# Patient Record
Sex: FEMALE | Race: WHITE | NOT HISPANIC OR LATINO | Employment: OTHER | URBAN - NONMETROPOLITAN AREA
[De-identification: names, ages, dates, MRNs, and addresses within clinical notes are randomized per-mention and may not be internally consistent; named-entity substitution may affect disease eponyms.]

---

## 2017-02-06 ENCOUNTER — LAB REQUISITION (OUTPATIENT)
Dept: LAB | Facility: HOSPITAL | Age: 72
End: 2017-02-06

## 2017-02-06 DIAGNOSIS — Z00.00 ENCOUNTER FOR GENERAL ADULT MEDICAL EXAMINATION WITHOUT ABNORMAL FINDINGS: ICD-10-CM

## 2017-02-06 PROCEDURE — 87186 SC STD MICRODIL/AGAR DIL: CPT | Performed by: INTERNAL MEDICINE

## 2017-02-06 PROCEDURE — 87086 URINE CULTURE/COLONY COUNT: CPT | Performed by: INTERNAL MEDICINE

## 2017-02-06 PROCEDURE — 87077 CULTURE AEROBIC IDENTIFY: CPT | Performed by: INTERNAL MEDICINE

## 2017-02-08 LAB — BACTERIA SPEC AEROBE CULT: ABNORMAL

## 2017-02-12 ENCOUNTER — HOSPITAL ENCOUNTER (EMERGENCY)
Facility: HOSPITAL | Age: 72
Discharge: HOME | End: 2017-02-12
Attending: EMERGENCY MEDICINE | Admitting: EMERGENCY MEDICINE

## 2017-02-12 ENCOUNTER — APPOINTMENT (OUTPATIENT)
Dept: GENERAL RADIOLOGY | Facility: HOSPITAL | Age: 72
End: 2017-02-12

## 2017-02-12 VITALS
OXYGEN SATURATION: 99 % | DIASTOLIC BLOOD PRESSURE: 81 MMHG | HEIGHT: 59 IN | RESPIRATION RATE: 18 BRPM | WEIGHT: 151 LBS | TEMPERATURE: 97.8 F | BODY MASS INDEX: 30.44 KG/M2 | SYSTOLIC BLOOD PRESSURE: 157 MMHG | HEART RATE: 91 BPM

## 2017-02-12 DIAGNOSIS — M54.12 CERVICAL RADICULOPATHY: Primary | ICD-10-CM

## 2017-02-12 PROCEDURE — 73030 X-RAY EXAM OF SHOULDER: CPT

## 2017-02-12 PROCEDURE — 72050 X-RAY EXAM NECK SPINE 4/5VWS: CPT

## 2017-02-12 PROCEDURE — 99282 EMERGENCY DEPT VISIT SF MDM: CPT

## 2017-02-12 RX ORDER — OXYCODONE AND ACETAMINOPHEN 7.5; 325 MG/1; MG/1
1 TABLET ORAL EVERY 4 HOURS PRN
Qty: 20 TABLET | Refills: 0 | Status: SHIPPED | OUTPATIENT
Start: 2017-02-12 | End: 2017-09-25 | Stop reason: ALTCHOICE

## 2017-02-12 RX ORDER — METHYLPREDNISOLONE 4 MG/1
TABLET ORAL
Qty: 21 TABLET | Refills: 0 | Status: SHIPPED | OUTPATIENT
Start: 2017-02-12 | End: 2017-09-25 | Stop reason: ALTCHOICE

## 2017-02-12 NOTE — ED PROVIDER NOTES
Subjective   Patient is a 72 y.o. female presenting with neck pain.   History provided by:  Patient   used: No    Neck Pain   Pain location:  R side  Quality:  Aching  Pain radiates to:  R shoulder and R arm  Pain severity:  Severe  Pain is:  Same all the time  Onset quality:  Gradual  Duration:  2 days  Timing:  Constant  Progression:  Unchanged  Chronicity:  New  Context: not fall, not jumping from heights, not lifting a heavy object, not MCA, not MVC, not pedestrian accident and not recent injury    Relieved by:  Nothing  Worsened by:  Nothing  Ineffective treatments:  None tried  Associated symptoms: no bladder incontinence, no bowel incontinence, no chest pain, no fever and no headaches    Risk factors: no hx of head and neck radiation, no hx of osteoporosis and no hx of spinal trauma        Review of Systems   Constitutional: Negative.  Negative for fever.   HENT: Negative.    Cardiovascular: Negative.  Negative for chest pain.   Gastrointestinal: Negative.  Negative for bowel incontinence.   Genitourinary: Negative.  Negative for bladder incontinence.   Musculoskeletal: Positive for neck pain.   Skin: Negative.    Neurological: Negative.  Negative for headaches.   Hematological: Negative.    Psychiatric/Behavioral: Negative.    All other systems reviewed and are negative.      Past Medical History   Diagnosis Date   • Arthritis    • Diabetes mellitus    • Hypertension        Allergies   Allergen Reactions   • Aspirin        Past Surgical History   Procedure Laterality Date   • Total knee arthroplasty     • Breast biopsy         History reviewed. No pertinent family history.    Social History     Social History   • Marital status:      Spouse name: N/A   • Number of children: N/A   • Years of education: N/A     Social History Main Topics   • Smoking status: Never Smoker   • Smokeless tobacco: None   • Alcohol use No   • Drug use: No   • Sexual activity: Not Asked     Other Topics  Concern   • None     Social History Narrative   • None           Objective   Physical Exam   Constitutional: She is oriented to person, place, and time. She appears well-nourished.   HENT:   Head: Normocephalic and atraumatic.   Eyes: EOM are normal. Pupils are equal, round, and reactive to light.   Neck: Normal range of motion. Neck supple.   Musculoskeletal: Normal range of motion.   Neurological: She is alert and oriented to person, place, and time.   Skin: Skin is warm.   Psychiatric: She has a normal mood and affect. Her behavior is normal.   Nursing note and vitals reviewed.      Procedures         ED Course  ED Course                  MDM  Number of Diagnoses or Management Options  Cervical radiculopathy: new and requires workup     Amount and/or Complexity of Data Reviewed  Clinical lab tests: ordered and reviewed  Tests in the radiology section of CPT®: ordered and reviewed    Risk of Complications, Morbidity, and/or Mortality  Presenting problems: moderate  Diagnostic procedures: moderate  Management options: moderate    Patient Progress  Patient progress: stable      Final diagnoses:   Cervical radiculopathy            Maciej Jo Jr., MD  02/12/17 9331

## 2017-09-25 ENCOUNTER — OFFICE VISIT (OUTPATIENT)
Dept: OTOLARYNGOLOGY | Facility: CLINIC | Age: 72
End: 2017-09-25

## 2017-09-25 VITALS
HEART RATE: 81 BPM | BODY MASS INDEX: 29.86 KG/M2 | HEIGHT: 59 IN | SYSTOLIC BLOOD PRESSURE: 140 MMHG | WEIGHT: 148.13 LBS | TEMPERATURE: 97.5 F | DIASTOLIC BLOOD PRESSURE: 64 MMHG

## 2017-09-25 DIAGNOSIS — K21.9 LARYNGOPHARYNGEAL REFLUX (LPR): Primary | ICD-10-CM

## 2017-09-25 DIAGNOSIS — R09.89 GLOBUS SENSATION: ICD-10-CM

## 2017-09-25 PROCEDURE — 31575 DIAGNOSTIC LARYNGOSCOPY: CPT | Performed by: NURSE PRACTITIONER

## 2017-09-25 PROCEDURE — 99203 OFFICE O/P NEW LOW 30 MIN: CPT | Performed by: NURSE PRACTITIONER

## 2017-09-25 RX ORDER — HYDROCODONE BITARTRATE AND ACETAMINOPHEN 10; 325 MG/1; MG/1
0.5 TABLET ORAL DAILY
Status: ON HOLD | COMMUNITY
End: 2018-09-24

## 2017-09-25 RX ORDER — FUROSEMIDE 20 MG/1
TABLET ORAL DAILY
COMMUNITY
Start: 2016-10-26

## 2017-09-25 RX ORDER — LEFLUNOMIDE 20 MG/1
TABLET ORAL
COMMUNITY
Start: 2016-10-08 | End: 2018-09-24 | Stop reason: HOSPADM

## 2017-09-25 RX ORDER — CARVEDILOL 6.25 MG/1
TABLET ORAL
COMMUNITY
Start: 2016-09-06 | End: 2018-08-22 | Stop reason: DRUGHIGH

## 2017-09-25 RX ORDER — MELATONIN
2000 DAILY
COMMUNITY

## 2017-09-25 RX ORDER — PRAVASTATIN SODIUM 40 MG
TABLET ORAL DAILY
Status: ON HOLD | COMMUNITY
Start: 2016-10-21 | End: 2018-09-19

## 2017-09-25 RX ORDER — PREDNISONE 1 MG/1
TABLET ORAL
COMMUNITY
Start: 2016-09-20 | End: 2018-08-03

## 2017-09-25 RX ORDER — RANITIDINE 150 MG/1
TABLET ORAL 2 TIMES DAILY
COMMUNITY
Start: 2016-09-18 | End: 2021-08-03

## 2017-09-25 RX ORDER — AMLODIPINE AND VALSARTAN 10; 320 MG/1; MG/1
1 TABLET ORAL
COMMUNITY
Start: 2016-10-07

## 2017-09-25 RX ORDER — POTASSIUM CHLORIDE 750 MG/1
TABLET, FILM COATED, EXTENDED RELEASE ORAL DAILY
COMMUNITY
Start: 2016-10-12

## 2017-09-25 NOTE — PROGRESS NOTES
YOB: 1945  Location: [facility]  Location Address: [address] [place] [address], [facility], [address] [place] [address]  Location Phone: [phone]    Chief Complaint   Patient presents with   • Sore Throat       History of Present Illness  Nuha Cali is a 72 y.o. female.  Nuha Cali is here for evaluation of ENT complaints. The patient has had problems with sore throats  The symptoms are not localized to a particular location. The patient has had a resolution of the symptoms. The symptoms have been present for the last several weeks The symptoms are aggravated by  reflux. The symptoms are improved by reflux medications.  She was positive for burning in her throat and she started a better reflux med which helped.  She is off the of the new med and denies further symptoms.  She is on Zantac BID.  She only has burning when she eats acidic foods.  Hx of endoscopy Dr. Morataya - unsure on date     Past Medical History:   Diagnosis Date   • Arthritis    • Chronic kidney disease     stage 3   • Diabetes mellitus    • GERD (gastroesophageal reflux disease)    • Hypertension    • Vitamin D deficiency        Past Surgical History:   Procedure Laterality Date   • ARM LESION/CYST EXCISION      nodules   • BACK SURGERY     • BREAST BIOPSY     • CATARACT EXTRACTION     • HYSTERECTOMY     • TOTAL KNEE ARTHROPLASTY           Current Outpatient Prescriptions:   •  amLODIPine-valsartan (EXFORGE)  MG per tablet, , Disp: , Rfl:   •  carvedilol (COREG) 6.25 MG tablet, , Disp: , Rfl:   •  cholecalciferol (VITAMIN D3) 1000 units tablet, Take 1,000 Units by mouth Daily., Disp: , Rfl:   •  furosemide (LASIX) 20 MG tablet, , Disp: , Rfl:   •  HYDROcodone-acetaminophen (NORCO)  MG per tablet, Take 1 tablet by mouth Every 6 (Six) Hours As Needed for Moderate Pain ., Disp: , Rfl:   •  leflunomide (ARAVA) 20 MG tablet, TAKE 1 TABLET BY MOUTH EVERY DAY, Disp: , Rfl:   •  potassium chloride (K-DUR) 10 MEQ CR tablet, , Disp:  , Rfl:   •  pravastatin (PRAVACHOL) 40 MG tablet, , Disp: , Rfl:   •  predniSONE (DELTASONE) 5 MG tablet, , Disp: , Rfl:   •  raNITIdine (ZANTAC) 150 MG tablet, , Disp: , Rfl:     Aspirin    Family History   Problem Relation Age of Onset   • Hypertension Other    • Diabetes Other    • Coronary artery disease Other    • Cancer Other        Social History     Social History   • Marital status:      Spouse name: N/A   • Number of children: N/A   • Years of education: N/A     Occupational History   • Not on file.     Social History Main Topics   • Smoking status: Never Smoker   • Smokeless tobacco: Never Used   • Alcohol use No   • Drug use: No   • Sexual activity: Not on file     Other Topics Concern   • Not on file     Social History Narrative       Review of Systems   Constitutional: Negative.    HENT:        SEE HPI   Eyes: Negative.    Respiratory: Negative.    Cardiovascular: Negative.    Gastrointestinal: Negative.    Endocrine: Negative.    Genitourinary: Negative.    Musculoskeletal: Negative.    Skin: Negative.    Allergic/Immunologic: Negative.    Neurological: Negative.    Hematological: Negative.    Psychiatric/Behavioral: Negative.        Vitals:    09/25/17 1046   BP: 140/64   Pulse: 81   Temp: 97.5 °F (36.4 °C)       Objective     Physical Exam  CONSTITUTIONAL: well nourished, alert, oriented, in no acute distress     COMMUNICATION AND VOICE: able to communicate normally, normal voice quality    HEAD: normocephalic, no lesions, atraumatic, no tenderness, no masses     FACE: appearance normal, no lesions, no tenderness, no deformities, facial motion symmetric    SALIVARY GLANDS: parotid glands with no tenderness, no swelling, no masses, submandibular glands with normal size, nontender    EYES: ocular motility normal, eyelids normal, orbits normal, no proptosis, conjunctiva normal , pupils equal, round     EARS:  Hearing: response to conversational voice normal bilaterally   External Ears: auricles  without lesions  Otoscopic: tympanic membrane appearance normal, no lesions, no perforation, normal mobility, no fluid    NOSE:  External Nose: structure normal, no tenderness on palpation, no nasal discharge, no lesions, no evidence of trauma, nostrils patent   Intranasal Exam: nasal mucosa normal, vestibule within normal limits, inferior turbinate normal, nasal septum midline     ORAL:  Lips: upper and lower lips without lesion   Teeth: dentition within normal limits for age   Gums: gingivae healthy   Oral Mucosa: oral mucosa normal, no mucosal lesions   Floor of Mouth: Warthin’s duct patent, mucosa normal  Tongue: lingual mucosa normal without lesions, normal tongue mobility   Palate: soft and hard palates with normal mucosa and structure  Oropharynx: oropharyngeal mucosa normal    HYPOPHARYNX:   LARYNX: see scope    NECK: neck appearance normal, no mass,  noted without erythema or tenderness    THYROID: no overt thyromegaly, no tenderness, nodules or mass present on palpation, position midline     LYMPH NODES: no lymphadenopathy    CHEST/RESPIRATORY: respiratory effort normal    CARDIOVASCULAR: extremities without cyanosis or edema      NEUROLOGIC/PSYCHIATRIC: oriented to time, place and person, mood normal, affect appropriate, CN II-XII intact grossly    Assessment/Plan   Nuha was seen today for sore throat.    Diagnoses and all orders for this visit:    Laryngopharyngeal reflux (LPR)    Globus sensation      * Surgery not found *  No orders of the defined types were placed in this encounter.    No Follow-up on file.       Patient Instructions   Gastroesophageal Reflux Disease, Adult  Normally, food travels down the esophagus and stays in the stomach to be digested. However, when a person has gastroesophageal reflux disease (GERD), food and stomach acid move back up into the esophagus. When this happens, the esophagus becomes sore and inflamed. Over time, GERD can create small holes (ulcers) in the lining of  the esophagus.   CAUSES  This condition is caused by a problem with the muscle between the esophagus and the stomach (lower esophageal sphincter, or LES). Normally, the LES muscle closes after food passes through the esophagus to the stomach. When the LES is weakened or abnormal, it does not close properly, and that allows food and stomach acid to go back up into the esophagus. The LES can be weakened by certain dietary substances, medicines, and medical conditions, including:  · Tobacco use.  · Pregnancy.  · Having a hiatal hernia.  · Heavy alcohol use.  · Certain foods and beverages, such as coffee, chocolate, onions, and peppermint.  RISK FACTORS  This condition is more likely to develop in:  · People who have an increased body weight.  · People who have connective tissue disorders.  · People who use NSAID medicines.  SYMPTOMS  Symptoms of this condition include:  · Heartburn.  · Difficult or painful swallowing.  · The feeling of having a lump in the throat.  · A bitter taste in the mouth.  · Bad breath.  · Having a large amount of saliva.  · Having an upset or bloated stomach.  · Belching.  · Chest pain.  · Shortness of breath or wheezing.  · Ongoing (chronic) cough or a night-time cough.  · Wearing away of tooth enamel.  · Weight loss.  Different conditions can cause chest pain. Make sure to see your health care provider if you experience chest pain.  DIAGNOSIS  Your health care provider will take a medical history and perform a physical exam. To determine if you have mild or severe GERD, your health care provider may also monitor how you respond to treatment. You may also have other tests, including:  · An endoscopy to examine your stomach and esophagus with a small camera.  · A test that measures the acidity level in your esophagus.  · A test that measures how much pressure is on your esophagus.  · A barium swallow or modified barium swallow to show the shape, size, and functioning of your  esophagus.  TREATMENT  The goal of treatment is to help relieve your symptoms and to prevent complications. Treatment for this condition may vary depending on how severe your symptoms are. Your health care provider may recommend:  · Changes to your diet.  · Medicine.  · Surgery.  HOME CARE INSTRUCTIONS  Diet  · Follow a diet as recommended by your health care provider. This may involve avoiding foods and drinks such as:    Coffee and tea (with or without caffeine).    Drinks that contain alcohol.    Energy drinks and sports drinks.    Carbonated drinks or sodas.    Chocolate and cocoa.    Peppermint and mint flavorings.    Garlic and onions.    Horseradish.    Spicy and acidic foods, including peppers, chili powder, weeks powder, vinegar, hot sauces, and barbecue sauce.    Citrus fruit juices and citrus fruits, such as oranges, rosa maria, and limes.    Tomato-based foods, such as red sauce, chili, salsa, and pizza with red sauce.    Fried and fatty foods, such as donuts, french fries, potato chips, and high-fat dressings.    High-fat meats, such as hot dogs and fatty cuts of red and white meats, such as rib eye steak, sausage, ham, and pastor.    High-fat dairy items, such as whole milk, butter, and cream cheese.  · Eat small, frequent meals instead of large meals.  · Avoid drinking large amounts of liquid with your meals.  · Avoid eating meals during the 2-3 hours before bedtime.  · Avoid lying down right after you eat.  · Do not exercise right after you eat.   General Instructions   · Pay attention to any changes in your symptoms.  · Take over-the-counter and prescription medicines only as told by your health care provider. Do not take aspirin, ibuprofen, or other NSAIDs unless your health care provider told you to do so.  · Do not use any tobacco products, including cigarettes, chewing tobacco, and e-cigarettes. If you need help quitting, ask your health care provider.  · Wear loose-fitting clothing. Do not wear  anything tight around your waist that causes pressure on your abdomen.  · Raise (elevate) the head of your bed 6 inches (15cm).  · Try to reduce your stress, such as with yoga or meditation. If you need help reducing stress, ask your health care provider.  · If you are overweight, reduce your weight to an amount that is healthy for you. Ask your health care provider for guidance about a safe weight loss goal.  · Keep all follow-up visits as told by your health care provider. This is important.  SEEK MEDICAL CARE IF:  · You have new symptoms.  · You have unexplained weight loss.  · You have difficulty swallowing, or it hurts to swallow.  · You have wheezing or a persistent cough.  · Your symptoms do not improve with treatment.  · You have a hoarse voice.  SEEK IMMEDIATE MEDICAL CARE IF:  · You have pain in your arms, neck, jaw, teeth, or back.  · You feel sweaty, dizzy, or light-headed.  · You have chest pain or shortness of breath.  · You vomit and your vomit looks like blood or coffee grounds.  · You faint.  · Your stool is bloody or black.  · You cannot swallow, drink, or eat.     This information is not intended to replace advice given to you by your health care provider. Make sure you discuss any questions you have with your health care provider.     Document Released: 09/27/2006 Document Revised: 09/07/2016 Document Reviewed: 04/13/2016  [employer] Interactive Patient Education ©2017 [employer]

## 2017-09-25 NOTE — PATIENT INSTRUCTIONS
Gastroesophageal Reflux Disease, Adult  Normally, food travels down the esophagus and stays in the stomach to be digested. However, when a person has gastroesophageal reflux disease (GERD), food and stomach acid move back up into the esophagus. When this happens, the esophagus becomes sore and inflamed. Over time, GERD can create small holes (ulcers) in the lining of the esophagus.   CAUSES  This condition is caused by a problem with the muscle between the esophagus and the stomach (lower esophageal sphincter, or LES). Normally, the LES muscle closes after food passes through the esophagus to the stomach. When the LES is weakened or abnormal, it does not close properly, and that allows food and stomach acid to go back up into the esophagus. The LES can be weakened by certain dietary substances, medicines, and medical conditions, including:  · Tobacco use.  · Pregnancy.  · Having a hiatal hernia.  · Heavy alcohol use.  · Certain foods and beverages, such as coffee, chocolate, onions, and peppermint.  RISK FACTORS  This condition is more likely to develop in:  · People who have an increased body weight.  · People who have connective tissue disorders.  · People who use NSAID medicines.  SYMPTOMS  Symptoms of this condition include:  · Heartburn.  · Difficult or painful swallowing.  · The feeling of having a lump in the throat.  · A bitter taste in the mouth.  · Bad breath.  · Having a large amount of saliva.  · Having an upset or bloated stomach.  · Belching.  · Chest pain.  · Shortness of breath or wheezing.  · Ongoing (chronic) cough or a night-time cough.  · Wearing away of tooth enamel.  · Weight loss.  Different conditions can cause chest pain. Make sure to see your health care provider if you experience chest pain.  DIAGNOSIS  Your health care provider will take a medical history and perform a physical exam. To determine if you have mild or severe GERD, your health care provider may also monitor how you respond  to treatment. You may also have other tests, including:  · An endoscopy to examine your stomach and esophagus with a small camera.  · A test that measures the acidity level in your esophagus.  · A test that measures how much pressure is on your esophagus.  · A barium swallow or modified barium swallow to show the shape, size, and functioning of your esophagus.  TREATMENT  The goal of treatment is to help relieve your symptoms and to prevent complications. Treatment for this condition may vary depending on how severe your symptoms are. Your health care provider may recommend:  · Changes to your diet.  · Medicine.  · Surgery.  HOME CARE INSTRUCTIONS  Diet  · Follow a diet as recommended by your health care provider. This may involve avoiding foods and drinks such as:    Coffee and tea (with or without caffeine).    Drinks that contain alcohol.    Energy drinks and sports drinks.    Carbonated drinks or sodas.    Chocolate and cocoa.    Peppermint and mint flavorings.    Garlic and onions.    Horseradish.    Spicy and acidic foods, including peppers, chili powder, weeks powder, vinegar, hot sauces, and barbecue sauce.    Citrus fruit juices and citrus fruits, such as oranges, rosa maria, and limes.    Tomato-based foods, such as red sauce, chili, salsa, and pizza with red sauce.    Fried and fatty foods, such as donuts, french fries, potato chips, and high-fat dressings.    High-fat meats, such as hot dogs and fatty cuts of red and white meats, such as rib eye steak, sausage, ham, and pastor.    High-fat dairy items, such as whole milk, butter, and cream cheese.  · Eat small, frequent meals instead of large meals.  · Avoid drinking large amounts of liquid with your meals.  · Avoid eating meals during the 2-3 hours before bedtime.  · Avoid lying down right after you eat.  · Do not exercise right after you eat.   General Instructions   · Pay attention to any changes in your symptoms.  · Take over-the-counter and prescription  medicines only as told by your health care provider. Do not take aspirin, ibuprofen, or other NSAIDs unless your health care provider told you to do so.  · Do not use any tobacco products, including cigarettes, chewing tobacco, and e-cigarettes. If you need help quitting, ask your health care provider.  · Wear loose-fitting clothing. Do not wear anything tight around your waist that causes pressure on your abdomen.  · Raise (elevate) the head of your bed 6 inches (15cm).  · Try to reduce your stress, such as with yoga or meditation. If you need help reducing stress, ask your health care provider.  · If you are overweight, reduce your weight to an amount that is healthy for you. Ask your health care provider for guidance about a safe weight loss goal.  · Keep all follow-up visits as told by your health care provider. This is important.  SEEK MEDICAL CARE IF:  · You have new symptoms.  · You have unexplained weight loss.  · You have difficulty swallowing, or it hurts to swallow.  · You have wheezing or a persistent cough.  · Your symptoms do not improve with treatment.  · You have a hoarse voice.  SEEK IMMEDIATE MEDICAL CARE IF:  · You have pain in your arms, neck, jaw, teeth, or back.  · You feel sweaty, dizzy, or light-headed.  · You have chest pain or shortness of breath.  · You vomit and your vomit looks like blood or coffee grounds.  · You faint.  · Your stool is bloody or black.  · You cannot swallow, drink, or eat.     This information is not intended to replace advice given to you by your health care provider. Make sure you discuss any questions you have with your health care provider.     Document Released: 09/27/2006 Document Revised: 09/07/2016 Document Reviewed: 04/13/2016  [employer] Interactive Patient Education ©2017 [employer]

## 2017-09-25 NOTE — PROGRESS NOTES
OPERATIVE NOTE:    PROCEDURE:   Flexible Fiberoptic Laryngoscopy    ANESTHESIA:  None    REASON FOR PROCEDURE:  Procedure was recommend for suspicious clinical behavior unresponsive to medical management.  Risks, benefits and alternatives were discussed.      DETAILS of OPERATION:  The patient was seated in the exam chair.  A flexible fiberoptic laryngoscopy was performed through the oral cavity.  The scope was introduced into the oral cavity and directed to the level of the glottis, examining the structures of the oropharynx, base of tongue, vallecula, supraglottic larynx, glottic larynx, and hypopharynx.      FINDINGS:  Mucosal surfaces:   The mucosal surfaces demonstrated normal mucosa surfaces without inflammation.    Base of tongue:  The base of tongue was found to have no mass or lesion.    Epiglottis:  The epiglottis was found to have no mass or lesion.    Aryepligottic fold:  The AE folds were found to have no mass or lesion.    False Vocal Fold:  The false cords were found to have no mass or lesion.    True Vocal Cord:  The true vocal cords were found to have no mass or lesion.    Arytenoid:   The arytenoids were found to have erythema/edema    Hypopharynx:  The hypopharynx was found to have no mass or lesion.    The patient tolerated procedure well.

## 2018-08-03 ENCOUNTER — OFFICE VISIT (OUTPATIENT)
Dept: GASTROENTEROLOGY | Facility: CLINIC | Age: 73
End: 2018-08-03

## 2018-08-03 VITALS
WEIGHT: 136 LBS | OXYGEN SATURATION: 99 % | DIASTOLIC BLOOD PRESSURE: 70 MMHG | HEIGHT: 59 IN | SYSTOLIC BLOOD PRESSURE: 120 MMHG | BODY MASS INDEX: 27.42 KG/M2 | HEART RATE: 94 BPM

## 2018-08-03 DIAGNOSIS — D64.9 ANEMIA, UNSPECIFIED TYPE: Primary | ICD-10-CM

## 2018-08-03 DIAGNOSIS — K57.20 DIVERTICULITIS OF LARGE INTESTINE WITH ABSCESS WITHOUT BLEEDING: ICD-10-CM

## 2018-08-03 DIAGNOSIS — R19.4 CHANGE IN BOWEL HABITS: ICD-10-CM

## 2018-08-03 DIAGNOSIS — I10 HTN (HYPERTENSION), BENIGN: ICD-10-CM

## 2018-08-03 DIAGNOSIS — E11.9 CONTROLLED TYPE 2 DIABETES MELLITUS WITHOUT COMPLICATION, WITHOUT LONG-TERM CURRENT USE OF INSULIN (HCC): ICD-10-CM

## 2018-08-03 DIAGNOSIS — R19.5 HEME POSITIVE STOOL: ICD-10-CM

## 2018-08-03 PROCEDURE — 99214 OFFICE O/P EST MOD 30 MIN: CPT | Performed by: CLINICAL NURSE SPECIALIST

## 2018-08-03 RX ORDER — PANTOPRAZOLE SODIUM 40 MG/1
40 TABLET, DELAYED RELEASE ORAL DAILY
COMMUNITY
End: 2018-08-22

## 2018-08-03 NOTE — PROGRESS NOTES
Nuha Cali  1945    8/3/2018  Chief Complaint   Patient presents with   • GI Problem     Anemia and weight loss     Subjective   HPI  Nuha Cali is a 73 y.o. female who presents with a multiple GI complaints to include early satiety, change in bowels, and heme positive stools. She has associated anemia with labs on 5/321/2018 showing H/H 9.7/33. She has not required a blood transfusion. She says that she had one with a knee replacement a few years ago. She was heme positive stool x1 with her PCP incidental finding on exam. Anemia was new for her as well. No visible bleeding. Iron 58, Ferritin 110, TIBC 290. Iron Sat 20%. She says that since Feb 2018 she has had a change in her bowels to more of an alternating pattern with diarrhea and constipation at times. No certain triggers. No alleviating factors. She has the feeling of fullness throughout her abdomen at times.  She has had a loss in appetite with assoc wt loss of 14 pounds since Feb 2018. She denies any nausea or vomiting. No fever chills or sweats. No family hx for colon cancer. She has had colonoscopy in 2015 reviewed today.   Past Medical History:   Diagnosis Date   • Arthritis    • Chronic kidney disease     stage 3   • DDD (degenerative disc disease), cervical    • Diabetes mellitus (CMS/HCC)    • GERD (gastroesophageal reflux disease)    • Hx of colonic polyp    • Hypertension    • Vitamin D deficiency      Past Surgical History:   Procedure Laterality Date   • ARM LESION/CYST EXCISION      nodules   • BACK SURGERY      x 2   • BREAST BIOPSY     • CATARACT EXTRACTION     • COLONOSCOPY  01/19/2015    Diverticulosis repeat exam in 5 years   • COLONOSCOPY W/ POLYPECTOMY  11/24/2009    Tubular adenoma cecal pit mild atypia repeat exam in 5 years   • ENDOSCOPY  1999    Chronic active gastritis severe with focal superficial ulceration urea + treated   • HYSTERECTOMY     • TOTAL KNEE ARTHROPLASTY      x 2     Outpatient Prescriptions Marked as  Taking for the 8/3/18 encounter (Office Visit) with Laverne Wing APRN   Medication Sig Dispense Refill   • amLODIPine-valsartan (EXFORGE)  MG per tablet      • carvedilol (COREG) 6.25 MG tablet      • cholecalciferol (VITAMIN D3) 1000 units tablet Take 1,000 Units by mouth Daily.     • furosemide (LASIX) 20 MG tablet      • HYDROcodone-acetaminophen (NORCO)  MG per tablet Take 1 tablet by mouth Every 6 (Six) Hours As Needed for Moderate Pain .     • leflunomide (ARAVA) 20 MG tablet TAKE 1 TABLET BY MOUTH EVERY DAY     • pantoprazole (PROTONIX) 40 MG EC tablet Take 40 mg by mouth Daily.     • potassium chloride (K-DUR) 10 MEQ CR tablet      • pravastatin (PRAVACHOL) 40 MG tablet      • raNITIdine (ZANTAC) 150 MG tablet        Allergies   Allergen Reactions   • Aspirin Swelling     Social History     Social History   • Marital status:      Spouse name: N/A   • Number of children: N/A   • Years of education: N/A     Occupational History   • Not on file.     Social History Main Topics   • Smoking status: Never Smoker   • Smokeless tobacco: Never Used   • Alcohol use No   • Drug use: No   • Sexual activity: Not on file     Other Topics Concern   • Not on file     Social History Narrative   • No narrative on file     Family History   Problem Relation Age of Onset   • Hypertension Other    • Diabetes Other    • Coronary artery disease Other    • Cancer Other    • Colon cancer Neg Hx    • Colon polyps Neg Hx      Health Maintenance   Topic Date Due   • URINE MICROALBUMIN  1945   • TDAP/TD VACCINES (1 - Tdap) 01/22/1962   • ZOSTER VACCINE (1 of 2) 01/22/1993   • PNEUMOCOCCAL VACCINES (65+ LOW/MEDIUM RISK) (1 of 2 - PCV13) 01/22/2008   • MEDICARE ANNUAL WELLNESS  09/25/2017   • DIABETIC FOOT EXAM  09/25/2017   • MAMMOGRAM  09/25/2017   • HEMOGLOBIN A1C  09/25/2017   • DIABETIC EYE EXAM  09/25/2017   • INFLUENZA VACCINE  08/01/2018   • COLONOSCOPY  01/19/2025     Review of Systems  "  Constitutional: Positive for fatigue and unexpected weight change (14 pounds since Feb 2018). Negative for activity change, appetite change, chills, diaphoresis and fever.   HENT: Negative for ear pain, hearing loss, mouth sores, sore throat, trouble swallowing and voice change.    Eyes: Negative.    Respiratory: Negative for cough, choking, shortness of breath and wheezing.    Cardiovascular: Negative for chest pain and palpitations.   Gastrointestinal: Positive for constipation and diarrhea. Negative for abdominal pain, blood in stool, nausea and vomiting.   Endocrine: Negative for cold intolerance and heat intolerance.   Genitourinary: Negative for decreased urine volume, dysuria, frequency, hematuria and urgency.   Musculoskeletal: Negative for back pain, gait problem and myalgias.   Skin: Positive for pallor. Negative for color change and rash.   Allergic/Immunologic: Negative for food allergies and immunocompromised state.   Neurological: Negative for dizziness, tremors, seizures, syncope, weakness, light-headedness, numbness and headaches.   Hematological: Negative for adenopathy. Does not bruise/bleed easily.   Psychiatric/Behavioral: Negative for agitation and confusion. The patient is not nervous/anxious.    All other systems reviewed and are negative.    Objective   Vitals:    08/03/18 0838   BP: 120/70   Pulse: 94   SpO2: 99%   Weight: 61.7 kg (136 lb)   Height: 149.9 cm (59\")     Body mass index is 27.47 kg/m².  Physical Exam   Constitutional: She is oriented to person, place, and time. She appears well-developed and well-nourished.   HENT:   Head: Normocephalic and atraumatic.   Eyes: Pupils are equal, round, and reactive to light.   Neck: Normal range of motion. Neck supple. No tracheal deviation present.   Cardiovascular: Normal rate, regular rhythm and normal heart sounds.  Exam reveals no gallop and no friction rub.    No murmur heard.  Pulmonary/Chest: Effort normal and breath sounds normal. No " respiratory distress. She has no wheezes. She has no rales. She exhibits no tenderness.   Abdominal: Soft. Bowel sounds are normal. She exhibits no distension. There is no hepatosplenomegaly. There is no tenderness. There is no rigidity, no rebound and no guarding.   Musculoskeletal: Normal range of motion. She exhibits no edema, tenderness or deformity.   Neurological: She is alert and oriented to person, place, and time. She has normal reflexes.   Skin: Skin is warm and dry. No rash noted. No pallor.   Psychiatric: She has a normal mood and affect. Her behavior is normal. Judgment and thought content normal.     Assessment/Plan   Nuha was seen today for gi problem.    Diagnoses and all orders for this visit:    Anemia, unspecified type    Heme positive stool  -     Case Request; Standing  -     Follow Anesthesia Guidelines / Standing Orders; Future  -     Implement Anesthesia Orders Day of Procedure; Standing  -     Obtain Informed Consent; Standing  -     Verify bowel prep was successful; Standing  -     Case Request  -     Case Request; Standing  -     Follow Anesthesia Guidelines / Standing Orders; Future  -     Implement Anesthesia Orders Day of Procedure; Standing  -     Obtain Informed Consent; Standing  -     Case Request    Change in bowel habits  -     polyethylene glycol (GoLYTELY) 236 g solution; Take as directed by office instructions.    HTN (hypertension), benign    Controlled type 2 diabetes mellitus without complication, without long-term current use of insulin (CMS/[facility])      WORKUP HAS INCLUDED A CT SCAN AND I DO NOT HAVE THIS AVAILABLE TO ME AT THIS TIME. THE PATIENT SAYS THAT SHE HAD DIVERTICULITIS AND HAS BEEN TREATED WITH ANTIBIOTICS THIS WAS 6/25/2018.   Today her exam was benign nontender left lower quadrant no guarding no rebound    8/3/2018  12:55 PM  I have reviewed CT from 06/25/2018 she is noted to have localized inflammation in the fatty tissue around the sigmoid colon it is  consistent with diverticulitis suspected small abscess recommend colonoscopy to rule out underlying malignancy    COLONOSCOPY WITH ANESTHESIA (N/A)  EMR Dragon/transcription disclaimer: Much of this encounter note is electronic transcription/translation of spoken language to printed text. The electronic translation of spoken language may be erroneous, or at times, nonsensical words or phrases may be inadvertently transcribed. Although I have reviewed the note for such errors, some may still exist.  Body mass index is 27.47 kg/m².  No Follow-up on file.    Patient's Body mass index is 27.47 kg/m². BMI is above normal parameters. Recommendations include: nutrition counseling.      All risks, benefits, alternatives, and indications of colonoscopy and/or Endoscopy procedure have been discussed with the patient. Risks to include perforation of the colon requiring possible surgery or colostomy, risk of bleeding from biopsies or removal of colon tissue, possibility of missing a colon polyp or cancer, or adverse drug reaction.  Benefits to include the diagnosis and management of disease of the colon and rectum. Alternatives to include barium enema, radiographic evaluation, lab testing or no intervention. Pt verbalizes understanding and agrees.     Laverne Wing, APRN  8/3/2018  9:12 AM      Obesity, Adult  Obesity is the condition of having too much total body fat. Being overweight or obese means that your weight is greater than what is considered healthy for your body size. Obesity is determined by a measurement called BMI. BMI is an estimate of body fat and is calculated from height and weight. For adults, a BMI of 30 or higher is considered obese.  Obesity can eventually lead to other health concerns and major illnesses, including:  · Stroke.  · Coronary artery disease (CAD).  · Type 2 diabetes.  · Some types of cancer, including cancers of the colon, breast, uterus, and gallbladder.  · Osteoarthritis.  · High  blood pressure (hypertension).  · High cholesterol.  · Sleep apnea.  · Gallbladder stones.  · Infertility problems.  What are the causes?  The main cause of obesity is taking in (consuming) more calories than your body uses for energy. Other factors that contribute to this condition may include:  · Being born with genes that make you more likely to become obese.  · Having a medical condition that causes obesity. These conditions include:  ¨ Hypothyroidism.  ¨ Polycystic ovarian syndrome (PCOS).  ¨ Binge-eating disorder.  ¨ Cushing syndrome.  · Taking certain medicines, such as steroids, antidepressants, and seizure medicines.  · Not being physically active (sedentary lifestyle).  · Living where there are limited places to exercise safely or buy healthy foods.  · Not getting enough sleep.  What increases the risk?  The following factors may increase your risk of this condition:  · Having a family history of obesity.  · Being a woman of -American descent.  · Being a man of  descent.  What are the signs or symptoms?  Having excessive body fat is the main symptom of this condition.  How is this diagnosed?  This condition may be diagnosed based on:  · Your symptoms.  · Your medical history.  · A physical exam. Your health care provider may measure:  ¨ Your BMI. If you are an adult with a BMI between 25 and less than 30, you are considered overweight. If you are an adult with a BMI of 30 or higher, you are considered obese.  ¨ The distances around your hips and your waist (circumferences). These may be compared to each other to help diagnose your condition.  ¨ Your skinfold thickness. Your health care provider may gently pinch a fold of your skin and measure it.  How is this treated?  Treatment for this condition often includes changing your lifestyle. Treatment may include some or all of the following:  · Dietary changes. Work with your health care provider and a dietitian to set a weight-loss goal that is  healthy and reasonable for you. Dietary changes may include eating:  ¨ Smaller portions. A portion size is the amount of a particular food that is healthy for you to eat at one time. This varies from person to person.  ¨ Low-calorie or low-fat options.  ¨ More whole grains, fruits, and vegetables.  · Regular physical activity. This may include aerobic activity (cardio) and strength training.  · Medicine to help you lose weight. Your health care provider may prescribe medicine if you are unable to lose 1 pound a week after 6 weeks of eating more healthily and doing more physical activity.  · Surgery. Surgical options may include gastric banding and gastric bypass. Surgery may be done if:  ¨ Other treatments have not helped to improve your condition.  ¨ You have a BMI of 40 or higher.  ¨ You have life-threatening health problems related to obesity.  Follow these instructions at home:     Eating and drinking     · Follow recommendations from your health care provider about what you eat and drink. Your health care provider may advise you to:  ¨ Limit fast foods, sweets, and processed snack foods.  ¨ Choose low-fat options, such as low-fat milk instead of whole milk.  ¨ Eat 5 or more servings of fruits or vegetables every day.  ¨ Eat at home more often. This gives you more control over what you eat.  ¨ Choose healthy foods when you eat out.  ¨ Learn what a healthy portion size is.  ¨ Keep low-fat snacks on hand.  ¨ Avoid sugary drinks, such as soda, fruit juice, iced tea sweetened with sugar, and flavored milk.  ¨ Eat a healthy breakfast.  · Drink enough water to keep your urine clear or pale yellow.  · Do not go without eating for long periods of time (do not fast) or follow a fad diet. Fasting and fad diets can be unhealthy and even dangerous.  Physical Activity   · Exercise regularly, as told by your health care provider. Ask your health care provider what types of exercise are safe for you and how often you should  exercise.  · Warm up and stretch before being active.  · Cool down and stretch after being active.  · Rest between periods of activity.  Lifestyle   · Limit the time that you spend in front of your TV, computer, or video game system.  · Find ways to reward yourself that do not involve food.  · Limit alcohol intake to no more than 1 drink a day for nonpregnant women and 2 drinks a day for men. One drink equals 12 oz of beer, 5 oz of wine, or 1½ oz of hard liquor.  General instructions   · Keep a weight loss journal to keep track of the food you eat and how much you exercise you get.  · Take over-the-counter and prescription medicines only as told by your health care provider.  · Take vitamins and supplements only as told by your health care provider.  · Consider joining a support group. Your health care provider may be able to recommend a support group.  · Keep all follow-up visits as told by your health care provider. This is important.  Contact a health care provider if:  · You are unable to meet your weight loss goal after 6 weeks of dietary and lifestyle changes.  This information is not intended to replace advice given to you by your health care provider. Make sure you discuss any questions you have with your health care provider.  Document Released: 01/25/2006 Document Revised: 05/22/2017 Document Reviewed: 10/05/2016  [employer] Interactive Patient Education © 2017 [employer]      If you smoke or use tobacco, 4 minutes reading provided  Steps to Quit Smoking  Smoking tobacco can be harmful to your health and can affect almost every organ in your body. Smoking puts you, and those around you, at risk for developing many serious chronic diseases. Quitting smoking is difficult, but it is one of the best things that you can do for your health. It is never too late to quit.  What are the benefits of quitting smoking?  When you quit smoking, you lower your risk of developing serious diseases and conditions, such  as:  · Lung cancer or lung disease, such as COPD.  · Heart disease.  · Stroke.  · Heart attack.  · Infertility.  · Osteoporosis and bone fractures.  Additionally, symptoms such as coughing, wheezing, and shortness of breath may get better when you quit. You may also find that you get sick less often because your body is stronger at fighting off colds and infections. If you are pregnant, quitting smoking can help to reduce your chances of having a baby of low birth weight.  How do I get ready to quit?  When you decide to quit smoking, create a plan to make sure that you are successful. Before you quit:  · Pick a date to quit. Set a date within the next two weeks to give you time to prepare.  · Write down the reasons why you are quitting. Keep this list in places where you will see it often, such as on your bathroom mirror or in your car or wallet.  · Identify the people, places, things, and activities that make you want to smoke (triggers) and avoid them. Make sure to take these actions:  ¨ Throw away all cigarettes at home, at work, and in your car.  ¨ Throw away smoking accessories, such as ashtrays and lighters.  ¨ Clean your car and make sure to empty the ashtray.  ¨ Clean your home, including curtains and carpets.  · Tell your family, friends, and coworkers that you are quitting. Support from your loved ones can make quitting easier.  · Talk with your health care provider about your options for quitting smoking.  · Find out what treatment options are covered by your health insurance.  What strategies can I use to quit smoking?  Talk with your healthcare provider about different strategies to quit smoking. Some strategies include:  · Quitting smoking altogether instead of gradually lessening how much you smoke over a period of time. Research shows that quitting “cold turkey” is more successful than gradually quitting.  · Attending in-person counseling to help you build problem-solving skills. You are more likely  to have success in quitting if you attend several counseling sessions. Even short sessions of 10 minutes can be effective.  · Finding resources and support systems that can help you to quit smoking and remain smoke-free after you quit. These resources are most helpful when you use them often. They can include:  ¨ Online chats with a counselor.  ¨ Telephone quitlines.  ¨ Printed self-help materials.  ¨ Support groups or group counseling.  ¨ Text messaging programs.  ¨ Mobile phone applications.  · Taking medicines to help you quit smoking. (If you are pregnant or breastfeeding, talk with your health care provider first.) Some medicines contain nicotine and some do not. Both types of medicines help with cravings, but the medicines that include nicotine help to relieve withdrawal symptoms. Your health care provider may recommend:  ¨ Nicotine patches, gum, or lozenges.  ¨ Nicotine inhalers or sprays.  ¨ Non-nicotine medicine that is taken by mouth.  Talk with your health care provider about combining strategies, such as taking medicines while you are also receiving in-person counseling. Using these two strategies together makes you more likely to succeed in quitting than if you used either strategy on its own.  If you are pregnant or breastfeeding, talk with your health care provider about finding counseling or other support strategies to quit smoking. Do not take medicine to help you quit smoking unless told to do so by your health care provider.  What things can I do to make it easier to quit?  Quitting smoking might feel overwhelming at first, but there is a lot that you can do to make it easier. Take these important actions:  · Reach out to your family and friends and ask that they support and encourage you during this time. Call telephone quitlines, reach out to support groups, or work with a counselor for support.  · Ask people who smoke to avoid smoking around you.  · Avoid places that trigger you to smoke, such  as bars, parties, or smoke-break areas at work.  · Spend time around people who do not smoke.  · Lessen stress in your life, because stress can be a smoking trigger for some people. To lessen stress, try:  ¨ Exercising regularly.  ¨ Deep-breathing exercises.  ¨ Yoga.  ¨ Meditating.  ¨ Performing a body scan. This involves closing your eyes, scanning your body from head to toe, and noticing which parts of your body are particularly tense. Purposefully relax the muscles in those areas.  · Download or purchase mobile phone or tablet apps (applications) that can help you stick to your quit plan by providing reminders, tips, and encouragement. There are many free apps, such as QuitGuide from the CDC (Centers for Disease Control and Prevention). You can find other support for quitting smoking (smoking cessation) through smokefree.gov and other websites.  How will I feel when I quit smoking?  Within the first 24 hours of quitting smoking, you may start to feel some withdrawal symptoms. These symptoms are usually most noticeable 2-3 days after quitting, but they usually do not last beyond 2-3 weeks. Changes or symptoms that you might experience include:  · Mood swings.  · Restlessness, anxiety, or irritation.  · Difficulty concentrating.  · Dizziness.  · Strong cravings for sugary foods in addition to nicotine.  · Mild weight gain.  · Constipation.  · Nausea.  · Coughing or a sore throat.  · Changes in how your medicines work in your body.  · A depressed mood.  · Difficulty sleeping (insomnia).  After the first 2-3 weeks of quitting, you may start to notice more positive results, such as:  · Improved sense of smell and taste.  · Decreased coughing and sore throat.  · Slower heart rate.  · Lower blood pressure.  · Clearer skin.  · The ability to breathe more easily.  · Fewer sick days.  Quitting smoking is very challenging for most people. Do not get discouraged if you are not successful the first time. Some people need to  make many attempts to quit before they achieve long-term success. Do your best to stick to your quit plan, and talk with your health care provider if you have any questions or concerns.  This information is not intended to replace advice given to you by your health care provider. Make sure you discuss any questions you have with your health care provider.  Document Released: 12/12/2002 Document Revised: 08/15/2017 Document Reviewed: 05/03/2016  [employer] Interactive Patient Education © 2017 [employer]

## 2018-08-08 ENCOUNTER — APPOINTMENT (OUTPATIENT)
Dept: LAB | Facility: HOSPITAL | Age: 73
End: 2018-08-08
Attending: INTERNAL MEDICINE

## 2018-08-08 ENCOUNTER — TRANSCRIBE ORDERS (OUTPATIENT)
Dept: LAB | Facility: HOSPITAL | Age: 73
End: 2018-08-08

## 2018-08-08 ENCOUNTER — TELEPHONE (OUTPATIENT)
Dept: GASTROENTEROLOGY | Facility: CLINIC | Age: 73
End: 2018-08-08

## 2018-08-08 DIAGNOSIS — R10.84 ABDOMINAL PAIN, GENERALIZED: Primary | ICD-10-CM

## 2018-08-08 DIAGNOSIS — R19.4 CHANGE IN BOWEL HABITS: ICD-10-CM

## 2018-08-08 LAB
ALBUMIN SERPL-MCNC: 4 G/DL (ref 3.5–5)
ALBUMIN/GLOB SERPL: 1.1 G/DL (ref 1.1–2.5)
ALP SERPL-CCNC: 100 U/L (ref 24–120)
ALT SERPL W P-5'-P-CCNC: <15 U/L (ref 0–54)
ANION GAP SERPL CALCULATED.3IONS-SCNC: 8 MMOL/L (ref 4–13)
AST SERPL-CCNC: 19 U/L (ref 7–45)
BILIRUB SERPL-MCNC: 0.3 MG/DL (ref 0.1–1)
BUN BLD-MCNC: 22 MG/DL (ref 5–21)
BUN/CREAT SERPL: 14.7 (ref 7–25)
CALCIUM SPEC-SCNC: 9.6 MG/DL (ref 8.4–10.4)
CHLORIDE SERPL-SCNC: 107 MMOL/L (ref 98–110)
CO2 SERPL-SCNC: 28 MMOL/L (ref 24–31)
CREAT BLD-MCNC: 1.5 MG/DL (ref 0.5–1.4)
CRP SERPL-MCNC: 1.68 MG/DL (ref 0–0.99)
DEPRECATED RDW RBC AUTO: 44.4 FL (ref 40–54)
ERYTHROCYTE [DISTWIDTH] IN BLOOD BY AUTOMATED COUNT: 14.6 % (ref 12–15)
ERYTHROCYTE [SEDIMENTATION RATE] IN BLOOD: 39 MM/HR (ref 0–20)
GFR SERPL CREATININE-BSD FRML MDRD: 34 ML/MIN/1.73
GLOBULIN UR ELPH-MCNC: 3.5 GM/DL
GLUCOSE BLD-MCNC: 112 MG/DL (ref 70–100)
HCT VFR BLD AUTO: 31.3 % (ref 37–47)
HGB BLD-MCNC: 9.6 G/DL (ref 12–16)
MCH RBC QN AUTO: 26 PG (ref 28–32)
MCHC RBC AUTO-ENTMCNC: 30.7 G/DL (ref 33–36)
MCV RBC AUTO: 84.8 FL (ref 82–98)
PLATELET # BLD AUTO: 271 10*3/MM3 (ref 130–400)
PMV BLD AUTO: 8.7 FL (ref 6–12)
POTASSIUM BLD-SCNC: 4.2 MMOL/L (ref 3.5–5.3)
PROT SERPL-MCNC: 7.5 G/DL (ref 6.3–8.7)
RBC # BLD AUTO: 3.69 10*6/MM3 (ref 4.2–5.4)
SODIUM BLD-SCNC: 143 MMOL/L (ref 135–145)
WBC NRBC COR # BLD: 6.77 10*3/MM3 (ref 4.8–10.8)

## 2018-08-08 PROCEDURE — 80053 COMPREHEN METABOLIC PANEL: CPT | Performed by: INTERNAL MEDICINE

## 2018-08-08 PROCEDURE — 86140 C-REACTIVE PROTEIN: CPT | Performed by: INTERNAL MEDICINE

## 2018-08-08 PROCEDURE — 85651 RBC SED RATE NONAUTOMATED: CPT | Performed by: INTERNAL MEDICINE

## 2018-08-08 PROCEDURE — 36415 COLL VENOUS BLD VENIPUNCTURE: CPT

## 2018-08-08 PROCEDURE — 85027 COMPLETE CBC AUTOMATED: CPT | Performed by: INTERNAL MEDICINE

## 2018-08-08 NOTE — TELEPHONE ENCOUNTER
Saw Dr. Echavarria today, she has stool coming thru her vagina.  This started on Sunday.  She is having to wear a pad.  Dr. Echavarria is sending [place] [facility] [place] [facility] and also to see Dr. Justice lange.  She will call us when things get better.

## 2018-08-22 ENCOUNTER — OFFICE VISIT (OUTPATIENT)
Dept: OBSTETRICS AND GYNECOLOGY | Facility: CLINIC | Age: 73
End: 2018-08-22

## 2018-08-22 VITALS
HEIGHT: 59 IN | BODY MASS INDEX: 27.42 KG/M2 | SYSTOLIC BLOOD PRESSURE: 128 MMHG | DIASTOLIC BLOOD PRESSURE: 60 MMHG | WEIGHT: 136 LBS

## 2018-08-22 DIAGNOSIS — N82.3 RECTOVAGINAL FISTULA: Primary | ICD-10-CM

## 2018-08-22 PROCEDURE — 99203 OFFICE O/P NEW LOW 30 MIN: CPT | Performed by: OBSTETRICS & GYNECOLOGY

## 2018-08-22 RX ORDER — CARVEDILOL 12.5 MG/1
TABLET ORAL 2 TIMES DAILY WITH MEALS
COMMUNITY
Start: 2018-08-11

## 2018-08-22 NOTE — PROGRESS NOTES
Subjective   Nuha Cali is a 73 y.o. female  YOB: 1945    72 yo  s/p hysterectomy with bilateral salpingooopherectomy presents c/o brown discharge that started 2 weeks ago. Patient reports that it is continuous saturating pads. Was previously diagnosed with diverticulosis by colonoscopy. Denies fever or chills. Denies nausea, vomiting or diarrhea. Denies any other associated symptoms.     Chief Complaint   Patient presents with   • Possible Fistula     Pt referred by Dr. Echavarria for possible fistula. Pt has current thin, brown, odorous vaginal discharge.  First noticed 18.        HPI    Allergies   Allergen Reactions   • Aspirin Anaphylaxis and Swelling       Past Medical History:   Diagnosis Date   • Arthritis    • Chronic kidney disease     stage 3   • Diabetes mellitus (CMS/HCC)    • GERD (gastroesophageal reflux disease)    • Hx of colonic polyp    • Hypertension    • Rheumatoid arthritis (CMS/HCC)    • Vitamin D deficiency        Family History   Problem Relation Age of Onset   • Hypertension Other    • Diabetes Other    • Coronary artery disease Other    • Cancer Other    • No Known Problems Son    • No Known Problems Son    • No Known Problems Son    • Breast cancer Other    • Colon cancer Neg Hx    • Colon polyps Neg Hx    • Ovarian cancer Neg Hx        Social History     Social History   • Marital status:      Spouse name: N/A   • Number of children: N/A   • Years of education: N/A     Occupational History   • Not on file.     Social History Main Topics   • Smoking status: Never Smoker   • Smokeless tobacco: Never Used   • Alcohol use No   • Drug use: No   • Sexual activity: Not on file     Other Topics Concern   • Not on file     Social History Narrative   • No narrative on file         Current Outpatient Prescriptions:   •  amLODIPine-valsartan (EXFORGE)  MG per tablet, , Disp: , Rfl:   •  carvedilol (COREG) 12.5 MG tablet, , Disp: , Rfl:   •  cholecalciferol  (VITAMIN D3) 1000 units tablet, Take 1,000 Units by mouth Daily., Disp: , Rfl:   •  furosemide (LASIX) 20 MG tablet, , Disp: , Rfl:   •  HYDROcodone-acetaminophen (NORCO)  MG per tablet, Take 1 tablet by mouth Every 6 (Six) Hours As Needed for Moderate Pain ., Disp: , Rfl:   •  leflunomide (ARAVA) 20 MG tablet, TAKE 1 TABLET BY MOUTH EVERY DAY, Disp: , Rfl:   •  potassium chloride (K-DUR) 10 MEQ CR tablet, , Disp: , Rfl:   •  pravastatin (PRAVACHOL) 40 MG tablet, , Disp: , Rfl:   •  raNITIdine (ZANTAC) 150 MG tablet, , Disp: , Rfl:     No LMP recorded. Patient has had a hysterectomy.    Sexual History:         Could not be calculated    Past Surgical History:   Procedure Laterality Date   • ARM LESION/CYST EXCISION      nodules   • BACK SURGERY      x 2   • BREAST BIOPSY      x 2   • CATARACT EXTRACTION     • COLONOSCOPY  01/19/2015    Diverticulosis repeat exam in 5 years   • COLONOSCOPY W/ POLYPECTOMY  11/24/2009    Tubular adenoma cecal pit mild atypia repeat exam in 5 years   • ENDOSCOPY  1999    Chronic active gastritis severe with focal superficial ulceration urea + treated   • HYSTERECTOMY  1981    Heavy, painful periods.    • TOTAL KNEE ARTHROPLASTY      x 2       Review of Systems   Constitutional: Negative for activity change and unexpected weight loss.   HENT: Negative for congestion.    Cardiovascular: Negative for chest pain.   Gastrointestinal: Negative for blood in stool, constipation and diarrhea.   Endocrine: Negative for cold intolerance and heat intolerance.   Genitourinary: Positive for vaginal discharge. Negative for dyspareunia and pelvic pain.   Musculoskeletal: Negative for arthralgias, back pain, neck pain and neck stiffness.   Skin: Negative for rash.   Neurological: Negative for dizziness and headache.   Psychiatric/Behavioral: Negative for sleep disturbance. The patient is not nervous/anxious.        Objective   Physical Exam   Constitutional: She appears well-developed and  "well-nourished. No distress.   HENT:   Head: Normocephalic and atraumatic.   Pulmonary/Chest: Effort normal.   Abdominal: Soft. There is no tenderness.   Genitourinary: Pelvic exam was performed with patient supine. There is no tenderness or lesion on the right labia. There is no tenderness or lesion on the left labia. Uterus is absent.   Cervix is absent. Right adnexum displays no tenderness and no fullness. Left adnexum displays no tenderness and no fullness. No tenderness or bleeding in the vagina. No signs of injury around the vagina. Vaginal discharge (brown discharge noted out of left side of vaginal cuff measuring 3 to 4 mm) found.   Nursing note and vitals reviewed.        Vitals:    08/22/18 1347   BP: 128/60   BP Location: Left arm   Patient Position: Sitting   Cuff Size: Adult   Weight: 61.7 kg (136 lb)   Height: 149.9 cm (59\")       Nuha was seen today for possible fistula.    Diagnoses and all orders for this visit:    Rectovaginal fistula  -     Ambulatory Referral to General Surgery    -Follow up prn     Valentina Holly DO       "

## 2018-08-24 ENCOUNTER — TELEPHONE (OUTPATIENT)
Dept: OBSTETRICS AND GYNECOLOGY | Facility: CLINIC | Age: 73
End: 2018-08-24

## 2018-08-24 NOTE — TELEPHONE ENCOUNTER
Relayed message from Dr. Holly that Dr. Princess Demarco can do her fistula surgery and that office would be calling her to schedule OV.   Patient stated that she had been told that she has appointment with a Dr. Kimbrough (sp?) in [facility] on 9-10-18 at 8 am. She was unsure of who called her to tell her that.   She would rather have this repair done locally, though, and will await a call from Dr. Demarco's office.

## 2018-08-27 ENCOUNTER — TELEPHONE (OUTPATIENT)
Dept: OBSTETRICS AND GYNECOLOGY | Facility: CLINIC | Age: 73
End: 2018-08-27

## 2018-08-27 NOTE — TELEPHONE ENCOUNTER
Patient returned call and informed of appointment with Dr. Demarco.    Attempt to inform patient of upcoming appointment with Dr. Princess Demarco on 9-5-18 at 1:45 for evaluation of fistula.  [address].    (Pt has appt in [place] on 9-10-18 as well for the same thing. She has been told to hold on to this appt.in case Princess Demarco was unable to address her problem in surgery.)     Left message to return call to office.     Claire is sending her records to Dr. Demarco's office.

## 2018-09-11 ENCOUNTER — HOSPITAL ENCOUNTER (INPATIENT)
Facility: HOSPITAL | Age: 73
LOS: 13 days | Discharge: HOME | End: 2018-09-24
Attending: SPECIALIST | Admitting: SPECIALIST

## 2018-09-11 DIAGNOSIS — N82.4 COLOVAGINAL FISTULA: Primary | ICD-10-CM

## 2018-09-11 DIAGNOSIS — R19.5 HEME POSITIVE STOOL: ICD-10-CM

## 2018-09-11 LAB
ALBUMIN SERPL-MCNC: 3.8 G/DL (ref 3.5–5)
ALBUMIN/GLOB SERPL: 1.2 G/DL (ref 1.1–2.5)
ALP SERPL-CCNC: 79 U/L (ref 24–120)
ALT SERPL W P-5'-P-CCNC: 19 U/L (ref 0–54)
AMYLASE SERPL-CCNC: 41 U/L (ref 30–110)
ANION GAP SERPL CALCULATED.3IONS-SCNC: 8 MMOL/L (ref 4–13)
AST SERPL-CCNC: 32 U/L (ref 7–45)
BASOPHILS # BLD AUTO: 0.03 10*3/MM3 (ref 0–0.2)
BASOPHILS NFR BLD AUTO: 0.5 % (ref 0–2)
BILIRUB SERPL-MCNC: 0.4 MG/DL (ref 0.1–1)
BUN BLD-MCNC: 19 MG/DL (ref 5–21)
BUN/CREAT SERPL: 12.3 (ref 7–25)
CALCIUM SPEC-SCNC: 9.4 MG/DL (ref 8.4–10.4)
CHLORIDE SERPL-SCNC: 107 MMOL/L (ref 98–110)
CO2 SERPL-SCNC: 26 MMOL/L (ref 24–31)
CREAT BLD-MCNC: 1.54 MG/DL (ref 0.5–1.4)
DEPRECATED RDW RBC AUTO: 47.7 FL (ref 40–54)
EOSINOPHIL # BLD AUTO: 0.14 10*3/MM3 (ref 0–0.7)
EOSINOPHIL NFR BLD AUTO: 2.3 % (ref 0–4)
ERYTHROCYTE [DISTWIDTH] IN BLOOD BY AUTOMATED COUNT: 15.5 % (ref 12–15)
GFR SERPL CREATININE-BSD FRML MDRD: 33 ML/MIN/1.73
GLOBULIN UR ELPH-MCNC: 3.3 GM/DL
GLUCOSE BLD-MCNC: 132 MG/DL (ref 70–100)
HCT VFR BLD AUTO: 32.1 % (ref 37–47)
HGB BLD-MCNC: 10 G/DL (ref 12–16)
IMM GRANULOCYTES # BLD: 0.03 10*3/MM3 (ref 0–0.03)
IMM GRANULOCYTES NFR BLD: 0.5 % (ref 0–5)
INR PPP: 1 (ref 0.91–1.09)
LIPASE SERPL-CCNC: 76 U/L (ref 23–203)
LYMPHOCYTES # BLD AUTO: 0.69 10*3/MM3 (ref 0.72–4.86)
LYMPHOCYTES NFR BLD AUTO: 11.5 % (ref 15–45)
MCH RBC QN AUTO: 26.5 PG (ref 28–32)
MCHC RBC AUTO-ENTMCNC: 31.2 G/DL (ref 33–36)
MCV RBC AUTO: 84.9 FL (ref 82–98)
MONOCYTES # BLD AUTO: 0.53 10*3/MM3 (ref 0.19–1.3)
MONOCYTES NFR BLD AUTO: 8.8 % (ref 4–12)
NEUTROPHILS # BLD AUTO: 4.58 10*3/MM3 (ref 1.87–8.4)
NEUTROPHILS NFR BLD AUTO: 76.4 % (ref 39–78)
NRBC BLD MANUAL-RTO: 0 /100 WBC (ref 0–0)
PLATELET # BLD AUTO: 211 10*3/MM3 (ref 130–400)
PMV BLD AUTO: 8.4 FL (ref 6–12)
POTASSIUM BLD-SCNC: 4 MMOL/L (ref 3.5–5.3)
PROT SERPL-MCNC: 7.1 G/DL (ref 6.3–8.7)
PROTHROMBIN TIME: 13.5 SECONDS (ref 11.9–14.6)
RBC # BLD AUTO: 3.78 10*6/MM3 (ref 4.2–5.4)
SODIUM BLD-SCNC: 141 MMOL/L (ref 135–145)
WBC NRBC COR # BLD: 6 10*3/MM3 (ref 4.8–10.8)

## 2018-09-11 PROCEDURE — 85025 COMPLETE CBC W/AUTO DIFF WBC: CPT | Performed by: SPECIALIST

## 2018-09-11 PROCEDURE — 94799 UNLISTED PULMONARY SVC/PX: CPT

## 2018-09-11 PROCEDURE — 80053 COMPREHEN METABOLIC PANEL: CPT | Performed by: SPECIALIST

## 2018-09-11 PROCEDURE — 85610 PROTHROMBIN TIME: CPT | Performed by: SPECIALIST

## 2018-09-11 PROCEDURE — 94760 N-INVAS EAR/PLS OXIMETRY 1: CPT

## 2018-09-11 PROCEDURE — 83690 ASSAY OF LIPASE: CPT | Performed by: SPECIALIST

## 2018-09-11 PROCEDURE — 25010000002 PIPERACILLIN SOD-TAZOBACTAM PER 1 G: Performed by: SPECIALIST

## 2018-09-11 PROCEDURE — 82150 ASSAY OF AMYLASE: CPT | Performed by: SPECIALIST

## 2018-09-11 RX ORDER — ONDANSETRON 2 MG/ML
4 INJECTION INTRAMUSCULAR; INTRAVENOUS EVERY 4 HOURS PRN
Status: DISCONTINUED | OUTPATIENT
Start: 2018-09-11 | End: 2018-09-24 | Stop reason: HOSPADM

## 2018-09-11 RX ORDER — FUROSEMIDE 20 MG/1
20 TABLET ORAL DAILY
Status: DISCONTINUED | OUTPATIENT
Start: 2018-09-11 | End: 2018-09-14

## 2018-09-11 RX ORDER — PANTOPRAZOLE SODIUM 40 MG/1
40 TABLET, DELAYED RELEASE ORAL
Status: DISCONTINUED | OUTPATIENT
Start: 2018-09-11 | End: 2018-09-14

## 2018-09-11 RX ORDER — CARVEDILOL 6.25 MG/1
12.5 TABLET ORAL 2 TIMES DAILY WITH MEALS
Status: DISCONTINUED | OUTPATIENT
Start: 2018-09-11 | End: 2018-09-14

## 2018-09-11 RX ORDER — SODIUM CHLORIDE, SODIUM LACTATE, POTASSIUM CHLORIDE, CALCIUM CHLORIDE 600; 310; 30; 20 MG/100ML; MG/100ML; MG/100ML; MG/100ML
50 INJECTION, SOLUTION INTRAVENOUS CONTINUOUS
Status: DISCONTINUED | OUTPATIENT
Start: 2018-09-11 | End: 2018-09-14

## 2018-09-11 RX ADMIN — CARVEDILOL 12.5 MG: 6.25 TABLET, FILM COATED ORAL at 17:49

## 2018-09-11 RX ADMIN — TAZOBACTAM SODIUM AND PIPERACILLIN SODIUM 3.38 G: 375; 3 INJECTION, SOLUTION INTRAVENOUS at 15:11

## 2018-09-11 RX ADMIN — SODIUM CHLORIDE, POTASSIUM CHLORIDE, SODIUM LACTATE AND CALCIUM CHLORIDE 1000 ML: 600; 310; 30; 20 INJECTION, SOLUTION INTRAVENOUS at 13:00

## 2018-09-11 RX ADMIN — NYSTATIN: 100000 CREAM TOPICAL at 15:11

## 2018-09-11 RX ADMIN — SODIUM CHLORIDE, POTASSIUM CHLORIDE, SODIUM LACTATE AND CALCIUM CHLORIDE 50 ML/HR: 600; 310; 30; 20 INJECTION, SOLUTION INTRAVENOUS at 10:01

## 2018-09-11 RX ADMIN — TAZOBACTAM SODIUM AND PIPERACILLIN SODIUM 3.38 G: 375; 3 INJECTION, SOLUTION INTRAVENOUS at 20:58

## 2018-09-11 RX ADMIN — TAZOBACTAM SODIUM AND PIPERACILLIN SODIUM 3.38 G: 375; 3 INJECTION, SOLUTION INTRAVENOUS at 10:07

## 2018-09-11 NOTE — PLAN OF CARE
Problem: Patient Care Overview  Goal: Plan of Care Review  Outcome: Ongoing (interventions implemented as appropriate)   09/11/18 1711   Coping/Psychosocial   Plan of Care Reviewed With patient;spouse   Plan of Care Review   Progress no change   OTHER   Outcome Summary Received patient as a direct admit to Dr. Demarco. Denies pain/nausea. Patient is voiding however, stool is noted in her urine. Patient states that it comes out when she voids. IVF given, including a bolus. IV abx cont. Tolerating diet well. Ambulating well. Refuses SCD's at this time d/t patient requiring to get up to void so frequently. Will cont to monitor.      Goal: Individualization and Mutuality  Outcome: Ongoing (interventions implemented as appropriate)    Goal: Discharge Needs Assessment  Outcome: Ongoing (interventions implemented as appropriate)    Goal: Interprofessional Rounds/Family Conf  Outcome: Ongoing (interventions implemented as appropriate)      Problem: Infection, Risk/Actual (Adult)  Goal: Identify Related Risk Factors and Signs and Symptoms  Outcome: Ongoing (interventions implemented as appropriate)   09/11/18 1711   Infection, Risk/Actual (Adult)   Related Risk Factors (Infection, Risk/Actual) chronic illness/condition   Signs and Symptoms (Infection, Risk/Actual) nausea;other (see comments)  (stool passing through vagina. )     Goal: Infection Prevention/Resolution  Outcome: Ongoing (interventions implemented as appropriate)

## 2018-09-11 NOTE — PROGRESS NOTES
Action/Assessment/Plan:  Lovenox 40mg subQ q24h to start 9/12 at 0900. Labs/dose reviewed. Based on current renal function, will decrease to lovenox 30 mg subQ q24h.     Nuha Cali is a 73 y.o. female [Ht:  ; Wt:  ] on Lovenox for indication of VTE prophylaxis.    There is no height or weight on file to calculate BMI.  Estimated Creatinine Clearance: 26.9 mL/min (A) (by C-G formula based on SCr of 1.54 mg/dL (H)).  Lab Results   Component Value Date    INR 1.00 09/11/2018    INR 1.80 (H) 03/23/2015    INR 3.50 (H) 03/12/2015    PROTIME 13.5 09/11/2018    PROTIME 22.0 (H) 03/23/2015    PROTIME 42.6 (H) 03/12/2015     Lab Results   Component Value Date    HGB 10.0 (L) 09/11/2018    HGB 9.6 (L) 08/08/2018    HGB 10.6 (L) 09/16/2014     Lab Results   Component Value Date     09/11/2018     08/08/2018     09/16/2014     Tyler Del Cid, VidyaD

## 2018-09-11 NOTE — H&P
Princess Demarco MD [facility] History and Physical     Referring Provider: Princess Demarco MD    Patient Care Team:  Juan Echavarria MD as PCP - General  Juan Echavarria MD as PCP - Family Medicine  [facility], Cole Campos MD as Consulting Physician (Otolaryngology)  Tarun Morataya MD as Consulting Physician (Gastroenterology)    Chief complaint colovaginal fistula    Subjective .     History of present illness:  The patient is a 73 y.o. 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  She has been seen as an outpatient and has been placed on oral antibiotics in preparation for takedown of fistula and sigmoidectomy.  She is now admitted due to worsening of discharge, pain, and fatigue for intravenous antibiotic administration prior to the surgical procedure..    Review of Systems    Review of Systems - General ROS: negative  ENT ROS: negative  Respiratory ROS: no cough, shortness of breath, or wheezing  Cardiovascular ROS: no chest pain or dyspnea on exertion  Gastrointestinal ROS: no abdominal pain, change in bowel habits, or black or bloody stools  Genito-Urinary ROS: no dysuria, trouble voiding, or hematuria  Dermatological ROS: negative   Breast ROS: negative for breast lumps  Hematological and Lymphatic ROS: negative  Musculoskeletal ROS: negative   Neurological ROS: no TIA or stroke symptoms    Psychological ROS: negative  Endocrine ROS: negative    History  Past Medical History:   Diagnosis Date   • Arthritis    • Chronic kidney disease     stage 3   • Diabetes mellitus (CMS/HCC)    • GERD (gastroesophageal reflux disease)    • Hx of colonic polyp    • Hypertension    • Rheumatoid arthritis (CMS/HCC)    • Vitamin D deficiency    ,   Past Surgical History:   Procedure Laterality Date   • ARM LESION/CYST EXCISION      nodules   • BACK SURGERY      x 2   • BREAST BIOPSY      x 2   • CATARACT EXTRACTION     • COLONOSCOPY  01/19/2015    Diverticulosis repeat exam  in 5 years   • COLONOSCOPY W/ POLYPECTOMY  11/24/2009    Tubular adenoma cecal pit mild atypia repeat exam in 5 years   • ENDOSCOPY  1999    Chronic active gastritis severe with focal superficial ulceration urea + treated   • HYSTERECTOMY  1981    Heavy, painful periods.    • TOTAL KNEE ARTHROPLASTY      x 2   ,   Family History   Problem Relation Age of Onset   • Hypertension Other    • Diabetes Other    • Coronary artery disease Other    • Cancer Other    • No Known Problems Son    • No Known Problems Son    • No Known Problems Son    • Breast cancer Other    • Colon cancer Neg Hx    • Colon polyps Neg Hx    • Ovarian cancer Neg Hx    ,   Social History   Substance Use Topics   • Smoking status: Never Smoker   • Smokeless tobacco: Never Used   • Alcohol use No   ,   Prescriptions Prior to Admission   Medication Sig Dispense Refill Last Dose   • amLODIPine-valsartan (EXFORGE)  MG per tablet    Taking   • carvedilol (COREG) 12.5 MG tablet    Taking   • cholecalciferol (VITAMIN D3) 1000 units tablet Take 1,000 Units by mouth Daily.   Taking   • furosemide (LASIX) 20 MG tablet    Taking   • HYDROcodone-acetaminophen (NORCO)  MG per tablet Take 1 tablet by mouth Every 6 (Six) Hours As Needed for Moderate Pain .   Taking   • leflunomide (ARAVA) 20 MG tablet TAKE 1 TABLET BY MOUTH EVERY DAY   Taking   • potassium chloride (K-DUR) 10 MEQ CR tablet    Taking   • pravastatin (PRAVACHOL) 40 MG tablet    Taking   • raNITIdine (ZANTAC) 150 MG tablet    Taking    and Allergies:  Aspirin    Current Facility-Administered Medications:   •  amLODIPine (NORVASC) 10 mg, valsartan (DIOVAN) 320 mg for EXFORGE , , Oral, Daily, Princess Demarco MD  •  carvedilol (COREG) tablet 12.5 mg, 12.5 mg, Oral, BID With Meals, Princess Demarco MD  •  [START ON 9/12/2018] enoxaparin (LOVENOX) syringe 40 mg, 40 mg, Subcutaneous, Daily, Princess Demarco MD  •  furosemide (LASIX) tablet 20 mg, 20 mg, Oral, Daily, Dav, April L,  MD  •  lactated ringers bolus 1,000 mL, 1,000 mL, Intravenous, Once, Princess Demarco MD  •  lactated ringers infusion, 50 mL/hr, Intravenous, Continuous, Princess Demarco MD, Last Rate: 50 mL/hr at 09/11/18 1001, 50 mL/hr at 09/11/18 1001  •  ondansetron (ZOFRAN) injection 4 mg, 4 mg, Intravenous, Q4H PRN, Princess Demarco MD  •  pantoprazole (PROTONIX) EC tablet 40 mg, 40 mg, Oral, Q AM, Princess Demarco MD  •  piperacillin-tazobactam (ZOSYN) 3.375 g in iso-osmotic dextrose 50 ml (premix), 3.375 g, Intravenous, Q6H, Princess Demarco MD, 3.375 g at 09/11/18 1007  •  Naomy's amazing butt cream, , Topical, PRN, Princess Demarco MD    Objective     Vital Signs   Temp:  [97.6 °F (36.4 °C)] 97.6 °F (36.4 °C)  Heart Rate:  [74] 74  Resp:  [16] 16  BP: (107)/(41) 107/41    Physical Exam:  General appearance - alert, well appearing, and in no distress  Mental status - alert, oriented to person, place, and time  Neck - supple, no significant adenopathy  Chest - clear to auscultation, no wheezes, rales or rhonchi, symmetric air entry  Heart - normal rate, regular rhythm, normal S1, S2, no murmurs, rubs, clicks or gallops  Abdomen - soft, nontender, nondistended, no masses or organomegaly  Neurological - alert, oriented, normal speech, no focal findings or movement disorder noted  Musculoskeletal - no joint tenderness, deformity or swelling  Extremities - peripheral pulses normal, no pedal edema, no clubbing or cyanosis    Results Review:     Lab Results (last 24 hours)     Procedure Component Value Units Date/Time    Comprehensive Metabolic Panel [[id number]]  (Abnormal) Collected:  09/11/18 0932    Specimen:  Blood Updated:  09/11/18 1002     Glucose 132 (H) mg/dL      BUN 19 mg/dL      Creatinine 1.54 (H) mg/dL      Sodium 141 mmol/L      Potassium 4.0 mmol/L      Chloride 107 mmol/L      CO2 26.0 mmol/L      Calcium 9.4 mg/dL      Total Protein 7.1 g/dL      Albumin 3.80 g/dL      ALT (SGPT) 19 U/L      AST (SGOT) 32  U/L      Alkaline Phosphatase 79 U/L      Total Bilirubin 0.4 mg/dL      eGFR Non African Amer 33 (L) mL/min/1.73      Globulin 3.3 gm/dL      A/G Ratio 1.2 g/dL      BUN/Creatinine Ratio 12.3     Anion Gap 8.0 mmol/L     Narrative:       The MDRD GFR formula is only valid for adults with stable renal function between ages 18 and 70.    Amylase [[id number]]  (Normal) Collected:  09/11/18 0932    Specimen:  Blood Updated:  09/11/18 1002     Amylase 41 U/L     Lipase [[id number]]  (Normal) Collected:  09/11/18 0932    Specimen:  Blood Updated:  09/11/18 1002     Lipase 76 U/L     Protime-INR [[id number]]  (Normal) Collected:  09/11/18 0932    Specimen:  Blood Updated:  09/11/18 0949     Protime 13.5 Seconds      INR 1.00    CBC & Differential [[id number]] Collected:  09/11/18 0932    Specimen:  Blood Updated:  09/11/18 0941    Narrative:       The following orders were created for panel order CBC & Differential.  Procedure                               Abnormality         Status                     ---------                               -----------         ------                     CBC Auto Differential[[id number]]        Abnormal            Final result                 Please view results for these tests on the individual orders.    CBC Auto Differential [[id number]]  (Abnormal) Collected:  09/11/18 0932    Specimen:  Blood Updated:  09/11/18 0941     WBC 6.00 10*3/mm3      RBC 3.78 (L) 10*6/mm3      Hemoglobin 10.0 (L) g/dL      Hematocrit 32.1 (L) %      MCV 84.9 fL      MCH 26.5 (L) pg      MCHC 31.2 (L) g/dL      RDW 15.5 (H) %      RDW-SD 47.7 fl      MPV 8.4 fL      Platelets 211 10*3/mm3      Neutrophil % 76.4 %      Lymphocyte % 11.5 (L) %      Monocyte % 8.8 %      Eosinophil % 2.3 %      Basophil % 0.5 %      Immature Grans % 0.5 %      Neutrophils, Absolute 4.58 10*3/mm3      Lymphocytes, Absolute 0.69 (L) 10*3/mm3      Monocytes, Absolute 0.53 10*3/mm3      Eosinophils, Absolute 0.14 10*3/mm3      Basophils,  Absolute 0.03 10*3/mm3      Immature Grans, Absolute 0.03 10*3/mm3      nRBC 0.0 /100 WBC         Imaging Results (last 24 hours)     ** No results found for the last 24 hours. **            Assessment/Plan     sigmoid diverticulitis complicated by colovaginal fistula.  She will be admitted, hydrated, and iv antibiotics will be ordered.  Sigmoidectomy and takedown of the fistula will be scheduled.      Princess Demarco MD  09/11/18  11:52 AM

## 2018-09-12 ENCOUNTER — APPOINTMENT (OUTPATIENT)
Dept: GENERAL RADIOLOGY | Facility: HOSPITAL | Age: 73
End: 2018-09-12

## 2018-09-12 LAB
C DIFF TOX GENS STL QL NAA+PROBE: POSITIVE
DEPRECATED RDW RBC AUTO: 47.2 FL (ref 40–54)
ERYTHROCYTE [DISTWIDTH] IN BLOOD BY AUTOMATED COUNT: 15.2 % (ref 12–15)
HCT VFR BLD AUTO: 29.5 % (ref 37–47)
HGB BLD-MCNC: 9.4 G/DL (ref 12–16)
MCH RBC QN AUTO: 27.2 PG (ref 28–32)
MCHC RBC AUTO-ENTMCNC: 31.9 G/DL (ref 33–36)
MCV RBC AUTO: 85.3 FL (ref 82–98)
PLATELET # BLD AUTO: 178 10*3/MM3 (ref 130–400)
PMV BLD AUTO: 8.8 FL (ref 6–12)
RBC # BLD AUTO: 3.46 10*6/MM3 (ref 4.2–5.4)
WBC NRBC COR # BLD: 4.42 10*3/MM3 (ref 4.8–10.8)

## 2018-09-12 PROCEDURE — 87493 C DIFF AMPLIFIED PROBE: CPT | Performed by: SPECIALIST

## 2018-09-12 PROCEDURE — 85027 COMPLETE CBC AUTOMATED: CPT | Performed by: SPECIALIST

## 2018-09-12 PROCEDURE — 25010000002 PIPERACILLIN SOD-TAZOBACTAM PER 1 G: Performed by: SPECIALIST

## 2018-09-12 PROCEDURE — 94799 UNLISTED PULMONARY SVC/PX: CPT

## 2018-09-12 PROCEDURE — 94760 N-INVAS EAR/PLS OXIMETRY 1: CPT

## 2018-09-12 PROCEDURE — 93005 ELECTROCARDIOGRAM TRACING: CPT | Performed by: SPECIALIST

## 2018-09-12 PROCEDURE — 93010 ELECTROCARDIOGRAM REPORT: CPT | Performed by: INTERNAL MEDICINE

## 2018-09-12 PROCEDURE — 25010000002 ENOXAPARIN PER 10 MG: Performed by: SPECIALIST

## 2018-09-12 PROCEDURE — 71045 X-RAY EXAM CHEST 1 VIEW: CPT

## 2018-09-12 RX ORDER — LOPERAMIDE HYDROCHLORIDE 2 MG/1
2 CAPSULE ORAL 4 TIMES DAILY PRN
Status: DISCONTINUED | OUTPATIENT
Start: 2018-09-12 | End: 2018-09-13

## 2018-09-12 RX ADMIN — TAZOBACTAM SODIUM AND PIPERACILLIN SODIUM 3.38 G: 375; 3 INJECTION, SOLUTION INTRAVENOUS at 23:21

## 2018-09-12 RX ADMIN — CARVEDILOL 12.5 MG: 6.25 TABLET, FILM COATED ORAL at 08:23

## 2018-09-12 RX ADMIN — METRONIDAZOLE 500 MG: 500 INJECTION, SOLUTION INTRAVENOUS at 11:51

## 2018-09-12 RX ADMIN — PANTOPRAZOLE SODIUM 40 MG: 40 TABLET, DELAYED RELEASE ORAL at 05:35

## 2018-09-12 RX ADMIN — CARVEDILOL 12.5 MG: 6.25 TABLET, FILM COATED ORAL at 18:22

## 2018-09-12 RX ADMIN — SODIUM CHLORIDE, POTASSIUM CHLORIDE, SODIUM LACTATE AND CALCIUM CHLORIDE 50 ML/HR: 600; 310; 30; 20 INJECTION, SOLUTION INTRAVENOUS at 10:11

## 2018-09-12 RX ADMIN — FUROSEMIDE 20 MG: 20 TABLET ORAL at 08:23

## 2018-09-12 RX ADMIN — AMLODIPINE BESYLATE: 10 TABLET ORAL at 08:23

## 2018-09-12 RX ADMIN — LOPERAMIDE HYDROCHLORIDE 2 MG: 2 CAPSULE ORAL at 10:10

## 2018-09-12 RX ADMIN — ENOXAPARIN SODIUM 30 MG: 30 INJECTION SUBCUTANEOUS at 08:24

## 2018-09-12 RX ADMIN — METRONIDAZOLE 500 MG: 500 INJECTION, SOLUTION INTRAVENOUS at 22:25

## 2018-09-12 RX ADMIN — TAZOBACTAM SODIUM AND PIPERACILLIN SODIUM 3.38 G: 375; 3 INJECTION, SOLUTION INTRAVENOUS at 10:09

## 2018-09-12 RX ADMIN — SODIUM CHLORIDE, POTASSIUM CHLORIDE, SODIUM LACTATE AND CALCIUM CHLORIDE 50 ML/HR: 600; 310; 30; 20 INJECTION, SOLUTION INTRAVENOUS at 05:35

## 2018-09-12 RX ADMIN — TAZOBACTAM SODIUM AND PIPERACILLIN SODIUM 3.38 G: 375; 3 INJECTION, SOLUTION INTRAVENOUS at 15:13

## 2018-09-12 RX ADMIN — TAZOBACTAM SODIUM AND PIPERACILLIN SODIUM 3.38 G: 375; 3 INJECTION, SOLUTION INTRAVENOUS at 04:27

## 2018-09-12 NOTE — PLAN OF CARE
Problem: Patient Care Overview  Goal: Plan of Care Review  Outcome: Ongoing (interventions implemented as appropriate)   09/12/18 1746   Coping/Psychosocial   Plan of Care Reviewed With patient   Plan of Care Review   Progress declining   OTHER   Outcome Summary Patient tested positive for c-diff and placed in isolation with contact/spore precautions. Started on IV flagyll. No c/o pain. Possible OR on Friday.     Goal: Individualization and Mutuality  Outcome: Ongoing (interventions implemented as appropriate)   09/12/18 1746   Individualization   Patient Specific Preferences none   Patient Specific Goals (Include Timeframe) resolve infection   Patient Specific Interventions placed in isolation precautions for c-diff     Goal: Discharge Needs Assessment  Outcome: Ongoing (interventions implemented as appropriate)   09/11/18 1000 09/11/18 1711 09/12/18 1746   Discharge Needs Assessment   Readmission Within the Last 30 Days --  no previous admission in last 30 days --    Concerns to be Addressed --  denies needs/concerns at this time --    Patient/Family Anticipates Transition to home with family --  --    Patient/Family Anticipated Services at Transition none --  --    Transportation Concerns car, none --  --    Transportation Anticipated car, drives self;family or friend will provide --  --    Anticipated Changes Related to Illness --  --  none   Disability   Equipment Currently Used at Home --  none --        Problem: Infection, Risk/Actual (Adult)  Goal: Identify Related Risk Factors and Signs and Symptoms  Outcome: Ongoing (interventions implemented as appropriate)   09/12/18 1746   Infection, Risk/Actual (Adult)   Related Risk Factors (Infection, Risk/Actual) chronic illness/condition;exposure to microbes;treatment plan   Signs and Symptoms (Infection, Risk/Actual) cultures positive  (C Diff positive)     Goal: Infection Prevention/Resolution  Outcome: Ongoing (interventions implemented as appropriate)    09/12/18 1746   Infection, Risk/Actual (Adult)   Infection Prevention/Resolution unable to achieve outcome

## 2018-09-12 NOTE — PAYOR COMM NOTE
"Toby Vaughn (73 y.o. Female) [id number]   [facility] phone   Fax        Date of Birth Social Security Number Address Home Phone MRN    1945  [id number] [address] [phone] [id number]    Catholic Marital Status          Other        Admission Date Admission Type Admitting Provider Attending Provider Department, Room/Bed    9/11/18 Elective Princess Demarco MD Jackson, April L, MD [facility] 3C, 361/1    Discharge Date Discharge Disposition Discharge Destination                       Attending Provider:  Princess Demarco MD    Allergies:  Aspirin    Isolation:  None   Infection:  C.difficile (09/12/18)   Code Status:  CPR    Ht:  149.9 cm (59\")   Wt:  61.2 kg (135 lb)    Admission Cmt:  None   Principal Problem:  None                Active Insurance as of 9/11/2018     Primary Coverage     Payor Plan Insurance Group Employer/Plan Group    HUMANA MEDICARE REPLACEMENT HUMANA MEDICARE REPL [id number]     Payor Plan Address Payor Plan Phone Number Effective From Effective To    [address] [address] [phone] 1/1/2013     [facility] [address]       Subscriber Name Subscriber Birth Date Member ID       TOBY VAUGHN 1945 [id number]                 Emergency Contacts      (Rel.) Home Phone Work Phone Mobile Phone    Raymon Vaughn (Spouse) [phone] -- [phone]        Laverne Barney, RN Registered Nurse Signed   Plan of Care Date of Service: 9/11/2018  5:17 PM         Problem: Patient Care Overview  Goal: Plan of Care Review  Outcome: Ongoing (interventions implemented as appropriate)    09/11/18 1711   Coping/Psychosocial   Plan of Care Reviewed With patient;spouse   Plan of Care Review   Progress no change   OTHER   Outcome Summary Received patient as a direct admit to Dr. Demarco. Denies pain/nausea. Patient is voiding however, stool is noted in her urine. Patient states that it comes " out when she voids. IVF given, including a bolus. IV abx cont. Tolerating diet well. Ambulating well. Refuses SCD's at this time d/t patient requiring to get up to void so frequently. Will cont to monitor.       Goal: Individualization and Mutuality  Outcome: Ongoing (interventions implemented as appropriate)     Goal: Discharge Needs Assessment  Outcome: Ongoing (interventions implemented as appropriate)     Goal: Interprofessional Rounds/Family Conf  Outcome: Ongoing (interventions implemented as appropriate)        Problem: Infection, Risk/Actual (Adult)  Goal: Identify Related Risk Factors and Signs and Symptoms  Outcome: Ongoing (interventions implemented as appropriate)    09/11/18 1711   Infection, Risk/Actual (Adult)   Related Risk Factors (Infection, Risk/Actual) chronic illness/condition   Signs and Symptoms (Infection, Risk/Actual) nausea;other (see comments)  (stool passing through vagina. )      Goal: Infection Prevention/Resolution  Outcome: Ongoing (interventions implemented as appropriate)                        History & Physical      Princess Demarco MD at 9/11/2018 11:52 AM          Princess GAFFNEY. MD [facility] History and Physical     Referring Provider: Princess Demarco MD    Patient Care Team:  Juan Echavarria MD as PCP - General  Juan Echavarria MD as PCP - Family Medicine  [facility], Cole Campos MD as Consulting Physician (Otolaryngology)  Tarun Morataya MD as Consulting Physician (Gastroenterology)    Chief complaint colovaginal fistula    Subjective .     History of present illness:  The patient is a 73 y.o. 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  She has been seen as an outpatient and has been placed on oral antibiotics in preparation for takedown of fistula and sigmoidectomy.  She is now admitted due to worsening of discharge, pain, and fatigue for intravenous antibiotic administration prior to the surgical procedure..    Review  of Systems    Review of Systems - General ROS: negative  ENT ROS: negative  Respiratory ROS: no cough, shortness of breath, or wheezing  Cardiovascular ROS: no chest pain or dyspnea on exertion  Gastrointestinal ROS: no abdominal pain, change in bowel habits, or black or bloody stools  Genito-Urinary ROS: no dysuria, trouble voiding, or hematuria  Dermatological ROS: negative   Breast ROS: negative for breast lumps  Hematological and Lymphatic ROS: negative  Musculoskeletal ROS: negative   Neurological ROS: no TIA or stroke symptoms    Psychological ROS: negative  Endocrine ROS: negative    History  Past Medical History:   Diagnosis Date   • Arthritis    • Chronic kidney disease     stage 3   • Diabetes mellitus (CMS/HCC)    • GERD (gastroesophageal reflux disease)    • Hx of colonic polyp    • Hypertension    • Rheumatoid arthritis (CMS/HCC)    • Vitamin D deficiency    ,   Past Surgical History:   Procedure Laterality Date   • ARM LESION/CYST EXCISION      nodules   • BACK SURGERY      x 2   • BREAST BIOPSY      x 2   • CATARACT EXTRACTION     • COLONOSCOPY  01/19/2015    Diverticulosis repeat exam in 5 years   • COLONOSCOPY W/ POLYPECTOMY  11/24/2009    Tubular adenoma cecal pit mild atypia repeat exam in 5 years   • ENDOSCOPY  1999    Chronic active gastritis severe with focal superficial ulceration urea + treated   • HYSTERECTOMY  1981    Heavy, painful periods.    • TOTAL KNEE ARTHROPLASTY      x 2   ,   Family History   Problem Relation Age of Onset   • Hypertension Other    • Diabetes Other    • Coronary artery disease Other    • Cancer Other    • No Known Problems Son    • No Known Problems Son    • No Known Problems Son    • Breast cancer Other    • Colon cancer Neg Hx    • Colon polyps Neg Hx    • Ovarian cancer Neg Hx    ,   Social History   Substance Use Topics   • Smoking status: Never Smoker   • Smokeless tobacco: Never Used   • Alcohol use No   ,   Prescriptions Prior to Admission   Medication Sig  Dispense Refill Last Dose   • amLODIPine-valsartan (EXFORGE)  MG per tablet    Taking   • carvedilol (COREG) 12.5 MG tablet    Taking   • cholecalciferol (VITAMIN D3) 1000 units tablet Take 1,000 Units by mouth Daily.   Taking   • furosemide (LASIX) 20 MG tablet    Taking   • HYDROcodone-acetaminophen (NORCO)  MG per tablet Take 1 tablet by mouth Every 6 (Six) Hours As Needed for Moderate Pain .   Taking   • leflunomide (ARAVA) 20 MG tablet TAKE 1 TABLET BY MOUTH EVERY DAY   Taking   • potassium chloride (K-DUR) 10 MEQ CR tablet    Taking   • pravastatin (PRAVACHOL) 40 MG tablet    Taking   • raNITIdine (ZANTAC) 150 MG tablet    Taking    and Allergies:  Aspirin    Current Facility-Administered Medications:   •  amLODIPine (NORVASC) 10 mg, valsartan (DIOVAN) 320 mg for EXFORGE , , Oral, Daily, Princess Demarco MD  •  carvedilol (COREG) tablet 12.5 mg, 12.5 mg, Oral, BID With Meals, Princess Demarco MD  •  [START ON 9/12/2018] enoxaparin (LOVENOX) syringe 40 mg, 40 mg, Subcutaneous, Daily, Princess Demarco MD  •  furosemide (LASIX) tablet 20 mg, 20 mg, Oral, Daily, Princess Demarco MD  •  lactated ringers bolus 1,000 mL, 1,000 mL, Intravenous, Once, Princess Demarco MD  •  lactated ringers infusion, 50 mL/hr, Intravenous, Continuous, Princess eDmarco MD, Last Rate: 50 mL/hr at 09/11/18 1001, 50 mL/hr at 09/11/18 1001  •  ondansetron (ZOFRAN) injection 4 mg, 4 mg, Intravenous, Q4H PRN, Princess Demarco MD  •  pantoprazole (PROTONIX) EC tablet 40 mg, 40 mg, Oral, Q AM, Princess Demarco MD  •  piperacillin-tazobactam (ZOSYN) 3.375 g in iso-osmotic dextrose 50 ml (premix), 3.375 g, Intravenous, Q6H, Princess Demarco MD, 3.375 g at 09/11/18 1007  •  Naomy's amazing butt cream, , Topical, PRN, Princess Demarco MD    Objective     Vital Signs   Temp:  [97.6 °F (36.4 °C)] 97.6 °F (36.4 °C)  Heart Rate:  [74] 74  Resp:  [16] 16  BP: (107)/(41) 107/41    Physical Exam:  General appearance - alert,  well appearing, and in no distress  Mental status - alert, oriented to person, place, and time  Neck - supple, no significant adenopathy  Chest - clear to auscultation, no wheezes, rales or rhonchi, symmetric air entry  Heart - normal rate, regular rhythm, normal S1, S2, no murmurs, rubs, clicks or gallops  Abdomen - soft, nontender, nondistended, no masses or organomegaly  Neurological - alert, oriented, normal speech, no focal findings or movement disorder noted  Musculoskeletal - no joint tenderness, deformity or swelling  Extremities - peripheral pulses normal, no pedal edema, no clubbing or cyanosis    Results Review:     Lab Results (last 24 hours)     Procedure Component Value Units Date/Time    Comprehensive Metabolic Panel [[id number]]  (Abnormal) Collected:  09/11/18 0932    Specimen:  Blood Updated:  09/11/18 1002     Glucose 132 (H) mg/dL      BUN 19 mg/dL      Creatinine 1.54 (H) mg/dL      Sodium 141 mmol/L      Potassium 4.0 mmol/L      Chloride 107 mmol/L      CO2 26.0 mmol/L      Calcium 9.4 mg/dL      Total Protein 7.1 g/dL      Albumin 3.80 g/dL      ALT (SGPT) 19 U/L      AST (SGOT) 32 U/L      Alkaline Phosphatase 79 U/L      Total Bilirubin 0.4 mg/dL      eGFR Non African Amer 33 (L) mL/min/1.73      Globulin 3.3 gm/dL      A/G Ratio 1.2 g/dL      BUN/Creatinine Ratio 12.3     Anion Gap 8.0 mmol/L     Narrative:       The MDRD GFR formula is only valid for adults with stable renal function between ages 18 and 70.    Amylase [[id number]]  (Normal) Collected:  09/11/18 0932    Specimen:  Blood Updated:  09/11/18 1002     Amylase 41 U/L     Lipase [[id number]]  (Normal) Collected:  09/11/18 0932    Specimen:  Blood Updated:  09/11/18 1002     Lipase 76 U/L     Protime-INR [[id number]]  (Normal) Collected:  09/11/18 0932    Specimen:  Blood Updated:  09/11/18 0949     Protime 13.5 Seconds      INR 1.00    CBC & Differential [[id number]] Collected:  09/11/18 0932    Specimen:  Blood Updated:  09/11/18  0941    Narrative:       The following orders were created for panel order CBC & Differential.  Procedure                               Abnormality         Status                     ---------                               -----------         ------                     CBC Auto Differential[[id number]]        Abnormal            Final result                 Please view results for these tests on the individual orders.    CBC Auto Differential [[id number]]  (Abnormal) Collected:  09/11/18 0932    Specimen:  Blood Updated:  09/11/18 0941     WBC 6.00 10*3/mm3      RBC 3.78 (L) 10*6/mm3      Hemoglobin 10.0 (L) g/dL      Hematocrit 32.1 (L) %      MCV 84.9 fL      MCH 26.5 (L) pg      MCHC 31.2 (L) g/dL      RDW 15.5 (H) %      RDW-SD 47.7 fl      MPV 8.4 fL      Platelets 211 10*3/mm3      Neutrophil % 76.4 %      Lymphocyte % 11.5 (L) %      Monocyte % 8.8 %      Eosinophil % 2.3 %      Basophil % 0.5 %      Immature Grans % 0.5 %      Neutrophils, Absolute 4.58 10*3/mm3      Lymphocytes, Absolute 0.69 (L) 10*3/mm3      Monocytes, Absolute 0.53 10*3/mm3      Eosinophils, Absolute 0.14 10*3/mm3      Basophils, Absolute 0.03 10*3/mm3      Immature Grans, Absolute 0.03 10*3/mm3      nRBC 0.0 /100 WBC         Imaging Results (last 24 hours)     ** No results found for the last 24 hours. **            Assessment/Plan     sigmoid diverticulitis complicated by colovaginal fistula.  She will be admitted, hydrated, and iv antibiotics will be ordered.  Sigmoidectomy and takedown of the fistula will be scheduled.      Princess Demarco MD  09/11/18  11:52 AM              Electronically signed by Princess Demarco MD at 9/11/2018 11:56 AM       Hospital Medications (active)       Dose Frequency Start End    amLODIPine (NORVASC) 10 mg, valsartan (DIOVAN) 320 mg for EXFORGE   Daily 9/11/2018     Sig - Route: Take  by mouth Daily. - Oral    carvedilol (COREG) tablet 12.5 mg 12.5 mg 2 Times Daily With Meals 9/11/2018     Sig -  Route: Take 2 tablets by mouth 2 (Two) Times a Day With Meals. - Oral    enoxaparin (LOVENOX) syringe 30 mg 30 mg Daily 9/12/2018     Sig - Route: Inject 0.3 mL under the skin into the appropriate area as directed Daily. - Subcutaneous    furosemide (LASIX) tablet 20 mg 20 mg Daily 9/11/2018     Sig - Route: Take 1 tablet by mouth Daily. - Oral    lactated ringers bolus 1,000 mL 1,000 mL Once 9/11/2018 9/11/2018    Sig - Route: Infuse 1,000 mL into a venous catheter 1 (One) Time. - Intravenous    lactated ringers infusion 50 mL/hr Continuous 9/11/2018     Sig - Route: Infuse 50 mL/hr into a venous catheter Continuous. - Intravenous    loperamide (IMODIUM) capsule 2 mg 2 mg 4 Times Daily PRN 9/12/2018     Sig - Route: Take 1 capsule by mouth 4 (Four) Times a Day As Needed for Diarrhea. - Oral    metroNIDAZOLE (FLAGYL) IVPB 500 mg 500 mg Every 8 Hours 9/12/2018 12/21/2018    Sig - Route: Infuse 100 mL into a venous catheter Every 8 (Eight) Hours. - Intravenous    ondansetron (ZOFRAN) injection 4 mg 4 mg Every 4 Hours PRN 9/11/2018     Sig - Route: Infuse 2 mL into a venous catheter Every 4 (Four) Hours As Needed for Nausea. - Intravenous    pantoprazole (PROTONIX) EC tablet 40 mg 40 mg Every Early Morning 9/11/2018     Sig - Route: Take 1 tablet by mouth Every Morning. - Oral    piperacillin-tazobactam (ZOSYN) 3.375 g in iso-osmotic dextrose 50 ml (premix) 3.375 g Every 6 Hours 9/11/2018 12/20/2018    Sig - Route: Infuse 50 mL into a venous catheter Every 6 (Six) Hours. - Intravenous    Naomy's amazing butt cream  As Needed 9/11/2018     Sig - Route: Apply  topically to the appropriate area as directed As Needed for skin irritation (to perineum). - Topical    enoxaparin (LOVENOX) syringe 40 mg (Discontinued) 40 mg Daily 9/12/2018 9/11/2018    Sig - Route: Inject 0.4 mL under the skin into the appropriate area as directed Daily. - Subcutaneous    metroNIDAZOLE (FLAGYL) IVPB 500 mg (Discontinued) 500 mg Every 8 Hours  9/12/2018 9/12/2018    Sig - Route: Infuse 100 mL into a venous catheter Every 8 (Eight) Hours. - Intravenous             Physician Progress Notes (last 24 hours) (Notes from 9/11/2018  1:20 PM through 9/12/2018  1:20 PM)      Princess Demarco MD at 9/12/2018  8:30 AM          Princess Demarco MD [facility]  Progress Note     LOS: 1 day   Patient Care Team:  Juan Echavarria MD as PCP - General  Jaun Echavarria MD as PCP - Family Medicine  [facility], Cole Campos MD as Consulting Physician (Otolaryngology)  Tarun Morataya MD as Consulting Physician (Gastroenterology)      Subjective     Interval History:      complains of diarrhea.  No nausea.  No pain.  Still discharge     Objective     Vital Signs  Temp:  [97.4 °F (36.3 °C)-98.1 °F (36.7 °C)] 97.4 °F (36.3 °C)  Heart Rate:  [74-85] 85  Resp:  [16] 16  BP: (107-136)/(40-69) 136/51    Physical Exam:  General appearance - alert, well appearing, and in no distress  Abdomen - soft, nontender, nondistended, no masses or organomegaly      Results Review:    Lab Results (last 24 hours)     Procedure Component Value Units Date/Time    CBC (No Diff) [[id number]]  (Abnormal) Collected:  09/12/18 0445    Specimen:  Blood Updated:  09/12/18 0513     WBC 4.42 (L) 10*3/mm3      RBC 3.46 (L) 10*6/mm3      Hemoglobin 9.4 (L) g/dL      Hematocrit 29.5 (L) %      MCV 85.3 fL      MCH 27.2 (L) pg      MCHC 31.9 (L) g/dL      RDW 15.2 (H) %      RDW-SD 47.2 fl      MPV 8.8 fL      Platelets 178 10*3/mm3     Comprehensive Metabolic Panel [[id number]]  (Abnormal) Collected:  09/11/18 0932    Specimen:  Blood Updated:  09/11/18 1002     Glucose 132 (H) mg/dL      BUN 19 mg/dL      Creatinine 1.54 (H) mg/dL      Sodium 141 mmol/L      Potassium 4.0 mmol/L      Chloride 107 mmol/L      CO2 26.0 mmol/L      Calcium 9.4 mg/dL      Total Protein 7.1 g/dL      Albumin 3.80 g/dL      ALT (SGPT) 19 U/L      AST (SGOT) 32 U/L      Alkaline Phosphatase 79 U/L      Total Bilirubin 0.4 mg/dL      eGFR  Non  Amer 33 (L) mL/min/1.73      Globulin 3.3 gm/dL      A/G Ratio 1.2 g/dL      BUN/Creatinine Ratio 12.3     Anion Gap 8.0 mmol/L     Narrative:       The MDRD GFR formula is only valid for adults with stable renal function between ages 18 and 70.    Amylase [[id number]]  (Normal) Collected:  09/11/18 0932    Specimen:  Blood Updated:  09/11/18 1002     Amylase 41 U/L     Lipase [[id number]]  (Normal) Collected:  09/11/18 0932    Specimen:  Blood Updated:  09/11/18 1002     Lipase 76 U/L     Protime-INR [[id number]]  (Normal) Collected:  09/11/18 0932    Specimen:  Blood Updated:  09/11/18 0949     Protime 13.5 Seconds      INR 1.00    CBC & Differential [[id number]] Collected:  09/11/18 0932    Specimen:  Blood Updated:  09/11/18 0941    Narrative:       The following orders were created for panel order CBC & Differential.  Procedure                               Abnormality         Status                     ---------                               -----------         ------                     CBC Auto Differential[[id number]]        Abnormal            Final result                 Please view results for these tests on the individual orders.    CBC Auto Differential [[id number]]  (Abnormal) Collected:  09/11/18 0932    Specimen:  Blood Updated:  09/11/18 0941     WBC 6.00 10*3/mm3      RBC 3.78 (L) 10*6/mm3      Hemoglobin 10.0 (L) g/dL      Hematocrit 32.1 (L) %      MCV 84.9 fL      MCH 26.5 (L) pg      MCHC 31.2 (L) g/dL      RDW 15.5 (H) %      RDW-SD 47.7 fl      MPV 8.4 fL      Platelets 211 10*3/mm3      Neutrophil % 76.4 %      Lymphocyte % 11.5 (L) %      Monocyte % 8.8 %      Eosinophil % 2.3 %      Basophil % 0.5 %      Immature Grans % 0.5 %      Neutrophils, Absolute 4.58 10*3/mm3      Lymphocytes, Absolute 0.69 (L) 10*3/mm3      Monocytes, Absolute 0.53 10*3/mm3      Eosinophils, Absolute 0.14 10*3/mm3      Basophils, Absolute 0.03 10*3/mm3      Immature Grans, Absolute 0.03 10*3/mm3      nRBC  0.0 /100 WBC         Imaging Results (last 24 hours)     ** No results found for the last 24 hours. **            Assessment/Plan       Sigmoid diverticulitis with colovaginal fistula.  Continue iv abx.  Check stool.  Plan sigmiodectomy Friday.      Princess Demarco MD  09/12/18  8:30 AM        Electronically signed by Princess Demarco MD at 9/12/2018  8:31 AM

## 2018-09-12 NOTE — PROGRESS NOTES
Princess Demarco MD [facility]  Progress Note     LOS: 1 day   Patient Care Team:  Juan Echavarria MD as PCP - General  Juan Echavarria MD as PCP - Family Medicine  [facility], Cole Campos MD as Consulting Physician (Otolaryngology)  Tarun Morataya MD as Consulting Physician (Gastroenterology)      Subjective     Interval History:      complains of diarrhea.  No nausea.  No pain.  Still discharge     Objective     Vital Signs  Temp:  [97.4 °F (36.3 °C)-98.1 °F (36.7 °C)] 97.4 °F (36.3 °C)  Heart Rate:  [74-85] 85  Resp:  [16] 16  BP: (107-136)/(40-69) 136/51    Physical Exam:  General appearance - alert, well appearing, and in no distress  Abdomen - soft, nontender, nondistended, no masses or organomegaly      Results Review:    Lab Results (last 24 hours)     Procedure Component Value Units Date/Time    CBC (No Diff) [[id number]]  (Abnormal) Collected:  09/12/18 0445    Specimen:  Blood Updated:  09/12/18 0513     WBC 4.42 (L) 10*3/mm3      RBC 3.46 (L) 10*6/mm3      Hemoglobin 9.4 (L) g/dL      Hematocrit 29.5 (L) %      MCV 85.3 fL      MCH 27.2 (L) pg      MCHC 31.9 (L) g/dL      RDW 15.2 (H) %      RDW-SD 47.2 fl      MPV 8.8 fL      Platelets 178 10*3/mm3     Comprehensive Metabolic Panel [[id number]]  (Abnormal) Collected:  09/11/18 0932    Specimen:  Blood Updated:  09/11/18 1002     Glucose 132 (H) mg/dL      BUN 19 mg/dL      Creatinine 1.54 (H) mg/dL      Sodium 141 mmol/L      Potassium 4.0 mmol/L      Chloride 107 mmol/L      CO2 26.0 mmol/L      Calcium 9.4 mg/dL      Total Protein 7.1 g/dL      Albumin 3.80 g/dL      ALT (SGPT) 19 U/L      AST (SGOT) 32 U/L      Alkaline Phosphatase 79 U/L      Total Bilirubin 0.4 mg/dL      eGFR Non African Amer 33 (L) mL/min/1.73      Globulin 3.3 gm/dL      A/G Ratio 1.2 g/dL      BUN/Creatinine Ratio 12.3     Anion Gap 8.0 mmol/L     Narrative:       The MDRD GFR formula is only valid for adults with stable renal function between ages 18 and 70.    Amylase [[id number]]   (Normal) Collected:  09/11/18 0932    Specimen:  Blood Updated:  09/11/18 1002     Amylase 41 U/L     Lipase [[id number]]  (Normal) Collected:  09/11/18 0932    Specimen:  Blood Updated:  09/11/18 1002     Lipase 76 U/L     Protime-INR [[id number]]  (Normal) Collected:  09/11/18 0932    Specimen:  Blood Updated:  09/11/18 0949     Protime 13.5 Seconds      INR 1.00    CBC & Differential [[id number]] Collected:  09/11/18 0932    Specimen:  Blood Updated:  09/11/18 0941    Narrative:       The following orders were created for panel order CBC & Differential.  Procedure                               Abnormality         Status                     ---------                               -----------         ------                     CBC Auto Differential[[id number]]        Abnormal            Final result                 Please view results for these tests on the individual orders.    CBC Auto Differential [[id number]]  (Abnormal) Collected:  09/11/18 0932    Specimen:  Blood Updated:  09/11/18 0941     WBC 6.00 10*3/mm3      RBC 3.78 (L) 10*6/mm3      Hemoglobin 10.0 (L) g/dL      Hematocrit 32.1 (L) %      MCV 84.9 fL      MCH 26.5 (L) pg      MCHC 31.2 (L) g/dL      RDW 15.5 (H) %      RDW-SD 47.7 fl      MPV 8.4 fL      Platelets 211 10*3/mm3      Neutrophil % 76.4 %      Lymphocyte % 11.5 (L) %      Monocyte % 8.8 %      Eosinophil % 2.3 %      Basophil % 0.5 %      Immature Grans % 0.5 %      Neutrophils, Absolute 4.58 10*3/mm3      Lymphocytes, Absolute 0.69 (L) 10*3/mm3      Monocytes, Absolute 0.53 10*3/mm3      Eosinophils, Absolute 0.14 10*3/mm3      Basophils, Absolute 0.03 10*3/mm3      Immature Grans, Absolute 0.03 10*3/mm3      nRBC 0.0 /100 WBC         Imaging Results (last 24 hours)     ** No results found for the last 24 hours. **            Assessment/Plan       Sigmoid diverticulitis with colovaginal fistula.  Continue iv abx.  Check stool.  Plan sigmiodectomy Friday.      Princess Demarco  MD  09/12/18  8:30 AM

## 2018-09-12 NOTE — PLAN OF CARE
Problem: Infection, Risk/Actual (Adult)  Goal: Identify Related Risk Factors and Signs and Symptoms  Outcome: Ongoing (interventions implemented as appropriate)   09/11/18 5391   Infection, Risk/Actual (Adult)   Related Risk Factors (Infection, Risk/Actual) chronic illness/condition   Signs and Symptoms (Infection, Risk/Actual) nausea;other (see comments)  (stool passing through vagina. )

## 2018-09-12 NOTE — PLAN OF CARE
Problem: Patient Care Overview  Goal: Individualization and Mutuality  Outcome: Ongoing (interventions implemented as appropriate)   09/11/18 1711   Individualization   Patient Specific Preferences Prefers to not wear the SCD's because she has to get up so often to urinate   Patient Specific Interventions Provide hydration, abx, monitor and treat w/ surgery when Dr. Demarco orders       Problem: Infection, Risk/Actual (Adult)  Goal: Identify Related Risk Factors and Signs and Symptoms  Outcome: Ongoing (interventions implemented as appropriate)   09/11/18 1711   Infection, Risk/Actual (Adult)   Related Risk Factors (Infection, Risk/Actual) chronic illness/condition   Signs and Symptoms (Infection, Risk/Actual) nausea;other (see comments)  (stool passing through vagina. )

## 2018-09-13 LAB
ANION GAP SERPL CALCULATED.3IONS-SCNC: 9 MMOL/L (ref 4–13)
BUN BLD-MCNC: 12 MG/DL (ref 5–21)
BUN/CREAT SERPL: 10.8 (ref 7–25)
CALCIUM SPEC-SCNC: 9 MG/DL (ref 8.4–10.4)
CHLORIDE SERPL-SCNC: 108 MMOL/L (ref 98–110)
CO2 SERPL-SCNC: 26 MMOL/L (ref 24–31)
CREAT BLD-MCNC: 1.11 MG/DL (ref 0.5–1.4)
DEPRECATED RDW RBC AUTO: 47.8 FL (ref 40–54)
ERYTHROCYTE [DISTWIDTH] IN BLOOD BY AUTOMATED COUNT: 15.3 % (ref 12–15)
GFR SERPL CREATININE-BSD FRML MDRD: 48 ML/MIN/1.73
GLUCOSE BLD-MCNC: 101 MG/DL (ref 70–100)
HCT VFR BLD AUTO: 30.4 % (ref 37–47)
HGB BLD-MCNC: 9.6 G/DL (ref 12–16)
MAGNESIUM SERPL-MCNC: 1.9 MG/DL (ref 1.4–2.2)
MCH RBC QN AUTO: 26.9 PG (ref 28–32)
MCHC RBC AUTO-ENTMCNC: 31.6 G/DL (ref 33–36)
MCV RBC AUTO: 85.2 FL (ref 82–98)
PLATELET # BLD AUTO: 180 10*3/MM3 (ref 130–400)
PMV BLD AUTO: 8.3 FL (ref 6–12)
POTASSIUM BLD-SCNC: 3.3 MMOL/L (ref 3.5–5.3)
RBC # BLD AUTO: 3.57 10*6/MM3 (ref 4.2–5.4)
SODIUM BLD-SCNC: 143 MMOL/L (ref 135–145)
WBC NRBC COR # BLD: 4.54 10*3/MM3 (ref 4.8–10.8)

## 2018-09-13 PROCEDURE — 94760 N-INVAS EAR/PLS OXIMETRY 1: CPT

## 2018-09-13 PROCEDURE — 80048 BASIC METABOLIC PNL TOTAL CA: CPT | Performed by: SPECIALIST

## 2018-09-13 PROCEDURE — 85027 COMPLETE CBC AUTOMATED: CPT | Performed by: SPECIALIST

## 2018-09-13 PROCEDURE — 25010000002 ENOXAPARIN PER 10 MG: Performed by: SPECIALIST

## 2018-09-13 PROCEDURE — 94799 UNLISTED PULMONARY SVC/PX: CPT

## 2018-09-13 PROCEDURE — 83735 ASSAY OF MAGNESIUM: CPT | Performed by: SPECIALIST

## 2018-09-13 PROCEDURE — 25010000002 PIPERACILLIN SOD-TAZOBACTAM PER 1 G: Performed by: SPECIALIST

## 2018-09-13 PROCEDURE — 99222 1ST HOSP IP/OBS MODERATE 55: CPT | Performed by: UROLOGY

## 2018-09-13 RX ORDER — MAGNESIUM CARB/ALUMINUM HYDROX 105-160MG
296 TABLET,CHEWABLE ORAL 2 TIMES DAILY
Status: DISCONTINUED | OUTPATIENT
Start: 2018-09-13 | End: 2018-09-14

## 2018-09-13 RX ADMIN — TAZOBACTAM SODIUM AND PIPERACILLIN SODIUM 3.38 G: 375; 3 INJECTION, SOLUTION INTRAVENOUS at 15:13

## 2018-09-13 RX ADMIN — TAZOBACTAM SODIUM AND PIPERACILLIN SODIUM 3.38 G: 375; 3 INJECTION, SOLUTION INTRAVENOUS at 10:09

## 2018-09-13 RX ADMIN — TAZOBACTAM SODIUM AND PIPERACILLIN SODIUM 3.38 G: 375; 3 INJECTION, SOLUTION INTRAVENOUS at 03:29

## 2018-09-13 RX ADMIN — CARVEDILOL 12.5 MG: 6.25 TABLET, FILM COATED ORAL at 08:35

## 2018-09-13 RX ADMIN — ENOXAPARIN SODIUM 40 MG: 40 INJECTION SUBCUTANEOUS at 08:35

## 2018-09-13 RX ADMIN — METRONIDAZOLE 500 MG: 500 INJECTION, SOLUTION INTRAVENOUS at 13:09

## 2018-09-13 RX ADMIN — Medication 296 ML: at 08:35

## 2018-09-13 RX ADMIN — METRONIDAZOLE 500 MG: 500 INJECTION, SOLUTION INTRAVENOUS at 05:45

## 2018-09-13 RX ADMIN — Medication 296 ML: at 22:18

## 2018-09-13 RX ADMIN — TAZOBACTAM SODIUM AND PIPERACILLIN SODIUM 3.38 G: 375; 3 INJECTION, SOLUTION INTRAVENOUS at 22:45

## 2018-09-13 RX ADMIN — AMLODIPINE BESYLATE: 10 TABLET ORAL at 08:34

## 2018-09-13 RX ADMIN — PANTOPRAZOLE SODIUM 40 MG: 40 TABLET, DELAYED RELEASE ORAL at 05:45

## 2018-09-13 RX ADMIN — CARVEDILOL 12.5 MG: 6.25 TABLET, FILM COATED ORAL at 17:25

## 2018-09-13 RX ADMIN — FUROSEMIDE 20 MG: 20 TABLET ORAL at 08:34

## 2018-09-13 RX ADMIN — METRONIDAZOLE 500 MG: 500 INJECTION, SOLUTION INTRAVENOUS at 21:26

## 2018-09-13 NOTE — PLAN OF CARE
Problem: Patient Care Overview  Goal: Plan of Care Review  Outcome: Ongoing (interventions implemented as appropriate)   09/13/18 1714   Coping/Psychosocial   Plan of Care Reviewed With patient;spouse   Plan of Care Review   Progress no change   OTHER   Outcome Summary IV abx continue. Mag citrate given to prep for surgery tomorrow. Plan for sigmoidectomy tomorrow with takedown of fistula. Full liquid diet. NPO after midnight. Contact isolation continues for c-diff.  seen filling water jug in nourishment room and reminded about isolation precautions and to call if he needs anything from the hallway.     Goal: Individualization and Mutuality  Outcome: Ongoing (interventions implemented as appropriate)   09/13/18 1714   Individualization   Patient Specific Preferences Lights off   Patient Specific Goals (Include Timeframe) resolve infection, surgery Friday   Patient Specific Interventions Isolation precautions education   Mutuality/Individual Preferences   How Would You and/or Your Support Person Like to Participate in Your Care? continue to remind spouse about infection precautions     Goal: Discharge Needs Assessment  Outcome: Ongoing (interventions implemented as appropriate)   09/13/18 1352 09/13/18 1400   Discharge Needs Assessment   Readmission Within the Last 30 Days --  no previous admission in last 30 days   Concerns to be Addressed denies needs/concerns at this time --    Patient/Family Anticipates Transition to --  home with family   Patient/Family Anticipated Services at Transition --  none   Transportation Concerns car, none --    Transportation Anticipated --  family or friend will provide   Anticipated Changes Related to Illness --  none   Equipment Needed After Discharge --  none   Discharge Coordination/Progress --  Pt lives at home with her spouse. She is ambulating in her room well. Noted that pt will be going to the OR tomorrow. No needs at this time but will follow after surgery.    Disability   Equipment Currently Used at Home --  none       Problem: Infection, Risk/Actual (Adult)  Goal: Infection Prevention/Resolution  Outcome: Ongoing (interventions implemented as appropriate)   09/13/18 0619   Infection, Risk/Actual (Adult)   Infection Prevention/Resolution making progress toward outcome

## 2018-09-13 NOTE — PLAN OF CARE
Problem: Patient Care Overview  Goal: Plan of Care Review  Outcome: Ongoing (interventions implemented as appropriate)   09/12/18 1746 09/13/18 0443   Coping/Psychosocial   Plan of Care Reviewed With --  patient   Plan of Care Review   Progress --  no change   OTHER   Outcome Summary Patient tested positive for c-diff and placed in isolation with contact/spore precautions. Started on IV flagyll. No c/o pain.  --      Goal: Individualization and Mutuality  Outcome: Ongoing (interventions implemented as appropriate)    Goal: Discharge Needs Assessment  Outcome: Ongoing (interventions implemented as appropriate)      Problem: Infection, Risk/Actual (Adult)  Goal: Identify Related Risk Factors and Signs and Symptoms  Outcome: Outcome(s) achieved Date Met: 09/13/18    Goal: Infection Prevention/Resolution  Outcome: Ongoing (interventions implemented as appropriate)

## 2018-09-13 NOTE — CONSULTS
Urology    Ms. Cali is 73 y.o. female    CHIEF COMPLAINT: Patient need ureteral stents for ease identifying ureters    HPI  Patient has an approximate 6 week history of brown vaginal discharge of sudden onset.  Severity is that it saturates multiple pads throughout the day.  This is a continuous issue for her.  Her been no fever or chills nausea or vomiting.  She denies flank pain or hematuria    The following portions of the patient's history were reviewed and updated as appropriate: allergies, current medications, past family history, past medical history, past social history, past surgical history and problem list.    Review of Systems   Constitutional: Positive for unexpected weight change. Negative for appetite change, diaphoresis and fever.   HENT: Negative for facial swelling and sore throat.    Eyes: Negative for discharge and visual disturbance.   Respiratory: Negative for cough and shortness of breath.    Cardiovascular: Negative for chest pain and leg swelling.   Gastrointestinal: Negative for anal bleeding and vomiting.   Endocrine: Negative for cold intolerance and heat intolerance.   Genitourinary: Positive for vaginal discharge. Negative for flank pain, hematuria and pelvic pain.   Musculoskeletal: Negative for back pain and gait problem.   Skin: Negative for pallor and rash.   Allergic/Immunologic: Negative for food allergies.   Neurological: Negative for seizures and headaches.   Hematological: Negative for adenopathy. Does not bruise/bleed easily.   Psychiatric/Behavioral: Negative for dysphoric mood, self-injury and suicidal ideas.       Prescriptions Prior to Admission   Medication Sig Dispense Refill Last Dose   • amLODIPine-valsartan (EXFORGE)  MG per tablet Daily.   Taking   • carvedilol (COREG) 12.5 MG tablet 2 (Two) Times a Day With Meals.   Taking   • cholecalciferol (VITAMIN D3) 1000 units tablet Take 2,000 Units by mouth Daily.   Taking   • furosemide (LASIX) 20 MG tablet Daily.    Taking   • HYDROcodone-acetaminophen (NORCO)  MG per tablet Take 0.5 tablets by mouth Daily.   Taking   • leflunomide (ARAVA) 20 MG tablet daily   Taking   • potassium chloride (K-DUR) 10 MEQ CR tablet Daily.   Taking   • pravastatin (PRAVACHOL) 40 MG tablet Daily.   Taking   • raNITIdine (ZANTAC) 150 MG tablet 2 (Two) Times a Day.   Taking         Current Facility-Administered Medications:   •  amLODIPine (NORVASC) 10 mg, valsartan (DIOVAN) 320 mg for EXFORGE , , Oral, Daily, Princess Demarco MD  •  carvedilol (COREG) tablet 12.5 mg, 12.5 mg, Oral, BID With Meals, Princess Deamrco MD, 12.5 mg at 09/13/18 0835  •  enoxaparin (LOVENOX) syringe 40 mg, 40 mg, Subcutaneous, Daily, Princess Demarco MD, 40 mg at 09/13/18 0835  •  [START ON 9/14/2018] ertapenem (INVanz) 1 g/100 mL 0.9% NS VTB (mbp), 1 g, Intravenous, 30 Min Pre-Op, Princess Demarco MD  •  furosemide (LASIX) tablet 20 mg, 20 mg, Oral, Daily, Princess Demarco MD, 20 mg at 09/13/18 0834  •  lactated ringers infusion, 50 mL/hr, Intravenous, Continuous, Princess Demarco MD, Last Rate: 50 mL/hr at 09/12/18 1011, 50 mL/hr at 09/12/18 1011  •  magnesium citrate solution 296 mL, 296 mL, Oral, BID, Princess Demarco MD, 296 mL at 09/13/18 0835  •  metroNIDAZOLE (FLAGYL) IVPB 500 mg, 500 mg, Intravenous, Q8H, Princess Demarco MD, Last Rate: 0 mL/hr at 09/13/18 0614, 500 mg at 09/13/18 1309  •  ondansetron (ZOFRAN) injection 4 mg, 4 mg, Intravenous, Q4H PRN, Princess Demarco MD  •  pantoprazole (PROTONIX) EC tablet 40 mg, 40 mg, Oral, Q AM, Princess Demarco MD, 40 mg at 09/13/18 0545  •  piperacillin-tazobactam (ZOSYN) 3.375 g in iso-osmotic dextrose 50 ml (premix), 3.375 g, Intravenous, Q6H, Princess Demarco MD, Last Rate: 0 mL/hr at 09/13/18 1115, 3.375 g at 09/13/18 1513  •  Naomy's amazing butt cream, , Topical, PRN, Princess Demarco MD    Past Medical History:   Diagnosis Date   • Arthritis    • Chronic kidney disease     stage 3   •  "Diabetes mellitus (CMS/HCC)    • GERD (gastroesophageal reflux disease)    • Hx of colonic polyp    • Hypertension    • Rheumatoid arthritis (CMS/HCC)    • Vitamin D deficiency        Past Surgical History:   Procedure Laterality Date   • ARM LESION/CYST EXCISION      nodules   • BACK SURGERY      x 2   • BREAST BIOPSY      x 2   • CATARACT EXTRACTION     • COLONOSCOPY  01/19/2015    Diverticulosis repeat exam in 5 years   • COLONOSCOPY W/ POLYPECTOMY  11/24/2009    Tubular adenoma cecal pit mild atypia repeat exam in 5 years   • ENDOSCOPY  1999    Chronic active gastritis severe with focal superficial ulceration urea + treated   • HYSTERECTOMY  1981    Heavy, painful periods.    • TOTAL KNEE ARTHROPLASTY      x 2       Social History     Social History   • Marital status:      Social History Main Topics   • Smoking status: Never Smoker   • Smokeless tobacco: Never Used   • Alcohol use No   • Drug use: No     Other Topics Concern   • Not on file       Family History   Problem Relation Age of Onset   • Hypertension Other    • Diabetes Other    • Coronary artery disease Other    • Cancer Other    • No Known Problems Son    • No Known Problems Son    • No Known Problems Son    • Breast cancer Other    • Colon cancer Neg Hx    • Colon polyps Neg Hx    • Ovarian cancer Neg Hx        BP 95/61 (BP Location: Right arm, Patient Position: Sitting)   Pulse 92   Temp 97.8 °F (36.6 °C) (Oral)   Resp 16   Ht 149.9 cm (59\")   Wt 61.2 kg (135 lb)   SpO2 92%   BMI 27.27 kg/m²     Physical Exam  Constitutional: Well nourished, Well developed; No apparent distress  Psychiatric: Appropriate affect; Alert and oriented  Eyes: Unremarkable  Musculoskeletal: Normal gait and station  GI: Abdomen is soft, non-tender  Respiratory: No distress; Unlabored movement; No accessory musculature needed with symmetric movements  Skin: No pallor or diaphoresis  : Has no cervix or uterus.  No vaginal bleeding noted.  Labia normal; " Urethral meatus normal position, not stenotic; No significant bladder prolapse.     Lab Results   Component Value Date    GLUCOSE 101 (H) 09/13/2018    BUN 12 09/13/2018    CREATININE 1.11 09/13/2018    EGFRIFNONA 48 (L) 09/13/2018    BCR 10.8 09/13/2018    CO2 26.0 09/13/2018    CALCIUM 9.0 09/13/2018    ALBUMIN 3.80 09/11/2018    AST 32 09/11/2018    ALT 19 09/11/2018     Lab Results   Component Value Date    GLUCOSE 101 (H) 09/13/2018    CALCIUM 9.0 09/13/2018     09/13/2018    K 3.3 (L) 09/13/2018    CO2 26.0 09/13/2018     09/13/2018    BUN 12 09/13/2018    CREATININE 1.11 09/13/2018    EGFRIFNONA 48 (L) 09/13/2018    BCR 10.8 09/13/2018    ANIONGAP 9.0 09/13/2018     Lab Results   Component Value Date    WBC 4.54 (L) 09/13/2018    HGB 9.6 (L) 09/13/2018    HCT 30.4 (L) 09/13/2018    MCV 85.2 09/13/2018     09/13/2018     Independent review of CT scan of the abdomen/pelvis The patient has undergone a CT scan of the abdomen and pelvis With contrast. The images are available for me to review as an independent interpretation for evaluation and management.  Assessment of the renal parenchyma with regards to thickness, scarring, symmetry in appearance and function, presence of masses both pre-and postcontrast, and calcifications are noted.  The collecting system with regard to dilatation, presence of calcifications, and masses were reviewed.  The course and caliber the ureters also noted.  The renal vessels and retroperitoneum is inspected for pathology.  The solid viscera and bowel pattern are briefly reviewed, but will also be inspected by the radiologist. The renal pelvis is inspected.  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  Kidney show no evidence of hydronephrosis.  There are no renal masses..    Assessment and Plan  #1. Colovaginal fistula-  -Dr. Demarco plans definitive management of colovaginal fistula in the operating room tomorrow.  -Patient  requires cystoscopy and retrograde ureteropyelograms to rule out evidence of ureteral injury or involvement.  We will also place ureteral stents for aid of identification of bilateral ureters.  The stents can be removed as an outpatient after treatment.    Dick Hart MD  09/13/18  3:23 PM

## 2018-09-13 NOTE — PLAN OF CARE
Problem: Patient Care Overview  Goal: Plan of Care Review  Outcome: Ongoing (interventions implemented as appropriate)   09/13/18 1352   Coping/Psychosocial   Plan of Care Reviewed With patient   Plan of Care Review   Progress no change   OTHER   Outcome Summary Patient in precautions for c-diff. No c/o pain during shift. IV abx continued. plan is to take pt. to OR in the morning.      Goal: Individualization and Mutuality  Outcome: Ongoing (interventions implemented as appropriate)   09/13/18 1352   Individualization   Patient Specific Preferences None    Patient Specific Goals (Include Timeframe) resolve infection    Patient Specific Interventions Isolation precautions for c-diff      Goal: Discharge Needs Assessment  Outcome: Ongoing (interventions implemented as appropriate)   09/13/18 1352   Discharge Needs Assessment   Readmission Within the Last 30 Days no previous admission in last 30 days   Concerns to be Addressed denies needs/concerns at this time   Patient/Family Anticipates Transition to home with family   Patient/Family Anticipated Services at Transition none   Transportation Concerns car, none   Transportation Anticipated family or friend will provide   Anticipated Changes Related to Illness none   Disability   Equipment Currently Used at Home none       Problem: Infection, Risk/Actual (Adult)  Goal: Infection Prevention/Resolution  Outcome: Ongoing (interventions implemented as appropriate)   09/13/18 1352   Infection, Risk/Actual (Adult)   Infection Prevention/Resolution making progress toward outcome

## 2018-09-13 NOTE — PROGRESS NOTES
"Pharmacy Renal Dose Conversion    Nuha Cali is a 73 y.o. year old female  149.9 cm (59\") 61.2 kg (135 lb)    Estimated Creatinine Clearance: 37.2 mL/min (by C-G formula based on SCr of 1.11 mg/dL).   Lab Results   Component Value Date    CREATININE 1.11 09/13/2018    CREATININE 1.54 (H) 09/11/2018    CREATININE 1.50 (H) 08/08/2018       PLAN  Based on prescribing information provided by the drug , Lovenox 30mg sq Q24H,  has been changed to Lovenox 40mg sq Q24H    Adjusted per the directives and guidelines established by [facility] Pharmacy and Therapeutics Committee and [facility] Executive Committee.  Pharmacy will continue to monitor daily and make further adjustment(s) accordingly.    Matti Martinez, [facility]  09/13/188:10 AM    "

## 2018-09-13 NOTE — PROGRESS NOTES
Discharge Planning Assessment  [facility]     Patient Name: Nuha Cali  MRN: [id number]  Today's Date: 9/13/2018    Admit Date: 9/11/2018          Discharge Needs Assessment     Row Name 09/13/18 1400       Living Environment    Lives With spouse    Name(s) of Who Lives With Patient Raymon    Current Living Arrangements home/apartment/condo    Primary Care Provided by self    Provides Primary Care For no one    Family Caregiver if Needed spouse    Quality of Family Relationships supportive    Able to Return to Prior Arrangements yes       Resource/Environmental Concerns    Resource/Environmental Concerns none       Transition Planning    Patient/Family Anticipates Transition to home with family    Patient/Family Anticipated Services at Transition none    Transportation Anticipated family or friend will provide       Discharge Needs Assessment    Readmission Within the Last 30 Days no previous admission in last 30 days    Equipment Currently Used at Home none    Anticipated Changes Related to Illness none    Equipment Needed After Discharge none    Discharge Coordination/Progress Pt lives at home with her spouse. She is ambulating in her room well. Noted that pt will be going to the OR tomorrow. No needs at this time but will follow after surgery.            Discharge Plan    No documentation.       Destination     No service coordination in this encounter.      Durable Medical Equipment     No service coordination in this encounter.      Dialysis/Infusion     No service coordination in this encounter.      Home Medical Care     No service coordination in this encounter.      Social Care     No service coordination in this encounter.                Demographic Summary    No documentation.           Functional Status    No documentation.           Psychosocial    No documentation.           Abuse/Neglect    No documentation.           Legal    No documentation.           Substance Abuse    No documentation.            Patient Forms    No documentation.         DANIEL Vega

## 2018-09-13 NOTE — PAYOR COMM NOTE
"Toby Vaughn (73 y.o. Female) [id number]  Additional info   [facility] phone   Fax        Date of Birth Social Security Number Address Home Phone MRN    1945  [address] [address] [phone] [id number]    [employer] Mar[employer] Status          Other        Admission Date Admission Type Admitting Provider Attending Provider Department, Room/Bed    9/11/18 Elective Princess Demarco MD Jackson, April L, MD [facility] 3C, 361/1    Discharge Date Discharge Disposition Discharge Destination                       Attending Provider:  Princess Demarco MD    Allergies:  Aspirin    Isolation:  Spore   Infection:  C.difficile (09/12/18)   Code Status:  CPR    Ht:  149.9 cm (59\")   Wt:  61.2 kg (135 lb)    Admission Cmt:  None   Principal Problem:  None                Active Insurance as of 9/11/2018     Primary Coverage     Payor Plan Insurance Group Employer/Plan Group    HUMANA MEDICARE REPLACEMENT HUMANA MEDICARE REPL [id number]     Payor Plan Address Payor Plan Phone Number Effective From Effective To    [address] [address] [phone] 1/1/2013     [facility] [id number]       Subscriber Name Subscriber Birth Date Member ID       TOBY VAUGHN 1945 [id number]                 Emergency Contacts      (Rel.) Home Phone Work Phone Mobile Phone    Raymon Vaughn (Spouse) [phone] -- [phone]        Buffy Renee RN Registered Nurse Signed   Plan of Care Date of Service: 9/13/2018  4:44 AM         Problem: Patient Care Overview  Goal: Plan of Care Review  Outcome: Ongoing (interventions implemented as appropriate)    09/12/18 1746 09/13/18 0443   Coping/Psychosocial   Plan of Care Reviewed With --  patient   Plan of Care Review   Progress --  no change   OTHER   Outcome Summary Patient tested positive for c-diff and placed in isolation with contact/spore precautions. Started on IV flagyll. No " c/o pain.  --       Goal: Individualization and Mutuality  Outcome: Ongoing (interventions implemented as appropriate)     Goal: Discharge Needs Assessment  Outcome: Ongoing (interventions implemented as appropriate)        Problem: Infection, Risk/Actual (Adult)  Goal: Identify Related Risk Factors and Signs and Symptoms  Outcome: Outcome(s) achieved Date Met: 09/13/18     Goal: Infection Prevention/Resolution  Outcome: Ongoing (interventions implemented as appropriate)                     Hospital Medications (active)       Dose Frequency Start End    amLODIPine (NORVASC) 10 mg, valsartan (DIOVAN) 320 mg for EXFORGE   Daily 9/11/2018     Sig - Route: Take  by mouth Daily. - Oral    carvedilol (COREG) tablet 12.5 mg 12.5 mg 2 Times Daily With Meals 9/11/2018     Sig - Route: Take 2 tablets by mouth 2 (Two) Times a Day With Meals. - Oral    enoxaparin (LOVENOX) syringe 30 mg 30 mg Daily 9/12/2018     Sig - Route: Inject 0.3 mL under the skin into the appropriate area as directed Daily. - Subcutaneous    furosemide (LASIX) tablet 20 mg 20 mg Daily 9/11/2018     Sig - Route: Take 1 tablet by mouth Daily. - Oral    lactated ringers infusion 50 mL/hr Continuous 9/11/2018     Sig - Route: Infuse 50 mL/hr into a venous catheter Continuous. - Intravenous    magnesium citrate solution 296 mL 296 mL 2 Times Daily 9/13/2018     Sig - Route: Take 296 mL by mouth 2 (Two) Times a Day. - Oral    metroNIDAZOLE (FLAGYL) IVPB 500 mg 500 mg Every 8 Hours 9/12/2018 12/21/2018    Sig - Route: Infuse 100 mL into a venous catheter Every 8 (Eight) Hours. - Intravenous    ondansetron (ZOFRAN) injection 4 mg 4 mg Every 4 Hours PRN 9/11/2018     Sig - Route: Infuse 2 mL into a venous catheter Every 4 (Four) Hours As Needed for Nausea. - Intravenous    pantoprazole (PROTONIX) EC tablet 40 mg 40 mg Every Early Morning 9/11/2018     Sig - Route: Take 1 tablet by mouth Every Morning. - Oral    piperacillin-tazobactam (ZOSYN) 3.375 g in  iso-osmotic dextrose 50 ml (premix) 3.375 g Every 6 Hours 9/11/2018 12/20/2018    Sig - Route: Infuse 50 mL into a venous catheter Every 6 (Six) Hours. - Intravenous    Naomy's amazing butt cream  As Needed 9/11/2018     Sig - Route: Apply  topically to the appropriate area as directed As Needed for skin irritation (to perineum). - Topical    loperamide (IMODIUM) capsule 2 mg (Discontinued) 2 mg 4 Times Daily PRN 9/12/2018 9/13/2018    Sig - Route: Take 1 capsule by mouth 4 (Four) Times a Day As Needed for Diarrhea. - Oral    metroNIDAZOLE (FLAGYL) IVPB 500 mg (Discontinued) 500 mg Every 8 Hours 9/12/2018 9/12/2018    Sig - Route: Infuse 100 mL into a venous catheter Every 8 (Eight) Hours. - Intravenous

## 2018-09-14 ENCOUNTER — ANESTHESIA (OUTPATIENT)
Dept: PERIOP | Facility: HOSPITAL | Age: 73
End: 2018-09-14

## 2018-09-14 ENCOUNTER — APPOINTMENT (OUTPATIENT)
Dept: GENERAL RADIOLOGY | Facility: HOSPITAL | Age: 73
End: 2018-09-14

## 2018-09-14 ENCOUNTER — ANESTHESIA EVENT (OUTPATIENT)
Dept: PERIOP | Facility: HOSPITAL | Age: 73
End: 2018-09-14

## 2018-09-14 LAB
ABO GROUP BLD: NORMAL
ANION GAP SERPL CALCULATED.3IONS-SCNC: 6 MMOL/L (ref 4–13)
BLD GP AB SCN SERPL QL: NEGATIVE
BUN BLD-MCNC: 7 MG/DL (ref 5–21)
BUN/CREAT SERPL: 7.4 (ref 7–25)
CALCIUM SPEC-SCNC: 8.9 MG/DL (ref 8.4–10.4)
CHLORIDE SERPL-SCNC: 108 MMOL/L (ref 98–110)
CO2 SERPL-SCNC: 28 MMOL/L (ref 24–31)
CREAT BLD-MCNC: 0.94 MG/DL (ref 0.5–1.4)
DEPRECATED RDW RBC AUTO: 47.6 FL (ref 40–54)
ERYTHROCYTE [DISTWIDTH] IN BLOOD BY AUTOMATED COUNT: 15.4 % (ref 12–15)
GFR SERPL CREATININE-BSD FRML MDRD: 58 ML/MIN/1.73
GLUCOSE BLD-MCNC: 93 MG/DL (ref 70–100)
GLUCOSE BLDC GLUCOMTR-MCNC: 118 MG/DL (ref 70–130)
HCT VFR BLD AUTO: 28.4 % (ref 37–47)
HGB BLD-MCNC: 8.9 G/DL (ref 12–16)
MCH RBC QN AUTO: 26.6 PG (ref 28–32)
MCHC RBC AUTO-ENTMCNC: 31.3 G/DL (ref 33–36)
MCV RBC AUTO: 84.8 FL (ref 82–98)
PLATELET # BLD AUTO: 166 10*3/MM3 (ref 130–400)
PMV BLD AUTO: 8.6 FL (ref 6–12)
POTASSIUM BLD-SCNC: 2.8 MMOL/L (ref 3.5–5.3)
POTASSIUM BLD-SCNC: 3.1 MMOL/L (ref 3.5–5.3)
RBC # BLD AUTO: 3.35 10*6/MM3 (ref 4.2–5.4)
RH BLD: POSITIVE
SODIUM BLD-SCNC: 142 MMOL/L (ref 135–145)
T&S EXPIRATION DATE: NORMAL
WBC NRBC COR # BLD: 3.3 10*3/MM3 (ref 4.8–10.8)

## 2018-09-14 PROCEDURE — 85027 COMPLETE CBC AUTOMATED: CPT | Performed by: SPECIALIST

## 2018-09-14 PROCEDURE — 25010000002 MIDAZOLAM PER 1 MG: Performed by: ANESTHESIOLOGY

## 2018-09-14 PROCEDURE — 86900 BLOOD TYPING SEROLOGIC ABO: CPT

## 2018-09-14 PROCEDURE — 86901 BLOOD TYPING SEROLOGIC RH(D): CPT | Performed by: SPECIALIST

## 2018-09-14 PROCEDURE — 84132 ASSAY OF SERUM POTASSIUM: CPT | Performed by: SPECIALIST

## 2018-09-14 PROCEDURE — 86140 C-REACTIVE PROTEIN: CPT | Performed by: SPECIALIST

## 2018-09-14 PROCEDURE — 74420 UROGRAPHY RTRGR +-KUB: CPT

## 2018-09-14 PROCEDURE — 82962 GLUCOSE BLOOD TEST: CPT

## 2018-09-14 PROCEDURE — 86850 RBC ANTIBODY SCREEN: CPT | Performed by: SPECIALIST

## 2018-09-14 PROCEDURE — 94760 N-INVAS EAR/PLS OXIMETRY 1: CPT

## 2018-09-14 PROCEDURE — 25010000003 MORPHINE PER 100 MG: Performed by: SPECIALIST

## 2018-09-14 PROCEDURE — 86901 BLOOD TYPING SEROLOGIC RH(D): CPT

## 2018-09-14 PROCEDURE — C1765 ADHESION BARRIER: HCPCS | Performed by: SPECIALIST

## 2018-09-14 PROCEDURE — 80048 BASIC METABOLIC PNL TOTAL CA: CPT | Performed by: SPECIALIST

## 2018-09-14 PROCEDURE — 25010000002 ONDANSETRON PER 1 MG: Performed by: SPECIALIST

## 2018-09-14 PROCEDURE — 94799 UNLISTED PULMONARY SVC/PX: CPT

## 2018-09-14 PROCEDURE — 25010000002 IOPAMIDOL 61 % SOLUTION: Performed by: UROLOGY

## 2018-09-14 PROCEDURE — 52351 CYSTOURETERO & OR PYELOSCOPE: CPT | Performed by: UROLOGY

## 2018-09-14 PROCEDURE — 25010000002 ERTAPENEM 1 GM/100ML SOLUTION: Performed by: SPECIALIST

## 2018-09-14 PROCEDURE — C1769 GUIDE WIRE: HCPCS | Performed by: SPECIALIST

## 2018-09-14 PROCEDURE — 25010000002 PROPOFOL 10 MG/ML EMULSION: Performed by: NURSE ANESTHETIST, CERTIFIED REGISTERED

## 2018-09-14 PROCEDURE — 0T788DZ DILATION OF BILATERAL URETERS WITH INTRALUMINAL DEVICE, VIA NATURAL OR ARTIFICIAL OPENING ENDOSCOPIC: ICD-10-PCS | Performed by: UROLOGY

## 2018-09-14 PROCEDURE — 86920 COMPATIBILITY TEST SPIN: CPT

## 2018-09-14 PROCEDURE — 82465 ASSAY BLD/SERUM CHOLESTEROL: CPT | Performed by: SPECIALIST

## 2018-09-14 PROCEDURE — BT141ZZ FLUOROSCOPY OF KIDNEYS, URETERS AND BLADDER USING LOW OSMOLAR CONTRAST: ICD-10-PCS | Performed by: UROLOGY

## 2018-09-14 PROCEDURE — 88307 TISSUE EXAM BY PATHOLOGIST: CPT | Performed by: SPECIALIST

## 2018-09-14 PROCEDURE — 25010000002 PIPERACILLIN SOD-TAZOBACTAM PER 1 G: Performed by: SPECIALIST

## 2018-09-14 PROCEDURE — 84134 ASSAY OF PREALBUMIN: CPT | Performed by: SPECIALIST

## 2018-09-14 PROCEDURE — 25010000002 POTASSIUM CHLORIDE PER 2 MEQ: Performed by: SPECIALIST

## 2018-09-14 PROCEDURE — 74420 UROGRAPHY RTRGR +-KUB: CPT | Performed by: UROLOGY

## 2018-09-14 PROCEDURE — C2617 STENT, NON-COR, TEM W/O DEL: HCPCS | Performed by: SPECIALIST

## 2018-09-14 PROCEDURE — 52332 CYSTOSCOPY AND TREATMENT: CPT | Performed by: UROLOGY

## 2018-09-14 PROCEDURE — C1758 CATHETER, URETERAL: HCPCS | Performed by: SPECIALIST

## 2018-09-14 PROCEDURE — 86900 BLOOD TYPING SEROLOGIC ABO: CPT | Performed by: SPECIALIST

## 2018-09-14 PROCEDURE — 25010000002 CEFOXITIN PER 1 G: Performed by: SPECIALIST

## 2018-09-14 PROCEDURE — 25010000002 ONDANSETRON PER 1 MG: Performed by: NURSE ANESTHETIST, CERTIFIED REGISTERED

## 2018-09-14 PROCEDURE — 84478 ASSAY OF TRIGLYCERIDES: CPT | Performed by: SPECIALIST

## 2018-09-14 PROCEDURE — 0DTN0ZZ RESECTION OF SIGMOID COLON, OPEN APPROACH: ICD-10-PCS | Performed by: SPECIALIST

## 2018-09-14 DEVICE — URETERAL STENT
Type: IMPLANTABLE DEVICE | Status: FUNCTIONAL
Brand: PERCUFLEX™ PLUS

## 2018-09-14 RX ORDER — ONDANSETRON 2 MG/ML
INJECTION INTRAMUSCULAR; INTRAVENOUS AS NEEDED
Status: DISCONTINUED | OUTPATIENT
Start: 2018-09-14 | End: 2018-09-14 | Stop reason: SURG

## 2018-09-14 RX ORDER — LABETALOL HYDROCHLORIDE 5 MG/ML
5 INJECTION, SOLUTION INTRAVENOUS
Status: DISCONTINUED | OUTPATIENT
Start: 2018-09-14 | End: 2018-09-14 | Stop reason: HOSPADM

## 2018-09-14 RX ORDER — ROCURONIUM BROMIDE 10 MG/ML
INJECTION, SOLUTION INTRAVENOUS AS NEEDED
Status: DISCONTINUED | OUTPATIENT
Start: 2018-09-14 | End: 2018-09-14 | Stop reason: SURG

## 2018-09-14 RX ORDER — METOPROLOL TARTRATE 5 MG/5ML
2.5 INJECTION INTRAVENOUS
Status: DISCONTINUED | OUTPATIENT
Start: 2018-09-14 | End: 2018-09-14 | Stop reason: HOSPADM

## 2018-09-14 RX ORDER — FAMOTIDINE 10 MG/ML
20 INJECTION, SOLUTION INTRAVENOUS EVERY 12 HOURS SCHEDULED
Status: DISCONTINUED | OUTPATIENT
Start: 2018-09-14 | End: 2018-09-15

## 2018-09-14 RX ORDER — MEPERIDINE HYDROCHLORIDE 25 MG/ML
12.5 INJECTION INTRAMUSCULAR; INTRAVENOUS; SUBCUTANEOUS
Status: DISCONTINUED | OUTPATIENT
Start: 2018-09-14 | End: 2018-09-14 | Stop reason: HOSPADM

## 2018-09-14 RX ORDER — PROPOFOL 10 MG/ML
VIAL (ML) INTRAVENOUS AS NEEDED
Status: DISCONTINUED | OUTPATIENT
Start: 2018-09-14 | End: 2018-09-14 | Stop reason: SURG

## 2018-09-14 RX ORDER — NALOXONE HCL 0.4 MG/ML
0.1 VIAL (ML) INJECTION
Status: DISCONTINUED | OUTPATIENT
Start: 2018-09-14 | End: 2018-09-24 | Stop reason: HOSPADM

## 2018-09-14 RX ORDER — SODIUM CHLORIDE 9 MG/ML
INJECTION, SOLUTION INTRAVENOUS AS NEEDED
Status: DISCONTINUED | OUTPATIENT
Start: 2018-09-14 | End: 2018-09-14 | Stop reason: HOSPADM

## 2018-09-14 RX ORDER — POTASSIUM CHLORIDE 14.9 MG/ML
20 INJECTION INTRAVENOUS
Status: DISCONTINUED | OUTPATIENT
Start: 2018-09-14 | End: 2018-09-14

## 2018-09-14 RX ORDER — NALOXONE HCL 0.4 MG/ML
0.4 VIAL (ML) INJECTION AS NEEDED
Status: DISCONTINUED | OUTPATIENT
Start: 2018-09-14 | End: 2018-09-14 | Stop reason: HOSPADM

## 2018-09-14 RX ORDER — MORPHINE SULFATE 1 MG/ML
INJECTION INTRAVENOUS CONTINUOUS
Status: DISCONTINUED | OUTPATIENT
Start: 2018-09-14 | End: 2018-09-19

## 2018-09-14 RX ORDER — FAMOTIDINE 10 MG/ML
20 INJECTION, SOLUTION INTRAVENOUS
Status: DISCONTINUED | OUTPATIENT
Start: 2018-09-14 | End: 2018-09-14 | Stop reason: HOSPADM

## 2018-09-14 RX ORDER — SUFENTANIL CITRATE 50 UG/ML
INJECTION EPIDURAL; INTRAVENOUS AS NEEDED
Status: DISCONTINUED | OUTPATIENT
Start: 2018-09-14 | End: 2018-09-14 | Stop reason: SURG

## 2018-09-14 RX ORDER — DEXTROSE MONOHYDRATE 25 G/50ML
12.5 INJECTION, SOLUTION INTRAVENOUS AS NEEDED
Status: DISCONTINUED | OUTPATIENT
Start: 2018-09-14 | End: 2018-09-14 | Stop reason: HOSPADM

## 2018-09-14 RX ORDER — SODIUM CHLORIDE 0.9 % (FLUSH) 0.9 %
1-10 SYRINGE (ML) INJECTION AS NEEDED
Status: DISCONTINUED | OUTPATIENT
Start: 2018-09-14 | End: 2018-09-14 | Stop reason: HOSPADM

## 2018-09-14 RX ORDER — MORPHINE SULFATE 2 MG/ML
2 INJECTION, SOLUTION INTRAMUSCULAR; INTRAVENOUS
Status: DISCONTINUED | OUTPATIENT
Start: 2018-09-14 | End: 2018-09-14 | Stop reason: HOSPADM

## 2018-09-14 RX ORDER — OXYCODONE AND ACETAMINOPHEN 10; 325 MG/1; MG/1
1 TABLET ORAL ONCE AS NEEDED
Status: DISCONTINUED | OUTPATIENT
Start: 2018-09-14 | End: 2018-09-14 | Stop reason: HOSPADM

## 2018-09-14 RX ORDER — MAGNESIUM HYDROXIDE 1200 MG/15ML
LIQUID ORAL AS NEEDED
Status: DISCONTINUED | OUTPATIENT
Start: 2018-09-14 | End: 2018-09-14 | Stop reason: HOSPADM

## 2018-09-14 RX ORDER — ONDANSETRON 2 MG/ML
4 INJECTION INTRAMUSCULAR; INTRAVENOUS ONCE AS NEEDED
Status: DISCONTINUED | OUTPATIENT
Start: 2018-09-14 | End: 2018-09-14 | Stop reason: HOSPADM

## 2018-09-14 RX ORDER — IPRATROPIUM BROMIDE AND ALBUTEROL SULFATE 2.5; .5 MG/3ML; MG/3ML
3 SOLUTION RESPIRATORY (INHALATION) ONCE AS NEEDED
Status: DISCONTINUED | OUTPATIENT
Start: 2018-09-14 | End: 2018-09-14 | Stop reason: HOSPADM

## 2018-09-14 RX ORDER — HYDRALAZINE HYDROCHLORIDE 20 MG/ML
5 INJECTION INTRAMUSCULAR; INTRAVENOUS
Status: DISCONTINUED | OUTPATIENT
Start: 2018-09-14 | End: 2018-09-14 | Stop reason: HOSPADM

## 2018-09-14 RX ORDER — DIPHENHYDRAMINE HYDROCHLORIDE 50 MG/ML
12.5 INJECTION INTRAMUSCULAR; INTRAVENOUS
Status: DISCONTINUED | OUTPATIENT
Start: 2018-09-14 | End: 2018-09-14 | Stop reason: HOSPADM

## 2018-09-14 RX ORDER — MIDAZOLAM HYDROCHLORIDE 1 MG/ML
1 INJECTION INTRAMUSCULAR; INTRAVENOUS
Status: DISCONTINUED | OUTPATIENT
Start: 2018-09-14 | End: 2018-09-14 | Stop reason: HOSPADM

## 2018-09-14 RX ORDER — MIDAZOLAM HYDROCHLORIDE 1 MG/ML
2 INJECTION INTRAMUSCULAR; INTRAVENOUS
Status: DISCONTINUED | OUTPATIENT
Start: 2018-09-14 | End: 2018-09-14 | Stop reason: HOSPADM

## 2018-09-14 RX ORDER — METOPROLOL TARTRATE 5 MG/5ML
5 INJECTION INTRAVENOUS EVERY 6 HOURS
Status: DISCONTINUED | OUTPATIENT
Start: 2018-09-14 | End: 2018-09-19

## 2018-09-14 RX ORDER — SODIUM CHLORIDE, SODIUM LACTATE, POTASSIUM CHLORIDE, CALCIUM CHLORIDE 600; 310; 30; 20 MG/100ML; MG/100ML; MG/100ML; MG/100ML
20 INJECTION, SOLUTION INTRAVENOUS CONTINUOUS
Status: DISCONTINUED | OUTPATIENT
Start: 2018-09-14 | End: 2018-09-14

## 2018-09-14 RX ORDER — FENTANYL CITRATE 50 UG/ML
25 INJECTION, SOLUTION INTRAMUSCULAR; INTRAVENOUS
Status: DISCONTINUED | OUTPATIENT
Start: 2018-09-14 | End: 2018-09-14 | Stop reason: HOSPADM

## 2018-09-14 RX ORDER — LIDOCAINE HYDROCHLORIDE 20 MG/ML
INJECTION, SOLUTION INFILTRATION; PERINEURAL AS NEEDED
Status: DISCONTINUED | OUTPATIENT
Start: 2018-09-14 | End: 2018-09-14 | Stop reason: SURG

## 2018-09-14 RX ORDER — SODIUM CHLORIDE, SODIUM LACTATE, POTASSIUM CHLORIDE, CALCIUM CHLORIDE 600; 310; 30; 20 MG/100ML; MG/100ML; MG/100ML; MG/100ML
75 INJECTION, SOLUTION INTRAVENOUS CONTINUOUS
Status: DISCONTINUED | OUTPATIENT
Start: 2018-09-14 | End: 2018-09-17

## 2018-09-14 RX ORDER — SODIUM CHLORIDE, SODIUM LACTATE, POTASSIUM CHLORIDE, CALCIUM CHLORIDE 600; 310; 30; 20 MG/100ML; MG/100ML; MG/100ML; MG/100ML
9 INJECTION, SOLUTION INTRAVENOUS CONTINUOUS
Status: DISCONTINUED | OUTPATIENT
Start: 2018-09-14 | End: 2018-09-14

## 2018-09-14 RX ORDER — HYDROMORPHONE HYDROCHLORIDE 1 MG/ML
0.5 INJECTION, SOLUTION INTRAMUSCULAR; INTRAVENOUS; SUBCUTANEOUS EVERY 4 HOURS PRN
Status: DISCONTINUED | OUTPATIENT
Start: 2018-09-14 | End: 2018-09-19 | Stop reason: ALTCHOICE

## 2018-09-14 RX ADMIN — PROPOFOL 25 MG: 10 INJECTION, EMULSION INTRAVENOUS at 11:21

## 2018-09-14 RX ADMIN — SUFENTANIL CITRATE 10 MCG: 50 INJECTION, SOLUTION EPIDURAL; INTRAVENOUS at 11:05

## 2018-09-14 RX ADMIN — ONDANSETRON HYDROCHLORIDE 4 MG: 2 INJECTION, SOLUTION INTRAMUSCULAR; INTRAVENOUS at 16:50

## 2018-09-14 RX ADMIN — TAZOBACTAM SODIUM AND PIPERACILLIN SODIUM 3.38 G: 375; 3 INJECTION, SOLUTION INTRAVENOUS at 09:51

## 2018-09-14 RX ADMIN — METRONIDAZOLE 500 MG: 500 INJECTION, SOLUTION INTRAVENOUS at 05:45

## 2018-09-14 RX ADMIN — SODIUM CHLORIDE, POTASSIUM CHLORIDE, SODIUM LACTATE AND CALCIUM CHLORIDE 125 ML/HR: 600; 310; 30; 20 INJECTION, SOLUTION INTRAVENOUS at 16:14

## 2018-09-14 RX ADMIN — FAMOTIDINE 20 MG: 10 INJECTION INTRAVENOUS at 09:54

## 2018-09-14 RX ADMIN — SUFENTANIL CITRATE 10 MCG: 50 INJECTION, SOLUTION EPIDURAL; INTRAVENOUS at 12:46

## 2018-09-14 RX ADMIN — TAZOBACTAM SODIUM AND PIPERACILLIN SODIUM 3.38 G: 375; 3 INJECTION, SOLUTION INTRAVENOUS at 16:44

## 2018-09-14 RX ADMIN — SUGAMMADEX 400 MG: 100 INJECTION, SOLUTION INTRAVENOUS at 13:55

## 2018-09-14 RX ADMIN — SUFENTANIL CITRATE 10 MCG: 50 INJECTION, SOLUTION EPIDURAL; INTRAVENOUS at 11:14

## 2018-09-14 RX ADMIN — TAZOBACTAM SODIUM AND PIPERACILLIN SODIUM 3.38 G: 375; 3 INJECTION, SOLUTION INTRAVENOUS at 03:34

## 2018-09-14 RX ADMIN — SUFENTANIL CITRATE 10 MCG: 50 INJECTION, SOLUTION EPIDURAL; INTRAVENOUS at 12:35

## 2018-09-14 RX ADMIN — SODIUM CHLORIDE, POTASSIUM CHLORIDE, SODIUM LACTATE AND CALCIUM CHLORIDE 50 ML/HR: 600; 310; 30; 20 INJECTION, SOLUTION INTRAVENOUS at 03:34

## 2018-09-14 RX ADMIN — ONDANSETRON HYDROCHLORIDE 4 MG: 2 INJECTION, SOLUTION INTRAMUSCULAR; INTRAVENOUS at 21:03

## 2018-09-14 RX ADMIN — ROCURONIUM BROMIDE 20 MG: 10 INJECTION INTRAVENOUS at 12:02

## 2018-09-14 RX ADMIN — SODIUM CHLORIDE, POTASSIUM CHLORIDE, SODIUM LACTATE AND CALCIUM CHLORIDE 9 ML/HR: 600; 310; 30; 20 INJECTION, SOLUTION INTRAVENOUS at 10:07

## 2018-09-14 RX ADMIN — ROCURONIUM BROMIDE 50 MG: 10 INJECTION INTRAVENOUS at 10:54

## 2018-09-14 RX ADMIN — MIDAZOLAM HYDROCHLORIDE 1 MG: 1 INJECTION, SOLUTION INTRAMUSCULAR; INTRAVENOUS at 09:54

## 2018-09-14 RX ADMIN — SUFENTANIL CITRATE 10 MCG: 50 INJECTION, SOLUTION EPIDURAL; INTRAVENOUS at 12:06

## 2018-09-14 RX ADMIN — SUFENTANIL CITRATE 10 MCG: 50 INJECTION, SOLUTION EPIDURAL; INTRAVENOUS at 11:10

## 2018-09-14 RX ADMIN — PROPOFOL 100 MG: 10 INJECTION, EMULSION INTRAVENOUS at 10:54

## 2018-09-14 RX ADMIN — METRONIDAZOLE 500 MG: 500 INJECTION, SOLUTION INTRAVENOUS at 21:03

## 2018-09-14 RX ADMIN — POTASSIUM CHLORIDE 20 MEQ: 14.9 INJECTION, SOLUTION INTRAVENOUS at 08:25

## 2018-09-14 RX ADMIN — FAMOTIDINE 20 MG: 10 INJECTION INTRAVENOUS at 21:03

## 2018-09-14 RX ADMIN — MORPHINE SULFATE: 25 INJECTION, SOLUTION, CONCENTRATE INTRAVENOUS at 16:10

## 2018-09-14 RX ADMIN — SUFENTANIL CITRATE 10 MCG: 50 INJECTION, SOLUTION EPIDURAL; INTRAVENOUS at 12:11

## 2018-09-14 RX ADMIN — SUFENTANIL CITRATE 20 MCG: 50 INJECTION, SOLUTION EPIDURAL; INTRAVENOUS at 10:54

## 2018-09-14 RX ADMIN — PROPOFOL 25 MG: 10 INJECTION, EMULSION INTRAVENOUS at 11:18

## 2018-09-14 RX ADMIN — ROCURONIUM BROMIDE 30 MG: 10 INJECTION INTRAVENOUS at 12:20

## 2018-09-14 RX ADMIN — ERTAPENEM SODIUM 1 G: 1 INJECTION, POWDER, LYOPHILIZED, FOR SOLUTION INTRAMUSCULAR; INTRAVENOUS at 10:57

## 2018-09-14 RX ADMIN — METOPROLOL TARTRATE 5 MG: 5 INJECTION, SOLUTION INTRAVENOUS at 22:42

## 2018-09-14 RX ADMIN — SUFENTANIL CITRATE 10 MCG: 50 INJECTION, SOLUTION EPIDURAL; INTRAVENOUS at 11:52

## 2018-09-14 RX ADMIN — SODIUM CHLORIDE, POTASSIUM CHLORIDE, SODIUM LACTATE AND CALCIUM CHLORIDE 9 ML/HR: 600; 310; 30; 20 INJECTION, SOLUTION INTRAVENOUS at 14:57

## 2018-09-14 RX ADMIN — TAZOBACTAM SODIUM AND PIPERACILLIN SODIUM 3.38 G: 375; 3 INJECTION, SOLUTION INTRAVENOUS at 22:42

## 2018-09-14 RX ADMIN — ONDANSETRON HYDROCHLORIDE 4 MG: 2 SOLUTION INTRAMUSCULAR; INTRAVENOUS at 13:56

## 2018-09-14 RX ADMIN — LIDOCAINE HYDROCHLORIDE 80 MG: 20 INJECTION, SOLUTION INFILTRATION; PERINEURAL at 10:54

## 2018-09-14 RX ADMIN — SODIUM CHLORIDE, POTASSIUM CHLORIDE, SODIUM LACTATE AND CALCIUM CHLORIDE: 600; 310; 30; 20 INJECTION, SOLUTION INTRAVENOUS at 12:23

## 2018-09-14 NOTE — ANESTHESIA PREPROCEDURE EVALUATION
Anesthesia Evaluation     Patient summary reviewed   history of anesthetic complications: PONV  NPO Solid Status: > 8 hours             Airway   Mallampati: II  TM distance: >3 FB  Neck ROM: full  Dental          Pulmonary    (-) asthma, sleep apnea, lung cancer  Cardiovascular   Exercise tolerance: good (4-7 METS)    Patient on routine beta blocker and Beta blocker given within 24 hours of surgery    (+) hypertension,   (-) pacemaker, past MI, angina, cardiac stents      Neuro/Psych  (-) seizures, TIA, CVA  GI/Hepatic/Renal/Endo    (+)   renal disease CRI, diabetes mellitus,   (-) GERD, liver disease    ROS Comment: Colovaginal fistula  C dff colitis      Musculoskeletal     Abdominal    Substance History      OB/GYN          Other                        Anesthesia Plan    ASA 2     general     intravenous induction   Anesthetic plan, all risks, benefits, and alternatives have been provided, discussed and informed consent has been obtained with: patient.  Use of blood products discussed with patient  Consented to blood products.

## 2018-09-14 NOTE — PROGRESS NOTES
Continued Stay Note   [facility]     Patient Name: Nuha Cali  MRN: [id number]  Today's Date: 9/14/2018    Admit Date: 9/11/2018          Discharge Plan     Row Name 09/14/18 1320       Plan    Plan Home    Patient/Family in Agreement with Plan yes    Plan Comments Pt lives with spouse and previously denied needs. Attempted to speak with her again today but she is still in OR. Will follow.              Discharge Codes    No documentation.           DANIEL Vega

## 2018-09-14 NOTE — ANESTHESIA PROCEDURE NOTES
Airway  Urgency: elective    Airway not difficult    General Information and Staff    Patient location during procedure: OR  CRNA: JERROD CHURCHILL    Indications and Patient Condition  Indications for airway management: airway protection    Preoxygenated: yes  MILS maintained throughout  Mask difficulty assessment: 1 - vent by mask    Final Airway Details  Final airway type: endotracheal airway      Successful airway: ETT  Cuffed: yes   Successful intubation technique: direct laryngoscopy  Endotracheal tube insertion site: oral  Blade: Gonzales  Blade size: 2  ETT size: 7.5 mm  Cormack-Lehane Classification: grade I - full view of glottis  Placement verified by: chest auscultation and capnometry   Cuff volume (mL): 10  Measured from: lips  ETT to lips (cm): 21  Number of attempts at approach: 1

## 2018-09-14 NOTE — PLAN OF CARE
Problem: Patient Care Overview  Goal: Plan of Care Review  Outcome: Ongoing (interventions implemented as appropriate)   09/14/18 1627   Coping/Psychosocial   Plan of Care Reviewed With patient;spouse;family   Plan of Care Review   Progress no change   OTHER   Outcome Summary Pt to OR today, returns to 3C with Quinones, midline incision C/D/I, NG with yellow thick drainage, PCA set up see MAR, c/o nausea, see MAR, pt resting comfortably, will continue to monitor.     Goal: Individualization and Mutuality  Outcome: Ongoing (interventions implemented as appropriate)   09/11/18 1711 09/13/18 1714   Individualization   Patient Specific Preferences --  Lights off   Patient Specific Goals (Include Timeframe) --  resolve infection, surgery Friday   Patient Specific Interventions --  Isolation precautions education   Mutuality/Individual Preferences   What Anxieties, Fears, Concerns, or Questions Do You Have About Your Care? Fear of the unknown related to this condition --    What Information Would Help Us Give You More Personalized Care? None at this time --    How Would You and/or Your Support Person Like to Participate in Your Care? --  continue to remind spouse about infection precautions   Mutuality/Individual Preferences   How to Address Anxieties/Fears Updating regarding situation/status of situation.  --      Goal: Discharge Needs Assessment  Outcome: Ongoing (interventions implemented as appropriate)   09/13/18 1352 09/13/18 1400   Discharge Needs Assessment   Readmission Within the Last 30 Days --  no previous admission in last 30 days   Concerns to be Addressed denies needs/concerns at this time --    Patient/Family Anticipates Transition to --  home with family   Patient/Family Anticipated Services at Transition --  none   Transportation Concerns car, none --    Transportation Anticipated --  family or friend will provide   Anticipated Changes Related to Illness --  none   Equipment Needed After Discharge --  none    Disability   Equipment Currently Used at Home --  none     Goal: Interprofessional Rounds/Family Conf  Outcome: Ongoing (interventions implemented as appropriate)   09/14/18 1627   Interdisciplinary Rounds/Family Conf   Participants family;nursing;patient;physician       Problem: Infection, Risk/Actual (Adult)  Intervention: Manage Suspected/Actual Infection   09/14/18 1627   Prevent/Manage Colorectal Surgical Infection   Fever Reduction/Comfort Measures lightweight bedding   Promote Perineal Hygiene/Elimination Safety   Isolation Precautions contact precautions maintained     Intervention: Prevent Infection/Maximize Resistance   09/14/18 1627   Pain/Comfort/Sleep Interventions   Sleep/Rest Enhancement regular sleep/rest pattern promoted       Goal: Infection Prevention/Resolution  Outcome: Ongoing (interventions implemented as appropriate)   09/14/18 1627   Infection, Risk/Actual (Adult)   Infection Prevention/Resolution making progress toward outcome       Problem: Bowel Resection (Adult)  Intervention: Promote Pulmonary Hygiene and Secretion Clearance   09/14/18 1525   Positioning   Head of Bed (HOB) HOB at 30 degrees     Intervention: Monitor and Manage Acute Postoperative Pain   09/14/18 1627   Promote Oxygenation/Perfusion   Pain Management Interventions see MAR;quiet environment facilitated;position adjusted     Intervention: Promote Effective Elimination   09/14/18 1627   Promote Effective Bowel Elimination   Bowel Intervention adequate fluid intake promoted   Genitourinary () Interventions   Urinary Elimination Promotion catheter patency maintained     Intervention: Prevent/Manage Post-surgical Infection   09/14/18 1627   Prevent/Manage Colorectal Surgical Infection   Fever Reduction/Comfort Measures lightweight bedding   Safety Management   Infection Prevention personal protective equipment utilized;rest/sleep promoted     Intervention: Promote Surgical Wound Healing/Prevent Dehiscence   09/14/18 1627    Pain/Comfort/Sleep Interventions   Sleep/Rest Enhancement regular sleep/rest pattern promoted   Skin Interventions   Dehiscence Prevention/Management abdominal binder secured in place     Intervention: Monitor/Manage Postoperative Bleeding   09/14/18 1627   Safety Interventions   Bleeding Management dressing monitored     Intervention: Monitor/Manage Post-resection GI Motility/Function   09/14/18 1627   Monitor/Manage Hypovolemia   Nausea/Vomiting Interventions see MAR;nasogastric tube to suction     Intervention: Prevent/Manage DVT/VTE Risk   09/14/18 1525   Interventions   VTE Prevention/Management bilateral;sequential compression devices on     Intervention: Monitor/Manage Postoperative Fluid Electrolyte Balance   09/14/18 1627   Nutrition Interventions   Fluid/Electrolyte Management fluids adjusted;electrolyte supplement adjusted     Intervention: Optimize Psychosocial Response to the Surgical Experience   09/14/18 1525   Interventions   Trust Relationship/Rapport care explained;choices provided;questions answered;questions encouraged;reassurance provided       Goal: Signs and Symptoms of Listed Potential Problems Will be Absent, Minimized or Managed (Bowel Resection)  Outcome: Ongoing (interventions implemented as appropriate)   09/14/18 1627   Goal/Outcome Evaluation   Problems Assessed (Bowel Resection) all   Problems Present (Bowel Resection) bleeding;pain;infection;postoperative nausea and vomiting;fluid/electrolyte imbalance     Goal: Anesthesia/Sedation Recovery  Outcome: Outcome(s) achieved Date Met: 09/14/18 09/14/18 1627   Goal/Outcome Evaluation   Anesthesia/Sedation Recovery criteria met for discharge

## 2018-09-14 NOTE — OP NOTE
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 Ureteral stents are needed for identification of ureters for anticipated low anterior resection.     Findings:   #1.  Cystoscopy findings: Urethra is without evidence of lesion.  The bladder itself shows no evidence of mucosal lesions, glomerulations, or masses.  Specifically there is no stool within the bladder nor any evidence of fistula.  #2.  Interpretation of bilateral retrograde ureteropyelograms: The left ureter is normal in its course and caliber.  Subsequent. images show a stent in good position.  The right ureters normal its course and caliber.  There is slight hydronephrosis noted down to the level just below the vessels.  This does not appear obstructive nor is there evidence of filling defect.  Subsequent images do show a stent curled in good position.    Procedure details:  After appropriate anesthesia, positioning, prep and drape, timeout protocol was observed.     22 Vincentian cystoscope sheath with 30° lens is inserted.  After inspection of the bladder bilateral retrogrades were performed with a 5 Vincentian cone-tipped catheter and half-strength contrast.    0.038 mm Sensor guidewires placed the left ureteral orifice and up the left renal pelvis.  Over the wire I passed an 8 Vincentian by 22 cm double-J ureteral stent and confirmed positioning by fluoroscopy and cystoscopy.    Same procedure performed on the right  side with the same wire.  I passed a 8 North Korean by 22 cm double-J ureteral stent over that wire curling one in the pelvis other than the bladder.  String was removed off both stents.    And 18 North Korean Quinones catheters placed.    Estimated Blood Loss: Less than 30 mL    Specimens:                None      Drains:   Urethral Catheter Latex 18 Fr. (Active)       Complications: none    Plan: Patient will have stents in place for approximately 2-3 weeks he can be removed at a later procedure.      (Please note that portions of this note were completed with a voice recognition program.)  Dick Hart MD     Date: 9/14/2018  Time: 11:27 AM

## 2018-09-14 NOTE — PROGRESS NOTES
Princess Demarco MD FACS  Progress Note     LOS: 3 days   Patient Care Team:  Juan Echavarria MD as PCP - General  Juan Echavarria MD as PCP - [facility]  [facility], Cole Campos MD as Consulting Physician (Otolaryngology)  Tarun Morataya MD as Consulting Physician (Gastroenterology)      Subjective     Interval History:      no events     Objective     Vital Signs  Temp:  [97.5 °F (36.4 °C)-98 °F (36.7 °C)] 97.8 °F (36.6 °C)  Heart Rate:  [] 78  Resp:  [16-18] 16  BP: ()/(52-63) 106/59    Physical Exam:  General appearance - alert, well appearing, and in no distress  Abdomen - soft, nontender, nondistended, no masses or organomegaly      Results Review:    Lab Results (last 24 hours)     Procedure Component Value Units Date/Time    CBC (No Diff) [[id number]]  (Abnormal) Collected:  09/14/18 0536    Specimen:  Blood Updated:  09/14/18 0639     WBC 3.30 (L) 10*3/mm3      RBC 3.35 (L) 10*6/mm3      Hemoglobin 8.9 (L) g/dL      Hematocrit 28.4 (L) %      MCV 84.8 fL      MCH 26.6 (L) pg      MCHC 31.3 (L) g/dL      RDW 15.4 (H) %      RDW-SD 47.6 fl      MPV 8.6 fL      Platelets 166 10*3/mm3     Basic Metabolic Panel [[id number]] Collected:  09/14/18 0536    Specimen:  Blood Updated:  09/14/18 0633        Imaging Results (last 24 hours)     ** No results found for the last 24 hours. 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  She remains afebrile and is without abdominal pain.  Ideally, she should be treated for the colitis prior to any resect or anastomosis.  Her fistula; however, is significantly morbid for her and she risks ascending infection from the fistula.  She understands the risks and wishes to proceed.  The risks, benefits, complications, and possible alternatives of sigmoidectomy, takedown of the colovaginal fistula, including the possible need for stoma creation were discussed with the patient  who agreed to proceed.      Princess Demarco MD  09/14/18  6:50 AM

## 2018-09-14 NOTE — ANESTHESIA POSTPROCEDURE EVALUATION
"Patient: Nuha OCASIO [place]    Procedure Summary     Date:  09/14/18 Room / Location:   [facility] OR 09 / [facility] OR    Anesthesia Start:  1050 Anesthesia Stop:  1416    Procedures:       COLON RESECTION LAPAROSCOPIC SIGMOID OR LOW ANTERIOR, 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  Please see RN notes for details of d/c criteria.    Blood pressure 100/42, pulse 77, temperature 97.6 °F (36.4 °C), temperature source Axillary, resp. rate 16, height 149.9 cm (59\"), weight 61.2 kg (135 lb), SpO2 99 %, not currently breastfeeding.          "

## 2018-09-14 NOTE — PLAN OF CARE
Problem: Patient Care Overview  Goal: Plan of Care Review  Outcome: Ongoing (interventions implemented as appropriate)   09/14/18 0136   Coping/Psychosocial   Plan of Care Reviewed With patient   Plan of Care Review   Progress no change   OTHER   Outcome Summary Pt has had no c/o pain this shift. IVF and IV abx cont. Surgery planned for tomorrow. Pt maintained on full liquid diet; NPO began after midnight. Contact precautions cont. VSS. Will cont to monitor.      Goal: Individualization and Mutuality  Outcome: Ongoing (interventions implemented as appropriate)   09/11/18 1711 09/13/18 1714   Individualization   Patient Specific Preferences --  Lights off   Patient Specific Goals (Include Timeframe) --  resolve infection, surgery Friday   Patient Specific Interventions --  Isolation precautions education   Mutuality/Individual Preferences   What Anxieties, Fears, Concerns, or Questions Do You Have About Your Care? Fear of the unknown related to this condition --    What Information Would Help Us Give You More Personalized Care? None at this time --      Goal: Discharge Needs Assessment  Outcome: Ongoing (interventions implemented as appropriate)   09/13/18 1352 09/13/18 1400   Discharge Needs Assessment   Readmission Within the Last 30 Days --  no previous admission in last 30 days   Concerns to be Addressed denies needs/concerns at this time --    Patient/Family Anticipates Transition to --  home with family   Patient/Family Anticipated Services at Transition --  none   Transportation Concerns car, none --    Transportation Anticipated --  family or friend will provide   Anticipated Changes Related to Illness --  none   Equipment Needed After Discharge --  none   Discharge Coordination/Progress --  Pt lives at home with her spouse. She is ambulating in her room well. Noted that pt will be going to the OR tomorrow. No needs at this time but will follow after surgery.   Disability   Equipment Currently Used at Home --   none     Goal: Interprofessional Rounds/Family Conf  Outcome: Ongoing (interventions implemented as appropriate)   09/14/18 0136   Interdisciplinary Rounds/Family Conf   Participants nursing;patient       Problem: Infection, Risk/Actual (Adult)  Goal: Infection Prevention/Resolution  Outcome: Ongoing (interventions implemented as appropriate)   09/13/18 1714   Infection, Risk/Actual (Adult)   Infection Prevention/Resolution making progress toward outcome

## 2018-09-15 LAB
ALBUMIN SERPL-MCNC: 2.7 G/DL (ref 3.5–5)
ALBUMIN/GLOB SERPL: 1 G/DL (ref 1.1–2.5)
ALP SERPL-CCNC: 49 U/L (ref 24–120)
ALT SERPL W P-5'-P-CCNC: 28 U/L (ref 0–54)
ANION GAP SERPL CALCULATED.3IONS-SCNC: 10 MMOL/L (ref 4–13)
AST SERPL-CCNC: 42 U/L (ref 7–45)
BILIRUB SERPL-MCNC: 0.6 MG/DL (ref 0.1–1)
BUN BLD-MCNC: 12 MG/DL (ref 5–21)
BUN/CREAT SERPL: 9.2 (ref 7–25)
CALCIUM SPEC-SCNC: 8.6 MG/DL (ref 8.4–10.4)
CHLORIDE SERPL-SCNC: 110 MMOL/L (ref 98–110)
CHOLEST SERPL-MCNC: 94 MG/DL (ref 130–200)
CO2 SERPL-SCNC: 24 MMOL/L (ref 24–31)
CREAT BLD-MCNC: 1.31 MG/DL (ref 0.5–1.4)
CRP SERPL-MCNC: 1.77 MG/DL (ref 0–0.99)
DEPRECATED RDW RBC AUTO: 48.5 FL (ref 40–54)
ERYTHROCYTE [DISTWIDTH] IN BLOOD BY AUTOMATED COUNT: 15.9 % (ref 12–15)
GFR SERPL CREATININE-BSD FRML MDRD: 40 ML/MIN/1.73
GLOBULIN UR ELPH-MCNC: 2.7 GM/DL
GLUCOSE BLD-MCNC: 107 MG/DL (ref 70–100)
GLUCOSE BLDC GLUCOMTR-MCNC: 136 MG/DL (ref 70–130)
HCT VFR BLD AUTO: 26.2 % (ref 37–47)
HGB BLD-MCNC: 8.1 G/DL (ref 12–16)
MAGNESIUM SERPL-MCNC: 1.9 MG/DL (ref 1.4–2.2)
MCH RBC QN AUTO: 26.4 PG (ref 28–32)
MCHC RBC AUTO-ENTMCNC: 30.9 G/DL (ref 33–36)
MCV RBC AUTO: 85.3 FL (ref 82–98)
PHOSPHATE SERPL-MCNC: 4.9 MG/DL (ref 2.5–4.5)
PLATELET # BLD AUTO: 150 10*3/MM3 (ref 130–400)
PMV BLD AUTO: 8.5 FL (ref 6–12)
POTASSIUM BLD-SCNC: 3.3 MMOL/L (ref 3.5–5.3)
PREALB SERPL-MCNC: 15.4 MG/DL (ref 18–36)
PROT SERPL-MCNC: 5.4 G/DL (ref 6.3–8.7)
RBC # BLD AUTO: 3.07 10*6/MM3 (ref 4.2–5.4)
SODIUM BLD-SCNC: 144 MMOL/L (ref 135–145)
TRIGL SERPL-MCNC: 106 MG/DL (ref 0–149)
WBC NRBC COR # BLD: 7.11 10*3/MM3 (ref 4.8–10.8)

## 2018-09-15 PROCEDURE — 83735 ASSAY OF MAGNESIUM: CPT | Performed by: SPECIALIST

## 2018-09-15 PROCEDURE — 25010000003 MORPHINE PER 100 MG: Performed by: SPECIALIST

## 2018-09-15 PROCEDURE — C1751 CATH, INF, PER/CENT/MIDLINE: HCPCS

## 2018-09-15 PROCEDURE — 25010000002 ENOXAPARIN PER 10 MG: Performed by: SPECIALIST

## 2018-09-15 PROCEDURE — 82962 GLUCOSE BLOOD TEST: CPT

## 2018-09-15 PROCEDURE — 84100 ASSAY OF PHOSPHORUS: CPT | Performed by: SPECIALIST

## 2018-09-15 PROCEDURE — 85027 COMPLETE CBC AUTOMATED: CPT | Performed by: SPECIALIST

## 2018-09-15 PROCEDURE — 02HV33Z INSERTION OF INFUSION DEVICE INTO SUPERIOR VENA CAVA, PERCUTANEOUS APPROACH: ICD-10-PCS | Performed by: SPECIALIST

## 2018-09-15 PROCEDURE — 25010000002 POTASSIUM CHLORIDE PER 2 MEQ: Performed by: SPECIALIST

## 2018-09-15 PROCEDURE — 94760 N-INVAS EAR/PLS OXIMETRY 1: CPT

## 2018-09-15 PROCEDURE — 94799 UNLISTED PULMONARY SVC/PX: CPT

## 2018-09-15 PROCEDURE — 80053 COMPREHEN METABOLIC PANEL: CPT | Performed by: SPECIALIST

## 2018-09-15 PROCEDURE — 25010000002 PIPERACILLIN SOD-TAZOBACTAM PER 1 G: Performed by: SPECIALIST

## 2018-09-15 RX ORDER — LIDOCAINE HYDROCHLORIDE 10 MG/ML
1 INJECTION, SOLUTION EPIDURAL; INFILTRATION; INTRACAUDAL; PERINEURAL ONCE
Status: COMPLETED | OUTPATIENT
Start: 2018-09-15 | End: 2018-09-15

## 2018-09-15 RX ORDER — FAMOTIDINE 10 MG/ML
20 INJECTION, SOLUTION INTRAVENOUS DAILY
Status: DISCONTINUED | OUTPATIENT
Start: 2018-09-16 | End: 2018-09-21

## 2018-09-15 RX ORDER — POTASSIUM CHLORIDE 14.9 MG/ML
20 INJECTION INTRAVENOUS
Status: COMPLETED | OUTPATIENT
Start: 2018-09-15 | End: 2018-09-15

## 2018-09-15 RX ADMIN — ASCORBIC ACID, VITAMIN A PALMITATE, CHOLECALCIFEROL, THIAMINE HYDROCHLORIDE, RIBOFLAVIN-5 PHOSPHATE SODIUM, PYRIDOXINE HYDROCHLORIDE, NIACINAMIDE, DEXPANTHENOL, ALPHA-TOCOPHEROL ACETATE, VITAMIN K1, FOLIC ACID, BIOTIN, CYANOCOBALAMIN: 200; 3300; 200; 6; 3.6; 6; 40; 15; 10; 150; 600; 60; 5 INJECTION, SOLUTION INTRAVENOUS at 17:34

## 2018-09-15 RX ADMIN — TAZOBACTAM SODIUM AND PIPERACILLIN SODIUM 2.25 G: 250; 2 INJECTION, SOLUTION INTRAVENOUS at 21:24

## 2018-09-15 RX ADMIN — LIDOCAINE HYDROCHLORIDE 1 ML: 10 INJECTION, SOLUTION EPIDURAL; INFILTRATION; INTRACAUDAL; PERINEURAL at 16:48

## 2018-09-15 RX ADMIN — TAZOBACTAM SODIUM AND PIPERACILLIN SODIUM 3.38 G: 375; 3 INJECTION, SOLUTION INTRAVENOUS at 04:27

## 2018-09-15 RX ADMIN — MORPHINE SULFATE: 25 INJECTION, SOLUTION, CONCENTRATE INTRAVENOUS at 17:46

## 2018-09-15 RX ADMIN — I.V. FAT EMULSION 50 G: 20 EMULSION INTRAVENOUS at 17:34

## 2018-09-15 RX ADMIN — METOPROLOL TARTRATE 5 MG: 5 INJECTION, SOLUTION INTRAVENOUS at 22:50

## 2018-09-15 RX ADMIN — METRONIDAZOLE 500 MG: 500 INJECTION, SOLUTION INTRAVENOUS at 11:59

## 2018-09-15 RX ADMIN — ENOXAPARIN SODIUM 40 MG: 40 INJECTION SUBCUTANEOUS at 09:26

## 2018-09-15 RX ADMIN — SODIUM CHLORIDE, POTASSIUM CHLORIDE, SODIUM LACTATE AND CALCIUM CHLORIDE 125 ML/HR: 600; 310; 30; 20 INJECTION, SOLUTION INTRAVENOUS at 02:41

## 2018-09-15 RX ADMIN — FAMOTIDINE 20 MG: 10 INJECTION INTRAVENOUS at 09:26

## 2018-09-15 RX ADMIN — METOPROLOL TARTRATE 5 MG: 5 INJECTION, SOLUTION INTRAVENOUS at 11:05

## 2018-09-15 RX ADMIN — POTASSIUM CHLORIDE 20 MEQ: 14.9 INJECTION, SOLUTION INTRAVENOUS at 11:59

## 2018-09-15 RX ADMIN — METRONIDAZOLE 500 MG: 500 INJECTION, SOLUTION INTRAVENOUS at 21:24

## 2018-09-15 RX ADMIN — SODIUM CHLORIDE, POTASSIUM CHLORIDE, SODIUM LACTATE AND CALCIUM CHLORIDE 125 ML/HR: 600; 310; 30; 20 INJECTION, SOLUTION INTRAVENOUS at 17:34

## 2018-09-15 RX ADMIN — TAZOBACTAM SODIUM AND PIPERACILLIN SODIUM 3.38 G: 375; 3 INJECTION, SOLUTION INTRAVENOUS at 09:27

## 2018-09-15 RX ADMIN — POTASSIUM CHLORIDE 20 MEQ: 14.9 INJECTION, SOLUTION INTRAVENOUS at 09:36

## 2018-09-15 RX ADMIN — TAZOBACTAM SODIUM AND PIPERACILLIN SODIUM 2.25 G: 250; 2 INJECTION, SOLUTION INTRAVENOUS at 17:35

## 2018-09-15 RX ADMIN — METOPROLOL TARTRATE 5 MG: 5 INJECTION, SOLUTION INTRAVENOUS at 17:56

## 2018-09-15 RX ADMIN — METRONIDAZOLE 500 MG: 500 INJECTION, SOLUTION INTRAVENOUS at 05:40

## 2018-09-15 NOTE — CONSULTS
Pt had 5 Haitian double lumen PICC placed in left brachial vein. Pt tolerated procedure well. 39 cm of catheter internal and 0 cm external. Tip confirmation by 3cg. All lumens flush and draw well. Sterile dressing applied.

## 2018-09-15 NOTE — PROGRESS NOTES
Princess Demarco MD FACS  Progress Note     LOS: 4 days   Patient Care Team:  Juan Echavarria MD as PCP - General  Juan Echavarria MD as PCP - Family Medicine  [facility], Cole Campos MD as Consulting Physician (Otolaryngology)  Tarun Moratyaa MD as Consulting Physician (Gastroenterology)      Subjective     Interval History:      no events.  No complaint     Objective     Vital Signs  Temp:  [97.2 °F (36.2 °C)-98.9 °F (37.2 °C)] 98.4 °F (36.9 °C)  Heart Rate:  [73-93] 93  Resp:  [12-16] 16  BP: ()/(42-83) 107/63    Physical Exam:  General appearance - alert, well appearing, and in no distress  Abdomen - soft, appropriately tender, clean      Results Review:    Lab Results (last 24 hours)     Procedure Component Value Units Date/Time    Comprehensive Metabolic Panel [[id number]]  (Abnormal) Collected:  09/15/18 0707    Specimen:  Blood Updated:  09/15/18 0808     Glucose 107 (H) mg/dL      BUN 12 mg/dL      Creatinine 1.31 mg/dL      Sodium 144 mmol/L      Potassium 3.3 (L) mmol/L      Chloride 110 mmol/L      CO2 24.0 mmol/L      Calcium 8.6 mg/dL      Total Protein 5.4 (L) g/dL      Albumin 2.70 (L) g/dL      ALT (SGPT) 28 U/L      AST (SGOT) 42 U/L      Alkaline Phosphatase 49 U/L      Total Bilirubin 0.6 mg/dL      eGFR Non African Amer 40 (L) mL/min/1.73      Globulin 2.7 gm/dL      A/G Ratio 1.0 (L) g/dL      BUN/Creatinine Ratio 9.2     Anion Gap 10.0 mmol/L     Narrative:       The MDRD GFR formula is only valid for adults with stable renal function between ages 18 and 70.    Magnesium [[id number]]  (Normal) Collected:  09/15/18 0707    Specimen:  Blood Updated:  09/15/18 0756     Magnesium 1.9 mg/dL     Phosphorus [[id number]]  (Abnormal) Collected:  09/15/18 0707    Specimen:  Blood Updated:  09/15/18 0756     Phosphorus 4.9 (H) mg/dL     CBC (No Diff) [[id number]]  (Abnormal) Collected:  09/15/18 0707    Specimen:  Blood Updated:  09/15/18 0717     WBC 7.11 10*3/mm3      RBC 3.07 (L) 10*6/mm3       Hemoglobin 8.1 (L) g/dL      Hematocrit 26.2 (L) %      MCV 85.3 fL      MCH 26.4 (L) pg      MCHC 30.9 (L) g/dL      RDW 15.9 (H) %      RDW-SD 48.5 fl      MPV 8.5 fL      Platelets 150 10*3/mm3     Potassium [[id number]]  (Abnormal) Collected:  09/14/18 1422    Specimen:  Blood Updated:  09/14/18 1434     Potassium 3.1 (L) mmol/L     POC Glucose Once [[id number]]  (Normal) Collected:  09/14/18 1416    Specimen:  Blood Updated:  09/14/18 1430     Glucose 118 mg/dL      Comment: : [id number] KathyMeter ID: [id number]           Imaging Results (last 24 hours)     Procedure Component Value Units Date/Time    FL Retrograde Pyelogram In OR [[id number]] Updated:  09/14/18 1121    FL Retrograde Pyelogram In OR [[id number]] Updated:  09/14/18 1121            Assessment/Plan       POD#1 continue iv abx, start TPN, replete K      Princess Demarco MD  09/15/18  9:29 AM

## 2018-09-15 NOTE — PLAN OF CARE
Problem: Patient Care Overview  Goal: Plan of Care Review  Outcome: Ongoing (interventions implemented as appropriate)   09/15/18 0913   Plan of Care Review   Progress no change   OTHER   Outcome Summary MD consult for TPN. Per RN, PICC line is to be placed this AM. D20AA5 with lipids daily to begin today at 20ml/hr. D20AA5 with lipids daily @ goal 36ml/hr to begin tomorrow (9/16/18). TPN to meet 95% of calorie needs and 100% of protein needs. Will monitor pertinent labs and continue to follow.        Problem: Nutrition, Parenteral (Adult)  Goal: Signs and Symptoms of Listed Potential Problems Will be Absent, Minimized or Managed (Nutrition, Parenteral)  Outcome: Ongoing (interventions implemented as appropriate)   09/15/18 0913   Goal/Outcome Evaluation   Problems Assessed (Parenteral Nutrition) all

## 2018-09-15 NOTE — PLAN OF CARE
Problem: Patient Care Overview  Goal: Plan of Care Review  Outcome: Ongoing (interventions implemented as appropriate)   09/15/18 1608   Coping/Psychosocial   Plan of Care Reviewed With patient;spouse   Plan of Care Review   Progress improving   OTHER   Outcome Summary Patient denies nausea. Reports minimal pain however tolerated well with morphine PCA in place. Patent currently having PICC placement for TPN/Lipids to begin this evening. Patients potassium was slightly low today. Replaced with 2 bags of 20 meq IV potassium. IV abx cont. Ambulated to the chair today w/ assist x1. patient reported feelling very dizzy. Up to the bedside commode after that in which patient denies dizziness after that one time. W-D dsg changed this AM. Incision line is approximated w/ staples in place. Will cont to monitor.     Goal: Individualization and Mutuality  Outcome: Ongoing (interventions implemented as appropriate)    Goal: Discharge Needs Assessment  Outcome: Ongoing (interventions implemented as appropriate)      Problem: Infection, Risk/Actual (Adult)  Goal: Infection Prevention/Resolution  Outcome: Ongoing (interventions implemented as appropriate)   09/15/18 1608   Infection, Risk/Actual (Adult)   Infection Prevention/Resolution making progress toward outcome       Problem: Bowel Resection (Adult)  Goal: Signs and Symptoms of Listed Potential Problems Will be Absent, Minimized or Managed (Bowel Resection)  Outcome: Ongoing (interventions implemented as appropriate)   09/15/18 1608   Goal/Outcome Evaluation   Problems Assessed (Bowel Resection) all   Problems Present (Bowel Resection) pain;fluid/electrolyte imbalance       Problem: Nutrition, Parenteral (Adult)  Goal: Signs and Symptoms of Listed Potential Problems Will be Absent, Minimized or Managed (Nutrition, Parenteral)  Outcome: Ongoing (interventions implemented as appropriate)   09/15/18 1608   Goal/Outcome Evaluation   Problems Assessed (Parenteral Nutrition) all    Problems Present (Parenteral Nutrition) none

## 2018-09-15 NOTE — PROGRESS NOTES
Pharmacy Dosing Service  Antimicrobials  Zosyn    Assessment/Action/Plan:  Renal function declined, will adjust Zosyn from 3.375gm Q6H to 2.25gm Q6H.  Pharmacy will continue to monitor and adjust dose accordingly.     Subjective:  Nuha Cali is a 73 y.o. female currently being treated for Intra-abdominal infection (colovaginal fistula, s/p sigmoidectomy).    Objective:  Estimated Creatinine Clearance: 31.5 mL/min (by C-G formula based on SCr of 1.31 mg/dL).   Lab Results   Component Value Date    CREATININE 1.31 09/15/2018    CREATININE 0.94 09/14/2018    CREATININE 1.11 09/13/2018     Lab Results   Component Value Date    WBC 7.11 09/15/2018     Temp Readings from Last 1 Encounters:   09/15/18 98.3 °F (36.8 °C) (Axillary)       Culture Results:  Microbiology Results (last 10 days)       Procedure Component Value - Date/Time    Clostridium Difficile Toxin - Stool, Per Rectum [[id number]] Collected:  09/12/18 1113    Lab Status:  Final result Specimen:  Stool from Per Rectum Updated:  09/12/18 1212    Narrative:       The following orders were created for panel order Clostridium Difficile Toxin - Stool, Per Rectum.  Procedure                               Abnormality         Status                     ---------                               -----------         ------                     Clostridium Difficile To...[[id number]]  Abnormal            Final result                 Please view results for these tests on the individual orders.    Clostridium Difficile Toxin, PCR - Stool, Per Rectum [[id number]]  (Abnormal) Collected:  09/12/18 1113    Lab Status:  Final result Specimen:  Stool from Per Rectum Updated:  09/12/18 1212     C. Difficile Toxins by PCR Positive (A)          Current Antimicrobials:   Anti-Infectives       Ordered     Dose/Rate Route Frequency Start Stop    09/15/18 1229  piperacillin-tazobactam (ZOSYN) in iso-osmotic dextrose IVPB 2.25 g (premix)     Ordering Provider:  Princess Demarco MD     2.25 g Intravenous Every 6 Hours 09/15/18 1545 12/20/18 0944    09/13/18 1458  ertapenem (INVanz) 1 g/100 mL 0.9% NS VTB (mbp)     Ordering Provider:  Princess Demarco MD    1 g  over 30 Minutes Intravenous 30 min pre-op 09/14/18 0600 09/14/18 1057    09/12/18 1148  metroNIDAZOLE (FLAGYL) IVPB 500 mg     Ordering Provider:  Princess Demarco MD    500 mg  over 60 Minutes Intravenous Every 8 Hours 09/12/18 1200 12/21/18 0444            Yin Kim [facility]  09/15/18 12:30 PM

## 2018-09-15 NOTE — PLAN OF CARE
Problem: Patient Care Overview  Goal: Plan of Care Review  Outcome: Ongoing (interventions implemented as appropriate)   09/15/18 0322   Coping/Psychosocial   Plan of Care Reviewed With patient   Plan of Care Review   Progress no change   OTHER   Outcome Summary Pt had sigmoid colon resection,takedown of colovaginal fistula, and cysto with bilateral stent placement. C/o abd pain. Morphine pain pump in use. Midline gauze, medipore tape, abd binder. NG in place with some green/brown  output. Quinones in place with dark red urine. VSS. Will cont to monitor.      Goal: Individualization and Mutuality  Outcome: Ongoing (interventions implemented as appropriate)   09/11/18 1711 09/13/18 1714 09/15/18 0322   Individualization   Patient Specific Preferences --  Lights off --    Patient Specific Goals (Include Timeframe) --  --  Keep pain tolerable    Patient Specific Interventions --  --  Pain pump in use    Mutuality/Individual Preferences   What Anxieties, Fears, Concerns, or Questions Do You Have About Your Care? --  --  Getting better since surgery   What Information Would Help Us Give You More Personalized Care? None at this time --  --      Goal: Discharge Needs Assessment  Outcome: Ongoing (interventions implemented as appropriate)   09/13/18 1352 09/13/18 1400   Discharge Needs Assessment   Readmission Within the Last 30 Days --  no previous admission in last 30 days   Concerns to be Addressed denies needs/concerns at this time --    Patient/Family Anticipates Transition to --  home with family   Patient/Family Anticipated Services at Transition --  none   Transportation Concerns car, none --    Transportation Anticipated --  family or friend will provide   Anticipated Changes Related to Illness --  none   Equipment Needed After Discharge --  none   Discharge Coordination/Progress --  Pt lives at home with her spouse. She is ambulating in her room well. Noted that pt will be going to the OR tomorrow. No needs at this  time but will follow after surgery.   Disability   Equipment Currently Used at Home --  none     Goal: Interprofessional Rounds/Family Conf  Outcome: Ongoing (interventions implemented as appropriate)   09/15/18 0322   Interdisciplinary Rounds/Family Conf   Participants nursing;patient       Problem: Infection, Risk/Actual (Adult)  Goal: Infection Prevention/Resolution  Outcome: Ongoing (interventions implemented as appropriate)   09/15/18 0322   Infection, Risk/Actual (Adult)   Infection Prevention/Resolution making progress toward outcome       Problem: Bowel Resection (Adult)  Goal: Signs and Symptoms of Listed Potential Problems Will be Absent, Minimized or Managed (Bowel Resection)  Outcome: Ongoing (interventions implemented as appropriate)

## 2018-09-16 LAB
ALBUMIN SERPL-MCNC: 2.3 G/DL (ref 3.5–5)
ALBUMIN/GLOB SERPL: 0.9 G/DL (ref 1.1–2.5)
ALP SERPL-CCNC: 46 U/L (ref 24–120)
ALT SERPL W P-5'-P-CCNC: 28 U/L (ref 0–54)
ANION GAP SERPL CALCULATED.3IONS-SCNC: 3 MMOL/L (ref 4–13)
AST SERPL-CCNC: 31 U/L (ref 7–45)
BILIRUB SERPL-MCNC: 0.4 MG/DL (ref 0.1–1)
BUN BLD-MCNC: 19 MG/DL (ref 5–21)
BUN/CREAT SERPL: 13.5 (ref 7–25)
CALCIUM SPEC-SCNC: 8.5 MG/DL (ref 8.4–10.4)
CHLORIDE SERPL-SCNC: 110 MMOL/L (ref 98–110)
CO2 SERPL-SCNC: 26 MMOL/L (ref 24–31)
CREAT BLD-MCNC: 1.41 MG/DL (ref 0.5–1.4)
DEPRECATED RDW RBC AUTO: 50.1 FL (ref 40–54)
ERYTHROCYTE [DISTWIDTH] IN BLOOD BY AUTOMATED COUNT: 16.1 % (ref 12–15)
GFR SERPL CREATININE-BSD FRML MDRD: 36 ML/MIN/1.73
GLOBULIN UR ELPH-MCNC: 2.6 GM/DL
GLUCOSE BLD-MCNC: 135 MG/DL (ref 70–100)
GLUCOSE BLDC GLUCOMTR-MCNC: 111 MG/DL (ref 70–130)
GLUCOSE BLDC GLUCOMTR-MCNC: 150 MG/DL (ref 70–130)
GLUCOSE BLDC GLUCOMTR-MCNC: 155 MG/DL (ref 70–130)
HCT VFR BLD AUTO: 22.5 % (ref 37–47)
HGB BLD-MCNC: 7.2 G/DL (ref 12–16)
MAGNESIUM SERPL-MCNC: 1.9 MG/DL (ref 1.4–2.2)
MCH RBC QN AUTO: 27.2 PG (ref 28–32)
MCHC RBC AUTO-ENTMCNC: 32 G/DL (ref 33–36)
MCV RBC AUTO: 84.9 FL (ref 82–98)
PHOSPHATE SERPL-MCNC: 2.7 MG/DL (ref 2.5–4.5)
PLATELET # BLD AUTO: 123 10*3/MM3 (ref 130–400)
PMV BLD AUTO: 8.9 FL (ref 6–12)
POTASSIUM BLD-SCNC: 3.3 MMOL/L (ref 3.5–5.3)
PROT SERPL-MCNC: 4.9 G/DL (ref 6.3–8.7)
RBC # BLD AUTO: 2.65 10*6/MM3 (ref 4.2–5.4)
SODIUM BLD-SCNC: 139 MMOL/L (ref 135–145)
WBC NRBC COR # BLD: 9.14 10*3/MM3 (ref 4.8–10.8)

## 2018-09-16 PROCEDURE — 80053 COMPREHEN METABOLIC PANEL: CPT | Performed by: SPECIALIST

## 2018-09-16 PROCEDURE — 94799 UNLISTED PULMONARY SVC/PX: CPT

## 2018-09-16 PROCEDURE — 25010000002 PIPERACILLIN SOD-TAZOBACTAM PER 1 G: Performed by: SPECIALIST

## 2018-09-16 PROCEDURE — 25010000002 ENOXAPARIN PER 10 MG: Performed by: SPECIALIST

## 2018-09-16 PROCEDURE — 94760 N-INVAS EAR/PLS OXIMETRY 1: CPT

## 2018-09-16 PROCEDURE — 83735 ASSAY OF MAGNESIUM: CPT | Performed by: SPECIALIST

## 2018-09-16 PROCEDURE — 25010000002 POTASSIUM CHLORIDE PER 2 MEQ: Performed by: SPECIALIST

## 2018-09-16 PROCEDURE — 82962 GLUCOSE BLOOD TEST: CPT

## 2018-09-16 PROCEDURE — 86900 BLOOD TYPING SEROLOGIC ABO: CPT

## 2018-09-16 PROCEDURE — 99231 SBSQ HOSP IP/OBS SF/LOW 25: CPT | Performed by: UROLOGY

## 2018-09-16 PROCEDURE — 84100 ASSAY OF PHOSPHORUS: CPT | Performed by: SPECIALIST

## 2018-09-16 PROCEDURE — 85027 COMPLETE CBC AUTOMATED: CPT | Performed by: SPECIALIST

## 2018-09-16 PROCEDURE — P9016 RBC LEUKOCYTES REDUCED: HCPCS

## 2018-09-16 PROCEDURE — 36430 TRANSFUSION BLD/BLD COMPNT: CPT

## 2018-09-16 RX ORDER — POTASSIUM CHLORIDE 14.9 MG/ML
20 INJECTION INTRAVENOUS
Status: COMPLETED | OUTPATIENT
Start: 2018-09-16 | End: 2018-09-16

## 2018-09-16 RX ADMIN — I.V. FAT EMULSION 50 G: 20 EMULSION INTRAVENOUS at 18:03

## 2018-09-16 RX ADMIN — TAZOBACTAM SODIUM AND PIPERACILLIN SODIUM 2.25 G: 250; 2 INJECTION, SOLUTION INTRAVENOUS at 09:13

## 2018-09-16 RX ADMIN — SODIUM CHLORIDE, POTASSIUM CHLORIDE, SODIUM LACTATE AND CALCIUM CHLORIDE 75 ML/HR: 600; 310; 30; 20 INJECTION, SOLUTION INTRAVENOUS at 18:04

## 2018-09-16 RX ADMIN — METRONIDAZOLE 500 MG: 500 INJECTION, SOLUTION INTRAVENOUS at 11:48

## 2018-09-16 RX ADMIN — TAZOBACTAM SODIUM AND PIPERACILLIN SODIUM 2.25 G: 250; 2 INJECTION, SOLUTION INTRAVENOUS at 03:53

## 2018-09-16 RX ADMIN — METOPROLOL TARTRATE 5 MG: 5 INJECTION, SOLUTION INTRAVENOUS at 09:52

## 2018-09-16 RX ADMIN — ENOXAPARIN SODIUM 40 MG: 40 INJECTION SUBCUTANEOUS at 09:13

## 2018-09-16 RX ADMIN — FAMOTIDINE 20 MG: 10 INJECTION, SOLUTION INTRAVENOUS at 09:13

## 2018-09-16 RX ADMIN — TAZOBACTAM SODIUM AND PIPERACILLIN SODIUM 3.38 G: 375; 3 INJECTION, SOLUTION INTRAVENOUS at 14:48

## 2018-09-16 RX ADMIN — TAZOBACTAM SODIUM AND PIPERACILLIN SODIUM 3.38 G: 375; 3 INJECTION, SOLUTION INTRAVENOUS at 21:31

## 2018-09-16 RX ADMIN — METOPROLOL TARTRATE 5 MG: 5 INJECTION, SOLUTION INTRAVENOUS at 04:28

## 2018-09-16 RX ADMIN — METRONIDAZOLE 500 MG: 500 INJECTION, SOLUTION INTRAVENOUS at 04:28

## 2018-09-16 RX ADMIN — POTASSIUM CHLORIDE 20 MEQ: 200 INJECTION, SOLUTION INTRAVENOUS at 09:52

## 2018-09-16 RX ADMIN — METOPROLOL TARTRATE 5 MG: 5 INJECTION, SOLUTION INTRAVENOUS at 21:31

## 2018-09-16 RX ADMIN — SODIUM CHLORIDE, POTASSIUM CHLORIDE, SODIUM LACTATE AND CALCIUM CHLORIDE 1000 ML: 600; 310; 30; 20 INJECTION, SOLUTION INTRAVENOUS at 04:28

## 2018-09-16 RX ADMIN — POTASSIUM CHLORIDE 20 MEQ: 200 INJECTION, SOLUTION INTRAVENOUS at 11:39

## 2018-09-16 RX ADMIN — METRONIDAZOLE 500 MG: 500 INJECTION, SOLUTION INTRAVENOUS at 21:31

## 2018-09-16 RX ADMIN — METOPROLOL TARTRATE 5 MG: 5 INJECTION, SOLUTION INTRAVENOUS at 16:01

## 2018-09-16 RX ADMIN — ASCORBIC ACID, VITAMIN A PALMITATE, CHOLECALCIFEROL, THIAMINE HYDROCHLORIDE, RIBOFLAVIN-5 PHOSPHATE SODIUM, PYRIDOXINE HYDROCHLORIDE, NIACINAMIDE, DEXPANTHENOL, ALPHA-TOCOPHEROL ACETATE, VITAMIN K1, FOLIC ACID, BIOTIN, CYANOCOBALAMIN: 200; 3300; 200; 6; 3.6; 6; 40; 15; 10; 150; 600; 60; 5 INJECTION, SOLUTION INTRAVENOUS at 18:04

## 2018-09-16 NOTE — PROGRESS NOTES
Princess Demarco MD FACS  Progress Note     LOS: 5 days   Patient Care Team:  Juan Echavarria MD as PCP - General  Juan Echavarria MD as PCP - Family Medicine  [facility], Cole Campos MD as Consulting Physician (Otolaryngology)  Tarun Morataya MD as Consulting Physician (Gastroenterology)      Subjective     Interval History:      walking halls.  Low uop over pm.  No nausea.  Pain controlled     Objective     Vital Signs  Temp:  [97.4 °F (36.3 °C)-98.4 °F (36.9 °C)] 97.7 °F (36.5 °C)  Heart Rate:  [] 110  Resp:  [14-18] 16  BP: ()/(47-84) 108/60    Physical Exam:  General appearance - pale  Abdomen - soft, appropriately tender, clean, mildly distended      Results Review:    Lab Results (last 24 hours)     Procedure Component Value Units Date/Time    CBC (No Diff) [[id number]]  (Abnormal) Collected:  09/16/18 0715    Specimen:  Blood Updated:  09/16/18 0725     WBC 9.14 10*3/mm3      RBC 2.65 (L) 10*6/mm3      Hemoglobin 7.2 (L) g/dL      Hematocrit 22.5 (L) %      MCV 84.9 fL      MCH 27.2 (L) pg      MCHC 32.0 (L) g/dL      RDW 16.1 (H) %      RDW-SD 50.1 fl      MPV 8.9 fL      Platelets 123 (L) 10*3/mm3     Comprehensive Metabolic Panel [[id number]]  (Abnormal) Collected:  09/16/18 0557    Specimen:  Blood Updated:  09/16/18 0636     Glucose 135 (H) mg/dL      BUN 19 mg/dL      Creatinine 1.41 (H) mg/dL      Sodium 139 mmol/L      Potassium 3.3 (L) mmol/L      Chloride 110 mmol/L      CO2 26.0 mmol/L      Calcium 8.5 mg/dL      Total Protein 4.9 (L) g/dL      Albumin 2.30 (L) g/dL      ALT (SGPT) 28 U/L      AST (SGOT) 31 U/L      Alkaline Phosphatase 46 U/L      Total Bilirubin 0.4 mg/dL      eGFR Non African Amer 36 (L) mL/min/1.73      Globulin 2.6 gm/dL      A/G Ratio 0.9 (L) g/dL      BUN/Creatinine Ratio 13.5     Anion Gap 3.0 (L) mmol/L     Narrative:       The MDRD GFR formula is only valid for adults with stable renal function between ages 18 and 70.    Magnesium [[id number]]  (Normal)  Collected:  09/16/18 0557    Specimen:  Blood Updated:  09/16/18 0626     Magnesium 1.9 mg/dL     Phosphorus [[id number]]  (Normal) Collected:  09/16/18 0557    Specimen:  Blood Updated:  09/16/18 0626     Phosphorus 2.7 mg/dL     POC Glucose Once [[id number]]  (Abnormal) Collected:  09/16/18 0018    Specimen:  Blood Updated:  09/16/18 0040     Glucose 150 (H) mg/dL      Comment: : [id number] Abdirizak JessicaMeter ID: [id number]       POC Glucose Once [[id number]]  (Abnormal) Collected:  09/15/18 1837    Specimen:  Blood Updated:  09/15/18 1848     Glucose 136 (H) mg/dL      Comment: : [id number] Ector JenniferMeter ID: [id number]       Prealbumin [[id number]]  (Abnormal) Collected:  09/14/18 0536    Specimen:  Blood Updated:  09/15/18 1059     Prealbumin 15.4 (L) mg/dL     Cholesterol, Total [[id number]]  (Abnormal) Collected:  09/14/18 0536    Specimen:  Blood Updated:  09/15/18 1024     Total Cholesterol 94 (L) mg/dL     Triglycerides [[id number]]  (Normal) Collected:  09/14/18 0536    Specimen:  Blood Updated:  09/15/18 1024     Triglycerides 106 mg/dL     C-reactive Protein [[id number]]  (Abnormal) Collected:  09/14/18 0536    Specimen:  Blood Updated:  09/15/18 1024     C-Reactive Protein 1.77 (H) mg/dL         Imaging Results (last 24 hours)     ** No results found for the last 24 hours. **            Assessment/Plan       Transfuse.  Watch fluid balance. Continue iv abx.  TPN.      Princess Demarco MD  09/16/18  9:50 AM

## 2018-09-16 NOTE — PLAN OF CARE
Problem: Patient Care Overview  Goal: Plan of Care Review  Outcome: Ongoing (interventions implemented as appropriate)   09/16/18 1512   Coping/Psychosocial   Plan of Care Reviewed With patient;spouse   Plan of Care Review   Progress no change   OTHER   Outcome Summary Patient denies pain other when coming from a sitting to standing position. States ambulating does not increase pain, neither does rest. PCA in use. Incision line approximated w/ staples, except for 2 open areas w/ w-d dsg. Dsg changed this AM (see charting/timed dsg). Binder in place. Potassium slightly low still today, replaced w/ 2 doses of 20 mEq IV potassium. Hbg also low, patient will have received two units PRBC. Remains NPO w/ NG tube in place. Will cont to monitor.      Goal: Individualization and Mutuality  Outcome: Ongoing (interventions implemented as appropriate)    Goal: Discharge Needs Assessment  Outcome: Ongoing (interventions implemented as appropriate)      Problem: Infection, Risk/Actual (Adult)  Goal: Infection Prevention/Resolution  Outcome: Ongoing (interventions implemented as appropriate)   09/16/18 1512   Infection, Risk/Actual (Adult)   Infection Prevention/Resolution making progress toward outcome       Problem: Bowel Resection (Adult)  Goal: Signs and Symptoms of Listed Potential Problems Will be Absent, Minimized or Managed (Bowel Resection)  Outcome: Ongoing (interventions implemented as appropriate)   09/15/18 1608   Goal/Outcome Evaluation   Problems Assessed (Bowel Resection) all   Problems Present (Bowel Resection) pain;fluid/electrolyte imbalance       Problem: Nutrition, Parenteral (Adult)  Goal: Signs and Symptoms of Listed Potential Problems Will be Absent, Minimized or Managed (Nutrition, Parenteral)  Outcome: Ongoing (interventions implemented as appropriate)   09/16/18 1512   Goal/Outcome Evaluation   Problems Assessed (Parenteral Nutrition) all   Problems Present (Parenteral Nutrition) none

## 2018-09-16 NOTE — PROGRESS NOTES
Urology  Length of Stay: 5  Patient Care Team:  Juan Echavarria MD as PCP - General  Juan Echavarria MD as PCP - Family Medicine  [facility], Cole Campos MD as Consulting Physician (Otolaryngology)  Tarun Morataya MD as Consulting Physician (Gastroenterology)    Chief Complaint:  F/u stent placement By me for colon resection by Dr. Demarco    Subjective     Interval History:   Some hematuria. No bladder pain    Review of Systems:   Review of Systems    Objective     Vital Signs  Temp:  [97.4 °F (36.3 °C)-98.4 °F (36.9 °C)] 97.7 °F (36.5 °C)  Heart Rate:  [] 110  Resp:  [14-18] 16  BP: ()/(47-84) 108/60    Physical Exam:  Physical Exam  Constitutional:  They  appear well-developed and well-nourished. There are no obvious deformities. No distress.   Pulmonary/Chest: Effort normal.   GI: Soft. The patient exhibits mild distension and no mass. There is no tenderness. There is no rebound and no guarding. No hernia.   Neurological: Patient is alert and oriented to person, place, and time.   Skin: Skin is warm and dry. Not diaphoretic.          Results Review:       I reviewed the patient's new clinical results.  Lab Results (last 24 hours)     Procedure Component Value Units Date/Time    CBC (No Diff) [[id number]]  (Abnormal) Collected:  09/16/18 0715    Specimen:  Blood Updated:  09/16/18 0725     WBC 9.14 10*3/mm3      RBC 2.65 (L) 10*6/mm3      Hemoglobin 7.2 (L) g/dL      Hematocrit 22.5 (L) %      MCV 84.9 fL      MCH 27.2 (L) pg      MCHC 32.0 (L) g/dL      RDW 16.1 (H) %      RDW-SD 50.1 fl      MPV 8.9 fL      Platelets 123 (L) 10*3/mm3     Comprehensive Metabolic Panel [[id number]]  (Abnormal) Collected:  09/16/18 0557    Specimen:  Blood Updated:  09/16/18 0636     Glucose 135 (H) mg/dL      BUN 19 mg/dL      Creatinine 1.41 (H) mg/dL      Sodium 139 mmol/L      Potassium 3.3 (L) mmol/L      Chloride 110 mmol/L      CO2 26.0 mmol/L      Calcium 8.5 mg/dL      Total Protein 4.9 (L) g/dL      Albumin  2.30 (L) g/dL      ALT (SGPT) 28 U/L      AST (SGOT) 31 U/L      Alkaline Phosphatase 46 U/L      Total Bilirubin 0.4 mg/dL      eGFR Non African Amer 36 (L) mL/min/1.73      Globulin 2.6 gm/dL      A/G Ratio 0.9 (L) g/dL      BUN/Creatinine Ratio 13.5     Anion Gap 3.0 (L) mmol/L     Narrative:       The MDRD GFR formula is only valid for adults with stable renal function between ages 18 and 70.    Magnesium [[id number]]  (Normal) Collected:  09/16/18 0557    Specimen:  Blood Updated:  09/16/18 0626     Magnesium 1.9 mg/dL     Phosphorus [[id number]]  (Normal) Collected:  09/16/18 0557    Specimen:  Blood Updated:  09/16/18 0626     Phosphorus 2.7 mg/dL     POC Glucose Once [[id number]]  (Abnormal) Collected:  09/16/18 0018    Specimen:  Blood Updated:  09/16/18 0040     Glucose 150 (H) mg/dL      Comment: : [id number] Abdirizak JessicaMeter ID: [id number]       POC Glucose Once [[id number]]  (Abnormal) Collected:  09/15/18 1837    Specimen:  Blood Updated:  09/15/18 1848     Glucose 136 (H) mg/dL      Comment: : [id number] Ector BarreraniferMeter ID: [id number]       Prealbumin [[id number]]  (Abnormal) Collected:  09/14/18 0536    Specimen:  Blood Updated:  09/15/18 1059     Prealbumin 15.4 (L) mg/dL     Cholesterol, Total [[id number]]  (Abnormal) Collected:  09/14/18 0536    Specimen:  Blood Updated:  09/15/18 1024     Total Cholesterol 94 (L) mg/dL     Triglycerides [[id number]]  (Normal) Collected:  09/14/18 0536    Specimen:  Blood Updated:  09/15/18 1024     Triglycerides 106 mg/dL     C-reactive Protein [[id number]]  (Abnormal) Collected:  09/14/18 0536    Specimen:  Blood Updated:  09/15/18 1024     C-Reactive Protein 1.77 (H) mg/dL         Imaging Results (last 24 hours)     ** No results found for the last 24 hours. **          Medication Review:     Current Facility-Administered Medications:   •  Adult Standard Central TPN, , Intravenous, Q24H (TPN), Last Rate: 20 mL/hr at 09/15/18 6938 **AND** fat emulsion  (INTRALIPID,LIPOSYN) 20 % infusion 50 g, 250 mL, Intravenous, Q24H, Princess Demarco MD, Stopped at 09/16/18 0645  •  Adult Standard Central TPN, , Intravenous, Q24H (TPN), Princess Demarco MD  •  enoxaparin (LOVENOX) syringe 40 mg, 40 mg, Subcutaneous, Daily, Princess Demarco MD, 40 mg at 09/16/18 0913  •  famotidine (PEPCID) injection 20 mg, 20 mg, Intravenous, Daily, Princess Demarco MD, 20 mg at 09/16/18 0913  •  HYDROmorphone (DILAUDID) injection 0.5 mg, 0.5 mg, Intravenous, Q4H PRN, Princess Demarco MD  •  lactated ringers infusion, 75 mL/hr, Intravenous, Continuous, Princess Demarco MD, Last Rate: 75 mL/hr at 09/16/18 1011, 75 mL/hr at 09/16/18 1011  •  metoprolol tartrate (LOPRESSOR) injection 5 mg, 5 mg, Intravenous, Q6H, Princess Demarco MD, 5 mg at 09/16/18 0952  •  metroNIDAZOLE (FLAGYL) IVPB 500 mg, 500 mg, Intravenous, Q8H, Princess Demarco MD, Last Rate: 0 mL/hr at 09/15/18 1259, 500 mg at 09/16/18 0428  •  Morphine sulfate PCA 1 mg/mL 30 mL syringe, , Intravenous, Continuous, Princess Demarco MD  •  naloxone (NARCAN) injection 0.1 mg, 0.1 mg, Intravenous, Q5 Min PRN, Princess Demarco MD  •  ondansetron (ZOFRAN) injection 4 mg, 4 mg, Intravenous, Q4H PRN, Princess Demarco MD, 4 mg at 09/14/18 2103  •  piperacillin-tazobactam (ZOSYN) 3.375 g in iso-osmotic dextrose 50 ml (premix), 3.375 g, Intravenous, Q6H, Princess Demarco MD  •  potassium chloride 20 mEq in 100 mL IVPB, 20 mEq, Intravenous, Q2H, Princess Demarco MD, Last Rate: 50 mL/hr at 09/16/18 0952, 20 mEq at 09/16/18 0952  •  Naomy's amazing butt cream, , Topical, PRN, Princess Demarco MD    Assessment/Plan:   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 Ok with Urology to remove crow when no longer needed to monitor fluids. Dependent on length of stay, may remove her stents under local/standby vs in office under local in couple weeks.     (Please note that portions of this note were completed with a voice  recognition program.)  Dick Hart MD  09/16/18  10:20 AM

## 2018-09-16 NOTE — PLAN OF CARE
Problem: Patient Care Overview  Goal: Plan of Care Review  Outcome: Ongoing (interventions implemented as appropriate)   09/15/18 1608 09/16/18 0121   Coping/Psychosocial   Plan of Care Reviewed With patient;spouse --    Plan of Care Review   Progress improving --    OTHER   Outcome Summary --  Pt had minimal c/o pain--PCA maintained, NG with minimal output, no nausea noted, drsg wet to dry changed at 2130--some scant serosang drainage on old drsg, TPN and lipids cont, IVF cont, crow to bsd, up in chair at beginning of shift, using incentive spirometer, will cont to monitor.      Goal: Individualization and Mutuality  Outcome: Ongoing (interventions implemented as appropriate)   09/11/18 1711 09/13/18 1714 09/15/18 0322   Individualization   Patient Specific Preferences --  Lights off --    Patient Specific Goals (Include Timeframe) --  --  Keep pain tolerable    Patient Specific Interventions --  --  Pain pump in use    Mutuality/Individual Preferences   What Anxieties, Fears, Concerns, or Questions Do You Have About Your Care? --  --  Getting better since surgery   What Information Would Help Us Give You More Personalized Care? None at this time --  --    How Would You and/or Your Support Person Like to Participate in Your Care? --  continue to remind spouse about infection precautions --    Mutuality/Individual Preferences   How to Address Anxieties/Fears Updating regarding situation/status of situation.  --  --      Goal: Discharge Needs Assessment  Outcome: Ongoing (interventions implemented as appropriate)   09/13/18 1352 09/13/18 1400   Discharge Needs Assessment   Readmission Within the Last 30 Days --  no previous admission in last 30 days   Concerns to be Addressed denies needs/concerns at this time --    Patient/Family Anticipates Transition to --  home with family   Patient/Family Anticipated Services at Transition --  none   Transportation Concerns car, none --    Transportation Anticipated --  family  or friend will provide   Anticipated Changes Related to Illness --  none   Equipment Needed After Discharge --  none   Discharge Coordination/Progress --  Pt lives at home with her spouse. She is ambulating in her room well. Noted that pt will be going to the OR tomorrow. No needs at this time but will follow after surgery.   Disability   Equipment Currently Used at Home --  none     Goal: Interprofessional Rounds/Family Conf  Outcome: Ongoing (interventions implemented as appropriate)   09/16/18 0121   Interdisciplinary Rounds/Family Conf   Participants nursing;patient;dietitian/nutrition services;physician       Problem: Infection, Risk/Actual (Adult)  Goal: Infection Prevention/Resolution  Outcome: Ongoing (interventions implemented as appropriate)   09/16/18 0121   Infection, Risk/Actual (Adult)   Infection Prevention/Resolution making progress toward outcome       Problem: Bowel Resection (Adult)  Goal: Signs and Symptoms of Listed Potential Problems Will be Absent, Minimized or Managed (Bowel Resection)  Outcome: Ongoing (interventions implemented as appropriate)   09/15/18 1608   Goal/Outcome Evaluation   Problems Assessed (Bowel Resection) all   Problems Present (Bowel Resection) pain;fluid/electrolyte imbalance       Problem: Nutrition, Parenteral (Adult)  Goal: Signs and Symptoms of Listed Potential Problems Will be Absent, Minimized or Managed (Nutrition, Parenteral)  Outcome: Ongoing (interventions implemented as appropriate)   09/15/18 1608   Goal/Outcome Evaluation   Problems Assessed (Parenteral Nutrition) all   Problems Present (Parenteral Nutrition) none

## 2018-09-17 LAB
ABO + RH BLD: NORMAL
ABO + RH BLD: NORMAL
ALBUMIN SERPL-MCNC: 2.7 G/DL (ref 3.5–5)
ALBUMIN/GLOB SERPL: 1 G/DL (ref 1.1–2.5)
ALP SERPL-CCNC: 56 U/L (ref 24–120)
ALT SERPL W P-5'-P-CCNC: 27 U/L (ref 0–54)
ANION GAP SERPL CALCULATED.3IONS-SCNC: 4 MMOL/L (ref 4–13)
AST SERPL-CCNC: 25 U/L (ref 7–45)
BH BB BLOOD EXPIRATION DATE: NORMAL
BH BB BLOOD EXPIRATION DATE: NORMAL
BH BB BLOOD TYPE BARCODE: 5100
BH BB BLOOD TYPE BARCODE: 5100
BH BB DISPENSE STATUS: NORMAL
BH BB DISPENSE STATUS: NORMAL
BH BB PRODUCT CODE: NORMAL
BH BB PRODUCT CODE: NORMAL
BH BB UNIT NUMBER: NORMAL
BH BB UNIT NUMBER: NORMAL
BILIRUB SERPL-MCNC: 0.5 MG/DL (ref 0.1–1)
BUN BLD-MCNC: 13 MG/DL (ref 5–21)
BUN/CREAT SERPL: 14.9 (ref 7–25)
CA-I BLD-MCNC: 4.92 MG/DL (ref 4.6–5.4)
CALCIUM SPEC-SCNC: 8.9 MG/DL (ref 8.4–10.4)
CHLORIDE SERPL-SCNC: 110 MMOL/L (ref 98–110)
CO2 SERPL-SCNC: 28 MMOL/L (ref 24–31)
CREAT BLD-MCNC: 0.87 MG/DL (ref 0.5–1.4)
CROSSMATCH INTERPRETATION: NORMAL
CROSSMATCH INTERPRETATION: NORMAL
DEPRECATED RDW RBC AUTO: 49.9 FL (ref 40–54)
ERYTHROCYTE [DISTWIDTH] IN BLOOD BY AUTOMATED COUNT: 16 % (ref 12–15)
GFR SERPL CREATININE-BSD FRML MDRD: 63 ML/MIN/1.73
GLOBULIN UR ELPH-MCNC: 2.8 GM/DL
GLUCOSE BLD-MCNC: 145 MG/DL (ref 70–100)
GLUCOSE BLDC GLUCOMTR-MCNC: 152 MG/DL (ref 70–130)
GLUCOSE BLDC GLUCOMTR-MCNC: 152 MG/DL (ref 70–130)
GLUCOSE BLDC GLUCOMTR-MCNC: 190 MG/DL (ref 70–130)
HCT VFR BLD AUTO: 30.7 % (ref 37–47)
HGB BLD-MCNC: 10 G/DL (ref 12–16)
Lab: NORMAL
MAGNESIUM SERPL-MCNC: 1.8 MG/DL (ref 1.4–2.2)
MCH RBC QN AUTO: 28 PG (ref 28–32)
MCHC RBC AUTO-ENTMCNC: 32.6 G/DL (ref 33–36)
MCV RBC AUTO: 86 FL (ref 82–98)
PHOSPHATE SERPL-MCNC: 2.5 MG/DL (ref 2.5–4.5)
PLATELET # BLD AUTO: 122 10*3/MM3 (ref 130–400)
PMV BLD AUTO: 8.5 FL (ref 6–12)
POTASSIUM BLD-SCNC: 3.2 MMOL/L (ref 3.5–5.3)
PROT SERPL-MCNC: 5.5 G/DL (ref 6.3–8.7)
RBC # BLD AUTO: 3.57 10*6/MM3 (ref 4.2–5.4)
SODIUM BLD-SCNC: 142 MMOL/L (ref 135–145)
UNIT  ABO: NORMAL
UNIT  ABO: NORMAL
UNIT  RH: NORMAL
UNIT  RH: NORMAL
WBC NRBC COR # BLD: 8.18 10*3/MM3 (ref 4.8–10.8)

## 2018-09-17 PROCEDURE — 84100 ASSAY OF PHOSPHORUS: CPT | Performed by: SPECIALIST

## 2018-09-17 PROCEDURE — 94799 UNLISTED PULMONARY SVC/PX: CPT

## 2018-09-17 PROCEDURE — 25010000002 POTASSIUM CHLORIDE PER 2 MEQ: Performed by: SPECIALIST

## 2018-09-17 PROCEDURE — 83735 ASSAY OF MAGNESIUM: CPT | Performed by: SPECIALIST

## 2018-09-17 PROCEDURE — 82962 GLUCOSE BLOOD TEST: CPT

## 2018-09-17 PROCEDURE — 80053 COMPREHEN METABOLIC PANEL: CPT | Performed by: SPECIALIST

## 2018-09-17 PROCEDURE — 82330 ASSAY OF CALCIUM: CPT

## 2018-09-17 PROCEDURE — 94760 N-INVAS EAR/PLS OXIMETRY 1: CPT

## 2018-09-17 PROCEDURE — 85027 COMPLETE CBC AUTOMATED: CPT | Performed by: SPECIALIST

## 2018-09-17 PROCEDURE — 25010000002 PIPERACILLIN SOD-TAZOBACTAM PER 1 G: Performed by: SPECIALIST

## 2018-09-17 PROCEDURE — 25010000002 ENOXAPARIN PER 10 MG: Performed by: SPECIALIST

## 2018-09-17 PROCEDURE — 25010000002 FUROSEMIDE PER 20 MG: Performed by: SPECIALIST

## 2018-09-17 RX ORDER — FUROSEMIDE 10 MG/ML
20 INJECTION INTRAMUSCULAR; INTRAVENOUS ONCE
Status: COMPLETED | OUTPATIENT
Start: 2018-09-17 | End: 2018-09-17

## 2018-09-17 RX ORDER — POTASSIUM CHLORIDE 14.9 MG/ML
20 INJECTION INTRAVENOUS
Status: COMPLETED | OUTPATIENT
Start: 2018-09-17 | End: 2018-09-17

## 2018-09-17 RX ADMIN — FAMOTIDINE 20 MG: 10 INJECTION, SOLUTION INTRAVENOUS at 08:24

## 2018-09-17 RX ADMIN — TAZOBACTAM SODIUM AND PIPERACILLIN SODIUM 3.38 G: 375; 3 INJECTION, SOLUTION INTRAVENOUS at 22:30

## 2018-09-17 RX ADMIN — METRONIDAZOLE 500 MG: 500 INJECTION, SOLUTION INTRAVENOUS at 21:22

## 2018-09-17 RX ADMIN — ASCORBIC ACID, VITAMIN A PALMITATE, CHOLECALCIFEROL, THIAMINE HYDROCHLORIDE, RIBOFLAVIN-5 PHOSPHATE SODIUM, PYRIDOXINE HYDROCHLORIDE, NIACINAMIDE, DEXPANTHENOL, ALPHA-TOCOPHEROL ACETATE, VITAMIN K1, FOLIC ACID, BIOTIN, CYANOCOBALAMIN: 200; 3300; 200; 6; 3.6; 6; 40; 15; 10; 150; 600; 60; 5 INJECTION, SOLUTION INTRAVENOUS at 17:51

## 2018-09-17 RX ADMIN — ENOXAPARIN SODIUM 40 MG: 40 INJECTION SUBCUTANEOUS at 08:55

## 2018-09-17 RX ADMIN — I.V. FAT EMULSION 50 G: 20 EMULSION INTRAVENOUS at 17:49

## 2018-09-17 RX ADMIN — TAZOBACTAM SODIUM AND PIPERACILLIN SODIUM 3.38 G: 375; 3 INJECTION, SOLUTION INTRAVENOUS at 14:30

## 2018-09-17 RX ADMIN — FUROSEMIDE 20 MG: 10 INJECTION, SOLUTION INTRAVENOUS at 08:24

## 2018-09-17 RX ADMIN — METOPROLOL TARTRATE 5 MG: 5 INJECTION, SOLUTION INTRAVENOUS at 21:43

## 2018-09-17 RX ADMIN — METOPROLOL TARTRATE 5 MG: 5 INJECTION, SOLUTION INTRAVENOUS at 10:45

## 2018-09-17 RX ADMIN — POTASSIUM CHLORIDE 20 MEQ: 200 INJECTION, SOLUTION INTRAVENOUS at 08:24

## 2018-09-17 RX ADMIN — TAZOBACTAM SODIUM AND PIPERACILLIN SODIUM 3.38 G: 375; 3 INJECTION, SOLUTION INTRAVENOUS at 04:00

## 2018-09-17 RX ADMIN — METRONIDAZOLE 500 MG: 500 INJECTION, SOLUTION INTRAVENOUS at 04:00

## 2018-09-17 RX ADMIN — POTASSIUM CHLORIDE 20 MEQ: 200 INJECTION, SOLUTION INTRAVENOUS at 10:42

## 2018-09-17 RX ADMIN — TAZOBACTAM SODIUM AND PIPERACILLIN SODIUM 3.38 G: 375; 3 INJECTION, SOLUTION INTRAVENOUS at 08:55

## 2018-09-17 RX ADMIN — METOPROLOL TARTRATE 5 MG: 5 INJECTION, SOLUTION INTRAVENOUS at 16:30

## 2018-09-17 RX ADMIN — METRONIDAZOLE 500 MG: 500 INJECTION, SOLUTION INTRAVENOUS at 12:56

## 2018-09-17 RX ADMIN — METOPROLOL TARTRATE 5 MG: 5 INJECTION, SOLUTION INTRAVENOUS at 04:00

## 2018-09-17 NOTE — PROGRESS NOTES
Princess Demarco MD FACS  Progress Note     LOS: 6 days   Patient Care Team:  Juan Echavarria MD as PCP - General  Juan Echavarria MD as PCP - Family Medicine  [facility], Cole Campos MD as Consulting Physician (Otolaryngology)  Tarun Morataya MD as Consulting Physician (Gastroenterology)      Subjective     Interval History:      no events. No sob or cp.  No flatus     Objective     Vital Signs  Temp:  [97.4 °F (36.3 °C)-98.3 °F (36.8 °C)] 98 °F (36.7 °C)  Heart Rate:  [] 98  Resp:  [16-20] 18  BP: (103-148)/(41-76) 148/68    Physical Exam:  General appearance - alert, well appearing, and in no distress  Abdomen - soft, still some distension, clean      Results Review:    Lab Results (last 24 hours)     Procedure Component Value Units Date/Time    Comprehensive Metabolic Panel [[id number]]  (Abnormal) Collected:  09/17/18 0517    Specimen:  Blood Updated:  09/17/18 0540     Glucose 145 (H) mg/dL      BUN 13 mg/dL      Creatinine 0.87 mg/dL      Sodium 142 mmol/L      Potassium 3.2 (L) mmol/L      Chloride 110 mmol/L      CO2 28.0 mmol/L      Calcium 8.9 mg/dL      Total Protein 5.5 (L) g/dL      Albumin 2.70 (L) g/dL      ALT (SGPT) 27 U/L      AST (SGOT) 25 U/L      Alkaline Phosphatase 56 U/L      Total Bilirubin 0.5 mg/dL      eGFR Non African Amer 63 mL/min/1.73      Globulin 2.8 gm/dL      A/G Ratio 1.0 (L) g/dL      BUN/Creatinine Ratio 14.9     Anion Gap 4.0 mmol/L     Narrative:       The MDRD GFR formula is only valid for adults with stable renal function between ages 18 and 70.    Magnesium [[id number]]  (Normal) Collected:  09/17/18 0517    Specimen:  Blood Updated:  09/17/18 0540     Magnesium 1.8 mg/dL     Phosphorus [[id number]]  (Normal) Collected:  09/17/18 0517    Specimen:  Blood Updated:  09/17/18 0540     Phosphorus 2.5 mg/dL     Calcium, Ionized [[id number]] Collected:  09/17/18 0517    Specimen:  Blood Updated:  09/17/18 0528     Ionized Calcium 4.92 mg/dL      Collected by [id number]    CBC  (No Diff) [[id number]]  (Abnormal) Collected:  09/17/18 0517    Specimen:  Blood Updated:  09/17/18 0526     WBC 8.18 10*3/mm3      RBC 3.57 (L) 10*6/mm3      Hemoglobin 10.0 (L) g/dL      Hematocrit 30.7 (L) %      MCV 86.0 fL      MCH 28.0 pg      MCHC 32.6 (L) g/dL      RDW 16.0 (H) %      RDW-SD 49.9 fl      MPV 8.5 fL      Platelets 122 (L) 10*3/mm3     POC Glucose Once [[id number]]  (Abnormal) Collected:  09/17/18 0003    Specimen:  Blood Updated:  09/17/18 0014     Glucose 152 (H) mg/dL      Comment: : [id number] Colleen JenniferMeter ID: [id number]       POC Glucose Once [[id number]]  (Normal) Collected:  09/16/18 1805    Specimen:  Blood Updated:  09/16/18 1816     Glucose 111 mg/dL      Comment: : [id number] Ector JenniferMeter ID: [id number]       POC Glucose Once [[id number]]  (Abnormal) Collected:  09/16/18 1138    Specimen:  Blood Updated:  09/16/18 1148     Glucose 155 (H) mg/dL      Comment: : [id number] Cesar Alejandre ID: [id number]           Imaging Results (last 24 hours)     Procedure Component Value Units Date/Time    FL Retrograde Pyelogram In OR [[id number]] Collected:  09/17/18 0652     Updated:  09/17/18 0657    Narrative:       EXAMINATION: FL RETROGRADE PYELOGRAM IN OR- 9/17/2018 6:53 AM CDT     HISTORY: bilateral retrogrades; 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     Fluoroscopy time: 1.7 minutes.     REPORT: 9 separate intraoperative fluoroscopic spot views were obtained  during bilateral retrograde ureteropyelography, with placement of a  lateral ureteral stents. Please review Dr. Hart's notes for  description of the procedure and findings. The images are archived in  PACS for review.  This report was finalized on 09/17/2018 06:53 by Dr. Macho Rivera MD.            Assessment/Plan       Await bowel function return.  Decrease fluids.  Good response to transfusion      Princess Demarco MD  09/17/18  7:35 AM

## 2018-09-17 NOTE — PROGRESS NOTES
Continued Stay Note   [place]     Patient Name: Nuha Cali  MRN: [id number]  Today's Date: 9/17/2018    Admit Date: 9/11/2018          Discharge Plan     Row Name 09/17/18 0242       Plan    Plan Home    Patient/Family in Agreement with Plan yes    Plan Comments Pt has been able to ambulate. Plan at this time is to return home at d/c.               Discharge Codes    No documentation.           DANIEL Vega

## 2018-09-17 NOTE — PLAN OF CARE
Problem: Patient Care Overview  Goal: Plan of Care Review  Outcome: Ongoing (interventions implemented as appropriate)   09/17/18 1621   Coping/Psychosocial   Plan of Care Reviewed With patient;spouse   Plan of Care Review   Progress improving   OTHER   Outcome Summary Patient denies pain/nausea all day and has ambulated and rested well. Potassium replaced w/ 2 doses of 20 meq. IVF placed to kvo to maintain PCA settings. Discussed this w/ Dr. Demarco during rounding. IVF abx cont. TPN/Lipids cont. Dsg w-d (2 open areas) changed this AM. Incision well approx w/ staples intact. Quinones d/c'd and voiding well. Multiple very small BM's today. Stool remains liquid. Will cont to monitor.      Goal: Individualization and Mutuality  Outcome: Ongoing (interventions implemented as appropriate)    Goal: Discharge Needs Assessment  Outcome: Ongoing (interventions implemented as appropriate)      Problem: Infection, Risk/Actual (Adult)  Goal: Infection Prevention/Resolution  Outcome: Ongoing (interventions implemented as appropriate)   09/17/18 1621   Infection, Risk/Actual (Adult)   Infection Prevention/Resolution making progress toward outcome       Problem: Bowel Resection (Adult)  Goal: Signs and Symptoms of Listed Potential Problems Will be Absent, Minimized or Managed (Bowel Resection)  Outcome: Ongoing (interventions implemented as appropriate)   09/15/18 1608   Goal/Outcome Evaluation   Problems Assessed (Bowel Resection) all   Problems Present (Bowel Resection) pain;fluid/electrolyte imbalance       Problem: Nutrition, Parenteral (Adult)  Goal: Signs and Symptoms of Listed Potential Problems Will be Absent, Minimized or Managed (Nutrition, Parenteral)  Outcome: Ongoing (interventions implemented as appropriate)   09/17/18 1543 09/17/18 1621   Goal/Outcome Evaluation   Problems Assessed (Parenteral Nutrition) --  all   Problems Present (Parenteral Nutrition) none --

## 2018-09-17 NOTE — PLAN OF CARE
Problem: Patient Care Overview  Goal: Plan of Care Review  Outcome: Ongoing (interventions implemented as appropriate)   09/17/18 1543   Plan of Care Review   Progress no change   OTHER   Outcome Summary RD nutrition follow-up completed. TPN continues to provide nutrition. WIll continue to follow for further d/c needs, however none are noted at this time       Problem: Nutrition, Parenteral (Adult)  Goal: Signs and Symptoms of Listed Potential Problems Will be Absent, Minimized or Managed (Nutrition, Parenteral)  Outcome: Ongoing (interventions implemented as appropriate)   09/17/18 6973   Goal/Outcome Evaluation   Problems Assessed (Parenteral Nutrition) all   Problems Present (Parenteral Nutrition) none

## 2018-09-17 NOTE — PLAN OF CARE
Problem: Patient Care Overview  Goal: Plan of Care Review  Outcome: Ongoing (interventions implemented as appropriate)   09/16/18 1512 09/17/18 0120   Coping/Psychosocial   Plan of Care Reviewed With patient;spouse --    Plan of Care Review   Progress no change --    OTHER   Outcome Summary --  Pt denying pain, ambulated in [place] before bed, incision approximated, drsg changed around 2140, good urine output this shift, IVF, TPN, lipids, and abx cont, vitals stable.      Goal: Individualization and Mutuality  Outcome: Ongoing (interventions implemented as appropriate)   09/11/18 1711 09/13/18 1714 09/15/18 0322   Individualization   Patient Specific Preferences --  Lights off --    Patient Specific Goals (Include Timeframe) --  --  Keep pain tolerable    Patient Specific Interventions --  --  Pain pump in use    Mutuality/Individual Preferences   What Anxieties, Fears, Concerns, or Questions Do You Have About Your Care? --  --  Getting better since surgery   What Information Would Help Us Give You More Personalized Care? None at this time --  --    How Would You and/or Your Support Person Like to Participate in Your Care? --  continue to remind spouse about infection precautions --    Mutuality/Individual Preferences   How to Address Anxieties/Fears Updating regarding situation/status of situation.  --  --      Goal: Discharge Needs Assessment  Outcome: Ongoing (interventions implemented as appropriate)   09/13/18 1352 09/13/18 1400   Discharge Needs Assessment   Readmission Within the Last 30 Days --  no previous admission in last 30 days   Concerns to be Addressed denies needs/concerns at this time --    Patient/Family Anticipates Transition to --  home with family   Patient/Family Anticipated Services at Transition --  none   Transportation Concerns car, none --    Transportation Anticipated --  family or friend will provide   Anticipated Changes Related to Illness --  none   Equipment Needed After Discharge --   none   Discharge Coordination/Progress --  Pt lives at home with her spouse. She is ambulating in her room well. Noted that pt will be going to the OR tomorrow. No needs at this time but will follow after surgery.   Disability   Equipment Currently Used at Home --  none     Goal: Interprofessional Rounds/Family Conf  Outcome: Ongoing (interventions implemented as appropriate)   09/16/18 0121   Interdisciplinary Rounds/Family Conf   Participants nursing;patient;dietitian/nutrition services;physician       Problem: Infection, Risk/Actual (Adult)  Goal: Infection Prevention/Resolution  Outcome: Ongoing (interventions implemented as appropriate)   09/17/18 0120   Infection, Risk/Actual (Adult)   Infection Prevention/Resolution making progress toward outcome       Problem: Bowel Resection (Adult)  Goal: Signs and Symptoms of Listed Potential Problems Will be Absent, Minimized or Managed (Bowel Resection)  Outcome: Ongoing (interventions implemented as appropriate)   09/15/18 1608   Goal/Outcome Evaluation   Problems Assessed (Bowel Resection) all   Problems Present (Bowel Resection) pain;fluid/electrolyte imbalance       Problem: Nutrition, Parenteral (Adult)  Goal: Signs and Symptoms of Listed Potential Problems Will be Absent, Minimized or Managed (Nutrition, Parenteral)  Outcome: Ongoing (interventions implemented as appropriate)   09/16/18 1512   Goal/Outcome Evaluation   Problems Assessed (Parenteral Nutrition) all   Problems Present (Parenteral Nutrition) none

## 2018-09-18 LAB
ANION GAP SERPL CALCULATED.3IONS-SCNC: 5 MMOL/L (ref 4–13)
BUN BLD-MCNC: 11 MG/DL (ref 5–21)
BUN/CREAT SERPL: 14.5 (ref 7–25)
CALCIUM SPEC-SCNC: 8.8 MG/DL (ref 8.4–10.4)
CHLORIDE SERPL-SCNC: 106 MMOL/L (ref 98–110)
CO2 SERPL-SCNC: 31 MMOL/L (ref 24–31)
CREAT BLD-MCNC: 0.76 MG/DL (ref 0.5–1.4)
DEPRECATED RDW RBC AUTO: 50.5 FL (ref 40–54)
ERYTHROCYTE [DISTWIDTH] IN BLOOD BY AUTOMATED COUNT: 16.2 % (ref 12–15)
GFR SERPL CREATININE-BSD FRML MDRD: 74 ML/MIN/1.73
GLUCOSE BLD-MCNC: 160 MG/DL (ref 70–100)
GLUCOSE BLDC GLUCOMTR-MCNC: 159 MG/DL (ref 70–130)
GLUCOSE BLDC GLUCOMTR-MCNC: 165 MG/DL (ref 70–130)
GLUCOSE BLDC GLUCOMTR-MCNC: 174 MG/DL (ref 70–130)
GLUCOSE BLDC GLUCOMTR-MCNC: 187 MG/DL (ref 70–130)
HCT VFR BLD AUTO: 30.6 % (ref 37–47)
HGB BLD-MCNC: 10 G/DL (ref 12–16)
MCH RBC QN AUTO: 28.2 PG (ref 28–32)
MCHC RBC AUTO-ENTMCNC: 32.7 G/DL (ref 33–36)
MCV RBC AUTO: 86.2 FL (ref 82–98)
PLATELET # BLD AUTO: 127 10*3/MM3 (ref 130–400)
PMV BLD AUTO: 8.9 FL (ref 6–12)
POTASSIUM BLD-SCNC: 3 MMOL/L (ref 3.5–5.3)
RBC # BLD AUTO: 3.55 10*6/MM3 (ref 4.2–5.4)
SODIUM BLD-SCNC: 142 MMOL/L (ref 135–145)
WBC NRBC COR # BLD: 6.9 10*3/MM3 (ref 4.8–10.8)

## 2018-09-18 PROCEDURE — 80048 BASIC METABOLIC PNL TOTAL CA: CPT | Performed by: SPECIALIST

## 2018-09-18 PROCEDURE — 85027 COMPLETE CBC AUTOMATED: CPT | Performed by: SPECIALIST

## 2018-09-18 PROCEDURE — 94760 N-INVAS EAR/PLS OXIMETRY 1: CPT

## 2018-09-18 PROCEDURE — 25010000002 POTASSIUM CHLORIDE PER 2 MEQ: Performed by: SPECIALIST

## 2018-09-18 PROCEDURE — 25010000002 ENOXAPARIN PER 10 MG: Performed by: SPECIALIST

## 2018-09-18 PROCEDURE — 82962 GLUCOSE BLOOD TEST: CPT

## 2018-09-18 PROCEDURE — 94799 UNLISTED PULMONARY SVC/PX: CPT

## 2018-09-18 PROCEDURE — 25010000002 PIPERACILLIN SOD-TAZOBACTAM PER 1 G: Performed by: SPECIALIST

## 2018-09-18 RX ORDER — POTASSIUM CHLORIDE 14.9 MG/ML
20 INJECTION INTRAVENOUS
Status: COMPLETED | OUTPATIENT
Start: 2018-09-18 | End: 2018-09-18

## 2018-09-18 RX ORDER — METOPROLOL TARTRATE 5 MG/5ML
5 INJECTION INTRAVENOUS ONCE
Status: COMPLETED | OUTPATIENT
Start: 2018-09-18 | End: 2018-09-18

## 2018-09-18 RX ADMIN — METRONIDAZOLE 500 MG: 500 INJECTION, SOLUTION INTRAVENOUS at 05:30

## 2018-09-18 RX ADMIN — POTASSIUM CHLORIDE 20 MEQ: 200 INJECTION, SOLUTION INTRAVENOUS at 11:05

## 2018-09-18 RX ADMIN — POTASSIUM CHLORIDE 20 MEQ: 200 INJECTION, SOLUTION INTRAVENOUS at 11:22

## 2018-09-18 RX ADMIN — METOPROLOL TARTRATE 5 MG: 5 INJECTION, SOLUTION INTRAVENOUS at 17:23

## 2018-09-18 RX ADMIN — TAZOBACTAM SODIUM AND PIPERACILLIN SODIUM 3.38 G: 375; 3 INJECTION, SOLUTION INTRAVENOUS at 09:08

## 2018-09-18 RX ADMIN — METRONIDAZOLE 500 MG: 500 INJECTION, SOLUTION INTRAVENOUS at 11:57

## 2018-09-18 RX ADMIN — TAZOBACTAM SODIUM AND PIPERACILLIN SODIUM 3.38 G: 375; 3 INJECTION, SOLUTION INTRAVENOUS at 14:33

## 2018-09-18 RX ADMIN — I.V. FAT EMULSION 50 G: 20 EMULSION INTRAVENOUS at 18:01

## 2018-09-18 RX ADMIN — METOPROLOL TARTRATE 5 MG: 5 INJECTION, SOLUTION INTRAVENOUS at 05:30

## 2018-09-18 RX ADMIN — TAZOBACTAM SODIUM AND PIPERACILLIN SODIUM 3.38 G: 375; 3 INJECTION, SOLUTION INTRAVENOUS at 22:00

## 2018-09-18 RX ADMIN — POTASSIUM CHLORIDE 20 MEQ: 200 INJECTION, SOLUTION INTRAVENOUS at 09:08

## 2018-09-18 RX ADMIN — ENOXAPARIN SODIUM 40 MG: 40 INJECTION SUBCUTANEOUS at 09:07

## 2018-09-18 RX ADMIN — METOPROLOL TARTRATE 5 MG: 5 INJECTION, SOLUTION INTRAVENOUS at 22:15

## 2018-09-18 RX ADMIN — METOPROLOL TARTRATE 5 MG: 5 INJECTION, SOLUTION INTRAVENOUS at 11:23

## 2018-09-18 RX ADMIN — ASCORBIC ACID, VITAMIN A PALMITATE, CHOLECALCIFEROL, THIAMINE HYDROCHLORIDE, RIBOFLAVIN-5 PHOSPHATE SODIUM, PYRIDOXINE HYDROCHLORIDE, NIACINAMIDE, DEXPANTHENOL, ALPHA-TOCOPHEROL ACETATE, VITAMIN K1, FOLIC ACID, BIOTIN, CYANOCOBALAMIN: 200; 3300; 200; 6; 3.6; 6; 40; 15; 10; 150; 600; 60; 5 INJECTION, SOLUTION INTRAVENOUS at 18:01

## 2018-09-18 RX ADMIN — METRONIDAZOLE 500 MG: 500 INJECTION, SOLUTION INTRAVENOUS at 21:45

## 2018-09-18 RX ADMIN — METOPROLOL TARTRATE 5 MG: 5 INJECTION, SOLUTION INTRAVENOUS at 02:13

## 2018-09-18 RX ADMIN — TAZOBACTAM SODIUM AND PIPERACILLIN SODIUM 3.38 G: 375; 3 INJECTION, SOLUTION INTRAVENOUS at 03:54

## 2018-09-18 RX ADMIN — FAMOTIDINE 20 MG: 10 INJECTION, SOLUTION INTRAVENOUS at 09:07

## 2018-09-18 NOTE — PLAN OF CARE
Problem: Patient Care Overview  Goal: Plan of Care Review  Outcome: Ongoing (interventions implemented as appropriate)   09/18/18 0325   Coping/Psychosocial   Plan of Care Reviewed With patient;spouse   Plan of Care Review   Progress improving   OTHER   Outcome Summary Pt only had c/o pain once this shift after transferring. No c/o nausea/vomiting. IVF kvo in relation to PCA use. Drsg change done w-d, medipore tape. IV abx cont. Voiding well. BP elevated with systolic in 170s/180s; Justice called, one time dose of Metoprolol 5mg given IV ; BP decreased to 113/ 74. Other VSS. Will cont to monitor.      Goal: Individualization and Mutuality  Outcome: Ongoing (interventions implemented as appropriate)   09/11/18 1711 09/13/18 1714 09/18/18 0325   Individualization   Patient Specific Preferences --  Lights off --    Patient Specific Goals (Include Timeframe) --  --  Work towards discharge    Patient Specific Interventions --  --  Cont abx, TPN/lipids. drsg changes   Mutuality/Individual Preferences   What Information Would Help Us Give You More Personalized Care? None at this time --  --    How Would You and/or Your Support Person Like to Participate in Your Care? --  continue to remind spouse about infection precautions --      Goal: Discharge Needs Assessment  Outcome: Ongoing (interventions implemented as appropriate)   09/13/18 1352 09/13/18 1400   Discharge Needs Assessment   Readmission Within the Last 30 Days --  no previous admission in last 30 days   Concerns to be Addressed denies needs/concerns at this time --    Patient/Family Anticipates Transition to --  home with family   Patient/Family Anticipated Services at Transition --  none   Transportation Concerns car, none --    Transportation Anticipated --  family or friend will provide   Anticipated Changes Related to Illness --  none   Equipment Needed After Discharge --  none   Discharge Coordination/Progress --  Pt lives at home with her spouse. She is  ambulating in her room well. Noted that pt will be going to the OR tomorrow. No needs at this time but will follow after surgery.   Disability   Equipment Currently Used at Home --  none     Goal: Interprofessional Rounds/Family Conf  Outcome: Ongoing (interventions implemented as appropriate)   09/18/18 0325   Interdisciplinary Rounds/Family Conf   Participants nursing;patient;family       Problem: Infection, Risk/Actual (Adult)  Goal: Infection Prevention/Resolution  Outcome: Ongoing (interventions implemented as appropriate)   09/17/18 1621   Infection, Risk/Actual (Adult)   Infection Prevention/Resolution making progress toward outcome       Problem: Bowel Resection (Adult)  Goal: Signs and Symptoms of Listed Potential Problems Will be Absent, Minimized or Managed (Bowel Resection)  Outcome: Ongoing (interventions implemented as appropriate)   09/15/18 1608   Goal/Outcome Evaluation   Problems Assessed (Bowel Resection) all   Problems Present (Bowel Resection) pain;fluid/electrolyte imbalance       Problem: Nutrition, Parenteral (Adult)  Goal: Signs and Symptoms of Listed Potential Problems Will be Absent, Minimized or Managed (Nutrition, Parenteral)  Outcome: Ongoing (interventions implemented as appropriate)   09/17/18 1543 09/17/18 1621   Goal/Outcome Evaluation   Problems Assessed (Parenteral Nutrition) --  all   Problems Present (Parenteral Nutrition) none --

## 2018-09-18 NOTE — PLAN OF CARE
Problem: Patient Care Overview  Goal: Plan of Care Review  Outcome: Ongoing (interventions implemented as appropriate)   09/18/18 1510   Coping/Psychosocial   Plan of Care Reviewed With patient   Plan of Care Review   Progress improving   OTHER   Outcome Summary denies pain this shift. up in [place] with assistance. potassium 3.0 today, 2 runs given. iv abx continue. ng dc'd/      Goal: Individualization and Mutuality  Outcome: Ongoing (interventions implemented as appropriate)    Goal: Discharge Needs Assessment  Outcome: Ongoing (interventions implemented as appropriate)      Problem: Infection, Risk/Actual (Adult)  Goal: Infection Prevention/Resolution  Outcome: Ongoing (interventions implemented as appropriate)      Problem: Bowel Resection (Adult)  Goal: Signs and Symptoms of Listed Potential Problems Will be Absent, Minimized or Managed (Bowel Resection)  Outcome: Ongoing (interventions implemented as appropriate)      Problem: Nutrition, Parenteral (Adult)  Goal: Signs and Symptoms of Listed Potential Problems Will be Absent, Minimized or Managed (Nutrition, Parenteral)  Outcome: Ongoing (interventions implemented as appropriate)

## 2018-09-18 NOTE — PROGRESS NOTES
Princess Demarco MD FACS  Progress Note     LOS: 7 days   Patient Care Team:  Juan Echavarria MD as PCP - General  Juan Echavarria MD as PCP - Family Medicine  [facility], Cole Campos MD as Consulting Physician (Otolaryngology)  Tarun Morataya MD as Consulting Physician (Gastroenterology)      Subjective     Interval History:      loose stools.  No nausea.  Pain controlled     Objective     Vital Signs  Temp:  [97.4 °F (36.3 °C)-98.2 °F (36.8 °C)] 97.6 °F (36.4 °C)  Heart Rate:  [] 85  Resp:  [16-20] 16  BP: (113-187)/(69-80) 145/73    Physical Exam:  General appearance - alert, well appearing, and in no distress  Abdomen - soft, appropriately tender, clean      Results Review:    Lab Results (last 24 hours)     Procedure Component Value Units Date/Time    POC Glucose Once [[id number]]  (Abnormal) Collected:  09/18/18 0656    Specimen:  Blood Updated:  09/18/18 0709     Glucose 174 (H) mg/dL      Comment: : [id number] Jason Museer ID: [id number]       Basic Metabolic Panel [[id number]]  (Abnormal) Collected:  09/18/18 0340    Specimen:  Blood Updated:  09/18/18 0449     Glucose 160 (H) mg/dL      BUN 11 mg/dL      Creatinine 0.76 mg/dL      Sodium 142 mmol/L      Potassium 3.0 (L) mmol/L      Chloride 106 mmol/L      CO2 31.0 mmol/L      Calcium 8.8 mg/dL      eGFR Non African Amer 74 mL/min/1.73      BUN/Creatinine Ratio 14.5     Anion Gap 5.0 mmol/L     Narrative:       The MDRD GFR formula is only valid for adults with stable renal function between ages 18 and 70.    CBC (No Diff) [[id number]]  (Abnormal) Collected:  09/18/18 0340    Specimen:  Blood Updated:  09/18/18 0427     WBC 6.90 10*3/mm3      RBC 3.55 (L) 10*6/mm3      Hemoglobin 10.0 (L) g/dL      Hematocrit 30.6 (L) %      MCV 86.2 fL      MCH 28.2 pg      MCHC 32.7 (L) g/dL      RDW 16.2 (H) %      RDW-SD 50.5 fl      MPV 8.9 fL      Platelets 127 (L) 10*3/mm3     POC Glucose Once [[id number]]  (Abnormal) Collected:  09/18/18 0239     Specimen:  Blood Updated:  09/18/18 0301     Glucose 159 (H) mg/dL      Comment: : [id number] Jason VeraMeter ID: [id number]       POC Glucose Once [[id number]]  (Abnormal) Collected:  09/17/18 1804    Specimen:  Blood Updated:  09/17/18 1826     Glucose 152 (H) mg/dL      Comment: : [id number] Ector JenniferMeter ID: [id number]       Tissue Pathology Exam [[id number]] Collected:  09/14/18 1425    Specimen:  Tissue from Large Intestine, Sigmoid Colon Updated:  09/17/18 1329    POC Glucose Once [[id number]]  (Abnormal) Collected:  09/17/18 1259    Specimen:  Blood Updated:  09/17/18 1310     Glucose 190 (H) mg/dL      Comment: : [id number] Ector BarreraniferMeter ID: [id number]           Imaging Results (last 24 hours)     Procedure Component Value Units Date/Time    FL Retrograde Pyelogram In OR [[id number]] Collected:  09/17/18 0652     Updated:  09/17/18 1027    Narrative:       EXAMINATION: FL RETROGRADE PYELOGRAM IN OR- 9/17/2018 6:53 AM CDT     HISTORY: bilateral retrogrades; 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     Fluoroscopy time: 1.7 minutes.     REPORT: 9 separate intraoperative fluoroscopic spot views were obtained  during bilateral retrograde ureteropyelography, with placement of a  lateral ureteral stents. Please review Dr. Hart's notes for  description of the procedure and findings. The images are archived in  PACS for review.  This report was finalized on 09/17/2018 06:53 by Dr. Macho Rivera MD.    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     Fluoroscopy time: 1.7 minutes.     REPORT: 9 separate intraoperative fluoroscopic spot views were obtained  during bilateral retrograde ureteropyelography, with placement of a  lateral ureteral stents.  Please review Dr. Hart's notes for  description of the procedure and findings. The images are archived in  PACS for review.  This report was finalized on 09/17/2018 06:53 by Dr. Macho Rivera MD.            Assessment/Plan       Clamp trial.  Replete WAYNE Demarco MD  09/18/18  7:58 AM

## 2018-09-19 LAB
ANION GAP SERPL CALCULATED.3IONS-SCNC: 4 MMOL/L (ref 4–13)
BUN BLD-MCNC: 12 MG/DL (ref 5–21)
BUN/CREAT SERPL: 17.4 (ref 7–25)
CALCIUM SPEC-SCNC: 8.8 MG/DL (ref 8.4–10.4)
CHLORIDE SERPL-SCNC: 107 MMOL/L (ref 98–110)
CO2 SERPL-SCNC: 30 MMOL/L (ref 24–31)
CREAT BLD-MCNC: 0.69 MG/DL (ref 0.5–1.4)
CYTO UR: NORMAL
DEPRECATED RDW RBC AUTO: 51.2 FL (ref 40–54)
ERYTHROCYTE [DISTWIDTH] IN BLOOD BY AUTOMATED COUNT: 16.2 % (ref 12–15)
GFR SERPL CREATININE-BSD FRML MDRD: 83 ML/MIN/1.73
GLUCOSE BLD-MCNC: 167 MG/DL (ref 70–100)
GLUCOSE BLDC GLUCOMTR-MCNC: 174 MG/DL (ref 70–130)
GLUCOSE BLDC GLUCOMTR-MCNC: 177 MG/DL (ref 70–130)
GLUCOSE BLDC GLUCOMTR-MCNC: 197 MG/DL (ref 70–130)
HCT VFR BLD AUTO: 30.7 % (ref 37–47)
HGB BLD-MCNC: 9.9 G/DL (ref 12–16)
LAB AP CASE REPORT: NORMAL
MCH RBC QN AUTO: 28 PG (ref 28–32)
MCHC RBC AUTO-ENTMCNC: 32.2 G/DL (ref 33–36)
MCV RBC AUTO: 86.7 FL (ref 82–98)
PATH REPORT.FINAL DX SPEC: NORMAL
PATH REPORT.GROSS SPEC: NORMAL
PLATELET # BLD AUTO: 132 10*3/MM3 (ref 130–400)
PMV BLD AUTO: 9 FL (ref 6–12)
POTASSIUM BLD-SCNC: 3.2 MMOL/L (ref 3.5–5.3)
RBC # BLD AUTO: 3.54 10*6/MM3 (ref 4.2–5.4)
SODIUM BLD-SCNC: 141 MMOL/L (ref 135–145)
WBC NRBC COR # BLD: 5.4 10*3/MM3 (ref 4.8–10.8)

## 2018-09-19 PROCEDURE — 80048 BASIC METABOLIC PNL TOTAL CA: CPT | Performed by: SPECIALIST

## 2018-09-19 PROCEDURE — 25010000002 PIPERACILLIN SOD-TAZOBACTAM PER 1 G: Performed by: SPECIALIST

## 2018-09-19 PROCEDURE — 25010000002 ENOXAPARIN PER 10 MG: Performed by: SPECIALIST

## 2018-09-19 PROCEDURE — 25010000002 ONDANSETRON PER 1 MG: Performed by: SPECIALIST

## 2018-09-19 PROCEDURE — 85027 COMPLETE CBC AUTOMATED: CPT | Performed by: SPECIALIST

## 2018-09-19 PROCEDURE — 94799 UNLISTED PULMONARY SVC/PX: CPT

## 2018-09-19 PROCEDURE — 82962 GLUCOSE BLOOD TEST: CPT

## 2018-09-19 PROCEDURE — 94760 N-INVAS EAR/PLS OXIMETRY 1: CPT

## 2018-09-19 RX ORDER — FUROSEMIDE 20 MG/1
20 TABLET ORAL DAILY
Status: DISCONTINUED | OUTPATIENT
Start: 2018-09-19 | End: 2018-09-24 | Stop reason: HOSPADM

## 2018-09-19 RX ORDER — HYDROCODONE BITARTRATE AND ACETAMINOPHEN 5; 325 MG/1; MG/1
1 TABLET ORAL EVERY 4 HOURS PRN
Status: DISCONTINUED | OUTPATIENT
Start: 2018-09-19 | End: 2018-09-24 | Stop reason: HOSPADM

## 2018-09-19 RX ORDER — CARVEDILOL 6.25 MG/1
12.5 TABLET ORAL 2 TIMES DAILY WITH MEALS
Status: DISCONTINUED | OUTPATIENT
Start: 2018-09-19 | End: 2018-09-24 | Stop reason: HOSPADM

## 2018-09-19 RX ADMIN — FUROSEMIDE 20 MG: 20 TABLET ORAL at 10:30

## 2018-09-19 RX ADMIN — AMLODIPINE BESYLATE: 10 TABLET ORAL at 10:30

## 2018-09-19 RX ADMIN — METRONIDAZOLE 500 MG: 500 INJECTION, SOLUTION INTRAVENOUS at 19:57

## 2018-09-19 RX ADMIN — ONDANSETRON HYDROCHLORIDE 4 MG: 2 INJECTION, SOLUTION INTRAMUSCULAR; INTRAVENOUS at 19:57

## 2018-09-19 RX ADMIN — METOPROLOL TARTRATE 5 MG: 5 INJECTION, SOLUTION INTRAVENOUS at 05:22

## 2018-09-19 RX ADMIN — TAZOBACTAM SODIUM AND PIPERACILLIN SODIUM 3.38 G: 375; 3 INJECTION, SOLUTION INTRAVENOUS at 03:25

## 2018-09-19 RX ADMIN — CARVEDILOL 12.5 MG: 6.25 TABLET, FILM COATED ORAL at 17:09

## 2018-09-19 RX ADMIN — METRONIDAZOLE 500 MG: 500 INJECTION, SOLUTION INTRAVENOUS at 13:58

## 2018-09-19 RX ADMIN — HYDROCODONE BITARTRATE AND ACETAMINOPHEN 1 TABLET: 5; 325 TABLET ORAL at 14:05

## 2018-09-19 RX ADMIN — ASCORBIC ACID, VITAMIN A PALMITATE, CHOLECALCIFEROL, THIAMINE HYDROCHLORIDE, RIBOFLAVIN-5 PHOSPHATE SODIUM, PYRIDOXINE HYDROCHLORIDE, NIACINAMIDE, DEXPANTHENOL, ALPHA-TOCOPHEROL ACETATE, VITAMIN K1, FOLIC ACID, BIOTIN, CYANOCOBALAMIN: 200; 3300; 200; 6; 3.6; 6; 40; 15; 10; 150; 600; 60; 5 INJECTION, SOLUTION INTRAVENOUS at 18:09

## 2018-09-19 RX ADMIN — ENOXAPARIN SODIUM 40 MG: 40 INJECTION SUBCUTANEOUS at 09:30

## 2018-09-19 RX ADMIN — I.V. FAT EMULSION 50 G: 20 EMULSION INTRAVENOUS at 18:09

## 2018-09-19 RX ADMIN — METRONIDAZOLE 500 MG: 500 INJECTION, SOLUTION INTRAVENOUS at 04:23

## 2018-09-19 RX ADMIN — FAMOTIDINE 20 MG: 10 INJECTION, SOLUTION INTRAVENOUS at 09:30

## 2018-09-19 RX ADMIN — CARVEDILOL 12.5 MG: 6.25 TABLET, FILM COATED ORAL at 10:30

## 2018-09-19 RX ADMIN — ONDANSETRON HYDROCHLORIDE 4 MG: 2 INJECTION, SOLUTION INTRAMUSCULAR; INTRAVENOUS at 15:06

## 2018-09-19 NOTE — PLAN OF CARE
Problem: Patient Care Overview  Goal: Plan of Care Review  Outcome: Ongoing (interventions implemented as appropriate)   09/19/18 1130   Plan of Care Review   Progress improving   OTHER   Outcome Summary RD nutrition follow-up completed. Pt remains on TPN and po diet was advanced to full liquids this morning. Will continue to follow for possible d/c needs, however none are noted at this time. Plans are for pt to return to home after hospital d/c       Problem: Nutrition, Parenteral (Adult)  Goal: Signs and Symptoms of Listed Potential Problems Will be Absent, Minimized or Managed (Nutrition, Parenteral)   09/19/18 1130   Goal/Outcome Evaluation   Problems Assessed (Parenteral Nutrition) all   Problems Present (Parenteral Nutrition) none

## 2018-09-19 NOTE — PROGRESS NOTES
Princess Demarco MD FACS  Progress Note     LOS: 8 days   Patient Care Team:  Juan Echavarria MD as PCP - General  Juan Echavarria MD as PCP - Family Medicine  [facility], Cole Campos MD as Consulting Physician (Otolaryngology)  Tarun Morataya MD as Consulting Physician (Gastroenterology)      Subjective     Interval History:      naomie clears.  Multiple loose stools     Objective     Vital Signs  Temp:  [97.8 °F (36.6 °C)-99.2 °F (37.3 °C)] 97.8 °F (36.6 °C)  Heart Rate:  [60-92] 84  Resp:  [16-18] 18  BP: (126-170)/(57-81) 140/67    Physical Exam:  General appearance - alert, well appearing, and in no distress  Abdomen - soft, clean, appropriately tender      Results Review:    Lab Results (last 24 hours)     Procedure Component Value Units Date/Time    POC Glucose Once [[id number]]  (Abnormal) Collected:  09/19/18 0659    Specimen:  Blood Updated:  09/19/18 0712     Glucose 174 (H) mg/dL      Comment: : [id number] Jason VeraMeter ID: [id number]       POC Glucose Once [[id number]]  (Abnormal) Collected:  09/18/18 1710    Specimen:  Blood Updated:  09/18/18 1741     Glucose 165 (H) mg/dL      Comment: : [id number] Terrell [email or web address] ID: [id number]       POC Glucose Once [[id number]]  (Abnormal) Collected:  09/18/18 1154    Specimen:  Blood Updated:  09/18/18 1214     Glucose 187 (H) mg/dL      Comment: : [id number] Terrell MoviepilotendolynMeter ID: [id number]           Imaging Results (last 24 hours)     ** No results found for the last 24 hours. **            Assessment/Plan       Advance diet as tolerated.      Princess Demarco MD  09/19/18  7:22 AM

## 2018-09-19 NOTE — PLAN OF CARE
Problem: Patient Care Overview  Goal: Plan of Care Review  Outcome: Ongoing (interventions implemented as appropriate)   09/19/18 1413   Coping/Psychosocial   Plan of Care Reviewed With patient;spouse   Plan of Care Review   Progress improving   OTHER   Outcome Summary Norco given for c/o back pain, still with multiple small bm's. pca dc'd. advanced to full liquids.      Goal: Individualization and Mutuality  Outcome: Ongoing (interventions implemented as appropriate)    Goal: Discharge Needs Assessment  Outcome: Ongoing (interventions implemented as appropriate)      Problem: Infection, Risk/Actual (Adult)  Goal: Infection Prevention/Resolution  Outcome: Ongoing (interventions implemented as appropriate)      Problem: Bowel Resection (Adult)  Goal: Signs and Symptoms of Listed Potential Problems Will be Absent, Minimized or Managed (Bowel Resection)  Outcome: Ongoing (interventions implemented as appropriate)      Problem: Nutrition, Parenteral (Adult)  Goal: Signs and Symptoms of Listed Potential Problems Will be Absent, Minimized or Managed (Nutrition, Parenteral)  Outcome: Ongoing (interventions implemented as appropriate)

## 2018-09-20 LAB
ANION GAP SERPL CALCULATED.3IONS-SCNC: 6 MMOL/L (ref 4–13)
BUN BLD-MCNC: 15 MG/DL (ref 5–21)
BUN/CREAT SERPL: 19.2 (ref 7–25)
CALCIUM SPEC-SCNC: 8.6 MG/DL (ref 8.4–10.4)
CHLORIDE SERPL-SCNC: 106 MMOL/L (ref 98–110)
CO2 SERPL-SCNC: 30 MMOL/L (ref 24–31)
CREAT BLD-MCNC: 0.78 MG/DL (ref 0.5–1.4)
DEPRECATED RDW RBC AUTO: 51.9 FL (ref 40–54)
ERYTHROCYTE [DISTWIDTH] IN BLOOD BY AUTOMATED COUNT: 16.4 % (ref 12–15)
GFR SERPL CREATININE-BSD FRML MDRD: 72 ML/MIN/1.73
GLUCOSE BLD-MCNC: 183 MG/DL (ref 70–100)
GLUCOSE BLDC GLUCOMTR-MCNC: 228 MG/DL (ref 70–130)
HCT VFR BLD AUTO: 31.9 % (ref 37–47)
HGB BLD-MCNC: 10 G/DL (ref 12–16)
MCH RBC QN AUTO: 27.2 PG (ref 28–32)
MCHC RBC AUTO-ENTMCNC: 31.3 G/DL (ref 33–36)
MCV RBC AUTO: 86.7 FL (ref 82–98)
PLATELET # BLD AUTO: 120 10*3/MM3 (ref 130–400)
PMV BLD AUTO: 9 FL (ref 6–12)
POTASSIUM BLD-SCNC: 2.9 MMOL/L (ref 3.5–5.3)
RBC # BLD AUTO: 3.68 10*6/MM3 (ref 4.2–5.4)
SODIUM BLD-SCNC: 142 MMOL/L (ref 135–145)
WBC NRBC COR # BLD: 5.07 10*3/MM3 (ref 4.8–10.8)

## 2018-09-20 PROCEDURE — 80048 BASIC METABOLIC PNL TOTAL CA: CPT | Performed by: SPECIALIST

## 2018-09-20 PROCEDURE — 25010000002 POTASSIUM CHLORIDE PER 2 MEQ: Performed by: SPECIALIST

## 2018-09-20 PROCEDURE — 99231 SBSQ HOSP IP/OBS SF/LOW 25: CPT | Performed by: UROLOGY

## 2018-09-20 PROCEDURE — 82962 GLUCOSE BLOOD TEST: CPT

## 2018-09-20 PROCEDURE — 85027 COMPLETE CBC AUTOMATED: CPT | Performed by: SPECIALIST

## 2018-09-20 PROCEDURE — 25010000002 ENOXAPARIN PER 10 MG: Performed by: SPECIALIST

## 2018-09-20 RX ORDER — POTASSIUM CHLORIDE 750 MG/1
40 CAPSULE, EXTENDED RELEASE ORAL ONCE
Status: COMPLETED | OUTPATIENT
Start: 2018-09-20 | End: 2018-09-20

## 2018-09-20 RX ORDER — POTASSIUM CHLORIDE 29.8 MG/ML
20 INJECTION INTRAVENOUS
Status: DISCONTINUED | OUTPATIENT
Start: 2018-09-20 | End: 2018-09-20 | Stop reason: SDUPTHER

## 2018-09-20 RX ORDER — POTASSIUM CHLORIDE 14.9 MG/ML
20 INJECTION INTRAVENOUS
Status: COMPLETED | OUTPATIENT
Start: 2018-09-20 | End: 2018-09-20

## 2018-09-20 RX ADMIN — CARVEDILOL 12.5 MG: 6.25 TABLET, FILM COATED ORAL at 17:03

## 2018-09-20 RX ADMIN — CARVEDILOL 12.5 MG: 6.25 TABLET, FILM COATED ORAL at 08:04

## 2018-09-20 RX ADMIN — ENOXAPARIN SODIUM 40 MG: 40 INJECTION SUBCUTANEOUS at 08:04

## 2018-09-20 RX ADMIN — FUROSEMIDE 20 MG: 20 TABLET ORAL at 08:04

## 2018-09-20 RX ADMIN — FAMOTIDINE 20 MG: 10 INJECTION, SOLUTION INTRAVENOUS at 09:00

## 2018-09-20 RX ADMIN — POTASSIUM CHLORIDE 20 MEQ: 200 INJECTION, SOLUTION INTRAVENOUS at 10:30

## 2018-09-20 RX ADMIN — POTASSIUM CHLORIDE 20 MEQ: 200 INJECTION, SOLUTION INTRAVENOUS at 08:04

## 2018-09-20 RX ADMIN — POTASSIUM CHLORIDE 40 MEQ: 750 CAPSULE, EXTENDED RELEASE ORAL at 12:13

## 2018-09-20 RX ADMIN — I.V. FAT EMULSION 50 G: 20 EMULSION INTRAVENOUS at 17:03

## 2018-09-20 RX ADMIN — ASCORBIC ACID, VITAMIN A PALMITATE, CHOLECALCIFEROL, THIAMINE HYDROCHLORIDE, RIBOFLAVIN-5 PHOSPHATE SODIUM, PYRIDOXINE HYDROCHLORIDE, NIACINAMIDE, DEXPANTHENOL, ALPHA-TOCOPHEROL ACETATE, VITAMIN K1, FOLIC ACID, BIOTIN, CYANOCOBALAMIN: 200; 3300; 200; 6; 3.6; 6; 40; 15; 10; 150; 600; 60; 5 INJECTION, SOLUTION INTRAVENOUS at 17:03

## 2018-09-20 RX ADMIN — METRONIDAZOLE 500 MG: 500 INJECTION, SOLUTION INTRAVENOUS at 21:38

## 2018-09-20 RX ADMIN — POTASSIUM CHLORIDE 40 MEQ: 10 CAPSULE, EXTENDED RELEASE ORAL at 08:08

## 2018-09-20 RX ADMIN — AMLODIPINE BESYLATE: 10 TABLET ORAL at 08:04

## 2018-09-20 RX ADMIN — METRONIDAZOLE 500 MG: 500 INJECTION, SOLUTION INTRAVENOUS at 04:27

## 2018-09-20 RX ADMIN — METRONIDAZOLE 500 MG: 500 INJECTION, SOLUTION INTRAVENOUS at 12:14

## 2018-09-20 NOTE — PROGRESS NOTES
Continued Stay Note   [facility]     Patient Name: Nuha Cali  MRN: [id number]  Today's Date: 9/20/2018    Admit Date: 9/11/2018          Discharge Plan     Row Name 09/20/18 9763       Plan    Plan Home    Patient/Family in Agreement with Plan yes    Plan Comments Pt is on TPN at this time. She does plan to return home. She has been up in the halls walking with her spouse.               Discharge Codes    No documentation.           DANIEL Vega

## 2018-09-20 NOTE — PROGRESS NOTES
Princess Demarco MD FACS  Progress Note     LOS: 9 days   Patient Care Team:  Juan Echavarria MD as PCP - General  Juan Echavarria MD as PCP - Family Medicine  [facility], Cole Campos MD as Consulting Physician (Otolaryngology)  Tarun Morataya MD as Consulting Physician (Gastroenterology)      Subjective     Interval History:      naomie po.  Thickening of stools.  Pain controlled     Objective     Vital Signs  Temp:  [97.6 °F (36.4 °C)-98.7 °F (37.1 °C)] 97.7 °F (36.5 °C)  Heart Rate:  [84-89] 84  Resp:  [14-18] 14  BP: (141-146)/(60-79) 146/60    Physical Exam:  General appearance - alert, well appearing, and in no distress  Abdomen - soft, still some distension, clean      Results Review:    Lab Results (last 24 hours)     Procedure Component Value Units Date/Time    Basic Metabolic Panel [[id number]]  (Abnormal) Collected:  09/20/18 0458    Specimen:  Blood Updated:  09/20/18 0601     Glucose 183 (H) mg/dL      BUN 15 mg/dL      Creatinine 0.78 mg/dL      Sodium 142 mmol/L      Potassium 2.9 (C) mmol/L      Chloride 106 mmol/L      CO2 30.0 mmol/L      Calcium 8.6 mg/dL      eGFR Non African Amer 72 mL/min/1.73      BUN/Creatinine Ratio 19.2     Anion Gap 6.0 mmol/L     Narrative:       The MDRD GFR formula is only valid for adults with stable renal function between ages 18 and 70.    CBC (No Diff) [[id number]]  (Abnormal) Collected:  09/20/18 0458    Specimen:  Blood Updated:  09/20/18 0540     WBC 5.07 10*3/mm3      RBC 3.68 (L) 10*6/mm3      Hemoglobin 10.0 (L) g/dL      Hematocrit 31.9 (L) %      MCV 86.7 fL      MCH 27.2 (L) pg      MCHC 31.3 (L) g/dL      RDW 16.4 (H) %      RDW-SD 51.9 fl      MPV 9.0 fL      Platelets 120 (L) 10*3/mm3     POC Glucose Once [[id number]]  (Abnormal) Collected:  09/20/18 0007    Specimen:  Blood Updated:  09/20/18 0027     Glucose 228 (H) mg/dL      Comment: : [id number] Jason Turcios ID: [id number]       POC Glucose Once [[id number]]  (Abnormal) Collected:  09/19/18  1707    Specimen:  Blood Updated:  09/19/18 1737     Glucose 197 (H) mg/dL      Comment: : [id number] Tejada theScoreolynMeter ID: [id number]       POC Glucose Once [[id number]]  (Abnormal) Collected:  09/19/18 1213    Specimen:  Blood Updated:  09/19/18 1227     Glucose 177 (H) mg/dL      Comment: : [id number] Tejada theScoreolynMeter ID: [id number]           Imaging Results (last 24 hours)     ** No results found for the last 24 hours. **            Assessment/Plan       Advance diet as tolerated.  Replete WAYNE Demarco MD  09/20/18  11:11 AM

## 2018-09-20 NOTE — OP NOTE
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  She presents for hand-assisted laparoscopic sigmoidectomy with takedown of the fistula. Urology will place bilateral ureteral stents immediately preoperatively.  The risks, benefits, complications, and possible alternatives were discussed with the patient who agreed to proceed.  Description of procedure:  The patient was laid in modified lithotomy position in [facility]. The abdomen was prepped and draped.  The abdomen was entered with veress.  A 5mm trocar was placed in the supraumbilical position was placement of the camera.  A gelport was placed in the infraumbilical region.  The lateral peritoneal reflection at the lateral sigmoid was opened with bovie.  The proximal sigmoid and ascending colon to the splenic flexure was dissected using blunt dissection and bovie.  Once freed, the gelport was removed and the case was converted to open.  Bookwalter retractor was placed to aid in visualization.  The mid sigmoid was tightly adherent to the vaginal cuff.  Using blunt dissection and bovie the sigmoid was freed from the left side of the pelvis and the fistula was taken down.  While doing so, the ureter was identified by palpating the stent.  Once the fistula was freed, a curved contour 35mm blue TA stapler was used to transect the proximal rectum.  Enseal was used to transect the mesoappendix.  The specimen was sent to pathology.  An automatic pursestring applier was used to anchor a 25mm anvil into the proximal sigmoid.  The rectum allowed dilation of 25mm.  A 25mm EEA stapler was placed into the rectum and the  colorectal stapled end to end anastomosis was created.  Air was insufflated into the rectum using a rigid proctoscope.  The colon did dilate proximal to the staple line demonstrating that the anastomosis was patent.  Saline was placed into the pelvis and the colon was insufflated and no bubbles were seen demonstrating no evidence of leak.  The peritoneal cavity was copiously irrigated with sterile saline with Mefoxin.  A piece of seprafilm was placed on the greater omentum. The fascia was closed with#1 looped PDS x 2.  The skin was intermittently closed with staples versus packed with saline soaked gauze.  Dry dressings were applied.  The sponge, needle, and instrument counts were correct.  Complications: none  Disposition:  Good to pacu  Findings:  Indurated and inflamed mid sigmoid at site of colovaginal fistula

## 2018-09-20 NOTE — PLAN OF CARE
Problem: Patient Care Overview  Goal: Plan of Care Review  Outcome: Ongoing (interventions implemented as appropriate)   09/20/18 0112   Coping/Psychosocial   Plan of Care Reviewed With patient   Plan of Care Review   Progress improving   OTHER   Outcome Summary No c/o pain so far this shift. Still having mult stools, yet more formed. Pt has c/o nausea once this shift; medicated with prn zofran. Drsg change done, w-t, gauze, medipore tape.IV abx cont. TPN and lipids infusing. VSS. Will cont to monitor.      Goal: Individualization and Mutuality  Outcome: Ongoing (interventions implemented as appropriate)   09/11/18 1711 09/13/18 1714 09/15/18 0322   Individualization   Patient Specific Preferences --  Lights off --    Patient Specific Goals (Include Timeframe) --  --  --    Patient Specific Interventions --  --  --    Mutuality/Individual Preferences   What Anxieties, Fears, Concerns, or Questions Do You Have About Your Care? --  --  Getting better since surgery   What Information Would Help Us Give You More Personalized Care? None at this time --  --     09/18/18 0325   Individualization   Patient Specific Preferences --    Patient Specific Goals (Include Timeframe) Work towards discharge    Patient Specific Interventions Cont abx, TPN/lipids. drsg changes   Mutuality/Individual Preferences   What Anxieties, Fears, Concerns, or Questions Do You Have About Your Care? --    What Information Would Help Us Give You More Personalized Care? --      Goal: Discharge Needs Assessment  Outcome: Ongoing (interventions implemented as appropriate)   09/13/18 1352 09/13/18 1400   Discharge Needs Assessment   Readmission Within the Last 30 Days --  no previous admission in last 30 days   Concerns to be Addressed denies needs/concerns at this time --    Patient/Family Anticipates Transition to --  home with family   Patient/Family Anticipated Services at Transition --  none   Transportation Concerns car, none --    Transportation  Anticipated --  family or friend will provide   Anticipated Changes Related to Illness --  none   Equipment Needed After Discharge --  none   Discharge Coordination/Progress --  Pt lives at home with her spouse. She is ambulating in her room well. Noted that pt will be going to the OR tomorrow. No needs at this time but will follow after surgery.   Disability   Equipment Currently Used at Home --  none     Goal: Interprofessional Rounds/Family Conf  Outcome: Ongoing (interventions implemented as appropriate)   09/18/18 0325   Interdisciplinary Rounds/Family Conf   Participants nursing;patient;family       Problem: Infection, Risk/Actual (Adult)  Goal: Infection Prevention/Resolution  Outcome: Ongoing (interventions implemented as appropriate)   09/19/18 1413   Infection, Risk/Actual (Adult)   Infection Prevention/Resolution making progress toward outcome       Problem: Bowel Resection (Adult)  Goal: Signs and Symptoms of Listed Potential Problems Will be Absent, Minimized or Managed (Bowel Resection)  Outcome: Ongoing (interventions implemented as appropriate)      Problem: Nutrition, Parenteral (Adult)  Goal: Signs and Symptoms of Listed Potential Problems Will be Absent, Minimized or Managed (Nutrition, Parenteral)  Outcome: Ongoing (interventions implemented as appropriate)

## 2018-09-21 LAB
ANION GAP SERPL CALCULATED.3IONS-SCNC: 7 MMOL/L (ref 4–13)
BUN BLD-MCNC: 19 MG/DL (ref 5–21)
BUN/CREAT SERPL: 23.2 (ref 7–25)
CALCIUM SPEC-SCNC: 8.7 MG/DL (ref 8.4–10.4)
CHLORIDE SERPL-SCNC: 106 MMOL/L (ref 98–110)
CO2 SERPL-SCNC: 28 MMOL/L (ref 24–31)
CREAT BLD-MCNC: 0.82 MG/DL (ref 0.5–1.4)
DEPRECATED RDW RBC AUTO: 51.2 FL (ref 40–54)
ERYTHROCYTE [DISTWIDTH] IN BLOOD BY AUTOMATED COUNT: 16.6 % (ref 12–15)
GFR SERPL CREATININE-BSD FRML MDRD: 68 ML/MIN/1.73
GLUCOSE BLD-MCNC: 156 MG/DL (ref 70–100)
GLUCOSE BLDC GLUCOMTR-MCNC: 246 MG/DL (ref 70–130)
HCT VFR BLD AUTO: 30.4 % (ref 37–47)
HGB BLD-MCNC: 9.7 G/DL (ref 12–16)
MAGNESIUM SERPL-MCNC: 1.8 MG/DL (ref 1.4–2.2)
MCH RBC QN AUTO: 27.2 PG (ref 28–32)
MCHC RBC AUTO-ENTMCNC: 31.9 G/DL (ref 33–36)
MCV RBC AUTO: 85.2 FL (ref 82–98)
PLATELET # BLD AUTO: 145 10*3/MM3 (ref 130–400)
PMV BLD AUTO: 9.7 FL (ref 6–12)
POTASSIUM BLD-SCNC: 3.3 MMOL/L (ref 3.5–5.3)
RBC # BLD AUTO: 3.57 10*6/MM3 (ref 4.2–5.4)
SODIUM BLD-SCNC: 141 MMOL/L (ref 135–145)
WBC NRBC COR # BLD: 5.74 10*3/MM3 (ref 4.8–10.8)

## 2018-09-21 PROCEDURE — 80048 BASIC METABOLIC PNL TOTAL CA: CPT | Performed by: SPECIALIST

## 2018-09-21 PROCEDURE — 25810000003 SODIUM CHLORIDE 0.9 % WITH KCL 20 MEQ 20-0.9 MEQ/L-% SOLUTION: Performed by: SPECIALIST

## 2018-09-21 PROCEDURE — 83735 ASSAY OF MAGNESIUM: CPT | Performed by: SPECIALIST

## 2018-09-21 PROCEDURE — 82962 GLUCOSE BLOOD TEST: CPT

## 2018-09-21 PROCEDURE — 25010000002 ENOXAPARIN PER 10 MG: Performed by: SPECIALIST

## 2018-09-21 PROCEDURE — 85027 COMPLETE CBC AUTOMATED: CPT | Performed by: SPECIALIST

## 2018-09-21 PROCEDURE — 25010000002 FUROSEMIDE PER 20 MG: Performed by: SPECIALIST

## 2018-09-21 RX ORDER — PANTOPRAZOLE SODIUM 40 MG/1
40 TABLET, DELAYED RELEASE ORAL
Status: DISCONTINUED | OUTPATIENT
Start: 2018-09-22 | End: 2018-09-24 | Stop reason: HOSPADM

## 2018-09-21 RX ORDER — POTASSIUM CHLORIDE 750 MG/1
40 CAPSULE, EXTENDED RELEASE ORAL ONCE
Status: DISCONTINUED | OUTPATIENT
Start: 2018-09-21 | End: 2018-09-21 | Stop reason: SDUPTHER

## 2018-09-21 RX ORDER — SODIUM CHLORIDE AND POTASSIUM CHLORIDE 150; 900 MG/100ML; MG/100ML
75 INJECTION, SOLUTION INTRAVENOUS CONTINUOUS
Status: DISCONTINUED | OUTPATIENT
Start: 2018-09-21 | End: 2018-09-24 | Stop reason: HOSPADM

## 2018-09-21 RX ORDER — FUROSEMIDE 10 MG/ML
40 INJECTION INTRAMUSCULAR; INTRAVENOUS ONCE
Status: COMPLETED | OUTPATIENT
Start: 2018-09-21 | End: 2018-09-21

## 2018-09-21 RX ORDER — POTASSIUM CHLORIDE 750 MG/1
40 CAPSULE, EXTENDED RELEASE ORAL ONCE
Status: COMPLETED | OUTPATIENT
Start: 2018-09-21 | End: 2018-09-21

## 2018-09-21 RX ADMIN — CARVEDILOL 12.5 MG: 6.25 TABLET, FILM COATED ORAL at 09:09

## 2018-09-21 RX ADMIN — METRONIDAZOLE 500 MG: 500 INJECTION, SOLUTION INTRAVENOUS at 13:19

## 2018-09-21 RX ADMIN — POTASSIUM CHLORIDE AND SODIUM CHLORIDE 75 ML/HR: 900; 150 INJECTION, SOLUTION INTRAVENOUS at 16:00

## 2018-09-21 RX ADMIN — FUROSEMIDE 40 MG: 10 INJECTION, SOLUTION INTRAMUSCULAR; INTRAVENOUS at 16:00

## 2018-09-21 RX ADMIN — AMLODIPINE BESYLATE: 10 TABLET ORAL at 09:08

## 2018-09-21 RX ADMIN — ENOXAPARIN SODIUM 40 MG: 40 INJECTION SUBCUTANEOUS at 09:09

## 2018-09-21 RX ADMIN — CARVEDILOL 12.5 MG: 6.25 TABLET, FILM COATED ORAL at 17:43

## 2018-09-21 RX ADMIN — FAMOTIDINE 20 MG: 10 INJECTION, SOLUTION INTRAVENOUS at 09:11

## 2018-09-21 RX ADMIN — METRONIDAZOLE 500 MG: 500 INJECTION, SOLUTION INTRAVENOUS at 05:09

## 2018-09-21 RX ADMIN — FUROSEMIDE 20 MG: 20 TABLET ORAL at 09:09

## 2018-09-21 RX ADMIN — METRONIDAZOLE 500 MG: 500 INJECTION, SOLUTION INTRAVENOUS at 20:19

## 2018-09-21 RX ADMIN — POTASSIUM CHLORIDE 40 MEQ: 750 CAPSULE, EXTENDED RELEASE ORAL at 16:00

## 2018-09-21 NOTE — PLAN OF CARE
Problem: Patient Care Overview  Goal: Plan of Care Review  Outcome: Ongoing (interventions implemented as appropriate)   09/21/18 1243   Coping/Psychosocial   Plan of Care Reviewed With patient   Plan of Care Review   Progress no change   Pt did eat a small amount of breakfast. TPN continues. Dressing changes BID. IV abx. Pt continues to have small BMs.   Goal: Individualization and Mutuality  Outcome: Ongoing (interventions implemented as appropriate)    Goal: Discharge Needs Assessment  Outcome: Ongoing (interventions implemented as appropriate)      Problem: Infection, Risk/Actual (Adult)  Goal: Infection Prevention/Resolution  Outcome: Ongoing (interventions implemented as appropriate)   09/21/18 1243   Infection, Risk/Actual (Adult)   Infection Prevention/Resolution making progress toward outcome       Problem: Bowel Resection (Adult)  Goal: Signs and Symptoms of Listed Potential Problems Will be Absent, Minimized or Managed (Bowel Resection)  Outcome: Ongoing (interventions implemented as appropriate)   09/21/18 1243   Goal/Outcome Evaluation   Problems Assessed (Bowel Resection) all   Problems Present (Bowel Resection) pain;fluid/electrolyte imbalance;wound healing impaired       Problem: Nutrition, Parenteral (Adult)  Goal: Signs and Symptoms of Listed Potential Problems Will be Absent, Minimized or Managed (Nutrition, Parenteral)  Outcome: Ongoing (interventions implemented as appropriate)   09/21/18 1243   Goal/Outcome Evaluation   Problems Assessed (Parenteral Nutrition) all   Problems Present (Parenteral Nutrition) fluid/electrolyte imbalance;glycemic control impaired       Problem: Nutrition, Imbalanced: Inadequate Oral Intake (Adult)  Goal: Improved Oral Intake  Outcome: Ongoing (interventions implemented as appropriate)   09/21/18 1243   Nutrition, Imbalanced: Inadequate Oral Intake (Adult)   Improved Oral Intake making progress toward outcome     Goal: Prevent Further Weight Loss  Outcome: Ongoing  (interventions implemented as appropriate)   09/21/18 1243   Nutrition, Imbalanced: Inadequate Oral Intake (Adult)   Prevent Further Weight Loss making progress toward outcome

## 2018-09-21 NOTE — PROGRESS NOTES
Princess Demarco MD FACS  Progress Note     LOS: 10 days   Patient Care Team:  Juan Echavarria MD as PCP - General  Juan Echavarria MD as PCP - Family Medicine  [facility], Cole Campos MD as Consulting Physician (Otolaryngology)  Tarun Morataya MD as Consulting Physician (Gastroenterology)      Subjective     Interval History:      feeling better.  Eating more.  Thickening of stools     Objective     Vital Signs  Temp:  [97.9 °F (36.6 °C)-98.4 °F (36.9 °C)] 98.3 °F (36.8 °C)  Heart Rate:  [84-96] 90  Resp:  [18] 18  BP: (143-166)/(62-81) 143/62    Physical Exam:  General appearance - alert, well appearing, and in no distress  Abdomen - soft, clean, still some distension      Results Review:    Lab Results (last 24 hours)     Procedure Component Value Units Date/Time    POC Glucose Once [[id number]]  (Abnormal) Collected:  09/21/18 1324    Specimen:  Blood Updated:  09/21/18 1345     Glucose 246 (H) mg/dL      Comment: : [id number] Adonay CrooksMeter ID: [id number]       Basic Metabolic Panel [[id number]]  (Abnormal) Collected:  09/21/18 0458    Specimen:  Blood Updated:  09/21/18 0613     Glucose 156 (H) mg/dL      BUN 19 mg/dL      Creatinine 0.82 mg/dL      Sodium 141 mmol/L      Potassium 3.3 (L) mmol/L      Chloride 106 mmol/L      CO2 28.0 mmol/L      Calcium 8.7 mg/dL      eGFR Non African Amer 68 mL/min/1.73      BUN/Creatinine Ratio 23.2     Anion Gap 7.0 mmol/L     Narrative:       The MDRD GFR formula is only valid for adults with stable renal function between ages 18 and 70.    Magnesium [[id number]]  (Normal) Collected:  09/21/18 0458    Specimen:  Blood Updated:  09/21/18 0613     Magnesium 1.8 mg/dL     CBC (No Diff) [[id number]]  (Abnormal) Collected:  09/21/18 0458    Specimen:  Blood Updated:  09/21/18 0604     WBC 5.74 10*3/mm3      RBC 3.57 (L) 10*6/mm3      Hemoglobin 9.7 (L) g/dL      Hematocrit 30.4 (L) %      MCV 85.2 fL      MCH 27.2 (L) pg      MCHC 31.9 (L) g/dL      RDW 16.6 (H) %       RDW-SD 51.2 fl      MPV 9.7 fL      Platelets 145 10*3/mm3         Imaging Results (last 24 hours)     ** No results found for the last 24 hours. **            Assessment/Plan       Diet as tolerated.  Will allow TPN to run out.  Will give iv lasix today due to bilateral pedal pitting edema.  Potassium repleted, will need to watch due to lasix.        Princess Demarco MD  09/21/18  2:41 PM

## 2018-09-21 NOTE — PLAN OF CARE
Problem: Patient Care Overview  Goal: Plan of Care Review  Outcome: Ongoing (interventions implemented as appropriate)   09/21/18 1053   Coping/Psychosocial   Plan of Care Reviewed With patient   Plan of Care Review   Progress improving   OTHER   Outcome Summary Pt is receiving a GI soft/bland diet. Appetite has been poor averaging 23% of the last 3 meals. She did eat better at breakfast this morning, consuming 50% of her meal. She cont to receive TPN of D20% Aa5% at 36mL/hour with lipids daily. She receives Ensure BID with meals. Per NN's stool is getting thicker. Cont to follow.       Problem: Nutrition, Parenteral (Adult)  Goal: Signs and Symptoms of Listed Potential Problems Will be Absent, Minimized or Managed (Nutrition, Parenteral)  Outcome: Ongoing (interventions implemented as appropriate)      Problem: Nutrition, Imbalanced: Inadequate Oral Intake (Adult)  Goal: Identify Related Risk Factors and Signs and Symptoms  Outcome: Outcome(s) achieved Date Met: 09/21/18    Goal: Improved Oral Intake  Outcome: Ongoing (interventions implemented as appropriate)    Goal: Prevent Further Weight Loss  Outcome: Ongoing (interventions implemented as appropriate)

## 2018-09-21 NOTE — PROGRESS NOTES
Continued Stay Note   [place]     Patient Name: Nuha Cali  MRN: [id number]  Today's Date: 9/21/2018    Admit Date: 9/11/2018          Discharge Plan     Row Name 09/21/18 1209       Plan    Plan home    Patient/Family in Agreement with Plan yes    Plan Comments Patient remains on TPN but diet is being advanced.  she will return home discharge once tolerating diet. patient up independently.               Discharge Codes    No documentation.           Marcia Fam RN

## 2018-09-21 NOTE — PROGRESS NOTES
Chief complaint: Follow up stent placement    HPI:  Hx:Urine is clear. No lower urinary tract symptoms    EXAM:   Temp:  [97.6 °F (36.4 °C)-98.7 °F (37.1 °C)] 98.4 °F (36.9 °C)  Heart Rate:  [84-89] 86  Resp:  [14-18] 15  BP: (141-146)/(55-79) 141/55  Neuro: No confusion; No disorientation; Alert and oriented  Pulmonary: No respiratory distress.   Skin: No pallor or diaphoresis      IMP/PLAN  #1.  Colovaginal fistula  -Status post repair  -Status post stent placement for ease of ureteral identification.  -Patient very tolerant of these.  We will set her up for office follow-up in 3 weeks to remove them.      Dick Hart MD  09/20/18  7:26 PM

## 2018-09-21 NOTE — PLAN OF CARE
Problem: Patient Care Overview  Goal: Plan of Care Review  Outcome: Ongoing (interventions implemented as appropriate)  Tpn/ lipids/ iv abx cont. Tolerates activity well. Poor appetite cont. Cont to monitor.   09/21/18 0147   Coping/Psychosocial   Plan of Care Reviewed With patient   Plan of Care Review   Progress improving     Goal: Individualization and Mutuality  Outcome: Ongoing (interventions implemented as appropriate)    Goal: Discharge Needs Assessment  Outcome: Ongoing (interventions implemented as appropriate)      Problem: Infection, Risk/Actual (Adult)  Goal: Infection Prevention/Resolution  Outcome: Ongoing (interventions implemented as appropriate)      Problem: Bowel Resection (Adult)  Goal: Signs and Symptoms of Listed Potential Problems Will be Absent, Minimized or Managed (Bowel Resection)  Outcome: Ongoing (interventions implemented as appropriate)      Problem: Nutrition, Parenteral (Adult)  Goal: Signs and Symptoms of Listed Potential Problems Will be Absent, Minimized or Managed (Nutrition, Parenteral)  Outcome: Ongoing (interventions implemented as appropriate)

## 2018-09-22 LAB
ANION GAP SERPL CALCULATED.3IONS-SCNC: 5 MMOL/L (ref 4–13)
BUN BLD-MCNC: 18 MG/DL (ref 5–21)
BUN/CREAT SERPL: 20.9 (ref 7–25)
CALCIUM SPEC-SCNC: 8.8 MG/DL (ref 8.4–10.4)
CHLORIDE SERPL-SCNC: 108 MMOL/L (ref 98–110)
CO2 SERPL-SCNC: 29 MMOL/L (ref 24–31)
CREAT BLD-MCNC: 0.86 MG/DL (ref 0.5–1.4)
DEPRECATED RDW RBC AUTO: 51.7 FL (ref 40–54)
ERYTHROCYTE [DISTWIDTH] IN BLOOD BY AUTOMATED COUNT: 16.9 % (ref 12–15)
GFR SERPL CREATININE-BSD FRML MDRD: 64 ML/MIN/1.73
GLUCOSE BLD-MCNC: 95 MG/DL (ref 70–100)
HCT VFR BLD AUTO: 30.6 % (ref 37–47)
HGB BLD-MCNC: 10 G/DL (ref 12–16)
MAGNESIUM SERPL-MCNC: 1.7 MG/DL (ref 1.4–2.2)
MCH RBC QN AUTO: 27.9 PG (ref 28–32)
MCHC RBC AUTO-ENTMCNC: 32.7 G/DL (ref 33–36)
MCV RBC AUTO: 85.5 FL (ref 82–98)
PLATELET # BLD AUTO: 140 10*3/MM3 (ref 130–400)
PMV BLD AUTO: 9.7 FL (ref 6–12)
POTASSIUM BLD-SCNC: 3.7 MMOL/L (ref 3.5–5.3)
RBC # BLD AUTO: 3.58 10*6/MM3 (ref 4.2–5.4)
SODIUM BLD-SCNC: 142 MMOL/L (ref 135–145)
WBC NRBC COR # BLD: 5.05 10*3/MM3 (ref 4.8–10.8)

## 2018-09-22 PROCEDURE — 25010000002 ENOXAPARIN PER 10 MG: Performed by: SPECIALIST

## 2018-09-22 PROCEDURE — 25810000003 SODIUM CHLORIDE 0.9 % WITH KCL 20 MEQ 20-0.9 MEQ/L-% SOLUTION: Performed by: SPECIALIST

## 2018-09-22 PROCEDURE — 85027 COMPLETE CBC AUTOMATED: CPT | Performed by: SPECIALIST

## 2018-09-22 PROCEDURE — 83735 ASSAY OF MAGNESIUM: CPT | Performed by: SPECIALIST

## 2018-09-22 PROCEDURE — 80048 BASIC METABOLIC PNL TOTAL CA: CPT | Performed by: SPECIALIST

## 2018-09-22 RX ADMIN — FUROSEMIDE 20 MG: 20 TABLET ORAL at 08:54

## 2018-09-22 RX ADMIN — PANTOPRAZOLE SODIUM 40 MG: 40 TABLET, DELAYED RELEASE ORAL at 06:07

## 2018-09-22 RX ADMIN — AMLODIPINE BESYLATE: 10 TABLET ORAL at 08:53

## 2018-09-22 RX ADMIN — METRONIDAZOLE 500 MG: 500 INJECTION, SOLUTION INTRAVENOUS at 20:07

## 2018-09-22 RX ADMIN — POTASSIUM CHLORIDE AND SODIUM CHLORIDE 75 ML/HR: 900; 150 INJECTION, SOLUTION INTRAVENOUS at 21:25

## 2018-09-22 RX ADMIN — POTASSIUM CHLORIDE AND SODIUM CHLORIDE 75 ML/HR: 900; 150 INJECTION, SOLUTION INTRAVENOUS at 06:42

## 2018-09-22 RX ADMIN — METRONIDAZOLE 500 MG: 500 INJECTION, SOLUTION INTRAVENOUS at 13:26

## 2018-09-22 RX ADMIN — ENOXAPARIN SODIUM 40 MG: 40 INJECTION SUBCUTANEOUS at 13:26

## 2018-09-22 RX ADMIN — CARVEDILOL 12.5 MG: 6.25 TABLET, FILM COATED ORAL at 17:33

## 2018-09-22 RX ADMIN — METRONIDAZOLE 500 MG: 500 INJECTION, SOLUTION INTRAVENOUS at 04:57

## 2018-09-22 RX ADMIN — CARVEDILOL 12.5 MG: 6.25 TABLET, FILM COATED ORAL at 08:54

## 2018-09-22 NOTE — PLAN OF CARE
Problem: Patient Care Overview  Goal: Plan of Care Review  Outcome: Ongoing (interventions implemented as appropriate)   09/22/18 3969   Coping/Psychosocial   Plan of Care Reviewed With patient;spouse   Plan of Care Review   Progress improving   OTHER   Outcome Summary alert and oriented. tolerating diet. Still having frequent stools. getting ensure with meals.      Goal: Individualization and Mutuality  Outcome: Ongoing (interventions implemented as appropriate)    Goal: Discharge Needs Assessment  Outcome: Ongoing (interventions implemented as appropriate)      Problem: Infection, Risk/Actual (Adult)  Goal: Infection Prevention/Resolution  Outcome: Ongoing (interventions implemented as appropriate)      Problem: Bowel Resection (Adult)  Goal: Signs and Symptoms of Listed Potential Problems Will be Absent, Minimized or Managed (Bowel Resection)  Outcome: Ongoing (interventions implemented as appropriate)      Problem: Nutrition, Imbalanced: Inadequate Oral Intake (Adult)  Goal: Improved Oral Intake  Outcome: Ongoing (interventions implemented as appropriate)    Goal: Prevent Further Weight Loss  Outcome: Ongoing (interventions implemented as appropriate)

## 2018-09-22 NOTE — PROGRESS NOTES
Lupe Mcghee MD Progress Note     LOS: 11 days   Patient Care Team:  Juan Echavarria MD as PCP - General  Juan Echavarria MD as PCP - Family Medicine  [facility], Cole Campos MD as Consulting Physician (Otolaryngology)  Tarun Morataya MD as Consulting Physician (Gastroenterology)        Subjective     Doing well.  Not eating much.    Objective     Vital Signs  Temp:  [97.9 °F (36.6 °C)-98.6 °F (37 °C)] 98 °F (36.7 °C)  Heart Rate:  [85-90] 85  Resp:  [18] 18  BP: (138-144)/(62-76) 143/74    Intake & Output (last 3 days)       09/19 0701 - 09/20 0700 09/20 0701 - 09/21 0700 09/21 0701 - 09/22 0700 09/22 0701 - 09/23 0700    P.O.  320 600     I.V. (mL/kg)   1183 (17.5)            Total Intake(mL/kg) 863 (13) 320 (4.7) 1783 (26.4)     Urine (mL/kg/hr)   600 (0.4)     Total Output     600      Net +863 +320 +1183              Unmeasured Urine Occurrence 6 x 5 x 5 x     Unmeasured Stool Occurrence 4 x 2 x 4 x           Physical Exam:     General Appearance:    Alert, cooperative, in no acute distress   Lungs:     respirations regular, even and unlabored    Heart:    Regular rhythm and normal rate, normal S1 and S2, no            murmur, no gallop, no rub   Chest Wall:    No abnormalities observed   Abdomen:      Soft wound packed into superficial areas abdomen nondistended    Extremities: No edema, + SCDs   Results Review:     I reviewed the patient's new clinical results.    Lab Results (last 72 hours)     Procedure Component Value Units Date/Time    Basic Metabolic Panel [[id number]]  (Normal) Collected:  09/22/18 0536    Specimen:  Blood Updated:  09/22/18 0625     Glucose 95 mg/dL      BUN 18 mg/dL      Creatinine 0.86 mg/dL      Sodium 142 mmol/L      Potassium 3.7 mmol/L      Chloride 108 mmol/L      CO2 29.0 mmol/L      Calcium 8.8 mg/dL      eGFR Non African Amer 64 mL/min/1.73      BUN/Creatinine Ratio 20.9     Anion Gap 5.0 mmol/L     Narrative:       The MDRD GFR formula is only valid for adults with  stable renal function between ages 18 and 70.    Magnesium [[id number]]  (Normal) Collected:  09/22/18 0536    Specimen:  Blood Updated:  09/22/18 0625     Magnesium 1.7 mg/dL     CBC (No Diff) [[id number]]  (Abnormal) Collected:  09/22/18 0536    Specimen:  Blood Updated:  09/22/18 0558     WBC 5.05 10*3/mm3      RBC 3.58 (L) 10*6/mm3      Hemoglobin 10.0 (L) g/dL      Hematocrit 30.6 (L) %      MCV 85.5 fL      MCH 27.9 (L) pg      MCHC 32.7 (L) g/dL      RDW 16.9 (H) %      RDW-SD 51.7 fl      MPV 9.7 fL      Platelets 140 10*3/mm3     POC Glucose Once [[id number]]  (Abnormal) Collected:  09/21/18 1324    Specimen:  Blood Updated:  09/21/18 1345     Glucose 246 (H) mg/dL      Comment: : [id number] Urias JessicaMeter ID: [id number]       Basic Metabolic Panel [[id number]]  (Abnormal) Collected:  09/21/18 0458    Specimen:  Blood Updated:  09/21/18 0613     Glucose 156 (H) mg/dL      BUN 19 mg/dL      Creatinine 0.82 mg/dL      Sodium 141 mmol/L      Potassium 3.3 (L) mmol/L      Chloride 106 mmol/L      CO2 28.0 mmol/L      Calcium 8.7 mg/dL      eGFR Non African Amer 68 mL/min/1.73      BUN/Creatinine Ratio 23.2     Anion Gap 7.0 mmol/L     Narrative:       The MDRD GFR formula is only valid for adults with stable renal function between ages 18 and 70.    Magnesium [[id number]]  (Normal) Collected:  09/21/18 0458    Specimen:  Blood Updated:  09/21/18 0613     Magnesium 1.8 mg/dL     CBC (No Diff) [[id number]]  (Abnormal) Collected:  09/21/18 0458    Specimen:  Blood Updated:  09/21/18 0604     WBC 5.74 10*3/mm3      RBC 3.57 (L) 10*6/mm3      Hemoglobin 9.7 (L) g/dL      Hematocrit 30.4 (L) %      MCV 85.2 fL      MCH 27.2 (L) pg      MCHC 31.9 (L) g/dL      RDW 16.6 (H) %      RDW-SD 51.2 fl      MPV 9.7 fL      Platelets 145 10*3/mm3     Basic Metabolic Panel [[id number]]  (Abnormal) Collected:  09/20/18 0458    Specimen:  Blood Updated:  09/20/18 0601     Glucose 183 (H) mg/dL      BUN 15 mg/dL      Creatinine  0.78 mg/dL      Sodium 142 mmol/L      Potassium 2.9 (C) mmol/L      Chloride 106 mmol/L      CO2 30.0 mmol/L      Calcium 8.6 mg/dL      eGFR Non African Amer 72 mL/min/1.73      BUN/Creatinine Ratio 19.2     Anion Gap 6.0 mmol/L     Narrative:       The MDRD GFR formula is only valid for adults with stable renal function between ages 18 and 70.    CBC (No Diff) [[id number]]  (Abnormal) Collected:  09/20/18 0458    Specimen:  Blood Updated:  09/20/18 0540     WBC 5.07 10*3/mm3      RBC 3.68 (L) 10*6/mm3      Hemoglobin 10.0 (L) g/dL      Hematocrit 31.9 (L) %      MCV 86.7 fL      MCH 27.2 (L) pg      MCHC 31.3 (L) g/dL      RDW 16.4 (H) %      RDW-SD 51.9 fl      MPV 9.0 fL      Platelets 120 (L) 10*3/mm3     POC Glucose Once [[id number]]  (Abnormal) Collected:  09/20/18 0007    Specimen:  Blood Updated:  09/20/18 0027     Glucose 228 (H) mg/dL      Comment: : [id number] Jason RenenikkienMeter ID: [id number]       POC Glucose Once [[id number]]  (Abnormal) Collected:  09/19/18 1707    Specimen:  Blood Updated:  09/19/18 1737     Glucose 197 (H) mg/dL      Comment: : [id number] Terrell Skim.itendolynMeter ID: [id number]       POC Glucose Once [[id number]]  (Abnormal) Collected:  09/19/18 1213    Specimen:  Blood Updated:  09/19/18 1227     Glucose 177 (H) mg/dL      Comment: : [id number] Terrell Skim.itendolynMeter ID: [id number]       Tissue Pathology Exam [[id number]] Collected:  09/14/18 1425    Specimen:  Tissue from Large Intestine, Sigmoid Colon Updated:  09/19/18 1109     Case Report --     Surgical Pathology Report                         Case: [id number]                                  Authorizing Provider:  Princess Demarco MD       Collected:           09/14/2018 02:25 PM          Ordering Location:     [facility] OR  Received:            09/17/2018 01:29 PM          Pathologist:           Jaswant Avila MD                                                        Specimen:    Large Intestine,  "Sigmoid Colon, sigmoid colon, anstomonic donut                             Final Diagnosis --     Sigmoid colon and anastomotic doughnut, resection:  Diverticulitis and pericolonic abscess.  Viable resection margins.  Two benign lymph nodes.       Gross Description --     Specimen #1 is received in a formalin filled container, labeled with the patient's name, date of birth, and \"sigmoid colon and anastomotic donut\".  The specimen consists of the segment of colon measuring 10.5 cm in length by 3.8 cm in diameter.  The surface of the colon is pink-brown, ragged with fibropurulent exudate.  The tightly adherent mesocolon shows disruption and fibropurulent exudate.  The colon is opened and reveals yellow-pink soft mucosa with multiple intact diverticula.  Sectioning reveals a pericolonic abscess in the center of the colon measuring 3.5 cm from one stapled line and 5.8 cm from the opposite stapled margin.  The abscess measures 2.0 x 1.4 x 0.8 cm.  The abscess appears confined to the surrounding mesocolon.  The remaining mucosa exhibits a small amount of autolysis with no lesions identified.  2 incidental pink-gray lymph node candidates are identified measuring up to 0.3.     An anastomotic ring is identified measuring 0.4 cm in length by 2.0 cm in diameter.  The serosa is unremarkable.  The mucosa is tan-pink and unremarkable.  A blue nylon suture runs throughout the remaining.  Multiple surgical clips are also noted.    The resection margins are shaved and submitted in blocks 1A and 1B.  The diverticular abscess is submitted in blocks 1C and 1D.  Representative sections of intact diverticula are submitted in blocks 1E and 1F.  A representative section of unremarkable colon wall is submitted in block 1G.  2 bisected lymph node candidates are submitted in block 1H.  Representative sections of anastomotic ring are submitted in block 1I.            Microscopic Description --     Histologic sections show a segment of colon " with multiple diverticula.  One of the diverticula shows extensive mixed inflammation extending out into the pericolonic soft tissue with areas a pericolonic abscess with surrounding mixed inflammation and multi nucleated giant cell reaction.  The proximal and distal resection margins are viable.          Imaging Results (last 72 hours)     ** No results found for the last 72 hours. **              Assessment/Plan     Active Problems:    Colovaginal fistula      Continue current treatment.  Continue to increase activity      Lupe Mcghee MD  09/22/18  8:15 AM

## 2018-09-22 NOTE — PLAN OF CARE
Problem: Patient Care Overview  Goal: Plan of Care Review  Outcome: Ongoing (interventions implemented as appropriate)  Ivf/ iv abx cont. Better po intake. Up to bsc, tolerates activity. abd with wet to dry gauze drsg and abd binder in place. Denies pain. Cont to monitor.    09/22/18 0136   Coping/Psychosocial   Plan of Care Reviewed With patient   Plan of Care Review   Progress improving     Goal: Individualization and Mutuality  Outcome: Ongoing (interventions implemented as appropriate)    Goal: Discharge Needs Assessment  Outcome: Ongoing (interventions implemented as appropriate)      Problem: Infection, Risk/Actual (Adult)  Goal: Infection Prevention/Resolution  Outcome: Ongoing (interventions implemented as appropriate)      Problem: Bowel Resection (Adult)  Goal: Signs and Symptoms of Listed Potential Problems Will be Absent, Minimized or Managed (Bowel Resection)  Outcome: Ongoing (interventions implemented as appropriate)      Problem: Nutrition, Parenteral (Adult)  Goal: Signs and Symptoms of Listed Potential Problems Will be Absent, Minimized or Managed (Nutrition, Parenteral)  Outcome: Outcome(s) achieved Date Met: 09/22/18      Problem: Nutrition, Imbalanced: Inadequate Oral Intake (Adult)  Goal: Improved Oral Intake  Outcome: Ongoing (interventions implemented as appropriate)

## 2018-09-23 LAB
ANION GAP SERPL CALCULATED.3IONS-SCNC: 5 MMOL/L (ref 4–13)
BUN BLD-MCNC: 13 MG/DL (ref 5–21)
BUN/CREAT SERPL: 16.3 (ref 7–25)
CALCIUM SPEC-SCNC: 8.6 MG/DL (ref 8.4–10.4)
CHLORIDE SERPL-SCNC: 110 MMOL/L (ref 98–110)
CO2 SERPL-SCNC: 27 MMOL/L (ref 24–31)
CREAT BLD-MCNC: 0.8 MG/DL (ref 0.5–1.4)
DEPRECATED RDW RBC AUTO: 51.9 FL (ref 40–54)
ERYTHROCYTE [DISTWIDTH] IN BLOOD BY AUTOMATED COUNT: 16.9 % (ref 12–15)
GFR SERPL CREATININE-BSD FRML MDRD: 70 ML/MIN/1.73
GLUCOSE BLD-MCNC: 102 MG/DL (ref 70–100)
GLUCOSE BLDC GLUCOMTR-MCNC: 134 MG/DL (ref 70–130)
GLUCOSE BLDC GLUCOMTR-MCNC: 99 MG/DL (ref 70–130)
HCT VFR BLD AUTO: 30 % (ref 37–47)
HGB BLD-MCNC: 9.5 G/DL (ref 12–16)
MCH RBC QN AUTO: 27.3 PG (ref 28–32)
MCHC RBC AUTO-ENTMCNC: 31.7 G/DL (ref 33–36)
MCV RBC AUTO: 86.2 FL (ref 82–98)
PLATELET # BLD AUTO: 172 10*3/MM3 (ref 130–400)
PMV BLD AUTO: 9.8 FL (ref 6–12)
POTASSIUM BLD-SCNC: 3.7 MMOL/L (ref 3.5–5.3)
RBC # BLD AUTO: 3.48 10*6/MM3 (ref 4.2–5.4)
SODIUM BLD-SCNC: 142 MMOL/L (ref 135–145)
WBC NRBC COR # BLD: 4.32 10*3/MM3 (ref 4.8–10.8)

## 2018-09-23 PROCEDURE — 82962 GLUCOSE BLOOD TEST: CPT

## 2018-09-23 PROCEDURE — 80048 BASIC METABOLIC PNL TOTAL CA: CPT | Performed by: SPECIALIST

## 2018-09-23 PROCEDURE — 85027 COMPLETE CBC AUTOMATED: CPT | Performed by: SPECIALIST

## 2018-09-23 PROCEDURE — 25010000002 ENOXAPARIN PER 10 MG: Performed by: SPECIALIST

## 2018-09-23 PROCEDURE — 25810000003 SODIUM CHLORIDE 0.9 % WITH KCL 20 MEQ 20-0.9 MEQ/L-% SOLUTION: Performed by: SPECIALIST

## 2018-09-23 RX ADMIN — PANTOPRAZOLE SODIUM 40 MG: 40 TABLET, DELAYED RELEASE ORAL at 05:51

## 2018-09-23 RX ADMIN — CARVEDILOL 12.5 MG: 6.25 TABLET, FILM COATED ORAL at 17:10

## 2018-09-23 RX ADMIN — CARVEDILOL 12.5 MG: 6.25 TABLET, FILM COATED ORAL at 08:51

## 2018-09-23 RX ADMIN — METRONIDAZOLE 500 MG: 500 INJECTION, SOLUTION INTRAVENOUS at 12:06

## 2018-09-23 RX ADMIN — METRONIDAZOLE 500 MG: 500 INJECTION, SOLUTION INTRAVENOUS at 05:51

## 2018-09-23 RX ADMIN — ENOXAPARIN SODIUM 40 MG: 40 INJECTION SUBCUTANEOUS at 08:51

## 2018-09-23 RX ADMIN — POTASSIUM CHLORIDE AND SODIUM CHLORIDE 75 ML/HR: 900; 150 INJECTION, SOLUTION INTRAVENOUS at 11:13

## 2018-09-23 RX ADMIN — METRONIDAZOLE 500 MG: 500 INJECTION, SOLUTION INTRAVENOUS at 20:24

## 2018-09-23 RX ADMIN — AMLODIPINE BESYLATE: 10 TABLET ORAL at 08:51

## 2018-09-23 RX ADMIN — FUROSEMIDE 20 MG: 20 TABLET ORAL at 08:51

## 2018-09-23 NOTE — PLAN OF CARE
Problem: Patient Care Overview  Goal: Plan of Care Review  Outcome: Ongoing (interventions implemented as appropriate)   09/23/18 1522   Coping/Psychosocial   Plan of Care Reviewed With patient;spouse   Plan of Care Review   Progress improving   OTHER   Outcome Summary No c/o pain today. Up in chair for most of day; ambulated in halls. Voiding; small BM today. ML d/i with staples; drsg changed to 2 small areas, wet to dry BID. IVF. IV abx. VSS. Cont to monitor.      Goal: Individualization and Mutuality  Outcome: Ongoing (interventions implemented as appropriate)    Goal: Discharge Needs Assessment  Outcome: Ongoing (interventions implemented as appropriate)      Problem: Infection, Risk/Actual (Adult)  Goal: Infection Prevention/Resolution  Outcome: Ongoing (interventions implemented as appropriate)      Problem: Bowel Resection (Adult)  Goal: Signs and Symptoms of Listed Potential Problems Will be Absent, Minimized or Managed (Bowel Resection)  Outcome: Ongoing (interventions implemented as appropriate)      Problem: Nutrition, Imbalanced: Inadequate Oral Intake (Adult)  Goal: Improved Oral Intake  Outcome: Ongoing (interventions implemented as appropriate)    Goal: Prevent Further Weight Loss  Outcome: Ongoing (interventions implemented as appropriate)

## 2018-09-23 NOTE — PLAN OF CARE
Problem: Patient Care Overview  Goal: Plan of Care Review  Outcome: Ongoing (interventions implemented as appropriate)   09/23/18 0214   Coping/Psychosocial   Plan of Care Reviewed With patient   Plan of Care Review   Progress improving   OTHER   Outcome Summary patient denies pain, frequent stools, W-D dressing changed, binder in place, will cont to monitor.        Problem: Infection, Risk/Actual (Adult)  Goal: Infection Prevention/Resolution  Outcome: Ongoing (interventions implemented as appropriate)   09/23/18 0214   Infection, Risk/Actual (Adult)   Infection Prevention/Resolution making progress toward outcome       Problem: Bowel Resection (Adult)  Goal: Signs and Symptoms of Listed Potential Problems Will be Absent, Minimized or Managed (Bowel Resection)  Outcome: Ongoing (interventions implemented as appropriate)   09/22/18 0136   Goal/Outcome Evaluation   Problems Assessed (Bowel Resection) all   Problems Present (Bowel Resection) none       Problem: Nutrition, Imbalanced: Inadequate Oral Intake (Adult)  Goal: Improved Oral Intake  Outcome: Ongoing (interventions implemented as appropriate)   09/23/18 0214   Nutrition, Imbalanced: Inadequate Oral Intake (Adult)   Improved Oral Intake making progress toward outcome     Goal: Prevent Further Weight Loss  Outcome: Ongoing (interventions implemented as appropriate)   09/23/18 0214   Nutrition, Imbalanced: Inadequate Oral Intake (Adult)   Prevent Further Weight Loss making progress toward outcome       Problem: Fall Risk (Adult)  Goal: Identify Related Risk Factors and Signs and Symptoms  Outcome: Ongoing (interventions implemented as appropriate)   09/23/18 0214   Fall Risk (Adult)   Related Risk Factors (Fall Risk) environment unfamiliar   Signs and Symptoms (Fall Risk) presence of risk factors     Goal: Absence of Fall  Outcome: Ongoing (interventions implemented as appropriate)   09/23/18 0214   Fall Risk (Adult)   Absence of Fall making progress toward  outcome

## 2018-09-23 NOTE — PROGRESS NOTES
Lupe Mcghee MD Progress Note     LOS: 12 days   Patient Care Team:  Juan Echavarria MD as PCP - General  Juan Echavarria MD as PCP - Family Medicine  [facility], Cole Campos MD as Consulting Physician (Otolaryngology)  Tarun Morataya MD as Consulting Physician (Gastroenterology)        Subjective     Doing well.  Bowels moving slowly.  Tolerating regular diet    Objective     Vital Signs  Temp:  [97.6 °F (36.4 °C)-98.6 °F (37 °C)] 97.6 °F (36.4 °C)  Heart Rate:  [85-91] 91  Resp:  [16-18] 16  BP: (133-143)/(54-78) 143/78    Intake & Output (last 3 days)       09/20 0701 - 09/21 0700 09/21 0701 - 09/22 0700 09/22 0701 - 09/23 0700 09/23 0701 - 09/24 0700    P.O. 320 600 480     I.V. (mL/kg)  1183 (17.5) 1113 (16.5) 771 (11.4)    IV Piggyback   200     Total Intake(mL/kg) 320 (4.7) 1783 (26.4) 1793 (26.6) 771 (11.4)    Urine (mL/kg/hr)  600 (0.4) 400 (0.2)     Total Output   600 400      Net +320 +1183 +1393 +771            Unmeasured Urine Occurrence 5 x 5 x 1 x 2 x    Unmeasured Stool Occurrence 2 x 4 x 1 x 2 x          Physical Exam:     General Appearance:    Alert, cooperative, in no acute distress   Lungs:     respirations regular, even and unlabored    Heart:    Regular rhythm and normal rate, normal S1 and S2, no            murmur, no gallop, no rub   Chest Wall:    No abnormalities observed   Abdomen:      Wound okay dressing changes twice a day    Extremities: No edema, + SCDs   Results Review:     I reviewed the patient's new clinical results.    Lab Results (last 72 hours)     Procedure Component Value Units Date/Time    Basic Metabolic Panel [[id number]]  (Abnormal) Collected:  09/23/18 0423    Specimen:  Blood Updated:  09/23/18 0539     Glucose 102 (H) mg/dL      BUN 13 mg/dL      Creatinine 0.80 mg/dL      Sodium 142 mmol/L      Potassium 3.7 mmol/L      Chloride 110 mmol/L      CO2 27.0 mmol/L      Calcium 8.6 mg/dL      eGFR Non African Amer 70 mL/min/1.73      BUN/Creatinine Ratio 16.3     Anion  Gap 5.0 mmol/L     Narrative:       The MDRD GFR formula is only valid for adults with stable renal function between ages 18 and 70.    CBC (No Diff) [[id number]]  (Abnormal) Collected:  09/23/18 0423    Specimen:  Blood Updated:  09/23/18 0527     WBC 4.32 (L) 10*3/mm3      RBC 3.48 (L) 10*6/mm3      Hemoglobin 9.5 (L) g/dL      Hematocrit 30.0 (L) %      MCV 86.2 fL      MCH 27.3 (L) pg      MCHC 31.7 (L) g/dL      RDW 16.9 (H) %      RDW-SD 51.9 fl      MPV 9.8 fL      Platelets 172 10*3/mm3     POC Glucose Once [[id number]]  (Normal) Collected:  09/23/18 0127    Specimen:  Blood Updated:  09/23/18 0139     Glucose 99 mg/dL      Comment: : [id number] Meneses LaDonnaMeter ID: [id number]       Basic Metabolic Panel [[id number]]  (Normal) Collected:  09/22/18 0536    Specimen:  Blood Updated:  09/22/18 0625     Glucose 95 mg/dL      BUN 18 mg/dL      Creatinine 0.86 mg/dL      Sodium 142 mmol/L      Potassium 3.7 mmol/L      Chloride 108 mmol/L      CO2 29.0 mmol/L      Calcium 8.8 mg/dL      eGFR Non African Amer 64 mL/min/1.73      BUN/Creatinine Ratio 20.9     Anion Gap 5.0 mmol/L     Narrative:       The MDRD GFR formula is only valid for adults with stable renal function between ages 18 and 70.    Magnesium [[id number]]  (Normal) Collected:  09/22/18 0536    Specimen:  Blood Updated:  09/22/18 0625     Magnesium 1.7 mg/dL     CBC (No Diff) [[id number]]  (Abnormal) Collected:  09/22/18 0536    Specimen:  Blood Updated:  09/22/18 0558     WBC 5.05 10*3/mm3      RBC 3.58 (L) 10*6/mm3      Hemoglobin 10.0 (L) g/dL      Hematocrit 30.6 (L) %      MCV 85.5 fL      MCH 27.9 (L) pg      MCHC 32.7 (L) g/dL      RDW 16.9 (H) %      RDW-SD 51.7 fl      MPV 9.7 fL      Platelets 140 10*3/mm3     POC Glucose Once [[id number]]  (Abnormal) Collected:  09/21/18 1324    Specimen:  Blood Updated:  09/21/18 1345     Glucose 246 (H) mg/dL      Comment: : [id number] Aodnay TrimbleicaMeter ID: [id number]       Basic Metabolic Panel  [[id number]]  (Abnormal) Collected:  09/21/18 0458    Specimen:  Blood Updated:  09/21/18 0613     Glucose 156 (H) mg/dL      BUN 19 mg/dL      Creatinine 0.82 mg/dL      Sodium 141 mmol/L      Potassium 3.3 (L) mmol/L      Chloride 106 mmol/L      CO2 28.0 mmol/L      Calcium 8.7 mg/dL      eGFR Non African Amer 68 mL/min/1.73      BUN/Creatinine Ratio 23.2     Anion Gap 7.0 mmol/L     Narrative:       The MDRD GFR formula is only valid for adults with stable renal function between ages 18 and 70.    Magnesium [[id number]]  (Normal) Collected:  09/21/18 0458    Specimen:  Blood Updated:  09/21/18 0613     Magnesium 1.8 mg/dL     CBC (No Diff) [[id number]]  (Abnormal) Collected:  09/21/18 0458    Specimen:  Blood Updated:  09/21/18 0604     WBC 5.74 10*3/mm3      RBC 3.57 (L) 10*6/mm3      Hemoglobin 9.7 (L) g/dL      Hematocrit 30.4 (L) %      MCV 85.2 fL      MCH 27.2 (L) pg      MCHC 31.9 (L) g/dL      RDW 16.6 (H) %      RDW-SD 51.2 fl      MPV 9.7 fL      Platelets 145 10*3/mm3         Imaging Results (last 72 hours)     ** No results found for the last 72 hours. **              Assessment/Plan     Active Problems:    Colovaginal fistula      Continue current treatment      Lupe Mcghee MD  09/23/18  9:58 AM

## 2018-09-24 VITALS
TEMPERATURE: 98.2 F | SYSTOLIC BLOOD PRESSURE: 146 MMHG | DIASTOLIC BLOOD PRESSURE: 68 MMHG | RESPIRATION RATE: 18 BRPM | OXYGEN SATURATION: 96 % | BODY MASS INDEX: 30 KG/M2 | HEART RATE: 91 BPM | WEIGHT: 148.8 LBS | HEIGHT: 59 IN

## 2018-09-24 LAB
ANION GAP SERPL CALCULATED.3IONS-SCNC: 6 MMOL/L (ref 4–13)
BUN BLD-MCNC: 9 MG/DL (ref 5–21)
BUN/CREAT SERPL: 10.5 (ref 7–25)
CALCIUM SPEC-SCNC: 8.6 MG/DL (ref 8.4–10.4)
CHLORIDE SERPL-SCNC: 110 MMOL/L (ref 98–110)
CO2 SERPL-SCNC: 26 MMOL/L (ref 24–31)
CREAT BLD-MCNC: 0.86 MG/DL (ref 0.5–1.4)
CRP SERPL-MCNC: 1.81 MG/DL (ref 0–0.99)
DEPRECATED RDW RBC AUTO: 51.5 FL (ref 40–54)
ERYTHROCYTE [DISTWIDTH] IN BLOOD BY AUTOMATED COUNT: 16.6 % (ref 12–15)
GFR SERPL CREATININE-BSD FRML MDRD: 64 ML/MIN/1.73
GLUCOSE BLD-MCNC: 102 MG/DL (ref 70–100)
HCT VFR BLD AUTO: 30.7 % (ref 37–47)
HGB BLD-MCNC: 9.8 G/DL (ref 12–16)
MCH RBC QN AUTO: 27.5 PG (ref 28–32)
MCHC RBC AUTO-ENTMCNC: 31.9 G/DL (ref 33–36)
MCV RBC AUTO: 86 FL (ref 82–98)
PLATELET # BLD AUTO: 173 10*3/MM3 (ref 130–400)
PMV BLD AUTO: 9.2 FL (ref 6–12)
POTASSIUM BLD-SCNC: 3.6 MMOL/L (ref 3.5–5.3)
PREALB SERPL-MCNC: 17.4 MG/DL (ref 18–36)
RBC # BLD AUTO: 3.57 10*6/MM3 (ref 4.2–5.4)
SODIUM BLD-SCNC: 142 MMOL/L (ref 135–145)
TRIGL SERPL-MCNC: 149 MG/DL (ref 0–149)
WBC NRBC COR # BLD: 4.31 10*3/MM3 (ref 4.8–10.8)

## 2018-09-24 PROCEDURE — 86140 C-REACTIVE PROTEIN: CPT | Performed by: SPECIALIST

## 2018-09-24 PROCEDURE — 84478 ASSAY OF TRIGLYCERIDES: CPT | Performed by: SPECIALIST

## 2018-09-24 PROCEDURE — 25010000002 ENOXAPARIN PER 10 MG: Performed by: SPECIALIST

## 2018-09-24 PROCEDURE — 85027 COMPLETE CBC AUTOMATED: CPT | Performed by: SPECIALIST

## 2018-09-24 PROCEDURE — 84134 ASSAY OF PREALBUMIN: CPT | Performed by: SPECIALIST

## 2018-09-24 PROCEDURE — 80048 BASIC METABOLIC PNL TOTAL CA: CPT | Performed by: SPECIALIST

## 2018-09-24 PROCEDURE — 25810000003 SODIUM CHLORIDE 0.9 % WITH KCL 20 MEQ 20-0.9 MEQ/L-% SOLUTION: Performed by: SPECIALIST

## 2018-09-24 RX ORDER — METRONIDAZOLE 500 MG/1
500 TABLET ORAL 3 TIMES DAILY
Qty: 15 TABLET | Refills: 0 | Status: SHIPPED | OUTPATIENT
Start: 2018-09-24 | End: 2018-10-01

## 2018-09-24 RX ORDER — HYDROCODONE BITARTRATE AND ACETAMINOPHEN 10; 325 MG/1; MG/1
0.5 TABLET ORAL DAILY
Qty: 30 TABLET | Refills: 0 | Status: SHIPPED | OUTPATIENT
Start: 2018-09-24 | End: 2021-08-03

## 2018-09-24 RX ADMIN — AMLODIPINE BESYLATE: 10 TABLET ORAL at 08:35

## 2018-09-24 RX ADMIN — PANTOPRAZOLE SODIUM 40 MG: 40 TABLET, DELAYED RELEASE ORAL at 06:04

## 2018-09-24 RX ADMIN — METRONIDAZOLE 500 MG: 500 INJECTION, SOLUTION INTRAVENOUS at 06:04

## 2018-09-24 RX ADMIN — POTASSIUM CHLORIDE AND SODIUM CHLORIDE 75 ML/HR: 900; 150 INJECTION, SOLUTION INTRAVENOUS at 01:24

## 2018-09-24 RX ADMIN — ENOXAPARIN SODIUM 40 MG: 40 INJECTION SUBCUTANEOUS at 08:28

## 2018-09-24 RX ADMIN — FUROSEMIDE 20 MG: 20 TABLET ORAL at 08:29

## 2018-09-24 RX ADMIN — CARVEDILOL 12.5 MG: 6.25 TABLET, FILM COATED ORAL at 08:29

## 2018-09-24 NOTE — PAYOR COMM NOTE
"Toby Vaughn (73 y.o. Female) [id number]    D/C  Notification   [facility] phone   Fax        Date of Birth Social Security Number Address Home Phone MRN    1945  [id number] [address] [phone] [id number]    Druze Marital Status          Other        Admission Date Admission Type Admitting Provider Attending Provider Department, Room/Bed    9/11/18 Elective Princess Demarco MD  [facility] 3C, 361/1    Discharge Date Discharge Disposition Discharge Destination        9/24/2018 Home or Self Care              Attending Provider:  (none)   Allergies:  Aspirin    Isolation:  Spore   Infection:  C.difficile (09/12/18)   Code Status:  CPR    Ht:  149.9 cm (59\")   Wt:  67.5 kg (148 lb 12.8 oz)    Admission Cmt:  None   Principal Problem:  None                Active Insurance as of 9/11/2018     Primary Coverage     Payor Plan Insurance Group Employer/Plan Group    HUMANA MEDICARE REPLACEMENT HUMANA MEDICARE REPL [id number]     Payor Plan Address Payor Plan Phone Number Effective From Effective To    [address] [address] [phone] 1/1/2013     [facility] [address]       Subscriber Name Subscriber Birth Date Member ID       TOBY VAUGHN 1945 [id number]                 Emergency Contacts      (Rel.) Home Phone Work Phone Mobile Phone    Raymon Vaughn (Spouse) [phone] -- [phone]               Physician Progress Notes (last 24 hours) (Notes from 9/23/2018 11:31 AM through 9/24/2018 11:31 AM)      Princess Demarco MD at 9/24/2018  7:45 AM          Princess GAFFNEY. MD Dav FACS  Progress Note     LOS: 13 days   Patient Care Team:  Juan Echavarria MD as PCP - General  Juan Echavarria MD as PCP - Family Medicine  [facility] as Consulting Physician (Otolaryngology)  Tarun Morataya MD as Consulting Physician (Gastroenterology)      Subjective     Interval History:      naomie po.  " Feeling stronger     Objective     Vital Signs  Temp:  [97.6 °F (36.4 °C)-98.2 °F (36.8 °C)] 98.2 °F (36.8 °C)  Heart Rate:  [82-90] 90  Resp:  [16-18] 18  BP: (123-146)/(61-72) 146/64    Physical Exam:  General appearance - alert, well appearing, and in no distress  Abdomen - soft, clean appropriately tender      Results Review:    Lab Results (last 24 hours)     Procedure Component Value Units Date/Time    Prealbumin [[id number]]  (Abnormal) Collected:  09/24/18 0435    Specimen:  Blood Updated:  09/24/18 0521     Prealbumin 17.4 (L) mg/dL     Basic Metabolic Panel [[id number]]  (Abnormal) Collected:  09/24/18 0435    Specimen:  Blood Updated:  09/24/18 0513     Glucose 102 (H) mg/dL      BUN 9 mg/dL      Creatinine 0.86 mg/dL      Sodium 142 mmol/L      Potassium 3.6 mmol/L      Chloride 110 mmol/L      CO2 26.0 mmol/L      Calcium 8.6 mg/dL      eGFR Non African Amer 64 mL/min/1.73      BUN/Creatinine Ratio 10.5     Anion Gap 6.0 mmol/L     Narrative:       The MDRD GFR formula is only valid for adults with stable renal function between ages 18 and 70.    C-reactive Protein [[id number]]  (Abnormal) Collected:  09/24/18 0435    Specimen:  Blood Updated:  09/24/18 0513     C-Reactive Protein 1.81 (H) mg/dL     Triglycerides [[id number]]  (Normal) Collected:  09/24/18 0435    Specimen:  Blood Updated:  09/24/18 0513     Triglycerides 149 mg/dL     CBC (No Diff) [[id number]]  (Abnormal) Collected:  09/24/18 0435    Specimen:  Blood Updated:  09/24/18 0456     WBC 4.31 (L) 10*3/mm3      RBC 3.57 (L) 10*6/mm3      Hemoglobin 9.8 (L) g/dL      Hematocrit 30.7 (L) %      MCV 86.0 fL      MCH 27.5 (L) pg      MCHC 31.9 (L) g/dL      RDW 16.6 (H) %      RDW-SD 51.5 fl      MPV 9.2 fL      Platelets 173 10*3/mm3     POC Glucose Once [[id number]]  (Abnormal) Collected:  09/23/18 1405    Specimen:  Blood Updated:  09/23/18 1417     Glucose 134 (H) mg/dL      Comment: : [id number] Rambo AnnMeter ID: [id number]           Imaging  Results (last 24 hours)     ** No results found for the last 24 hours. **            Assessment/Plan       home      Tamir GAFFNEY. MD Dav  09/24/18  7:45 AM        Electronically signed by Tamir Demarco MD at 9/24/2018  7:45 AM       Discharge Order     Start     Ordered    09/24/18 0748  Discharge patient  Once     Expected Discharge Date:  09/24/18    Discharge Disposition:  Home or Self Care    Physician of Record for Attribution - Please select from Treatment Team:  TAMIR DEMARCO [[id number]]    Review needed by CMO to determine Physician of Record:  No       Question Answer Comment   Physician of Record for Attribution - Please select from Treatment Team TAMIR DEMARCO    Review needed by CMO to determine Physician of Record No        09/24/18 0748

## 2018-09-24 NOTE — PROGRESS NOTES
Princess Demarco MD FACS  Progress Note     LOS: 13 days   Patient Care Team:  Juan Echavarria MD as PCP - General  Juan Echavarria MD as PCP - Family Medicine  [facility], Cole Campos MD as Consulting Physician (Otolaryngology)  Tarun Morataya MD as Consulting Physician (Gastroenterology)      Subjective     Interval History:      naomie po.  Feeling stronger     Objective     Vital Signs  Temp:  [97.6 °F (36.4 °C)-98.2 °F (36.8 °C)] 98.2 °F (36.8 °C)  Heart Rate:  [82-90] 90  Resp:  [16-18] 18  BP: (123-146)/(61-72) 146/64    Physical Exam:  General appearance - alert, well appearing, and in no distress  Abdomen - soft, clean appropriately tender      Results Review:    Lab Results (last 24 hours)     Procedure Component Value Units Date/Time    Prealbumin [[id number]]  (Abnormal) Collected:  09/24/18 0435    Specimen:  Blood Updated:  09/24/18 0521     Prealbumin 17.4 (L) mg/dL     Basic Metabolic Panel [[id number]]  (Abnormal) Collected:  09/24/18 0435    Specimen:  Blood Updated:  09/24/18 0513     Glucose 102 (H) mg/dL      BUN 9 mg/dL      Creatinine 0.86 mg/dL      Sodium 142 mmol/L      Potassium 3.6 mmol/L      Chloride 110 mmol/L      CO2 26.0 mmol/L      Calcium 8.6 mg/dL      eGFR Non African Amer 64 mL/min/1.73      BUN/Creatinine Ratio 10.5     Anion Gap 6.0 mmol/L     Narrative:       The MDRD GFR formula is only valid for adults with stable renal function between ages 18 and 70.    C-reactive Protein [[id number]]  (Abnormal) Collected:  09/24/18 0435    Specimen:  Blood Updated:  09/24/18 0513     C-Reactive Protein 1.81 (H) mg/dL     Triglycerides [[id number]]  (Normal) Collected:  09/24/18 0435    Specimen:  Blood Updated:  09/24/18 0513     Triglycerides 149 mg/dL     CBC (No Diff) [[id number]]  (Abnormal) Collected:  09/24/18 0435    Specimen:  Blood Updated:  09/24/18 0456     WBC 4.31 (L) 10*3/mm3      RBC 3.57 (L) 10*6/mm3      Hemoglobin 9.8 (L) g/dL      Hematocrit 30.7 (L) %      MCV 86.0 fL       MCH 27.5 (L) pg      MCHC 31.9 (L) g/dL      RDW 16.6 (H) %      RDW-SD 51.5 fl      MPV 9.2 fL      Platelets 173 10*3/mm3     POC Glucose Once [[id number]]  (Abnormal) Collected:  09/23/18 1405    Specimen:  Blood Updated:  09/23/18 1417     Glucose 134 (H) mg/dL      Comment: : [id number] Rambo AnnMeter ID: [id number]           Imaging Results (last 24 hours)     ** No results found for the last 24 hours. **            Assessment/Plan       home      Princess Demarco MD  09/24/18  7:45 AM

## 2018-09-24 NOTE — PROGRESS NOTES
Continued Stay Note  [facility]     Patient Name: Nuha Cali  MRN: [id number]  Today's Date: 9/24/2018    Admit Date: 9/11/2018          Discharge Plan     Row Name 09/24/18 0940       Plan    Final Discharge Disposition Code 01 - home or self-care    Final Note DC HOME WITH NO DC NEEDS              Discharge Codes    No documentation.       Expected Discharge Date and Time     Expected Discharge Date Expected Discharge Time    Sep 24, 2018             ERIN Calvillo

## 2018-09-25 ENCOUNTER — READMISSION MANAGEMENT (OUTPATIENT)
Dept: CALL CENTER | Facility: HOSPITAL | Age: 73
End: 2018-09-25

## 2018-09-25 NOTE — DISCHARGE SUMMARY
Consults     Date and Time Order Name Status Description    9/12/2018 0826 Inpatient Urology Consult Completed       April TIERNEY Demarco MD FACS Discharge Summary    Date of Discharge:  9/25/2018    Discharge Diagnosis: sigmoid diverticulitis with colovaginal fistula    Presenting Problem/History of Present Illness  Colovaginal fistula [N82.4]  Colovaginal fistula [N82.4]     Hospital Course  Patient is a 73 y.o. female presented with sigmoid diverticulitis with colovaginal fistula.  She underwent below procedures.  Diet was advanced as bowel function returned..      Procedures Performed  Procedure(s):  COLON RESECTION LAPAROSCOPIC SIGMOID OR LOW ANTERIOR, 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 Hart  CYSTOSCOPY BILATERAL RETROGRADE PYELOGRAM AND BILATERAL STENT INSERTION       Consults:   Consults     Date and Time Order Name Status Description    9/12/2018 0826 Inpatient Urology Consult Completed           Condition on Discharge:  good    Vital Signs  Heart Rate:  [91] 91  Resp:  [18] 18  BP: (146)/(68) 146/68    Physical Exam:   See History and Physical found in chart.    Discharge Disposition  Home or Self Care    Discharge Medications     Discharge Medications      New Medications      Instructions Start Date   metroNIDAZOLE 500 MG tablet  Commonly known as:  FLAGYL   500 mg, Oral, 3 Times Daily         Continue These Medications      Instructions Start Date   amLODIPine-valsartan  MG per tablet  Commonly known as:  EXFORGE   Daily      carvedilol 12.5 MG tablet  Commonly known as:  COREG   2 Times Daily With Meals      cholecalciferol 1000 units tablet  Commonly known as:  VITAMIN D3   2,000 Units, Oral, Daily      furosemide 20 MG tablet  Commonly known as:  LASIX   Daily      HYDROcodone-acetaminophen  MG per tablet  Commonly known as:  NORCO   0.5 tablets, Oral, Daily      potassium chloride 10 MEQ CR tablet  Commonly known as:   K-DUR   Daily      raNITIdine 150 MG tablet  Commonly known as:  ZANTAC   2 Times Daily         Stop These Medications    leflunomide 20 MG tablet  Commonly known as:  ARAVA            Discharge Diet:     Activity at Discharge:     Follow-up Appointments  Future Appointments  Date Time Provider Department Center   10/19/2018 10:20 AM Dick Hart MD [facility] None         Test Results Pending at Discharge       April TIERNEY Demarco MD  09/25/18  4:59 AM

## 2018-09-25 NOTE — OUTREACH NOTE
Prep Survey      Responses   Facility patient discharged from?  [place]   Is patient eligible?  Yes   Discharge diagnosis  Colovaginal fistula   Does the patient have one of the following disease processes/diagnoses(primary or secondary)?  General Surgery   Does the patient have Home health ordered?  No   Is there a DME ordered?  No   Prep survey completed?  Yes          Abby Meneses RN

## 2018-09-26 ENCOUNTER — READMISSION MANAGEMENT (OUTPATIENT)
Dept: CALL CENTER | Facility: HOSPITAL | Age: 73
End: 2018-09-26

## 2018-09-26 NOTE — OUTREACH NOTE
General Surgery Week 1 Survey      Responses   Facility patient discharged from?  [place]   Does the patient have one of the following disease processes/diagnoses(primary or secondary)?  General Surgery   Is there a successful TCM telephone encounter documented?  No   Week 1 attempt successful?  Yes   Call start time  1633   Call end time  1641   Discharge diagnosis  Colovaginal fistula   Is patient permission given to speak with other caregiver?  No   Meds reviewed with patient/caregiver?  Yes   Is the patient having any side effects they believe may be caused by any medication additions or changes?  No   Does the patient have all medications related to this admission filled (includes all antibiotics, pain medications, etc.)  Yes   Is the patient taking all medications as directed (includes completed medication regime)?  Yes   Does the patient have a follow up appointment scheduled with their surgeon?  Yes   Has the patient kept scheduled appointments due by today?  Yes   Comments  PCP Dr Echavarria 9/28/18 130pm. Post op appt 10/1/18 930 with Dr Demarco, Dr Hart 10/19/18 1020am.    Has home health visited the patient within 72 hours of discharge?  N/A   Psychosocial issues?  No   Did the patient receive a copy of their discharge instructions?  Yes   Nursing interventions  Reviewed instructions with patient   What is the patient's perception of their health status since discharge?  Improving   Nursing interventions  Nurse provided patient education   Is the patient /caregiver able to teach back basic post-op care?  Continue use of incentive spirometry at least 1 week post discharge, Practice 'cough and deep breath', No tub bath, swimming, or hot tub until instructed by MD, Keep incision areas clean,dry and protected, Lifting as instructed by MD in discharge instructions   Is the patient/caregiver able to teach back signs and symptoms of incisional infection?  Increased redness, swelling or pain at the incisonal site,  Increased drainage or bleeding, Incisional warmth, Pus or odor from incision, Fever   Is the patient/caregiver able to teach back steps to recovery at home?  Set small, achievable goals for return to baseline health, Rest and rebuild strength, gradually increase activity, Eat a well-balance diet, Make a list of questions for surgeon's appointment   Is the patient/caregiver able to teach back the hierarchy of who to call/visit for symptoms/problems? PCP, Specialist, Home health nurse, Urgent Care, ED, 911  Yes   Week 1 call completed?  Yes          Dot Broderick RN

## 2018-09-29 ENCOUNTER — TRANSCRIBE ORDERS (OUTPATIENT)
Dept: ADMINISTRATIVE | Facility: HOSPITAL | Age: 73
End: 2018-09-29

## 2018-09-29 ENCOUNTER — APPOINTMENT (OUTPATIENT)
Dept: GENERAL RADIOLOGY | Facility: HOSPITAL | Age: 73
End: 2018-09-29
Attending: INTERNAL MEDICINE

## 2018-09-29 DIAGNOSIS — K57.32 DVTRCLI OF LG INT W/O PERFORATION OR ABSCESS W/O BLEEDING: ICD-10-CM

## 2018-09-29 DIAGNOSIS — N82.9: ICD-10-CM

## 2018-09-29 DIAGNOSIS — R10.84 GENERALIZED ABDOMINAL PAIN: Primary | ICD-10-CM

## 2018-09-29 DIAGNOSIS — R10.2 PELVIC AND PERINEAL PAIN: ICD-10-CM

## 2018-10-03 ENCOUNTER — READMISSION MANAGEMENT (OUTPATIENT)
Dept: CALL CENTER | Facility: HOSPITAL | Age: 73
End: 2018-10-03

## 2018-10-03 NOTE — OUTREACH NOTE
General Surgery Week 2 Survey      Responses   Facility patient discharged from?  [place]   Does the patient have one of the following disease processes/diagnoses(primary or secondary)?  General Surgery   Week 2 attempt successful?  Yes   Call start time  1920   Call end time  1924   Discharge diagnosis  Colovaginal fistula   Meds reviewed with patient/caregiver?  Yes   Is the patient having any side effects they believe may be caused by any medication additions or changes?  No   Does the patient have all medications related to this admission filled (includes all antibiotics, pain medications, etc.)  Yes   Is the patient taking all medications as directed (includes completed medication regime)?  Yes   Does the patient have a follow up appointment scheduled with their surgeon?  Yes   Has the patient kept scheduled appointments due by today?  Yes   Comments  PCP Dr Echavarria 9/28/18 130pm. Post op appt 10/1/18 930 with Dr Demarco, Dr Hart 10/19/18 1020am.    Has home health visited the patient within 72 hours of discharge?  N/A   Psychosocial issues?  No   Did the patient receive a copy of their discharge instructions?  Yes   Nursing interventions  Reviewed instructions with patient   What is the patient's perception of their health status since discharge?  Improving   Nursing interventions  Nurse provided patient education   Is the patient /caregiver able to teach back basic post-op care?  Practice 'cough and deep breath', Drive as instructed by MD in discharge instructions, Lifting as instructed by MD in discharge instructions   Is the patient/caregiver able to teach back signs and symptoms of incisional infection?  Increased redness, swelling or pain at the incisonal site, Incisional warmth, Increased drainage or bleeding, Pus or odor from incision, Fever   Is the patient/caregiver able to teach back steps to recovery at home?  Set small, achievable goals for return to baseline health, Eat a well-balance diet, Rest  and rebuild strength, gradually increase activity   Is the patient/caregiver able to teach back the hierarchy of who to call/visit for symptoms/problems? PCP, Specialist, Home health nurse, Urgent Care, ED, 911  Yes   Additional teach back comments  SHe had a fall yesterday, encouraged BP checks with future dizziness. EncouragedLifeline/LifeAlert as she was alone at time of fall.   Week 2 call completed?  Yes          Emily Gilmore RN

## 2018-10-11 ENCOUNTER — READMISSION MANAGEMENT (OUTPATIENT)
Dept: CALL CENTER | Facility: HOSPITAL | Age: 73
End: 2018-10-11

## 2018-10-11 NOTE — OUTREACH NOTE
General Surgery Week 3 Survey      Responses   Facility patient discharged from?  [place]   Does the patient have one of the following disease processes/diagnoses(primary or secondary)?  General Surgery   Week 3 attempt successful?  Yes   Call start time  1139   Call end time  1148   Meds reviewed with patient/caregiver?  Yes   Is the patient having any side effects they believe may be caused by any medication additions or changes?  No   Does the patient have all medications related to this admission filled (includes all antibiotics, pain medications, etc.)  Yes   Prescription comments  -- [pt states her lasix is not dissolving.  She even put it in water over night and it did not dissolve.  Suggested she fup with pharmacy.  She has call into kidney dr.]   Is the patient taking all medications as directed (includes completed medication regime)?  Yes   Does the patient have a follow up appointment scheduled with their surgeon?  Yes   Has the patient kept scheduled appointments due by today?  Yes   Psychosocial issues?  No   What is the patient's perception of their health status since discharge?  Improving   Nursing interventions  Nurse provided patient education   Week 3 call completed?  Yes          Jaclyn Edwards RN

## 2018-10-18 ENCOUNTER — READMISSION MANAGEMENT (OUTPATIENT)
Dept: CALL CENTER | Facility: HOSPITAL | Age: 73
End: 2018-10-18

## 2018-10-18 NOTE — OUTREACH NOTE
General Surgery Week 4 Survey      Responses   Facility patient discharged from?  [facility]   Does the patient have one of the following disease processes/diagnoses(primary or secondary)?  General Surgery   Week 4 attempt successful?  Yes   Call start time  1603   Call end time  1605   Is the patient taking all medications as directed (includes completed medication regime)?  Yes   Has the patient kept scheduled appointments due by today?  Yes   Comments  Dr. Hart to get Stents out 10/19   Is the patient still receiving Home Health Services?  N/A   What is the patient's perception of their health status since discharge?  Improving   Additional teach back comments  Dizziness gone   Week 4 call completed?  Yes   Would the patient like one additional call?  No   Graduated  Yes   Did the patient feel the follow up calls were helpful during their recovery period?  Yes   Was the number of calls appropriate?  Yes          Jaclyn Malik RN

## 2018-10-19 ENCOUNTER — PROCEDURE VISIT (OUTPATIENT)
Dept: UROLOGY | Facility: CLINIC | Age: 73
End: 2018-10-19

## 2018-10-19 DIAGNOSIS — N82.4 COLOVAGINAL FISTULA: Primary | ICD-10-CM

## 2018-10-19 PROCEDURE — 52310 CYSTOSCOPY AND TREATMENT: CPT | Performed by: UROLOGY

## 2018-10-19 NOTE — PROGRESS NOTES
CC: I am here for the doctor to look at my bladder and get this stent out.     Cystoscopy with stent removal    Indications: Status post colovesical fistula repair    Prep: Hibiclens solution    Instrumentation:Flexible cystoscope and Alligator grasping forceps    Procedure: After lidocaine jelly is instilled into the urethra for 10 minutes, the well-lubricated cystoscope was introduced through the urethra into the bladder.  The stent is seen protruding from the bilateral ureteral orifices.  The alligator forceps or used to grasp the stent stents on him second insertions and pull it out the urethra.  The patient tolerated the procedure well.    Diagnoses and all orders for this visit:    Colovaginal fistula  -     POC Urinalysis Dipstick, Multipro        Follow up:    Routine follow up

## 2019-03-06 ENCOUNTER — TRANSCRIBE ORDERS (OUTPATIENT)
Dept: ADMINISTRATIVE | Facility: HOSPITAL | Age: 74
End: 2019-03-06

## 2019-03-06 DIAGNOSIS — R10.9 ABDOMINAL PAIN, UNSPECIFIED ABDOMINAL LOCATION: Primary | ICD-10-CM

## 2019-03-08 ENCOUNTER — HOSPITAL ENCOUNTER (OUTPATIENT)
Dept: CT IMAGING | Facility: HOSPITAL | Age: 74
Discharge: HOME | End: 2019-03-08
Admitting: SPECIALIST

## 2019-03-08 DIAGNOSIS — R10.9 ABDOMINAL PAIN, UNSPECIFIED ABDOMINAL LOCATION: ICD-10-CM

## 2019-03-08 LAB — CREAT BLDA-MCNC: 1.3 MG/DL (ref 0.6–1.3)

## 2019-03-08 PROCEDURE — 82565 ASSAY OF CREATININE: CPT

## 2019-03-08 PROCEDURE — 74177 CT ABD & PELVIS W/CONTRAST: CPT

## 2019-03-08 PROCEDURE — 0 IOPAMIDOL PER 1 ML: Performed by: SPECIALIST

## 2019-03-08 RX ADMIN — IOPAMIDOL 100 ML: 755 INJECTION, SOLUTION INTRAVENOUS at 11:11

## 2019-03-28 ENCOUNTER — TRANSCRIBE ORDERS (OUTPATIENT)
Dept: ADMINISTRATIVE | Facility: HOSPITAL | Age: 74
End: 2019-03-28

## 2019-03-28 DIAGNOSIS — R91.1 LUNG NODULE: Primary | ICD-10-CM

## 2019-04-01 ENCOUNTER — HOSPITAL ENCOUNTER (OUTPATIENT)
Dept: GENERAL RADIOLOGY | Facility: HOSPITAL | Age: 74
Discharge: HOME | End: 2019-04-01

## 2019-04-01 ENCOUNTER — HOSPITAL ENCOUNTER (OUTPATIENT)
Dept: CT IMAGING | Facility: HOSPITAL | Age: 74
Discharge: HOME | End: 2019-04-01
Admitting: INTERNAL MEDICINE

## 2019-04-01 VITALS
OXYGEN SATURATION: 96 % | HEART RATE: 86 BPM | TEMPERATURE: 97.4 F | HEIGHT: 59 IN | DIASTOLIC BLOOD PRESSURE: 64 MMHG | WEIGHT: 141.4 LBS | SYSTOLIC BLOOD PRESSURE: 151 MMHG | BODY MASS INDEX: 28.51 KG/M2 | RESPIRATION RATE: 21 BRPM

## 2019-04-01 DIAGNOSIS — R91.1 LUNG NODULE: ICD-10-CM

## 2019-04-01 LAB
APTT PPP: 28.1 SECONDS (ref 24.1–34.8)
INR PPP: 0.91 (ref 0.91–1.09)
PLATELET # BLD AUTO: 197 10*3/MM3 (ref 130–400)
PROTHROMBIN TIME: 12.5 SECONDS (ref 11.9–14.6)

## 2019-04-01 PROCEDURE — 88173 CYTOPATH EVAL FNA REPORT: CPT | Performed by: INTERNAL MEDICINE

## 2019-04-01 PROCEDURE — 77012 CT SCAN FOR NEEDLE BIOPSY: CPT

## 2019-04-01 PROCEDURE — 88172 CYTP DX EVAL FNA 1ST EA SITE: CPT | Performed by: INTERNAL MEDICINE

## 2019-04-01 PROCEDURE — 88177 CYTP FNA EVAL EA ADDL: CPT | Performed by: INTERNAL MEDICINE

## 2019-04-01 PROCEDURE — 85049 AUTOMATED PLATELET COUNT: CPT | Performed by: RADIOLOGY

## 2019-04-01 PROCEDURE — 85610 PROTHROMBIN TIME: CPT | Performed by: RADIOLOGY

## 2019-04-01 PROCEDURE — 88334 PATH CONSLTJ SURG CYTO XM EA: CPT | Performed by: INTERNAL MEDICINE

## 2019-04-01 PROCEDURE — 71045 X-RAY EXAM CHEST 1 VIEW: CPT

## 2019-04-01 PROCEDURE — 88305 TISSUE EXAM BY PATHOLOGIST: CPT | Performed by: INTERNAL MEDICINE

## 2019-04-01 PROCEDURE — 85730 THROMBOPLASTIN TIME PARTIAL: CPT | Performed by: RADIOLOGY

## 2019-04-01 RX ORDER — PRAVASTATIN SODIUM 20 MG
40 TABLET ORAL DAILY
COMMUNITY

## 2019-04-01 RX ORDER — SODIUM CHLORIDE 0.9 % (FLUSH) 0.9 %
3-10 SYRINGE (ML) INJECTION AS NEEDED
Status: DISCONTINUED | OUTPATIENT
Start: 2019-04-01 | End: 2019-04-02 | Stop reason: HOSPADM

## 2019-04-01 RX ORDER — SODIUM CHLORIDE 0.9 % (FLUSH) 0.9 %
3 SYRINGE (ML) INJECTION EVERY 12 HOURS SCHEDULED
Status: DISCONTINUED | OUTPATIENT
Start: 2019-04-01 | End: 2019-04-02 | Stop reason: HOSPADM

## 2019-04-02 LAB
CYTO UR: NORMAL
LAB AP CASE REPORT: NORMAL
Lab: NORMAL
PATH REPORT.FINAL DX SPEC: NORMAL
PATH REPORT.GROSS SPEC: NORMAL

## 2020-02-21 NOTE — PLAN OF CARE
Problem: Patient Care Overview  Goal: Plan of Care Review  Outcome: Ongoing (interventions implemented as appropriate)   09/19/18 0512   Coping/Psychosocial   Plan of Care Reviewed With patient;spouse   Plan of Care Review   Progress improving   OTHER   Outcome Summary Pt has no c/o pain. IVF kvo, PCA in place; pt not using r/t no c/o pain. No c/o nausea this shift. TPN/lipids cont. Abx cont. Up to bedside commode throughout the night. W-d drsg change done. VSS. Will cont to monitor.      Goal: Individualization and Mutuality  Outcome: Ongoing (interventions implemented as appropriate)   09/11/18 1711 09/13/18 1714 09/18/18 0325   Individualization   Patient Specific Preferences --  Lights off --    Patient Specific Goals (Include Timeframe) --  --  Work towards discharge    Patient Specific Interventions --  --  Cont abx, TPN/lipids. drsg changes   Mutuality/Individual Preferences   What Information Would Help Us Give You More Personalized Care? None at this time --  --      Goal: Discharge Needs Assessment  Outcome: Ongoing (interventions implemented as appropriate)   09/13/18 1352 09/13/18 1400   Discharge Needs Assessment   Readmission Within the Last 30 Days --  no previous admission in last 30 days   Concerns to be Addressed denies needs/concerns at this time --    Patient/Family Anticipates Transition to --  home with family   Patient/Family Anticipated Services at Transition --  none   Transportation Concerns car, none --    Transportation Anticipated --  family or friend will provide   Anticipated Changes Related to Illness --  none   Equipment Needed After Discharge --  none   Discharge Coordination/Progress --  Pt lives at home with her spouse. She is ambulating in her room well. Noted that pt will be going to the OR tomorrow. No needs at this time but will follow after surgery.   Disability   Equipment Currently Used at Home --  none     Goal: Interprofessional Rounds/Family Conf  Outcome: Ongoing  (interventions implemented as appropriate)   09/18/18 0325   Interdisciplinary Rounds/Family Conf   Participants nursing;patient;family       Problem: Infection, Risk/Actual (Adult)  Goal: Infection Prevention/Resolution  Outcome: Ongoing (interventions implemented as appropriate)   09/18/18 1510   Infection, Risk/Actual (Adult)   Infection Prevention/Resolution making progress toward outcome       Problem: Bowel Resection (Adult)  Goal: Signs and Symptoms of Listed Potential Problems Will be Absent, Minimized or Managed (Bowel Resection)  Outcome: Ongoing (interventions implemented as appropriate)   09/18/18 1510   Goal/Outcome Evaluation   Problems Assessed (Bowel Resection) all   Problems Present (Bowel Resection) fluid/electrolyte imbalance       Problem: Nutrition, Parenteral (Adult)  Goal: Signs and Symptoms of Listed Potential Problems Will be Absent, Minimized or Managed (Nutrition, Parenteral)  Outcome: Ongoing (interventions implemented as appropriate)   09/18/18 1510   Goal/Outcome Evaluation   Problems Assessed (Parenteral Nutrition) all   Problems Present (Parenteral Nutrition) fluid/electrolyte imbalance          Patient is a 78y old  Female who presents with a chief complaint of SOB/wheezing + back pain (20 Feb 2020 21:51)        SUBJECTIVE:  Feels netter.  Ambulating.  No SOB at rest.  ROJAS.  o fevers or chills       REVIEW OF SYSTEMS:    CONSTITUTIONAL:   no fever   no chills.  no weight gain   no weight loss    EYES:   no discharge,   no pain    ENT:   Ears: no ear pain and no hearing problems.  Nose: no nasal congestion and no nasal drainage.    CARDIOVASCULAR:   no chest pain,   no palpitaions  no syncope    RESPIRATORY:  Per HPI    GASTROINTESTINAL:   no abdominal pain,   no diarrhea,   no vomiting.    GENITOURINARY:  no dysuria,   no urgency  no hematuria.    MUSCULOSKELETAL:   no back pain,   no musculoskeletal pain,  no weakness.    SKIN:   no jaundice,    no rashes.    NEURO:   no loss of consciousness,   no headache,   no weakness.    PSYCHIATRIC:   no known mental health issues  no anxiety  no depression    ALLERGIC/IMMUNOLOGIC:   No active allergic or immunologic issues      PHYSICAL EXAM  Vital Signs Last 24 Hrs  T(C): 35.6 (21 Feb 2020 05:00), Max: 36 (20 Feb 2020 13:25)  T(F): 96 (21 Feb 2020 05:00), Max: 96.8 (20 Feb 2020 13:25)  HR: 75 (21 Feb 2020 05:00) (75 - 99)  BP: 121/62 (21 Feb 2020 05:00) (121/62 - 144/63)  BP(mean): --  RR: 18 (21 Feb 2020 05:00) (18 - 18)  SpO2: 97% (20 Feb 2020 19:44) (96% - 97%)    CONSTITUTIONAL:  Well nourished.  NAD    ENT:   Airway patent,   No thrush    CARDIAC:   Normal rate,   regular rhythm.    no edema      RESPIRATORY:   No Wheezing  Normal chest expansion  Not tachypneic,  No use of accessory muscles    GASTROINTESTINAL:  Abdomen soft,   non-tender,   no guarding,   + BS    MUSCULOSKELETAL:   range of motion is not limited,  no clubbing, cyanosis    NEUROLOGICAL:   Alert and oriented   no motor or deficits.    SKIN:   Skin normal color for race,   warm, dry   No evidence of rash.      02-20-20 @ 07:01  -  02-21-20 @ 07:00  --------------------------------------------------------  IN:    Oral Fluid: 870 mL  Total IN: 870 mL    OUT:    Voided: 700 mL  Total OUT: 700 mL    Total NET: 170 mL          LABS:                          9.5    5.33  )-----------( 137      ( 21 Feb 2020 05:10 )             29.8                                               02-21    140  |  103  |  40<H>  ----------------------------<  340<H>  4.5   |  24  |  1.3    Ca    9.2      21 Feb 2020 05:10                                                                                                                                                                                    MEDICATIONS  (STANDING):  albuterol/ipratropium for Nebulization. 3 milliLiter(s) Nebulizer every 4 hours  atorvastatin 20 milliGRAM(s) Oral at bedtime  budesonide 160 MICROgram(s)/formoterol 4.5 MICROgram(s) Inhaler 2 Puff(s) Inhalation two times a day  chlorhexidine 4% Liquid 1 Application(s) Topical <User Schedule>  dextrose 5%. 1000 milliLiter(s) (50 mL/Hr) IV Continuous <Continuous>  dextrose 50% Injectable 12.5 Gram(s) IV Push once  dextrose 50% Injectable 25 Gram(s) IV Push once  dextrose 50% Injectable 25 Gram(s) IV Push once  donepezil 10 milliGRAM(s) Oral at bedtime  doxycycline hyclate Capsule 100 milliGRAM(s) Oral every 12 hours  enoxaparin Injectable 40 milliGRAM(s) SubCutaneous daily  fenofibrate Tablet 48 milliGRAM(s) Oral daily  furosemide    Tablet 20 milliGRAM(s) Oral daily  insulin glargine Injectable (LANTUS) 10 Unit(s) SubCutaneous at bedtime  insulin lispro (HumaLOG) corrective regimen sliding scale   SubCutaneous three times a day before meals  isosorbide   mononitrate ER Tablet (IMDUR) 30 milliGRAM(s) Oral daily  losartan 100 milliGRAM(s) Oral daily  methylPREDNISolone sodium succinate Injectable 60 milliGRAM(s) IV Push every 12 hours  montelukast 10 milliGRAM(s) Oral daily  NIFEdipine XL 30 milliGRAM(s) Oral daily  pantoprazole    Tablet 40 milliGRAM(s) Oral before breakfast    MEDICATIONS  (PRN):  dextrose 40% Gel 15 Gram(s) Oral once PRN Blood Glucose LESS THAN 70 milliGRAM(s)/deciliter  glucagon  Injectable 1 milliGRAM(s) IntraMuscular once PRN Glucose LESS THAN 70 milligrams/deciliter  ibuprofen  Tablet. 600 milliGRAM(s) Oral every 8 hours PRN Severe Pain (7 - 10)      X-Rays reviewed    CXR interpreted by me:

## 2021-07-26 ENCOUNTER — TELEPHONE (OUTPATIENT)
Dept: VASCULAR SURGERY | Facility: CLINIC | Age: 76
End: 2021-07-26

## 2021-07-27 ENCOUNTER — OFFICE VISIT (OUTPATIENT)
Dept: WOUND CARE | Facility: HOSPITAL | Age: 76
End: 2021-07-27

## 2021-07-27 ENCOUNTER — LAB REQUISITION (OUTPATIENT)
Dept: LAB | Facility: HOSPITAL | Age: 76
End: 2021-07-27

## 2021-07-27 DIAGNOSIS — L97.511 NON-PRESSURE CHRONIC ULCER OF OTHER PART OF RIGHT FOOT LIMITED TO BREAKDOWN OF SKIN (HCC): ICD-10-CM

## 2021-07-27 DIAGNOSIS — Z00.00 ENCOUNTER FOR GENERAL ADULT MEDICAL EXAMINATION WITHOUT ABNORMAL FINDINGS: ICD-10-CM

## 2021-07-27 DIAGNOSIS — E78.2 MIXED HYPERLIPIDEMIA: ICD-10-CM

## 2021-07-27 DIAGNOSIS — I10 ESSENTIAL (PRIMARY) HYPERTENSION: ICD-10-CM

## 2021-07-27 DIAGNOSIS — L97.522 NON-PRESSURE CHRONIC ULCER OF OTHER PART OF LEFT FOOT WITH FAT LAYER EXPOSED (HCC): ICD-10-CM

## 2021-07-27 PROCEDURE — 97597 DBRDMT OPN WND 1ST 20 CM/<: CPT | Performed by: SURGERY

## 2021-07-27 PROCEDURE — G0463 HOSPITAL OUTPT CLINIC VISIT: HCPCS

## 2021-07-27 PROCEDURE — 87176 TISSUE HOMOGENIZATION CULTR: CPT | Performed by: SURGERY

## 2021-07-27 PROCEDURE — 99203 OFFICE O/P NEW LOW 30 MIN: CPT | Performed by: SURGERY

## 2021-07-27 PROCEDURE — 87015 SPECIMEN INFECT AGNT CONCNTJ: CPT | Performed by: SURGERY

## 2021-07-27 PROCEDURE — 11044 DBRDMT BONE 1ST 20 SQ CM/<: CPT | Performed by: SURGERY

## 2021-07-27 PROCEDURE — 87075 CULTR BACTERIA EXCEPT BLOOD: CPT | Performed by: SURGERY

## 2021-07-27 PROCEDURE — 87205 SMEAR GRAM STAIN: CPT | Performed by: SURGERY

## 2021-07-27 PROCEDURE — 87070 CULTURE OTHR SPECIMN AEROBIC: CPT | Performed by: SURGERY

## 2021-07-28 ENCOUNTER — TRANSCRIBE ORDERS (OUTPATIENT)
Dept: ADMINISTRATIVE | Facility: HOSPITAL | Age: 76
End: 2021-07-28

## 2021-07-28 DIAGNOSIS — I73.9 PERIPHERAL VASCULAR DISEASE, UNSPECIFIED (HCC): Primary | ICD-10-CM

## 2021-07-30 LAB
BACTERIA SPEC AEROBE CULT: NORMAL
GRAM STN SPEC: NORMAL

## 2021-08-01 LAB — BACTERIA SPEC ANAEROBE CULT: NORMAL

## 2021-08-02 ENCOUNTER — TELEPHONE (OUTPATIENT)
Dept: VASCULAR SURGERY | Facility: CLINIC | Age: 76
End: 2021-08-02

## 2021-08-03 ENCOUNTER — OFFICE VISIT (OUTPATIENT)
Dept: VASCULAR SURGERY | Facility: CLINIC | Age: 76
End: 2021-08-03

## 2021-08-03 VITALS
SYSTOLIC BLOOD PRESSURE: 120 MMHG | HEART RATE: 76 BPM | HEIGHT: 59 IN | WEIGHT: 142 LBS | BODY MASS INDEX: 28.63 KG/M2 | OXYGEN SATURATION: 97 % | DIASTOLIC BLOOD PRESSURE: 80 MMHG

## 2021-08-03 DIAGNOSIS — I70.25 ATHEROSCLEROSIS OF NATIVE ARTERIES OF THE EXTREMITIES WITH ULCERATION (HCC): Primary | ICD-10-CM

## 2021-08-03 DIAGNOSIS — E11.9 CONTROLLED TYPE 2 DIABETES MELLITUS WITHOUT COMPLICATION, WITHOUT LONG-TERM CURRENT USE OF INSULIN (HCC): ICD-10-CM

## 2021-08-03 DIAGNOSIS — I73.9 PAD (PERIPHERAL ARTERY DISEASE) (HCC): ICD-10-CM

## 2021-08-03 DIAGNOSIS — Z01.818 PREOP TESTING: ICD-10-CM

## 2021-08-03 DIAGNOSIS — Z79.02 ENCOUNTER FOR MONITORING ANTIPLATELET THERAPY: ICD-10-CM

## 2021-08-03 DIAGNOSIS — Z51.81 ENCOUNTER FOR MONITORING ANTIPLATELET THERAPY: ICD-10-CM

## 2021-08-03 PROCEDURE — 99204 OFFICE O/P NEW MOD 45 MIN: CPT | Performed by: SURGERY

## 2021-08-03 RX ORDER — CEPHALEXIN 500 MG/1
500 CAPSULE ORAL TAKE AS DIRECTED
COMMUNITY
Start: 2021-07-28 | End: 2021-08-11

## 2021-08-03 RX ORDER — DOXYCYCLINE HYCLATE 100 MG
100 TABLET ORAL 2 TIMES DAILY
COMMUNITY
Start: 2021-07-26 | End: 2021-08-26

## 2021-08-03 RX ORDER — FOLIC ACID 1 MG/1
1000 TABLET ORAL DAILY
COMMUNITY
Start: 2021-06-07

## 2021-08-03 RX ORDER — SODIUM CHLORIDE 9 MG/ML
100 INJECTION, SOLUTION INTRAVENOUS CONTINUOUS
Status: CANCELLED | OUTPATIENT
Start: 2021-08-03 | End: 2021-08-03

## 2021-08-03 NOTE — PROGRESS NOTES
08/03/2021      Annabella Wallis PA  [address]  [address]  [place] [address]    Nuha Cali  1945    Chief Complaint   Patient presents with   • Establish Care     Referred over by [facility] Annabella FONTAINE for Peripheral Artery Disease. Patient denies any stroke like symptoms.    • Non Smoker     Patient is a Non Smoker    • other     patient states her toes on the left side has infection they are concerned with the blood flow to see about amputation.    • Med Management     Verbally verified medicaitons with patient/son        Dear ZHEN Garcia:    HPI  I had the pleasure of seeing your patient Nuha Cali in the office today.  Thank you kindly for this consultation.  As you recall, Nuha Cali is a 76 y.o.  female who you are currently following for routine health maintenance.  She bumped her left second toe on the door about 7 weeks ago.  She is being treated in [facility].  She has an appointment for vascular testing on 8/19/2021.  She is diabetic, diet controlled.  She is maintained on Pravachol.   She had [facility] screening ABIs back in May 2021 which showed evidence of severe disease in the left lower extremity.    Past Medical History:   Diagnosis Date   • Arthritis    • Chronic kidney disease     stage 3   • Diabetes mellitus (CMS/HCC)    • GERD (gastroesophageal reflux disease)    • Hx of colonic polyp    • Hypertension    • Rheumatoid arthritis (CMS/HCC)    • Vitamin D deficiency        Past Surgical History:   Procedure Laterality Date   • ARM LESION/CYST EXCISION      nodules   • BACK SURGERY      x 2   • BREAST BIOPSY      x 2   • CATARACT EXTRACTION Bilateral     with lens inplant   • COLON RESECTION N/A 9/14/2018    Procedure: COLON RESECTION LAPAROSCOPIC SIGMOID OR LOW ANTERIOR, 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 Hart;  Surgeon: Princess Demarco MD;  Location: [facility] OR;   Service: General   • COLONOSCOPY  01/19/2015    Diverticulosis repeat exam in 5 years   • COLONOSCOPY W/ POLYPECTOMY  11/24/2009    Tubular adenoma cecal pit mild atypia repeat exam in 5 years   • CYSTOSCOPY, RETROGRADE PYELOGRAM AND STENT INSERTION Bilateral 9/14/2018    Procedure: CYSTOSCOPY BILATERAL RETROGRADE PYELOGRAM AND BILATERAL STENT INSERTION;  Surgeon: Dick Hart MD;  Location: [facility] OR;  Service: Urology   • ENDOSCOPY  1999    Chronic active gastritis severe with focal superficial ulceration urea + treated   • HYSTERECTOMY  1981    Heavy, painful periods.    • TOTAL KNEE ARTHROPLASTY      x 2       Family History   Problem Relation Age of Onset   • Hypertension Other    • Diabetes Other    • Coronary artery disease Other    • Cancer Other    • No Known Problems Son    • No Known Problems Son    • No Known Problems Son    • Breast cancer Other    • Colon cancer Neg Hx    • Colon polyps Neg Hx    • Ovarian cancer Neg Hx        Social History     Socioeconomic History   • Marital status:      Spouse name: Not on file   • Number of children: Not on file   • Years of education: Not on file   • Highest education level: Not on file   Tobacco Use   • Smoking status: Never Smoker   • Smokeless tobacco: Never Used   Substance and Sexual Activity   • Alcohol use: No   • Drug use: No       Allergies   Allergen Reactions   • Aspirin Anaphylaxis and Swelling       Current Outpatient Medications   Medication Instructions   • amLODIPine-valsartan (EXFORGE)  MG per tablet Daily   • carvedilol (COREG) 12.5 MG tablet 2 Times Daily With Meals   • cephalexin (KEFLEX) 500 mg, Oral, Take As Directed   • cholecalciferol (VITAMIN D3) 2,000 Units, Oral, Daily   • doxycycline (VIBRAMYICN) 100 mg, Oral, 2 Times Daily   • folic acid (FOLVITE) 1,000 mcg, Oral, Daily   • furosemide (LASIX) 20 MG tablet Daily   • potassium chloride (K-DUR) 10 MEQ CR tablet Daily   • pravastatin (PRAVACHOL) 40 mg, Oral, Daily     "    Review of Systems   Constitutional: Negative.    HENT: Negative.    Eyes: Negative.    Respiratory: Negative.    Cardiovascular: Negative.    Gastrointestinal: Negative.    Endocrine: Negative.    Genitourinary: Negative.    Musculoskeletal: Negative.    Skin: Positive for wound.   Allergic/Immunologic: Negative.    Neurological: Negative.    Hematological: Negative.    Psychiatric/Behavioral: Negative.    All other systems reviewed and are negative.      /80 (BP Location: Left arm, Patient Position: Sitting, Cuff Size: Adult)   Pulse 76   Ht 149.9 cm (59\")   Wt 64.4 kg (142 lb)   SpO2 97%   BMI 28.68 kg/m²   Physical Exam  Vitals and nursing note reviewed.   Constitutional:       Appearance: She is well-developed.   HENT:      Head: Normocephalic and atraumatic.   Eyes:      General: No scleral icterus.     Pupils: Pupils are equal, round, and reactive to light.   Neck:      Thyroid: No thyromegaly.      Vascular: No carotid bruit or JVD.   Cardiovascular:      Rate and Rhythm: Normal rate and regular rhythm.      Pulses:           Carotid pulses are 2+ on the right side and 2+ on the left side.       Femoral pulses are 2+ on the right side and 2+ on the left side.       Popliteal pulses are 2+ on the right side and 2+ on the left side.        Dorsalis pedis pulses are detected w/ Doppler on the right side and detected w/ Doppler on the left side.        Posterior tibial pulses are detected w/ Doppler on the right side and detected w/ Doppler on the left side.      Heart sounds: Normal heart sounds.   Pulmonary:      Effort: Pulmonary effort is normal.      Breath sounds: Normal breath sounds.   Abdominal:      General: Bowel sounds are normal. There is no distension or abdominal bruit.      Palpations: Abdomen is soft. There is no mass.      Tenderness: There is no abdominal tenderness.   Musculoskeletal:         General: Normal range of motion.      Cervical back: Neck supple.   Lymphadenopathy:    "   Cervical: No cervical adenopathy.   Skin:     General: Skin is warm and dry.   Neurological:      Mental Status: She is alert and oriented to person, place, and time.      Cranial Nerves: No cranial nerve deficit.      Sensory: No sensory deficit.         No results found.    Patient Active Problem List   Diagnosis   • Anemia   • Heme positive stool   • HTN (hypertension), benign   • Change in bowel habits   • Controlled type 2 diabetes mellitus without complication, without long-term current use of insulin (CMS/[facility])   • Colovaginal fistula   • PAD (peripheral artery disease) (CMS/[facility])         ICD-10-CM ICD-9-CM   1. Atherosclerosis of native arteries of the extremities with ulceration (CMS/[facility])  I70.25 440.23     707.9   2. PAD (peripheral artery disease) (CMS/[facility])  I73.9 443.9   3. Preop testing  Z01.818 V72.84   4. Encounter for monitoring antiplatelet therapy  Z51.81 V58.83    Z79.02 V58.63   5. Controlled type 2 diabetes mellitus without complication, without long-term current use of insulin (CMS/[facility])  E11.9 250.00       Lab Frequency Next Occurrence   Follow Anesthesia Guidelines / Standing Orders Once 08/03/2018   Follow Anesthesia Guidelines / Standing Orders Once 08/03/2018   US Ankle / Brachial Indices Extremity Complete Once 08/02/2021       Plan: After thoroughly evaluating Nuha Cali, I believe the best course of action is to proceed with a left lower extremity angiogram with possible concomitant therapeutic intervention.  Patient has a nonhealing wound of the left foot that requires restoration of blood flow for healing potential.  She had ABIs performed today which show evidence of severe disease in the left leg. Risks of angiogram were discussed.  These include, but are not limited to, bleeding, infection, vessel damage, nerve damage, embolus, and loss of limb.  The patient understands these risks and wishes to proceed with procedure.  I did discuss vascular risk factors as they pertain to  the progression of vascular disease including controlling type 2 diabetes mellitus.  This risk factors currently controlled. We will get this scheduled in the next 2 weeks. The patient can continue taking their current medication regimen as previously planned.  This was all discussed in full with complete understanding.  Thank you for allowing me to participate in the care of your patient.  Please do not hesitate with any questions or concerns.  I will keep you aware of any further encounters with Nuha Cali.        Sincerely yours,         Peter Huddleston, DO      Scribed for Dr. Peter Huddleston by Yvonne WHITE

## 2021-08-04 ENCOUNTER — PATIENT ROUNDING (BHMG ONLY) (OUTPATIENT)
Dept: VASCULAR SURGERY | Facility: CLINIC | Age: 76
End: 2021-08-04

## 2021-08-04 NOTE — PROGRESS NOTES
August 4, 2021    Hello, may I speak with Nuha Cali?    My name is Miriam       I am  with [facility]  [address], [address]  [place] [address] [phone].    Before we get started may I verify your date of birth? 1945    I am calling to officially welcome you to our practice and ask about your recent visit. Is this a good time to talk? YES     Tell me about your visit with us. What things went well?  Yes       We're always looking for ways to make our patients' experiences even better. Do you have recommendations on ways we may improve?  NO    Overall were you satisfied with your first visit to our practice? YES MA'AM       I appreciate you taking the time to speak with me today. Is there anything else I can do for you? YES can you have some one call me with my procedure information? I expressed I would have our surgery scheduler call her today .       Thank you, and have a great day.

## 2021-08-06 ENCOUNTER — PREP FOR SURGERY (OUTPATIENT)
Dept: OTHER | Facility: HOSPITAL | Age: 76
End: 2021-08-06

## 2021-08-06 ENCOUNTER — OFFICE VISIT (OUTPATIENT)
Dept: WOUND CARE | Facility: HOSPITAL | Age: 76
End: 2021-08-06

## 2021-08-06 DIAGNOSIS — E11.628 TYPE 2 DIABETES MELLITUS WITH OTHER SKIN COMPLICATIONS (HCC): ICD-10-CM

## 2021-08-06 DIAGNOSIS — M86.172 OTHER ACUTE OSTEOMYELITIS, LEFT ANKLE AND FOOT (HCC): ICD-10-CM

## 2021-08-06 DIAGNOSIS — L97.524 NON-PRESSURE CHRONIC ULCER OF OTHER PART OF LEFT FOOT WITH NECROSIS OF BONE (HCC): Primary | ICD-10-CM

## 2021-08-06 DIAGNOSIS — L97.524 NON-PRESSURE CHRONIC ULCER OF OTHER PART OF LEFT FOOT WITH NECROSIS OF BONE (HCC): ICD-10-CM

## 2021-08-06 DIAGNOSIS — M20.42 OTHER HAMMER TOE(S) (ACQUIRED), LEFT FOOT: ICD-10-CM

## 2021-08-06 PROCEDURE — 99214 OFFICE O/P EST MOD 30 MIN: CPT | Performed by: PODIATRIST

## 2021-08-06 PROCEDURE — G0463 HOSPITAL OUTPT CLINIC VISIT: HCPCS

## 2021-08-06 RX ORDER — BUPIVACAINE HCL/0.9 % NACL/PF 0.1 %
2 PLASTIC BAG, INJECTION (ML) EPIDURAL ONCE
Status: CANCELLED | OUTPATIENT
Start: 2021-08-06 | End: 2021-08-06

## 2021-08-06 NOTE — H&P (VIEW-ONLY)
[facility] - PODIATRY    Today's Date: 08/06/21    Patient Name: Nuha Cali  MRN: [id number]  CSN: [id number]  PCP: Juan Echavarria MD  Referring Provider: No ref. provider found    SUBJECTIVE   CC: Ulceration and deformity of toe    HPI: Nuha Cali, a 76 y.o.female, a(n) established patient complaining of non-healing ulcer of the left 2nd toe. Patient has h/o arthritis, CKD3, DM2, GERD, HTN, RA, Vit D Def. Patient is NIDDM and unsure of last BG level. Admits to mild numbness and tingling in feet. Relates that she has a chronic ulceration to her dorsal left 2nd toe that has been present for several weeks. Started from rubbing in her shoes and complicated by the fact her 2nd toe overrides her great toe. States that the wound has not been improving and is interested in a more aggressive treatment option to get the wound to heal. She has been taking her abx as prescribed. Notes PVD and a planned angiogram with Dr. Huddleston in the future. Admits pain at 4/10 level and described as aching and throbbing. Relates previous treatment(s) including local wound care, abx. Denies any constitutional symptoms. No other pedal complaints at this time.    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 Hart;  Surgeon: Princess Demarco MD;  Location: [facility] OR;  Service:  General   • COLONOSCOPY  01/19/2015    Diverticulosis repeat exam in 5 years   • COLONOSCOPY W/ POLYPECTOMY  11/24/2009    Tubular adenoma cecal pit mild atypia repeat exam in 5 years   • CYSTOSCOPY, RETROGRADE PYELOGRAM AND STENT INSERTION Bilateral 9/14/2018    Procedure: CYSTOSCOPY BILATERAL RETROGRADE PYELOGRAM AND BILATERAL STENT INSERTION;  Surgeon: Dick Hart MD;  Location: [facility];  Service: Urology   • ENDOSCOPY  1999    Chronic active gastritis severe with focal superficial ulceration urea + treated   • HYSTERECTOMY  1981    Heavy, painful periods.    • TOTAL KNEE ARTHROPLASTY      x 2     Family History   Problem Relation Age of Onset   • Hypertension Other    • Diabetes Other    • Coronary artery disease Other    • Cancer Other    • No Known Problems Son    • No Known Problems Son    • No Known Problems Son    • Breast cancer Other    • Colon cancer Neg Hx    • Colon polyps Neg Hx    • Ovarian cancer Neg Hx      Social History     Socioeconomic History   • Marital status:      Spouse name: Not on file   • Number of children: Not on file   • Years of education: Not on file   • Highest education level: Not on file   Tobacco Use   • Smoking status: Never Smoker   • Smokeless tobacco: Never Used   Substance and Sexual Activity   • Alcohol use: No   • Drug use: No     Allergies   Allergen Reactions   • Aspirin Anaphylaxis and Swelling     Current Outpatient Medications   Medication Sig Dispense Refill   • amLODIPine-valsartan (EXFORGE)  MG per tablet Daily.     • carvedilol (COREG) 12.5 MG tablet 2 (Two) Times a Day With Meals.     • cephalexin (KEFLEX) 500 MG capsule Take 500 mg by mouth Take As Directed.     • cholecalciferol (VITAMIN D3) 1000 units tablet Take 2,000 Units by mouth Daily.     • doxycycline (VIBRAMYICN) 100 MG tablet Take 100 mg by mouth 2 (Two) Times a Day.     • folic acid (FOLVITE) 1 MG tablet Take 1,000 mcg by mouth Daily.     • furosemide (LASIX) 20 MG tablet  Daily.     • potassium chloride (K-DUR) 10 MEQ CR tablet Daily.     • pravastatin (PRAVACHOL) 20 MG tablet Take 40 mg by mouth Daily.       No current facility-administered medications for this visit.     Review of Systems   Constitutional: Negative for chills and fever.   HENT: Negative for congestion.    Respiratory: Negative for shortness of breath.    Cardiovascular: Negative for chest pain and leg swelling.   Gastrointestinal: Negative for constipation, diarrhea, nausea and vomiting.   Musculoskeletal: Positive for arthralgias.        Foot pain   Skin: Positive for wound.   Neurological: Positive for numbness.       OBJECTIVE   There were no vitals filed for this visit.    PHYSICAL EXAM  GEN:   Accompanied by .     Foot/Ankle Exam:       General:   Appearance: appears stated age and healthy    Orientation: AAOx3    Affect: appropriate    Gait: unimpaired    Assistance: independent    Shoe Gear:  Surgical shoe    VASCULAR      Right Foot Vascularity   Dorsalis pedis:  2+  Posterior tibial:  2+  Skin Temperature: warm    Edema Grading:  Trace  CFT:  3  Pedal Hair Growth:  Present  Varicosities: mild varicosities       Left Foot Vascularity   Dorsalis pedis:  2+  Posterior tibial:  2+  Skin Temperature: warm    Edema Grading:  Trace  CFT:  3  Pedal Hair Growth:  Present  Varicosities: mild varicosities        NEUROLOGIC     Right Foot Neurologic   Light touch sensation:  Diminished  Vibratory sensation:  Diminished  Hot/Cold sensation: diminished       Left Foot Neurologic   Light touch sensation:  Diminished  Vibratory sensation:  Diminished  Hot/cold sensation: diminished       MUSCULOSKELETAL      Right Foot Musculoskeletal   Ecchymosis:  None  Tenderness: none    Arch:  Normal  Hammertoe:  Second toe (Overrides hallux)  Hallux valgus: Yes       Left Foot Musculoskeletal   Ecchymosis:  None  Tenderness: none    Arch:  Normal  Hammertoe:  Second toe (Overrides hallux)  Hallux valgus: Yes       MUSCLE  STRENGTH     Right Foot Muscle Strength   Foot dorsiflexion:  5  Foot plantar flexion:  5  Foot inversion:  5  Foot eversion:  5     Left Foot Muscle Strength   Foot dorsiflexion:  5  Foot plantar flexion:  5  Foot inversion:  5  Foot eversion:  5     RANGE OF MOTION      Right Foot Range of Motion   Foot and ankle ROM within normal limits       Left Foot Range of Motion   Foot and ankle ROM within normal limits       DERMATOLOGIC     Right Foot Dermatologic   Skin: skin intact       Left Foot Dermatologic   Skin: erythema and ulcer    Skin: no left foot cellulitis and no left foot drainage        Left Foot Additional Comments: Full thickness ulceration at dorsal 2nd toe PIPJ that extends to bone and joint. Mild periwound erythema. No purulence or malorodor.       RADIOLOGY/NUCLEAR:  No results found.    LABORATORY/CULTURE RESULTS:      PATHOLOGY RESULTS:       ASSESSMENT/PLAN     Diagnoses and all orders for this visit:    1. Non-pressure chronic ulcer of other part of left foot with necrosis of bone (CMS/HCC) (Primary)  -     Case Request; Standing  -     Instructions on coughing, deep breathing, and incentive spirometry.; Future  -     CBC & Differential; Future  -     Comprehensive Metabolic Panel; Future  -     XR chest 2 vw; Future  -     ceFAZolin in 0.9% normal saline (ANCEF) IVPB solution 2 g  -     Case Request    Other orders  -     Follow Anesthesia Guidelines / Protocol; Future  -     Obtain Informed Consent; Future  -     Follow Anesthesia Guidelines / Protocol; Standing  -     Provide Instructions to Patient Regarding NPO Status; Future  -     Chlorhexidine Skin Prep - Educate and Review With Patient; Future  -     Verify NPO Status; Standing  -     Obtain Informed Consent (If Not Done Inpatient or PAT); Standing  -     Instructions on coughing, deep breathing, and incentive spirometry.; Standing  -     Notify Physician - Standard; Standing  -     Provide NPO Instructions to Patient      Comprehensive  lower extremity examination and evaluation was performed.  Discussed findings and treatment plan including risks, benefits, and treatment options with patient in detail. Patient agreed with treatment plan.  Continue abx as prescribed.     Discussed with patient clinical findings indicative of osteomyelitis with exposed bone.  I believe the best course of action given the deformity of the toe and bone infection, would be to proceed with a left second toe amputation. Patient is in agreement.  All options, benefits, risks associate with surgery discussed with the patient including but not limited to: Standard risk of anesthesia, pain, bleeding, infection, nonhealing/dehiscence, loss of limb or life.  No guarantees were inferred.  Prepostop courses were discussed in detail including minimum 1 week nonweightbearing status postoperatively.  We will attempt to coordinate case with planned angiogram with Dr. Huddleston.    All questions were answered to patient/family satisfaction. Should symptoms fail to improve or worsen they agree to call or return to clinic or to go to the Emergency Department. Discussed the importance of following up with any needed screening tests/labs/specialist appointments and any requested follow-up recommended by me today. Importance of maintaining follow-up discussed and patient accepts that missed appointments can delay diagnosis and potentially lead to worsening of conditions.  No follow-ups on file., or sooner if acute issues arise.    Lab Frequency Next Occurrence   Follow Anesthesia Guidelines / Standing Orders Once 08/03/2018   Follow Anesthesia Guidelines / Standing Orders Once 08/03/2018   US Ankle / Brachial Indices Extremity Complete Once 08/02/2021   Follow Anesthesia Guidelines / Protocol Once 08/03/2021   Provide NPO Instructions to Patient Once 08/08/2021   Chlorhexidine Skin Prep Once 08/08/2021   CBC and Differential Once 08/08/2021   Basic metabolic panel Once 08/08/2021   APTT  Once 08/08/2021   Protime-INR Once 08/08/2021   ECG 12 Lead Once 08/08/2021   XR chest 2 vw Once 08/08/2021       This document has been electronically signed by Artemio Martinez DPM on August 6, 2021 15:21 CDT

## 2021-08-06 NOTE — H&P
[facility] - PODIATRY    Today's Date: 08/06/21    Patient Name: Nuha Cali  MRN: [id number]  CSN: [id number]  PCP: Juan Echavarria MD  Referring Provider: No ref. provider found    SUBJECTIVE   CC: Ulceration and deformity of toe    HPI: Nuha Cali, a 76 y.o.female, a(n) established patient complaining of non-healing ulcer of the left 2nd toe. Patient has h/o arthritis, CKD3, DM2, GERD, HTN, RA, Vit D Def. Patient is NIDDM and unsure of last BG level. Admits to mild numbness and tingling in feet. Relates that she has a chronic ulceration to her dorsal left 2nd toe that has been present for several weeks. Started from rubbing in her shoes and complicated by the fact her 2nd toe overrides her great toe. States that the wound has not been improving and is interested in a more aggressive treatment option to get the wound to heal. She has been taking her abx as prescribed. Notes PVD and a planned angiogram with Dr. Huddleston in the future. Admits pain at 4/10 level and described as aching and throbbing. Relates previous treatment(s) including local wound care, abx. Denies any constitutional symptoms. No other pedal complaints at this time.    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 Hart;  Surgeon: Princess Demarco MD;  Location: [facility] OR;  Service:  General   • COLONOSCOPY  01/19/2015    Diverticulosis repeat exam in 5 years   • COLONOSCOPY W/ POLYPECTOMY  11/24/2009    Tubular adenoma cecal pit mild atypia repeat exam in 5 years   • CYSTOSCOPY, RETROGRADE PYELOGRAM AND STENT INSERTION Bilateral 9/14/2018    Procedure: CYSTOSCOPY BILATERAL RETROGRADE PYELOGRAM AND BILATERAL STENT INSERTION;  Surgeon: Dick Hart MD;  Location: [facility];  Service: Urology   • ENDOSCOPY  1999    Chronic active gastritis severe with focal superficial ulceration urea + treated   • HYSTERECTOMY  1981    Heavy, painful periods.    • TOTAL KNEE ARTHROPLASTY      x 2     Family History   Problem Relation Age of Onset   • Hypertension Other    • Diabetes Other    • Coronary artery disease Other    • Cancer Other    • No Known Problems Son    • No Known Problems Son    • No Known Problems Son    • Breast cancer Other    • Colon cancer Neg Hx    • Colon polyps Neg Hx    • Ovarian cancer Neg Hx      Social History     Socioeconomic History   • Marital status:      Spouse name: Not on file   • Number of children: Not on file   • Years of education: Not on file   • Highest education level: Not on file   Tobacco Use   • Smoking status: Never Smoker   • Smokeless tobacco: Never Used   Substance and Sexual Activity   • Alcohol use: No   • Drug use: No     Allergies   Allergen Reactions   • Aspirin Anaphylaxis and Swelling     Current Outpatient Medications   Medication Sig Dispense Refill   • amLODIPine-valsartan (EXFORGE)  MG per tablet Daily.     • carvedilol (COREG) 12.5 MG tablet 2 (Two) Times a Day With Meals.     • cephalexin (KEFLEX) 500 MG capsule Take 500 mg by mouth Take As Directed.     • cholecalciferol (VITAMIN D3) 1000 units tablet Take 2,000 Units by mouth Daily.     • doxycycline (VIBRAMYICN) 100 MG tablet Take 100 mg by mouth 2 (Two) Times a Day.     • folic acid (FOLVITE) 1 MG tablet Take 1,000 mcg by mouth Daily.     • furosemide (LASIX) 20 MG tablet  Daily.     • potassium chloride (K-DUR) 10 MEQ CR tablet Daily.     • pravastatin (PRAVACHOL) 20 MG tablet Take 40 mg by mouth Daily.       No current facility-administered medications for this visit.     Review of Systems   Constitutional: Negative for chills and fever.   HENT: Negative for congestion.    Respiratory: Negative for shortness of breath.    Cardiovascular: Negative for chest pain and leg swelling.   Gastrointestinal: Negative for constipation, diarrhea, nausea and vomiting.   Musculoskeletal: Positive for arthralgias.        Foot pain   Skin: Positive for wound.   Neurological: Positive for numbness.       OBJECTIVE   There were no vitals filed for this visit.    PHYSICAL EXAM  GEN:   Accompanied by .     Foot/Ankle Exam:       General:   Appearance: appears stated age and healthy    Orientation: AAOx3    Affect: appropriate    Gait: unimpaired    Assistance: independent    Shoe Gear:  Surgical shoe    VASCULAR      Right Foot Vascularity   Dorsalis pedis:  2+  Posterior tibial:  2+  Skin Temperature: warm    Edema Grading:  Trace  CFT:  3  Pedal Hair Growth:  Present  Varicosities: mild varicosities       Left Foot Vascularity   Dorsalis pedis:  2+  Posterior tibial:  2+  Skin Temperature: warm    Edema Grading:  Trace  CFT:  3  Pedal Hair Growth:  Present  Varicosities: mild varicosities        NEUROLOGIC     Right Foot Neurologic   Light touch sensation:  Diminished  Vibratory sensation:  Diminished  Hot/Cold sensation: diminished       Left Foot Neurologic   Light touch sensation:  Diminished  Vibratory sensation:  Diminished  Hot/cold sensation: diminished       MUSCULOSKELETAL      Right Foot Musculoskeletal   Ecchymosis:  None  Tenderness: none    Arch:  Normal  Hammertoe:  Second toe (Overrides hallux)  Hallux valgus: Yes       Left Foot Musculoskeletal   Ecchymosis:  None  Tenderness: none    Arch:  Normal  Hammertoe:  Second toe (Overrides hallux)  Hallux valgus: Yes       MUSCLE  STRENGTH     Right Foot Muscle Strength   Foot dorsiflexion:  5  Foot plantar flexion:  5  Foot inversion:  5  Foot eversion:  5     Left Foot Muscle Strength   Foot dorsiflexion:  5  Foot plantar flexion:  5  Foot inversion:  5  Foot eversion:  5     RANGE OF MOTION      Right Foot Range of Motion   Foot and ankle ROM within normal limits       Left Foot Range of Motion   Foot and ankle ROM within normal limits       DERMATOLOGIC     Right Foot Dermatologic   Skin: skin intact       Left Foot Dermatologic   Skin: erythema and ulcer    Skin: no left foot cellulitis and no left foot drainage        Left Foot Additional Comments: Full thickness ulceration at dorsal 2nd toe PIPJ that extends to bone and joint. Mild periwound erythema. No purulence or malorodor.       RADIOLOGY/NUCLEAR:  No results found.    LABORATORY/CULTURE RESULTS:      PATHOLOGY RESULTS:       ASSESSMENT/PLAN     Diagnoses and all orders for this visit:    1. Non-pressure chronic ulcer of other part of left foot with necrosis of bone (CMS/HCC) (Primary)  -     Case Request; Standing  -     Instructions on coughing, deep breathing, and incentive spirometry.; Future  -     CBC & Differential; Future  -     Comprehensive Metabolic Panel; Future  -     XR chest 2 vw; Future  -     ceFAZolin in 0.9% normal saline (ANCEF) IVPB solution 2 g  -     Case Request    Other orders  -     Follow Anesthesia Guidelines / Protocol; Future  -     Obtain Informed Consent; Future  -     Follow Anesthesia Guidelines / Protocol; Standing  -     Provide Instructions to Patient Regarding NPO Status; Future  -     Chlorhexidine Skin Prep - Educate and Review With Patient; Future  -     Verify NPO Status; Standing  -     Obtain Informed Consent (If Not Done Inpatient or PAT); Standing  -     Instructions on coughing, deep breathing, and incentive spirometry.; Standing  -     Notify Physician - Standard; Standing  -     Provide NPO Instructions to Patient      Comprehensive  lower extremity examination and evaluation was performed.  Discussed findings and treatment plan including risks, benefits, and treatment options with patient in detail. Patient agreed with treatment plan.  Continue abx as prescribed.     Discussed with patient clinical findings indicative of osteomyelitis with exposed bone.  I believe the best course of action given the deformity of the toe and bone infection, would be to proceed with a left second toe amputation. Patient is in agreement.  All options, benefits, risks associate with surgery discussed with the patient including but not limited to: Standard risk of anesthesia, pain, bleeding, infection, nonhealing/dehiscence, loss of limb or life.  No guarantees were inferred.  Prepostop courses were discussed in detail including minimum 1 week nonweightbearing status postoperatively.  We will attempt to coordinate case with planned angiogram with Dr. Huddleston.    All questions were answered to patient/family satisfaction. Should symptoms fail to improve or worsen they agree to call or return to clinic or to go to the Emergency Department. Discussed the importance of following up with any needed screening tests/labs/specialist appointments and any requested follow-up recommended by me today. Importance of maintaining follow-up discussed and patient accepts that missed appointments can delay diagnosis and potentially lead to worsening of conditions.  No follow-ups on file., or sooner if acute issues arise.    Lab Frequency Next Occurrence   Follow Anesthesia Guidelines / Standing Orders Once 08/03/2018   Follow Anesthesia Guidelines / Standing Orders Once 08/03/2018   US Ankle / Brachial Indices Extremity Complete Once 08/02/2021   Follow Anesthesia Guidelines / Protocol Once 08/03/2021   Provide NPO Instructions to Patient Once 08/08/2021   Chlorhexidine Skin Prep Once 08/08/2021   CBC and Differential Once 08/08/2021   Basic metabolic panel Once 08/08/2021   APTT  Once 08/08/2021   Protime-INR Once 08/08/2021   ECG 12 Lead Once 08/08/2021   XR chest 2 vw Once 08/08/2021       This document has been electronically signed by Artemio Martinez DPM on August 6, 2021 15:21 CDT

## 2021-08-09 ENCOUNTER — TELEPHONE (OUTPATIENT)
Dept: VASCULAR SURGERY | Facility: CLINIC | Age: 76
End: 2021-08-09

## 2021-08-09 ENCOUNTER — TRANSCRIBE ORDERS (OUTPATIENT)
Dept: LAB | Facility: HOSPITAL | Age: 76
End: 2021-08-09

## 2021-08-09 DIAGNOSIS — Z11.59 SCREENING FOR VIRAL DISEASE: Primary | ICD-10-CM

## 2021-08-09 PROBLEM — Z01.818 PREOP TESTING: Status: ACTIVE | Noted: 2021-08-09

## 2021-08-09 NOTE — TELEPHONE ENCOUNTER
Spoke with patient and advised of upcoming procedure.  Patient pre work is scheduled for 8/11/2021 at 945 am.  Patient procedure is scheduled for 8/13/2021 at 600 am.  Patient advised of covid testing and visitation.  Patient advised of location time and prep.  Patient expressed understanding for all that was discussed.

## 2021-08-10 PROBLEM — L97.524 NON-PRESSURE CHRONIC ULCER OF OTHER PART OF LEFT FOOT WITH NECROSIS OF BONE: Status: ACTIVE | Noted: 2021-08-10

## 2021-08-11 ENCOUNTER — HOSPITAL ENCOUNTER (OUTPATIENT)
Dept: GENERAL RADIOLOGY | Facility: HOSPITAL | Age: 76
Discharge: HOME | End: 2021-08-11

## 2021-08-11 ENCOUNTER — PRE-ADMISSION TESTING (OUTPATIENT)
Dept: PREADMISSION TESTING | Facility: HOSPITAL | Age: 76
End: 2021-08-11

## 2021-08-11 ENCOUNTER — LAB (OUTPATIENT)
Dept: LAB | Facility: HOSPITAL | Age: 76
End: 2021-08-11

## 2021-08-11 VITALS
RESPIRATION RATE: 16 BRPM | OXYGEN SATURATION: 98 % | DIASTOLIC BLOOD PRESSURE: 62 MMHG | WEIGHT: 141.76 LBS | HEART RATE: 84 BPM | SYSTOLIC BLOOD PRESSURE: 146 MMHG | HEIGHT: 58 IN | BODY MASS INDEX: 29.76 KG/M2

## 2021-08-11 DIAGNOSIS — Z79.02 ENCOUNTER FOR MONITORING ANTIPLATELET THERAPY: ICD-10-CM

## 2021-08-11 DIAGNOSIS — Z01.818 PREOP TESTING: ICD-10-CM

## 2021-08-11 DIAGNOSIS — Z51.81 ENCOUNTER FOR MONITORING ANTIPLATELET THERAPY: ICD-10-CM

## 2021-08-11 DIAGNOSIS — L97.524 NON-PRESSURE CHRONIC ULCER OF OTHER PART OF LEFT FOOT WITH NECROSIS OF BONE (HCC): ICD-10-CM

## 2021-08-11 DIAGNOSIS — I73.9 PAD (PERIPHERAL ARTERY DISEASE) (HCC): ICD-10-CM

## 2021-08-11 LAB
ANION GAP SERPL CALCULATED.3IONS-SCNC: 9 MMOL/L (ref 5–15)
APTT PPP: 29.2 SECONDS (ref 24.1–35)
BASOPHILS # BLD AUTO: 0.03 10*3/MM3 (ref 0–0.2)
BASOPHILS NFR BLD AUTO: 0.5 % (ref 0–1.5)
BUN SERPL-MCNC: 25 MG/DL (ref 8–23)
BUN/CREAT SERPL: 14.2 (ref 7–25)
CALCIUM SPEC-SCNC: 9.8 MG/DL (ref 8.6–10.5)
CHLORIDE SERPL-SCNC: 107 MMOL/L (ref 98–107)
CO2 SERPL-SCNC: 26 MMOL/L (ref 22–29)
CREAT SERPL-MCNC: 1.76 MG/DL (ref 0.57–1)
DEPRECATED RDW RBC AUTO: 47.2 FL (ref 37–54)
EOSINOPHIL # BLD AUTO: 0.17 10*3/MM3 (ref 0–0.4)
EOSINOPHIL NFR BLD AUTO: 3 % (ref 0.3–6.2)
ERYTHROCYTE [DISTWIDTH] IN BLOOD BY AUTOMATED COUNT: 14.4 % (ref 12.3–15.4)
GFR SERPL CREATININE-BSD FRML MDRD: 28 ML/MIN/1.73
GLUCOSE SERPL-MCNC: 127 MG/DL (ref 65–99)
HCT VFR BLD AUTO: 31.6 % (ref 34–46.6)
HGB BLD-MCNC: 9.5 G/DL (ref 12–15.9)
IMM GRANULOCYTES # BLD AUTO: 0.03 10*3/MM3 (ref 0–0.05)
IMM GRANULOCYTES NFR BLD AUTO: 0.5 % (ref 0–0.5)
INR PPP: 1.02 (ref 0.91–1.09)
LYMPHOCYTES # BLD AUTO: 0.99 10*3/MM3 (ref 0.7–3.1)
LYMPHOCYTES NFR BLD AUTO: 17.7 % (ref 19.6–45.3)
MCH RBC QN AUTO: 27.2 PG (ref 26.6–33)
MCHC RBC AUTO-ENTMCNC: 30.1 G/DL (ref 31.5–35.7)
MCV RBC AUTO: 90.5 FL (ref 79–97)
MONOCYTES # BLD AUTO: 0.35 10*3/MM3 (ref 0.1–0.9)
MONOCYTES NFR BLD AUTO: 6.3 % (ref 5–12)
NEUTROPHILS NFR BLD AUTO: 4.03 10*3/MM3 (ref 1.7–7)
NEUTROPHILS NFR BLD AUTO: 72 % (ref 42.7–76)
NRBC BLD AUTO-RTO: 0 /100 WBC (ref 0–0.2)
PLATELET # BLD AUTO: 224 10*3/MM3 (ref 140–450)
PMV BLD AUTO: 9.2 FL (ref 6–12)
POTASSIUM SERPL-SCNC: 4.2 MMOL/L (ref 3.5–5.2)
PROTHROMBIN TIME: 12.6 SECONDS (ref 11.5–13.4)
RBC # BLD AUTO: 3.49 10*6/MM3 (ref 3.77–5.28)
SARS-COV-2 ORF1AB RESP QL NAA+PROBE: NOT DETECTED
SODIUM SERPL-SCNC: 142 MMOL/L (ref 136–145)
WBC # BLD AUTO: 5.6 10*3/MM3 (ref 3.4–10.8)

## 2021-08-11 PROCEDURE — 36415 COLL VENOUS BLD VENIPUNCTURE: CPT

## 2021-08-11 PROCEDURE — 80048 BASIC METABOLIC PNL TOTAL CA: CPT

## 2021-08-11 PROCEDURE — 85730 THROMBOPLASTIN TIME PARTIAL: CPT

## 2021-08-11 PROCEDURE — 71046 X-RAY EXAM CHEST 2 VIEWS: CPT

## 2021-08-11 PROCEDURE — 85025 COMPLETE CBC W/AUTO DIFF WBC: CPT

## 2021-08-11 PROCEDURE — 93005 ELECTROCARDIOGRAM TRACING: CPT

## 2021-08-11 PROCEDURE — 93010 ELECTROCARDIOGRAM REPORT: CPT | Performed by: INTERNAL MEDICINE

## 2021-08-11 PROCEDURE — C9803 HOPD COVID-19 SPEC COLLECT: HCPCS | Performed by: SURGERY

## 2021-08-11 PROCEDURE — U0004 COV-19 TEST NON-CDC HGH THRU: HCPCS | Performed by: SURGERY

## 2021-08-11 PROCEDURE — 85610 PROTHROMBIN TIME: CPT

## 2021-08-11 RX ORDER — LEFLUNOMIDE 20 MG/1
20 TABLET ORAL DAILY
COMMUNITY

## 2021-08-11 NOTE — DISCHARGE INSTRUCTIONS
DAY OF SURGERY INSTRUCTIONS        YOUR SURGEON: Dr Huddleston    PROCEDURE: Left Lower Extremity Angiogram    DATE OF SURGERY: Aug 13    ARRIVAL TIME: AS DIRECTED BY OFFICE    YOU MAY TAKE COREG THE MORNING OF SURGERY WITH A SIP OF WATER:     ALL OTHER HOME MEDICATION CHECK WITH YOUR PHYSICIAN      DO NOT TAKE EXFORGE  24 HOURS PRIOR TO SURGERY                      MANAGING PAIN AFTER SURGERY    We know you are probably wondering what your pain will be like after surgery.  Following surgery it is unrealistic to expect you will not have pain.   Pain is how our bodies let us know that something is wrong or cautions us to be careful.  That said, our goal is to make your pain tolerable.    Methods we may use to treat your pain include (oral or IV medications, PCAs, epidurals, nerve blocks, etc.)   While some procedures require IV pain medications for a short time after surgery, transitioning to pain medications by mouth allows for better management of pain.   Your nurse will encourage you to take oral pain medications whenever possible.  IV medications work almost immediately, but only last a short while.  Taking medications by mouth allows for a more constant level of medication in your blood stream for a longer period of time.      Once your pain is out of control it is harder to get back under control.  It is important you are aware when your next dose of pain medication is due.  If you are admitted, your nurse may write the time of your next dose on the white board in your room to help you remember.      We are interested in your pain and encourage you to inform us about aggravating factors during your visit.   Many times a simple repositioning every few hours can make a big difference.    If your physician says it is okay, do not let your pain prevent you from getting out of bed. Be sure to call your nurse for assistance prior to getting up so you do not fall.      Before surgery, please decide your tolerable pain  goal.  These faces help describe the pain ratings we use on a 0-10 scale.   Be prepared to tell us your goal and whether or not you take pain or anxiety medications at home.          BEFORE YOU COME TO THE HOSPITAL  (Pre-op instructions)  • Do not eat, drink, smoke or chew gum after midnight the night before surgery.  This also includes no mints.  • Morning of surgery take only the medicines you have been instructed with a sip of water unless otherwise instructed  by your physician.  • Do not shave, wear makeup or dark nail polish.  • Remove all jewelry including rings.  • Leave anything you consider valuable at home.  • Leave your suitcase in the car until after your surgery.  • Bring the following with you if applicable:  o Picture ID and insurance, Medicare or Medicaid cards  o Co-pay/deductible required by insurance (cash, check, credit card)  o Copy of advance directive, living will or power-of- documents if not brought to PAT  o CPAP or BIPAP mask and tubing  o Relaxation aids ( book, magazine), etc.  o Hearing aids                        ON THE DAY OF SURGERY  · On the day of surgery check in at registration located at the main entrance of the hospital.   ? You will be registered and given a beeper with instructions where to wait in the main lobby.  ? When your beeper lights up and vibrates a member of the Outpatient Surgery staff will meet you at the double doors under the stair steps and escort you to your preoperative room.   · You may have cloth compression devices placed on your legs. These help to prevent blood clots and reduce swelling in your legs.  · An IV may be inserted into one of your veins.  · In the operating room, you may be given one or more of the following:  ? A medicine to help you relax (sedative).  ? A medicine to numb the area (local anesthetic).  ? A medicine to make you fall asleep (general anesthetic).  ? A medicine that is injected into an area of your body to numb  "everything below the injection site (regional anesthetic).  · Your surgical site will be marked or identified.  · You may be given an antibiotic through your IV to help prevent infection.  Contact a health care provider if you:  · Develop a fever of more than 100.4°F (38°C) or other feelings of illness during the 48 hours before your surgery.  · Have symptoms that get worse.  Have questions or concerns about your surgery    General Anesthesia/Surgery, Adult  General anesthesia is the use of medicines to make a person \"go to sleep\" (unconscious) for a medical procedure. General anesthesia must be used for certain procedures, and is often recommended for procedures that:  · Last a long time.  · Require you to be still or in an unusual position.  · Are major and can cause blood loss.  The medicines used for general anesthesia are called general anesthetics. As well as making you unconscious for a certain amount of time, these medicines:  · Prevent pain.  · Control your blood pressure.  · Relax your muscles.  Tell a health care provider about:  · Any allergies you have.  · All medicines you are taking, including vitamins, herbs, eye drops, creams, and over-the-counter medicines.  · Any problems you or family members have had with anesthetic medicines.  · Types of anesthetics you have had in the past.  · Any blood disorders you have.  · Any surgeries you have had.  · Any medical conditions you have.  · Any recent upper respiratory, chest, or ear infections.  · Any history of:  ? Heart or lung conditions, such as heart failure, sleep apnea, asthma, or chronic obstructive pulmonary disease (COPD).  ?  service.  ? Depression or anxiety.  · Any tobacco or drug use, including marijuana or alcohol use.  · Whether you are pregnant or may be pregnant.  What are the risks?  Generally, this is a safe procedure. However, problems may occur, including:  · Allergic reaction.  · Lung and heart problems.  · Inhaling food or " liquid from the stomach into the lungs (aspiration).  · Nerve injury.  · Air in the bloodstream, which can lead to stroke.  · Extreme agitation or confusion (delirium) when you wake up from the anesthetic.  · Waking up during your procedure and being unable to move. This is rare.  These problems are more likely to develop if you are having a major surgery or if you have an advanced or serious medical condition. You can prevent some of these complications by answering all of your health care provider's questions thoroughly and by following all instructions before your procedure.  General anesthesia can cause side effects, including:  · Nausea or vomiting.  · A sore throat from the breathing tube.  · Hoarseness.  · Wheezing or coughing.  · Shaking chills.  · Tiredness.  · Body aches.  · Anxiety.  · Sleepiness or drowsiness.  · Confusion or agitation.  RISKS AND COMPLICATIONS OF SURGERY  Your health care provider will discuss possible risks and complications with you before surgery. Common risks and complications include:    · Problems due to the use of anesthetics.  · Blood loss and replacement (does not apply to minor surgical procedures).  · Temporary increase in pain due to surgery.  · Uncorrected pain or problems that the surgery was meant to correct.  · Infection.  · New damage.    What happens before the procedure?    Medicines  Ask your health care provider about:  · Changing or stopping your regular medicines. This is especially important if you are taking diabetes medicines or blood thinners.  · Taking medicines such as aspirin and ibuprofen. These medicines can thin your blood. Do not take these medicines unless your health care provider tells you to take them.  · Taking over-the-counter medicines, vitamins, herbs, and supplements. Do not take these during the week before your procedure unless your health care provider approves them.  General instructions  · Starting 3-6 weeks before the procedure, do not  use any products that contain nicotine or tobacco, such as cigarettes and e-cigarettes. If you need help quitting, ask your health care provider.  · If you brush your teeth on the morning of the procedure, make sure to spit out all of the toothpaste.  · Tell your health care provider if you become ill or develop a cold, cough, or fever.  · If instructed by your health care provider, bring your sleep apnea device with you on the day of your surgery (if applicable).  · Ask your health care provider if you will be going home the same day, the following day, or after a longer hospital stay.  ? Plan to have someone take you home from the hospital or clinic.  ? Plan to have a responsible adult care for you for at least 24 hours after you leave the hospital or clinic. This is important.  What happens during the procedure?  · You will be given anesthetics through both of the following:  ? A mask placed over your nose and mouth.  ? An IV in one of your veins.  · You may receive a medicine to help you relax (sedative).  · After you are unconscious, a breathing tube may be inserted down your throat to help you breathe. This will be removed before you wake up.  · An anesthesia specialist will stay with you throughout your procedure. He or she will:  ? Keep you comfortable and safe by continuing to give you medicines and adjusting the amount of medicine that you get.  ? Monitor your blood pressure, pulse, and oxygen levels to make sure that the anesthetics do not cause any problems.  The procedure may vary among health care providers and hospitals.  What happens after the procedure?  · Your blood pressure, temperature, heart rate, breathing rate, and blood oxygen level will be monitored until the medicines you were given have worn off.  · You will wake up in a recovery area. You may wake up slowly.  · If you feel anxious or agitated, you may be given medicine to help you calm down.  · If you will be going home the same day, your  health care provider may check to make sure you can walk, drink, and urinate.  · Your health care provider will treat any pain or side effects you have before you go home.  · Do not drive for 24 hours if you were given a sedative.  Summary  · General anesthesia is used to keep you still and prevent pain during a procedure.  · It is important to tell your healthcare provider about your medical history and any surgeries you have had, and previous experience with anesthesia.  · Follow your healthcare provider’s instructions about when to stop eating, drinking, or taking certain medicines before your procedure.  · Plan to have someone take you home from the hospital or clinic.  This information is not intended to replace advice given to you by your health care provider. Make sure you discuss any questions you have with your health care provider.  Document Released: 03/26/2009 Document Revised: 08/03/2018 Document Reviewed: 08/03/2018  [employer] Interactive Patient Education © 2019 [employer]       Fall Prevention in Hospitals, Adult  As a hospital patient, your condition and the treatments you receive can increase your risk for falls. Some additional risk factors for falls in a hospital include:  · Being in an unfamiliar environment.  · Being on bed rest.  · Your surgery.  · Taking certain medicines.  · Your tubing requirements, such as intravenous (IV) therapy or catheters.  It is important that you learn how to decrease fall risks while at the hospital. Below are important tips that can help prevent falls.  SAFETY TIPS FOR PREVENTING FALLS  Talk about your risk of falling.  · Ask your health care provider why you are at risk for falling. Is it your medicine, illness, tubing placement, or something else?  · Make a plan with your health care provider to keep you safe from falls.  · Ask your health care provider or pharmacist about side effects of your medicines. Some medicines can make you dizzy or affect your  coordination.  Ask for help.  · Ask for help before getting out of bed. You may need to press your call button.  · Ask for assistance in getting safely to the toilet.  · Ask for a walker or cane to be put at your bedside. Ask that most of the side rails on your bed be placed up before your health care provider leaves the room.  · Ask family or friends to sit with you.  · Ask for things that are out of your reach, such as your glasses, hearing aids, telephone, bedside table, or call button.  Follow these tips to avoid falling:  · Stay lying or seated, rather than standing, while waiting for help.  · Wear rubber-soled slippers or shoes whenever you walk in the hospital.  · Avoid quick, sudden movements.  ¨ Change positions slowly.  ¨ Sit on the side of your bed before standing.  ¨ Stand up slowly and wait before you start to walk.  · Let your health care provider know if there is a spill on the floor.  · Pay careful attention to the medical equipment, electrical cords, and tubes around you.  · When you need help, use your call button by your bed or in the bathroom. Wait for one of your health care providers to help you.  · If you feel dizzy or unsure of your footing, return to bed and wait for assistance.  · Avoid being distracted by the TV, telephone, or another person in your room.  · Do not lean or support yourself on rolling objects, such as IV poles or bedside tables.     This information is not intended to replace advice given to you by your health care provider. Make sure you discuss any questions you have with your health care provider.     Document Released: 12/15/2001 Document Revised: 01/08/2016 Document Reviewed: 08/25/2013  [employer] Interactive Patient Education ©2016 [employer]       [facility]  CHG 4% Patient Instruction Sheet    Chlorhexidine Before Surgery  Chlorhexidine gluconate (CHG) is a germ-killing (antiseptic) solution that is used to clean the skin. It gets rid of the bacteria  that normally live on the skin. Cleaning your skin with CHG before surgery helps lower the risk for infection after surgery.    How to use CHG solution  · You will take 2 showers, one shower the night before surgery, the second shower the morning of surgery before coming to the hospital.  · Use CHG only as told by your health care provider, and follow the instructions on the label.  · Use CHG solution while taking a shower. Follow these steps when using CHG solution (unless your health care provider gives you different instructions):  1. Start the shower.  2. Use your normal soap and shampoo to wash your face and hair.  3. Turn off the shower or move out of the shower stream.  4. Pour the CHG onto a clean washcloth. Do not use any type of brush or rough-edged sponge.  5. Starting at your neck, lather your body down to your toes. Make sure you:  6. Pay special attention to the part of your body where you will be having surgery. Scrub this area for at least 1 minute.  7. Use the full amount of CHG as directed. Usually, this is one half bottle for each shower.  8. Do not use CHG on your head or face. If the solution gets into your ears or eyes, rinse them well with water.  9. Avoid your genital area.  10. Avoid any areas of skin that have broken skin, cuts, or scrapes.  11. Scrub your back and under your arms. Make sure to wash skin folds.  12. Let the lather sit on your skin for 1-2 minutes or as long as told by your health care  provider.  13. Thoroughly rinse your entire body in the shower. Make sure that all body creases and crevices are rinsed well.  14. Dry off with a clean towel. Do not put any substances on your body afterward, such as powder, lotion, or perfume.  15. Put on clean clothes or pajamas.  16. If it is the night before your surgery, sleep in clean sheets.    What are the risks?  Risks of using CHG include:  · A skin reaction.  · Hearing loss, if CHG gets in your ears.  · Eye injury, if CHG gets in  your eyes and is not rinsed out.  · The CHG product catching fire.  Make sure that you avoid smoking and flames after applying CHG to your skin.  Do not use CHG:  · If you have a chlorhexidine allergy or have previously reacted to chlorhexidine.  · On babies younger than 2 months of age.      On the day of surgery, when you are taken to your room in Outpatient Surgery you will be given a CHG prepackaged cloth to wipe the site for your surgery.  How to use CHG prepackaged cloths  · Follow the instructions on the label.  · Use the CHG cloth on clean, dry skin. Follow these steps when using a CHG cloth (unless your health care provider gives you different instructions):  1. Using the CHG cloth, vigorously scrub the part of your body where you will be having surgery. Scrub using a back-and-forth motion for 3 minutes. The area on your body should be completely wet with CHG when you are finished scrubbing.  2. Do not rinse. Discard the cloth and let the area air-dry for 1 minute. Do not put any substances on your body afterward, such as powder, lotion, or perfume.  Contact a health care provider if:  · Your skin gets irritated after scrubbing.  · You have questions about using your solution or cloth.  Get help right away if:  · Your eyes become very red or swollen.  · Your eyes itch badly.  · Your skin itches badly and is red or swollen.  · Your hearing changes.  · You have trouble seeing.  · You have swelling or tingling in your mouth or throat.  · You have trouble breathing.  · You swallow any chlorhexidine.  Summary  · Chlorhexidine gluconate (CHG) is a germ-killing (antiseptic) solution that is used to clean the skin. Cleaning your skin with CHG before surgery helps lower the risk for infection after surgery.  · You may be given CHG to use at home. It may be in a bottle or in a prepackaged cloth to use on your skin. Carefully follow your health care provider's instructions and the instructions on the product  label.  · Do not use CHG if you have a chlorhexidine allergy.  · Contact your health care provider if your skin gets irritated after scrubbing.  This information is not intended to replace advice given to you by your health care provider. Make sure you discuss any questions you have with your health care provider.  Document Released: 09/11/2013 Document Revised: 11/15/2018 Document Reviewed: 11/15/2018  [employer] Interactive Patient Education © 2019 [employer]          PATIENT/FAMILY/RESPONSIBLE PARTY VERBALIZES UNDERSTANDING OF ABOVE EDUCATION.  COPY OF PAIN SCALE GIVEN AND REVIEWED WITH VERBALIZED UNDERSTANDING.

## 2021-08-12 ENCOUNTER — TELEPHONE (OUTPATIENT)
Dept: VASCULAR SURGERY | Facility: CLINIC | Age: 76
End: 2021-08-12

## 2021-08-12 LAB
QT INTERVAL: 388 MS
QTC INTERVAL: 444 MS

## 2021-08-12 NOTE — H&P
8/12/2021         Annabella Wallis PA  [address]  [address]  [place] [address]     Nuha Cali  1945          Chief Complaint   Patient presents with   • Establish Care       Referred over by [facility] Annabella FONTAINE for Peripheral Artery Disease. Patient denies any stroke like symptoms.    • Non Smoker       Patient is a Non Smoker    • other       patient states her toes on the left side has infection they are concerned with the blood flow to see about amputation.    • Med Management       Verbally verified medicaitons with patient/son          Dear ZHEN Garcia:     HPI  I had the pleasure of seeing your patient Nuha Cali in the office today.  Thank you kindly for this consultation.  As you recall, Nuha Cali is a 76 y.o.  female who you are currently following for routine health maintenance.  She bumped her left second toe on the door about 7 weeks ago.  She is being treated in [facility].  She has an appointment for vascular testing on 8/19/2021.  She is diabetic, diet controlled.  She is maintained on Pravachol.   She had [facility] screening ABIs back in May 2021 which showed evidence of severe disease in the left lower extremity.     Past Medical History:   Diagnosis Date   • Arthritis    • Chronic kidney disease     stage 3   • Diabetes mellitus (CMS/HCC)    • Elevated cholesterol    • GERD (gastroesophageal reflux disease)    • History of transfusion    • Hx of colonic polyp    • Hyperlipidemia    • Hypertension    • PONV (postoperative nausea and vomiting)    • Rheumatoid arthritis (CMS/HCC)    • Vitamin D deficiency      Past Surgical History:   Procedure Laterality Date   • ARM LESION/CYST EXCISION      nodules   • BACK SURGERY      x 2   • BREAST BIOPSY      x 2   • CATARACT EXTRACTION Bilateral     with lens inplant   • COLON RESECTION N/A 9/14/2018    Procedure: COLON RESECTION LAPAROSCOPIC SIGMOID OR LOW ANTERIOR, hand-assisted sigmoidectomy with  "takedown colovaginal fistula; placement bilateral ureteral stents Dr. Hart;  Surgeon: Princess Demarco MD;  Location:  [facility] OR;  Service: General   • COLONOSCOPY  01/19/2015    Diverticulosis repeat exam in 5 years   • COLONOSCOPY W/ POLYPECTOMY  11/24/2009    Tubular adenoma cecal pit mild atypia repeat exam in 5 years   • CYSTOSCOPY, RETROGRADE PYELOGRAM AND STENT INSERTION Bilateral 9/14/2018    Procedure: CYSTOSCOPY BILATERAL RETROGRADE PYELOGRAM AND BILATERAL STENT INSERTION;  Surgeon: Dick Hart MD;  Location:  [facility] OR;  Service: Urology   • ENDOSCOPY  1999    Chronic active gastritis severe with focal superficial ulceration urea + treated   • HYSTERECTOMY  1981    Heavy, painful periods.    • TOTAL KNEE ARTHROPLASTY      x 2     Family History   Problem Relation Age of Onset   • Hypertension Other    • Diabetes Other    • Coronary artery disease Other    • Cancer Other    • No Known Problems Son    • No Known Problems Son    • No Known Problems Son    • Breast cancer Other    • Colon cancer Neg Hx    • Colon polyps Neg Hx    • Ovarian cancer Neg Hx      Social History     Tobacco Use   • Smoking status: Never Smoker   • Smokeless tobacco: Never Used   Substance Use Topics   • Alcohol use: No   • Drug use: No     Allergies   Allergen Reactions   • Aspirin Anaphylaxis and Swelling          Review of Systems   Constitutional: Negative.    HENT: Negative.    Eyes: Negative.    Respiratory: Negative.    Cardiovascular: Negative.    Gastrointestinal: Negative.    Endocrine: Negative.    Genitourinary: Negative.    Musculoskeletal: Negative.    Skin: Positive for wound.   Allergic/Immunologic: Negative.    Neurological: Negative.    Hematological: Negative.    Psychiatric/Behavioral: Negative.    All other systems reviewed and are negative.        /80 (BP Location: Left arm, Patient Position: Sitting, Cuff Size: Adult)   Pulse 76   Ht 149.9 cm (59\")   Wt 64.4 kg (142 lb)   SpO2 97%   BMI 28.68 " kg/m²   Physical Exam  Vitals and nursing note reviewed.   Constitutional:       Appearance: She is well-developed.   HENT:      Head: Normocephalic and atraumatic.   Eyes:      General: No scleral icterus.     Pupils: Pupils are equal, round, and reactive to light.   Neck:      Thyroid: No thyromegaly.      Vascular: No carotid bruit or JVD.   Cardiovascular:      Rate and Rhythm: Normal rate and regular rhythm.      Pulses:           Carotid pulses are 2+ on the right side and 2+ on the left side.       Femoral pulses are 2+ on the right side and 2+ on the left side.       Popliteal pulses are 2+ on the right side and 2+ on the left side.        Dorsalis pedis pulses are detected w/ Doppler on the right side and detected w/ Doppler on the left side.        Posterior tibial pulses are detected w/ Doppler on the right side and detected w/ Doppler on the left side.      Heart sounds: Normal heart sounds.   Pulmonary:      Effort: Pulmonary effort is normal.      Breath sounds: Normal breath sounds.   Abdominal:      General: Bowel sounds are normal. There is no distension or abdominal bruit.      Palpations: Abdomen is soft. There is no mass.      Tenderness: There is no abdominal tenderness.   Musculoskeletal:         General: Normal range of motion.      Cervical back: Neck supple.   Lymphadenopathy:      Cervical: No cervical adenopathy.   Skin:     General: Skin is warm and dry.   Neurological:      Mental Status: She is alert and oriented to person, place, and time.      Cranial Nerves: No cranial nerve deficit.      Sensory: No sensory deficit.          No results found.         Patient Active Problem List   Diagnosis   • Anemia   • Heme positive stool   • HTN (hypertension), benign   • Change in bowel habits   • Controlled type 2 diabetes mellitus without complication, without long-term current use of insulin (CMS/[facility])   • Colovaginal fistula   • PAD (peripheral artery disease) (CMS/[facility])         Visit  Diagnosis       ICD-10-CM ICD-9-CM   1. Atherosclerosis of native arteries of the extremities with ulceration (CMS/HCC)  I70.25 440.23       707.9   2. PAD (peripheral artery disease) (CMS/[facility])  I73.9 443.9   3. Preop testing  Z01.818 V72.84   4. Encounter for monitoring antiplatelet therapy  Z51.81 V58.83     Z79.02 V58.63   5. Controlled type 2 diabetes mellitus without complication, without long-term current use of insulin (CMS/HCC)  E11.9 250.00                 Plan: After thoroughly evaluating Nuha Cali, I believe the best course of action is to proceed with a left lower extremity angiogram with possible concomitant therapeutic intervention.  Patient has a nonhealing wound of the left foot that requires restoration of blood flow for healing potential.  She had ABIs performed today which show evidence of severe disease in the left leg. Risks of angiogram were discussed.  These include, but are not limited to, bleeding, infection, vessel damage, nerve damage, embolus, and loss of limb.  The patient understands these risks and wishes to proceed with procedure.  I did discuss vascular risk factors as they pertain to the progression of vascular disease including controlling type 2 diabetes mellitus.  This risk factors currently controlled. We will get this scheduled in the next 2 weeks. The patient can continue taking their current medication regimen as previously planned.  This was all discussed in full with complete understanding.  Thank you for allowing me to participate in the care of your patient.  Please do not hesitate with any questions or concerns.  I will keep you aware of any further encounters with Nuha Cali.           Sincerely yours,        Peter Huddleston, DO

## 2021-08-13 ENCOUNTER — HOSPITAL ENCOUNTER (OUTPATIENT)
Facility: HOSPITAL | Age: 76
Setting detail: HOSPITAL OUTPATIENT SURGERY
Discharge: HOME | End: 2021-08-13
Attending: SURGERY | Admitting: SURGERY

## 2021-08-13 ENCOUNTER — ANESTHESIA (OUTPATIENT)
Dept: PERIOP | Facility: HOSPITAL | Age: 76
End: 2021-08-13

## 2021-08-13 ENCOUNTER — APPOINTMENT (OUTPATIENT)
Dept: INTERVENTIONAL RADIOLOGY/VASCULAR | Facility: HOSPITAL | Age: 76
End: 2021-08-13

## 2021-08-13 ENCOUNTER — ANESTHESIA EVENT (OUTPATIENT)
Dept: PERIOP | Facility: HOSPITAL | Age: 76
End: 2021-08-13

## 2021-08-13 VITALS
SYSTOLIC BLOOD PRESSURE: 156 MMHG | RESPIRATION RATE: 16 BRPM | TEMPERATURE: 98 F | HEART RATE: 87 BPM | OXYGEN SATURATION: 95 % | DIASTOLIC BLOOD PRESSURE: 58 MMHG

## 2021-08-13 DIAGNOSIS — Z01.818 PREOP TESTING: ICD-10-CM

## 2021-08-13 DIAGNOSIS — I73.9 PAD (PERIPHERAL ARTERY DISEASE) (HCC): ICD-10-CM

## 2021-08-13 LAB
ABO GROUP BLD: NORMAL
BLD GP AB SCN SERPL QL: NEGATIVE
GLUCOSE BLDC GLUCOMTR-MCNC: 136 MG/DL (ref 70–130)
GLUCOSE BLDC GLUCOMTR-MCNC: 163 MG/DL (ref 70–130)
RH BLD: POSITIVE
T&S EXPIRATION DATE: NORMAL

## 2021-08-13 PROCEDURE — 75710 ARTERY X-RAYS ARM/LEG: CPT

## 2021-08-13 PROCEDURE — 75625 CONTRAST EXAM ABDOMINL AORTA: CPT

## 2021-08-13 PROCEDURE — 75710 ARTERY X-RAYS ARM/LEG: CPT | Performed by: SURGERY

## 2021-08-13 PROCEDURE — C1760 CLOSURE DEV, VASC: HCPCS | Performed by: SURGERY

## 2021-08-13 PROCEDURE — 86901 BLOOD TYPING SEROLOGIC RH(D): CPT | Performed by: SURGERY

## 2021-08-13 PROCEDURE — 36247 INS CATH ABD/L-EXT ART 3RD: CPT | Performed by: SURGERY

## 2021-08-13 PROCEDURE — C1769 GUIDE WIRE: HCPCS | Performed by: SURGERY

## 2021-08-13 PROCEDURE — 75625 CONTRAST EXAM ABDOMINL AORTA: CPT | Performed by: SURGERY

## 2021-08-13 PROCEDURE — 25010000002 IOPAMIDOL 61 % SOLUTION: Performed by: SURGERY

## 2021-08-13 PROCEDURE — C1894 INTRO/SHEATH, NON-LASER: HCPCS | Performed by: SURGERY

## 2021-08-13 PROCEDURE — C1887 CATHETER, GUIDING: HCPCS | Performed by: SURGERY

## 2021-08-13 PROCEDURE — 82962 GLUCOSE BLOOD TEST: CPT

## 2021-08-13 PROCEDURE — 94799 UNLISTED PULMONARY SVC/PX: CPT

## 2021-08-13 PROCEDURE — 25010000002 CEFAZOLIN PER 500 MG: Performed by: SURGERY

## 2021-08-13 PROCEDURE — 25010000002 HEPARIN (PORCINE) PER 1000 UNITS: Performed by: SURGERY

## 2021-08-13 PROCEDURE — 86850 RBC ANTIBODY SCREEN: CPT | Performed by: SURGERY

## 2021-08-13 PROCEDURE — 76000 FLUOROSCOPY <1 HR PHYS/QHP: CPT

## 2021-08-13 PROCEDURE — 25010000002 PROPOFOL 10 MG/ML EMULSION: Performed by: NURSE ANESTHETIST, CERTIFIED REGISTERED

## 2021-08-13 PROCEDURE — 86900 BLOOD TYPING SEROLOGIC ABO: CPT | Performed by: SURGERY

## 2021-08-13 PROCEDURE — 25010000002 HEPARIN (PORCINE) PER 1000 UNITS: Performed by: NURSE ANESTHETIST, CERTIFIED REGISTERED

## 2021-08-13 RX ORDER — LIDOCAINE HYDROCHLORIDE 20 MG/ML
INJECTION, SOLUTION EPIDURAL; INFILTRATION; INTRACAUDAL; PERINEURAL AS NEEDED
Status: DISCONTINUED | OUTPATIENT
Start: 2021-08-13 | End: 2021-08-13 | Stop reason: SURG

## 2021-08-13 RX ORDER — LABETALOL HYDROCHLORIDE 5 MG/ML
5 INJECTION, SOLUTION INTRAVENOUS
Status: DISCONTINUED | OUTPATIENT
Start: 2021-08-13 | End: 2021-08-13 | Stop reason: HOSPADM

## 2021-08-13 RX ORDER — NALOXONE HCL 0.4 MG/ML
0.4 VIAL (ML) INJECTION AS NEEDED
Status: DISCONTINUED | OUTPATIENT
Start: 2021-08-13 | End: 2021-08-13 | Stop reason: HOSPADM

## 2021-08-13 RX ORDER — SODIUM CHLORIDE, SODIUM LACTATE, POTASSIUM CHLORIDE, CALCIUM CHLORIDE 600; 310; 30; 20 MG/100ML; MG/100ML; MG/100ML; MG/100ML
1000 INJECTION, SOLUTION INTRAVENOUS CONTINUOUS
Status: DISCONTINUED | OUTPATIENT
Start: 2021-08-13 | End: 2021-08-13 | Stop reason: HOSPADM

## 2021-08-13 RX ORDER — PROPOFOL 10 MG/ML
VIAL (ML) INTRAVENOUS AS NEEDED
Status: DISCONTINUED | OUTPATIENT
Start: 2021-08-13 | End: 2021-08-13 | Stop reason: SURG

## 2021-08-13 RX ORDER — DEXTROSE MONOHYDRATE 25 G/50ML
12.5 INJECTION, SOLUTION INTRAVENOUS AS NEEDED
Status: DISCONTINUED | OUTPATIENT
Start: 2021-08-13 | End: 2021-08-13 | Stop reason: HOSPADM

## 2021-08-13 RX ORDER — LIDOCAINE HYDROCHLORIDE 10 MG/ML
0.5 INJECTION, SOLUTION EPIDURAL; INFILTRATION; INTRACAUDAL; PERINEURAL ONCE AS NEEDED
Status: DISCONTINUED | OUTPATIENT
Start: 2021-08-13 | End: 2021-08-13 | Stop reason: HOSPADM

## 2021-08-13 RX ORDER — HYDROCODONE BITARTRATE AND ACETAMINOPHEN 5; 325 MG/1; MG/1
1 TABLET ORAL ONCE AS NEEDED
Status: DISCONTINUED | OUTPATIENT
Start: 2021-08-13 | End: 2021-08-13 | Stop reason: HOSPADM

## 2021-08-13 RX ORDER — FENTANYL CITRATE 50 UG/ML
25 INJECTION, SOLUTION INTRAMUSCULAR; INTRAVENOUS
Status: DISCONTINUED | OUTPATIENT
Start: 2021-08-13 | End: 2021-08-13 | Stop reason: HOSPADM

## 2021-08-13 RX ORDER — SODIUM CHLORIDE 9 MG/ML
100 INJECTION, SOLUTION INTRAVENOUS CONTINUOUS
Status: DISPENSED | OUTPATIENT
Start: 2021-08-13 | End: 2021-08-13

## 2021-08-13 RX ORDER — ONDANSETRON 2 MG/ML
4 INJECTION INTRAMUSCULAR; INTRAVENOUS ONCE AS NEEDED
Status: DISCONTINUED | OUTPATIENT
Start: 2021-08-13 | End: 2021-08-13 | Stop reason: HOSPADM

## 2021-08-13 RX ORDER — HEPARIN SODIUM 1000 [USP'U]/ML
INJECTION, SOLUTION INTRAVENOUS; SUBCUTANEOUS AS NEEDED
Status: DISCONTINUED | OUTPATIENT
Start: 2021-08-13 | End: 2021-08-13 | Stop reason: SURG

## 2021-08-13 RX ORDER — OXYCODONE AND ACETAMINOPHEN 7.5; 325 MG/1; MG/1
2 TABLET ORAL EVERY 4 HOURS PRN
Status: DISCONTINUED | OUTPATIENT
Start: 2021-08-13 | End: 2021-08-13 | Stop reason: HOSPADM

## 2021-08-13 RX ORDER — FLUMAZENIL 0.1 MG/ML
0.2 INJECTION INTRAVENOUS AS NEEDED
Status: DISCONTINUED | OUTPATIENT
Start: 2021-08-13 | End: 2021-08-13 | Stop reason: HOSPADM

## 2021-08-13 RX ORDER — OXYCODONE AND ACETAMINOPHEN 10; 325 MG/1; MG/1
1 TABLET ORAL ONCE AS NEEDED
Status: DISCONTINUED | OUTPATIENT
Start: 2021-08-13 | End: 2021-08-13 | Stop reason: HOSPADM

## 2021-08-13 RX ORDER — SODIUM CHLORIDE 0.9 % (FLUSH) 0.9 %
3 SYRINGE (ML) INJECTION AS NEEDED
Status: DISCONTINUED | OUTPATIENT
Start: 2021-08-13 | End: 2021-08-13 | Stop reason: HOSPADM

## 2021-08-13 RX ORDER — ALLOPURINOL 100 MG/1
100 TABLET ORAL DAILY
COMMUNITY
End: 2022-03-31

## 2021-08-13 RX ORDER — SODIUM CHLORIDE 0.9 % (FLUSH) 0.9 %
10 SYRINGE (ML) INJECTION AS NEEDED
Status: DISCONTINUED | OUTPATIENT
Start: 2021-08-13 | End: 2021-08-13 | Stop reason: HOSPADM

## 2021-08-13 RX ORDER — BUPIVACAINE HYDROCHLORIDE 5 MG/ML
INJECTION, SOLUTION EPIDURAL; INTRACAUDAL AS NEEDED
Status: DISCONTINUED | OUTPATIENT
Start: 2021-08-13 | End: 2021-08-13 | Stop reason: HOSPADM

## 2021-08-13 RX ORDER — SODIUM CHLORIDE 0.9 % (FLUSH) 0.9 %
10 SYRINGE (ML) INJECTION EVERY 12 HOURS SCHEDULED
Status: DISCONTINUED | OUTPATIENT
Start: 2021-08-13 | End: 2021-08-13 | Stop reason: HOSPADM

## 2021-08-13 RX ORDER — BUPIVACAINE HCL/0.9 % NACL/PF 0.1 %
2 PLASTIC BAG, INJECTION (ML) EPIDURAL ONCE
Status: COMPLETED | OUTPATIENT
Start: 2021-08-13 | End: 2021-08-13

## 2021-08-13 RX ORDER — IBUPROFEN 600 MG/1
600 TABLET ORAL ONCE AS NEEDED
Status: DISCONTINUED | OUTPATIENT
Start: 2021-08-13 | End: 2021-08-13 | Stop reason: HOSPADM

## 2021-08-13 RX ORDER — SODIUM CHLORIDE, SODIUM LACTATE, POTASSIUM CHLORIDE, CALCIUM CHLORIDE 600; 310; 30; 20 MG/100ML; MG/100ML; MG/100ML; MG/100ML
9 INJECTION, SOLUTION INTRAVENOUS CONTINUOUS
Status: DISCONTINUED | OUTPATIENT
Start: 2021-08-13 | End: 2021-08-13 | Stop reason: HOSPADM

## 2021-08-13 RX ADMIN — PROPOFOL 125 MCG/KG/MIN: 10 INJECTION, EMULSION INTRAVENOUS at 07:52

## 2021-08-13 RX ADMIN — SODIUM CHLORIDE, POTASSIUM CHLORIDE, SODIUM LACTATE AND CALCIUM CHLORIDE 1000 ML: 600; 310; 30; 20 INJECTION, SOLUTION INTRAVENOUS at 06:12

## 2021-08-13 RX ADMIN — HEPARIN SODIUM 1000 UNITS: 1000 INJECTION, SOLUTION INTRAVENOUS; SUBCUTANEOUS at 08:11

## 2021-08-13 RX ADMIN — LIDOCAINE HYDROCHLORIDE 80 MG: 20 INJECTION, SOLUTION EPIDURAL; INFILTRATION; INTRACAUDAL; PERINEURAL at 07:51

## 2021-08-13 RX ADMIN — HEPARIN SODIUM 3000 UNITS: 1000 INJECTION, SOLUTION INTRAVENOUS; SUBCUTANEOUS at 08:19

## 2021-08-13 RX ADMIN — PROPOFOL 50 MG: 10 INJECTION, EMULSION INTRAVENOUS at 07:51

## 2021-08-13 RX ADMIN — Medication 2 G: at 07:53

## 2021-08-13 NOTE — DISCHARGE INSTRUCTIONS
YOUR NEXT PAIN MEDICATION IS DUE AT______________        Moderate Conscious Sedation, Adult, Care After  Refer to this sheet in the next few weeks. These instructions provide you with information on caring for yourself after your procedure. Your health care provider may also give you more specific instructions. Your treatment has been planned according to current medical practices, but problems sometimes occur. Call your health care provider if you have any problems or questions after your procedure.  WHAT TO EXPECT AFTER THE PROCEDURE    After your procedure:  · You may feel sleepy, clumsy, and have poor balance for several hours.  · Vomiting may occur if you eat too soon after the procedure.  HOME CARE INSTRUCTIONS  · Do not participate in any activities where you could become injured for at least 24 hours. Do not:  ¨ Drive.  ¨ Swim.  ¨ Ride a bicycle.  ¨ Operate heavy machinery.  ¨ Cook.  ¨ Use power tools.  ¨ Climb ladders.  ¨ Work from a high place.  · Do not make important decisions or sign legal documents until you are improved.  · If you vomit, drink water, juice, or soup when you can drink without vomiting. Make sure you have little or no nausea before eating solid foods.  · Only take over-the-counter or prescription medicines for pain, discomfort, or fever as directed by your health care provider.  · Make sure you and your family fully understand everything about the medicines given to you, including what side effects may occur.  · You should not drink alcohol, take sleeping pills, or take medicines that cause drowsiness for at least 24 hours.  · If you smoke, do not smoke without supervision.  · If you are feeling better, you may resume normal activities 24 hours after you were sedated.  · Keep all appointments with your health care provider.  SEEK MEDICAL CARE IF:  · Your skin is pale or bluish in color.  · You continue to feel nauseous or vomit.  · Your pain is getting worse and is not helped by  medicine.  · You have bleeding or swelling.  · You are still sleepy or feeling clumsy after 24 hours.  SEEK IMMEDIATE MEDICAL CARE IF:  · You develop a rash.  · You have difficulty breathing.  · You develop any type of allergic problem.  · You have a fever.  MAKE SURE YOU:  · Understand these instructions.  · Will watch your condition.  · Will get help right away if you are not doing well or get worse.     This information is not intended to replace advice given to you by your health care provider. Make sure you discuss any questions you have with your health care provider.     Document Released: 10/08/2014 Document Revised: 01/08/2016 Document Reviewed: 10/08/2014  [employer] Interactive Patient Education ©2016 [employer]         CALL YOUR PHYSICIAN IF YOU EXPERIENCE  INCREASED PAIN NOT HELPED BY YOUR PAIN MEDICATION.        Fall Prevention in the Home      Falls can cause injuries. They can happen to people of all ages. There are many things you can do to make your home safe and to help prevent falls.    WHAT CAN I DO ON THE OUTSIDE OF MY HOME?  · Regularly fix the edges of walkways and driveways and fix any cracks.  · Remove anything that might make you trip as you walk through a door, such as a raised step or threshold.  · Trim any bushes or trees on the path to your home.  · Use bright outdoor lighting.  · Clear any walking paths of anything that might make someone trip, such as rocks or tools.  · Regularly check to see if handrails are loose or broken. Make sure that both sides of any steps have handrails.  · Any raised decks and porches should have guardrails on the edges.  · Have any leaves, snow, or ice cleared regularly.  · Use sand or salt on walking paths during winter.  · Clean up any spills in your garage right away. This includes oil or grease spills.  WHAT CAN I DO IN THE BATHROOM?    · Use night lights.  · Install grab bars by the toilet and in the tub and shower. Do not use towel bars as grab  bars.  · Use non-skid mats or decals in the tub or shower.  · If you need to sit down in the shower, use a plastic, non-slip stool.  · Keep the floor dry. Clean up any water that spills on the floor as soon as it happens.  · Remove soap buildup in the tub or shower regularly.  · Attach bath mats securely with double-sided non-slip rug tape.  · Do not have throw rugs and other things on the floor that can make you trip.  WHAT CAN I DO IN THE BEDROOM?  · Use night lights.  · Make sure that you have a light by your bed that is easy to reach.  · Do not use any sheets or blankets that are too big for your bed. They should not hang down onto the floor.  · Have a firm chair that has side arms. You can use this for support while you get dressed.  · Do not have throw rugs and other things on the floor that can make you trip.  WHAT CAN I DO IN THE KITCHEN?  · Clean up any spills right away.  · Avoid walking on wet floors.  · Keep items that you use a lot in easy-to-reach places.  · If you need to reach something above you, use a strong step stool that has a grab bar.  · Keep electrical cords out of the way.  · Do not use floor polish or wax that makes floors slippery. If you must use wax, use non-skid floor wax.  · Do not have throw rugs and other things on the floor that can make you trip.  WHAT CAN I DO WITH MY STAIRS?  · Do not leave any items on the stairs.  · Make sure that there are handrails on both sides of the stairs and use them. Fix handrails that are broken or loose. Make sure that handrails are as long as the stairways.  · Check any carpeting to make sure that it is firmly attached to the stairs. Fix any carpet that is loose or worn.  · Avoid having throw rugs at the top or bottom of the stairs. If you do have throw rugs, attach them to the floor with carpet tape.  · Make sure that you have a light switch at the top of the stairs and the bottom of the stairs. If you do not have them, ask someone to add them for  you.  WHAT ELSE CAN I DO TO HELP PREVENT FALLS?  · Wear shoes that:  ¨ Do not have high heels.  ¨ Have rubber bottoms.  ¨ Are comfortable and fit you well.  ¨ Are closed at the toe. Do not wear sandals.  · If you use a stepladder:  ¨ Make sure that it is fully opened. Do not climb a closed stepladder.  ¨ Make sure that both sides of the stepladder are locked into place.  ¨ Ask someone to hold it for you, if possible.  · Clearly claus and make sure that you can see:  ¨ Any grab bars or handrails.  ¨ First and last steps.  ¨ Where the edge of each step is.  · Use tools that help you move around (mobility aids) if they are needed. These include:  ¨ Canes.  ¨ Walkers.  ¨ Scooters.  ¨ Crutches.  · Turn on the lights when you go into a dark area. Replace any light bulbs as soon as they burn out.  · Set up your furniture so you have a clear path. Avoid moving your furniture around.  · If any of your floors are uneven, fix them.  · If there are any pets around you, be aware of where they are.  · Review your medicines with your doctor. Some medicines can make you feel dizzy. This can increase your chance of falling.  Ask your doctor what other things that you can do to help prevent falls.     This information is not intended to replace advice given to you by your health care provider. Make sure you discuss any questions you have with your health care provider.     Document Released: 10/14/2010 Document Revised: 05/03/2016 Document Reviewed: 01/22/2016  Elsevier Interactive Patient Education ©2016 [employer]     PATIENT/FAMILY/RESPONSIBLE PARTY VERBALIZES UNDERSTANDING OF ABOVE EDUCATION.  COPY OF PAIN SCALE GIVEN AND REVIEWED WITH VERBALIZED UNDERSTANDING.

## 2021-08-13 NOTE — ANESTHESIA POSTPROCEDURE EVALUATION
Patient: Nuha Cali    Procedure Summary     Date: 08/13/21 Room / Location:  [facility] OR  /  [facility] 12    Anesthesia Start: 0745 Anesthesia Stop: 0901    Procedure: LEFT LOWER EXTREMITY ANGIOGRAM (Left Leg Lower) Diagnosis:       PAD (peripheral artery disease) (CMS/HCC)      Preop testing      (PAD (peripheral artery disease) (CMS/HCC) [I73.9])      (Preop testing [Z01.818])    Surgeons: Peter Huddleston DO Provider: Tyron Vogel CRNA    Anesthesia Type: MAC ASA Status: 3          Anesthesia Type: MAC    Vitals  Vitals Value Taken Time   /65 08/13/21 0910   Temp 98 °F (36.7 °C) 08/13/21 0910   Pulse 86 08/13/21 0913   Resp 16 08/13/21 0910   SpO2 95 % 08/13/21 0913   Vitals shown include unvalidated device data.        Post Anesthesia Care and Evaluation    Patient location during evaluation: PACU  Patient participation: complete - patient participated  Level of consciousness: awake and alert  Pain management: adequate  Airway patency: patent  Anesthetic complications: No anesthetic complications    Cardiovascular status: acceptable  Respiratory status: acceptable  Hydration status: acceptable    Comments: Blood pressure 150/61, pulse 86, temperature 98 °F (36.7 °C), temperature source Temporal, resp. rate 16, SpO2 97 %, not currently breastfeeding.    Pt discharged from PACU based on fox score >8

## 2021-08-13 NOTE — OP NOTE
Nuha Cali  8/13/2021     PREOPERATIVE DIAGNOSIS: PAD (peripheral artery disease) (CMS/[facility]) [I73.9]  Preop testing [Z01.818]     POSTOPERATIVE DIAGNOSIS: Post-Op Diagnosis Codes:     * PAD (peripheral artery disease) (CMS/[facility]) [I73.9]     * Preop testing [Z01.818]     PROCEDURE PERFORMED:   1.  Introduction of catheter/sheath into the aorta  2.  Aortoiliac angiogram with left lower extremity runoff  3.  Contralateral cannulation of the left common iliac artery  4.  Selective cannulation of the anterior tibial artery  5.  Attempted crossing of the distal anterior tibial artery chronic total occlusion  6.  Completion left lower extremity angiogram with radiographic supervision and interpretation  7.  Minx closure of the right common femoral artery     SURGEON: Peter Huddleston DO      ANESTHESIA: MAC    PREPARATION: Routine.    STAFF: Circulator: Chiara Gray RN  Scrub Person: Zion Coronado  Assistant: Suni Marc  Vascular Radiology Technician: Meron Vigil    Estimated Blood Loss: minimal    SPECIMENS: None    COMPLICATIONS: None    INDICATIONS: Nuha Cali is a 76 y.o. female who bumped her left second toe on the door about 7 weeks ago.  She is being treated in [facility].  She is diabetic, diet controlled.  She is maintained on Pravachol.   She had [facility] screening ABIs back in May 2021 which showed evidence of severe disease in the left lower extremity. The indications, risks, and possible complications of the procedure were explained to the patient, who voiced understanding and wished to proceed with surgery.     PROCEDURE IN DETAIL: The patient was taken to the operating room and placed on the operating table in a supine position. After MAC anesthesia was obtained, the bilateral groins was prepped and draped in a sterile manner.  5 cc of 0.5% Marcaine plain was used to infiltrate the right groin focal anesthesia.  Using a micropuncture technique the right common femoral  was cannulated and a microsheath was placed.  The advantage Glidewire was advanced into the aorta and a short 6 Arabic sheath was placed.  The patient was given 1000 units of intravenous heparin.  The Omni Flush catheter was advanced into the aorta and an aortoiliac angiogram was performed.  Findings are as follows:  1.  Patent renal arteries bilaterally without stenosis  2.  Patent aorta without stenosis  3.  Patent iliac systems bilaterally without stenosis    Contralateral cannulation was established of the left common iliac artery.  The catheter was then brought down to the level of the femoral head.  An angiogram with runoff was performed.  Findings are as follows:  1.  Patent common femoral, profunda femoris, and SFA without stenosis but heavy calcific wall plaque was noted throughout  2.  Patent popliteal artery without stenosis  3.  Patent 2.5 vessel runoff to the foot via the posterior tibial and peroneal arteries.  The anterior tibial artery was occluded in the distal lower leg with reconstitution of the dorsalis pedis artery by the profunda femoris and posterior tibial arteries.     At this point, the decision was made to try and revascularize the distal anterior tibial artery.  A 6 Arabic by 65 cm destination sheath was placed down into the distal SFA.  The patient was given 3000 units of intravenous heparin.  With the help of the Wasserman cross catheter and under roadmap guidance, the anterior tibial artery was selectively cannulated.  The wire and catheter were maneuvered down through the anterior tibial artery  but this was unsuccessful.  I was not able to achieve true lumen distally.  Multiple attempts were made.  An 014 advantage Glidewire and 014 quick cross catheter were also used.  After multiple attempts, the decision was made to abandon the procedure.  There was still adequate flow into the foot.  There is evidence of distal forefoot small vessel disease due to the diabetes.  The sheath and wire  were then removed and a minx was used to seal off the right common femoral artery.  Direct pressure was held for an additional 10 to 15 minutes to help ensure hemostasis.  Sterile dressings were applied. The patient tolerated the procedure well. Sponge and needle counts were correct. The patient was then awakened in the operating room and taken to the recovery room in good condition.    Peter Huddleston,   Date: 8/13/2021 Time: 08:42 CDT     CC: Artemio Martinez D.P.M.

## 2021-08-13 NOTE — ANESTHESIA PREPROCEDURE EVALUATION
Anesthesia Evaluation     Patient summary reviewed   history of anesthetic complications: PONV  NPO Solid Status: > 8 hours             Airway   Mallampati: II  TM distance: >3 FB  Dental      Pulmonary    (-) COPD, asthma, sleep apnea, not a smoker  Cardiovascular     (+) hypertension, PVD, hyperlipidemia,   (-) pacemaker, past MI, angina, cardiac stents      Neuro/Psych  (-) seizures, TIA, CVA  GI/Hepatic/Renal/Endo    (+)  GERD,  renal disease CRI, diabetes mellitus,   (-) liver disease    Musculoskeletal     Abdominal    Substance History      OB/GYN          Other                        Anesthesia Plan    ASA 3     MAC       Anesthetic plan, all risks, benefits, and alternatives have been provided, discussed and informed consent has been obtained with: patient.

## 2021-08-16 ENCOUNTER — LAB (OUTPATIENT)
Dept: LAB | Facility: HOSPITAL | Age: 76
End: 2021-08-16

## 2021-08-16 ENCOUNTER — OFFICE VISIT (OUTPATIENT)
Dept: WOUND CARE | Facility: HOSPITAL | Age: 76
End: 2021-08-16

## 2021-08-16 ENCOUNTER — TRANSCRIBE ORDERS (OUTPATIENT)
Dept: LAB | Facility: HOSPITAL | Age: 76
End: 2021-08-16

## 2021-08-16 DIAGNOSIS — E11.628 TYPE 2 DIABETES MELLITUS WITH OTHER SKIN COMPLICATIONS (HCC): ICD-10-CM

## 2021-08-16 DIAGNOSIS — Z01.818 PREOPERATIVE TESTING: ICD-10-CM

## 2021-08-16 DIAGNOSIS — L97.524 NON-PRESSURE CHRONIC ULCER OF OTHER PART OF LEFT FOOT WITH NECROSIS OF BONE (HCC): ICD-10-CM

## 2021-08-16 DIAGNOSIS — M20.42 OTHER HAMMER TOE(S) (ACQUIRED), LEFT FOOT: ICD-10-CM

## 2021-08-16 DIAGNOSIS — Z11.59 SCREENING FOR VIRAL DISEASE: Primary | ICD-10-CM

## 2021-08-16 DIAGNOSIS — M86.172 OTHER ACUTE OSTEOMYELITIS, LEFT ANKLE AND FOOT (HCC): ICD-10-CM

## 2021-08-16 LAB — SARS-COV-2 ORF1AB RESP QL NAA+PROBE: NOT DETECTED

## 2021-08-16 PROCEDURE — U0004 COV-19 TEST NON-CDC HGH THRU: HCPCS

## 2021-08-16 PROCEDURE — C9803 HOPD COVID-19 SPEC COLLECT: HCPCS

## 2021-08-16 PROCEDURE — 99213 OFFICE O/P EST LOW 20 MIN: CPT | Performed by: PODIATRIST

## 2021-08-16 PROCEDURE — G0463 HOSPITAL OUTPT CLINIC VISIT: HCPCS

## 2021-08-17 ENCOUNTER — TELEPHONE (OUTPATIENT)
Dept: PODIATRY | Facility: CLINIC | Age: 76
End: 2021-08-17

## 2021-08-17 NOTE — TELEPHONE ENCOUNTER
Spoke with patient and advised that her arrival time had been moved to 600 am due to a cancellation. Patient expressed understanding for all that was discussed.

## 2021-08-17 NOTE — TELEPHONE ENCOUNTER
Spoke with patient and advised that due to a cancellation her arrival time had been moved to 500 am.  Patient  Expressed understanding for all that was discussed.

## 2021-08-18 ENCOUNTER — ANESTHESIA (OUTPATIENT)
Dept: PERIOP | Facility: HOSPITAL | Age: 76
End: 2021-08-18

## 2021-08-18 ENCOUNTER — ANESTHESIA EVENT (OUTPATIENT)
Dept: PERIOP | Facility: HOSPITAL | Age: 76
End: 2021-08-18

## 2021-08-18 ENCOUNTER — HOSPITAL ENCOUNTER (OUTPATIENT)
Facility: HOSPITAL | Age: 76
Setting detail: HOSPITAL OUTPATIENT SURGERY
Discharge: HOME | End: 2021-08-18
Attending: PODIATRIST | Admitting: PODIATRIST

## 2021-08-18 ENCOUNTER — APPOINTMENT (OUTPATIENT)
Dept: GENERAL RADIOLOGY | Facility: HOSPITAL | Age: 76
End: 2021-08-18

## 2021-08-18 VITALS
OXYGEN SATURATION: 94 % | HEART RATE: 80 BPM | TEMPERATURE: 97.5 F | DIASTOLIC BLOOD PRESSURE: 56 MMHG | RESPIRATION RATE: 16 BRPM | SYSTOLIC BLOOD PRESSURE: 139 MMHG

## 2021-08-18 DIAGNOSIS — L97.524 NON-PRESSURE CHRONIC ULCER OF OTHER PART OF LEFT FOOT WITH NECROSIS OF BONE (HCC): ICD-10-CM

## 2021-08-18 LAB
GLUCOSE BLDC GLUCOMTR-MCNC: 145 MG/DL (ref 70–130)
GLUCOSE BLDC GLUCOMTR-MCNC: 160 MG/DL (ref 70–130)

## 2021-08-18 PROCEDURE — 87070 CULTURE OTHR SPECIMN AEROBIC: CPT | Performed by: PODIATRIST

## 2021-08-18 PROCEDURE — 25010000002 CEFAZOLIN PER 500 MG: Performed by: PODIATRIST

## 2021-08-18 PROCEDURE — 28820 AMPUTATION OF TOE: CPT | Performed by: NURSE PRACTITIONER

## 2021-08-18 PROCEDURE — 25010000002 PROPOFOL 10 MG/ML EMULSION: Performed by: NURSE ANESTHETIST, CERTIFIED REGISTERED

## 2021-08-18 PROCEDURE — 25010000002 ONDANSETRON PER 1 MG: Performed by: NURSE ANESTHETIST, CERTIFIED REGISTERED

## 2021-08-18 PROCEDURE — 28820 AMPUTATION OF TOE: CPT | Performed by: PODIATRIST

## 2021-08-18 PROCEDURE — 25010000002 DEXAMETHASONE PER 1 MG: Performed by: ANESTHESIOLOGY

## 2021-08-18 PROCEDURE — 87176 TISSUE HOMOGENIZATION CULTR: CPT | Performed by: PODIATRIST

## 2021-08-18 PROCEDURE — 88305 TISSUE EXAM BY PATHOLOGIST: CPT | Performed by: PODIATRIST

## 2021-08-18 PROCEDURE — 88311 DECALCIFY TISSUE: CPT | Performed by: PODIATRIST

## 2021-08-18 PROCEDURE — 87205 SMEAR GRAM STAIN: CPT | Performed by: PODIATRIST

## 2021-08-18 PROCEDURE — 82962 GLUCOSE BLOOD TEST: CPT

## 2021-08-18 PROCEDURE — 73630 X-RAY EXAM OF FOOT: CPT

## 2021-08-18 RX ORDER — LABETALOL HYDROCHLORIDE 5 MG/ML
5 INJECTION, SOLUTION INTRAVENOUS
Status: DISCONTINUED | OUTPATIENT
Start: 2021-08-18 | End: 2021-08-18 | Stop reason: HOSPADM

## 2021-08-18 RX ORDER — NALOXONE HCL 0.4 MG/ML
0.4 VIAL (ML) INJECTION AS NEEDED
Status: DISCONTINUED | OUTPATIENT
Start: 2021-08-18 | End: 2021-08-18 | Stop reason: HOSPADM

## 2021-08-18 RX ORDER — LIDOCAINE HYDROCHLORIDE 10 MG/ML
0.5 INJECTION, SOLUTION EPIDURAL; INFILTRATION; INTRACAUDAL; PERINEURAL ONCE AS NEEDED
Status: DISCONTINUED | OUTPATIENT
Start: 2021-08-18 | End: 2021-08-18 | Stop reason: HOSPADM

## 2021-08-18 RX ORDER — PROMETHAZINE HYDROCHLORIDE 12.5 MG/1
12.5 TABLET ORAL EVERY 8 HOURS PRN
Qty: 21 TABLET | Refills: 0 | Status: SHIPPED | OUTPATIENT
Start: 2021-08-18 | End: 2021-08-26

## 2021-08-18 RX ORDER — ONDANSETRON 2 MG/ML
4 INJECTION INTRAMUSCULAR; INTRAVENOUS ONCE AS NEEDED
Status: DISCONTINUED | OUTPATIENT
Start: 2021-08-18 | End: 2021-08-18 | Stop reason: HOSPADM

## 2021-08-18 RX ORDER — OXYCODONE HYDROCHLORIDE AND ACETAMINOPHEN 5; 325 MG/1; MG/1
1 TABLET ORAL EVERY 6 HOURS PRN
Qty: 28 TABLET | Refills: 0 | Status: SHIPPED | OUTPATIENT
Start: 2021-08-18 | End: 2021-08-26

## 2021-08-18 RX ORDER — OXYCODONE AND ACETAMINOPHEN 10; 325 MG/1; MG/1
1 TABLET ORAL ONCE AS NEEDED
Status: DISCONTINUED | OUTPATIENT
Start: 2021-08-18 | End: 2021-08-18 | Stop reason: HOSPADM

## 2021-08-18 RX ORDER — ONDANSETRON 2 MG/ML
INJECTION INTRAMUSCULAR; INTRAVENOUS AS NEEDED
Status: DISCONTINUED | OUTPATIENT
Start: 2021-08-18 | End: 2021-08-18 | Stop reason: SURG

## 2021-08-18 RX ORDER — SODIUM CHLORIDE 0.9 % (FLUSH) 0.9 %
3 SYRINGE (ML) INJECTION EVERY 12 HOURS SCHEDULED
Status: DISCONTINUED | OUTPATIENT
Start: 2021-08-18 | End: 2021-08-18 | Stop reason: HOSPADM

## 2021-08-18 RX ORDER — SODIUM CHLORIDE, SODIUM LACTATE, POTASSIUM CHLORIDE, CALCIUM CHLORIDE 600; 310; 30; 20 MG/100ML; MG/100ML; MG/100ML; MG/100ML
100 INJECTION, SOLUTION INTRAVENOUS CONTINUOUS
Status: DISCONTINUED | OUTPATIENT
Start: 2021-08-18 | End: 2021-08-18 | Stop reason: HOSPADM

## 2021-08-18 RX ORDER — SODIUM CHLORIDE 0.9 % (FLUSH) 0.9 %
3 SYRINGE (ML) INJECTION AS NEEDED
Status: DISCONTINUED | OUTPATIENT
Start: 2021-08-18 | End: 2021-08-18 | Stop reason: HOSPADM

## 2021-08-18 RX ORDER — SODIUM CHLORIDE 0.9 % (FLUSH) 0.9 %
3-10 SYRINGE (ML) INJECTION AS NEEDED
Status: DISCONTINUED | OUTPATIENT
Start: 2021-08-18 | End: 2021-08-18 | Stop reason: HOSPADM

## 2021-08-18 RX ORDER — BUPIVACAINE HCL/0.9 % NACL/PF 0.1 %
2 PLASTIC BAG, INJECTION (ML) EPIDURAL ONCE
Status: COMPLETED | OUTPATIENT
Start: 2021-08-18 | End: 2021-08-18

## 2021-08-18 RX ORDER — SODIUM CHLORIDE, SODIUM LACTATE, POTASSIUM CHLORIDE, CALCIUM CHLORIDE 600; 310; 30; 20 MG/100ML; MG/100ML; MG/100ML; MG/100ML
1000 INJECTION, SOLUTION INTRAVENOUS CONTINUOUS
Status: DISCONTINUED | OUTPATIENT
Start: 2021-08-18 | End: 2021-08-18 | Stop reason: HOSPADM

## 2021-08-18 RX ORDER — MAGNESIUM HYDROXIDE 1200 MG/15ML
LIQUID ORAL AS NEEDED
Status: DISCONTINUED | OUTPATIENT
Start: 2021-08-18 | End: 2021-08-18 | Stop reason: HOSPADM

## 2021-08-18 RX ORDER — DEXAMETHASONE SODIUM PHOSPHATE 4 MG/ML
4 INJECTION, SOLUTION INTRA-ARTICULAR; INTRALESIONAL; INTRAMUSCULAR; INTRAVENOUS; SOFT TISSUE ONCE AS NEEDED
Status: COMPLETED | OUTPATIENT
Start: 2021-08-18 | End: 2021-08-18

## 2021-08-18 RX ORDER — LIDOCAINE HYDROCHLORIDE 20 MG/ML
INJECTION, SOLUTION EPIDURAL; INFILTRATION; INTRACAUDAL; PERINEURAL AS NEEDED
Status: DISCONTINUED | OUTPATIENT
Start: 2021-08-18 | End: 2021-08-18 | Stop reason: SURG

## 2021-08-18 RX ORDER — FLUMAZENIL 0.1 MG/ML
0.2 INJECTION INTRAVENOUS AS NEEDED
Status: DISCONTINUED | OUTPATIENT
Start: 2021-08-18 | End: 2021-08-18 | Stop reason: HOSPADM

## 2021-08-18 RX ORDER — FENTANYL CITRATE 50 UG/ML
25 INJECTION, SOLUTION INTRAMUSCULAR; INTRAVENOUS
Status: DISCONTINUED | OUTPATIENT
Start: 2021-08-18 | End: 2021-08-18 | Stop reason: HOSPADM

## 2021-08-18 RX ORDER — BUPIVACAINE HYDROCHLORIDE 5 MG/ML
INJECTION, SOLUTION EPIDURAL; INTRACAUDAL AS NEEDED
Status: DISCONTINUED | OUTPATIENT
Start: 2021-08-18 | End: 2021-08-18 | Stop reason: HOSPADM

## 2021-08-18 RX ORDER — DOCUSATE SODIUM 100 MG/1
100 CAPSULE, LIQUID FILLED ORAL DAILY
Qty: 7 CAPSULE | Refills: 0 | Status: SHIPPED | OUTPATIENT
Start: 2021-08-18 | End: 2021-08-26

## 2021-08-18 RX ORDER — ACETAMINOPHEN 500 MG
1000 TABLET ORAL ONCE
Status: COMPLETED | OUTPATIENT
Start: 2021-08-18 | End: 2021-08-18

## 2021-08-18 RX ORDER — PROPOFOL 10 MG/ML
VIAL (ML) INTRAVENOUS AS NEEDED
Status: DISCONTINUED | OUTPATIENT
Start: 2021-08-18 | End: 2021-08-18 | Stop reason: SURG

## 2021-08-18 RX ADMIN — LIDOCAINE HYDROCHLORIDE 60 MG: 20 INJECTION, SOLUTION EPIDURAL; INFILTRATION; INTRACAUDAL; PERINEURAL at 07:04

## 2021-08-18 RX ADMIN — ONDANSETRON 4 MG: 2 INJECTION INTRAMUSCULAR; INTRAVENOUS at 07:13

## 2021-08-18 RX ADMIN — Medication 1 G: at 07:07

## 2021-08-18 RX ADMIN — PROPOFOL 120 MG: 10 INJECTION, EMULSION INTRAVENOUS at 07:04

## 2021-08-18 RX ADMIN — ACETAMINOPHEN 1000 MG: 500 TABLET, FILM COATED ORAL at 06:52

## 2021-08-18 RX ADMIN — SODIUM CHLORIDE, POTASSIUM CHLORIDE, SODIUM LACTATE AND CALCIUM CHLORIDE 1000 ML: 600; 310; 30; 20 INJECTION, SOLUTION INTRAVENOUS at 06:11

## 2021-08-18 RX ADMIN — DEXAMETHASONE SODIUM PHOSPHATE 4 MG: 4 INJECTION, SOLUTION INTRA-ARTICULAR; INTRALESIONAL; INTRAMUSCULAR; INTRAVENOUS; SOFT TISSUE at 06:52

## 2021-08-18 NOTE — ANESTHESIA PREPROCEDURE EVALUATION
Anesthesia Evaluation     Patient summary reviewed   history of anesthetic complications: PONV  NPO Solid Status: > 8 hours  NPO Liquid Status: > 8 hours           Airway   Mallampati: III  TM distance: >3 FB  Small opening  Dental - normal exam     Pulmonary    (-) asthma, sleep apnea, not a smoker  Cardiovascular   Exercise tolerance: good (4-7 METS)    (+) hypertension, hyperlipidemia,   (-) past MI, CAD, dysrhythmias, cardiac stents      Neuro/Psych  (-) seizures, TIA, CVA  GI/Hepatic/Renal/Endo    (+)  GERD,  renal disease CRI, diabetes mellitus,   (-) liver disease    Musculoskeletal     Abdominal    Substance History      OB/GYN          Other   autoimmune disease rheumatoid arthritis,                      Anesthesia Plan    ASA 3     general     intravenous induction     Anesthetic plan, all risks, benefits, and alternatives have been provided, discussed and informed consent has been obtained with: patient.

## 2021-08-18 NOTE — DISCHARGE INSTRUCTIONS

## 2021-08-18 NOTE — BRIEF OP NOTE
AMPUTATION DIGIT  Progress Note    Nuha OCASIO Cali  8/18/2021    Pre-op Diagnosis:   Non-pressure chronic ulcer of other part of left foot with necrosis of bone (CMS/HCC) [L97.524]       Post-Op Diagnosis Codes:     * Non-pressure chronic ulcer of other part of left foot with necrosis of bone (CMS/HCC) [L97.524]    Procedure/CPT® Codes:        Procedure(s):  COMPLETE AMPUTATION of 2nd TOE - LEFT FOOT    Surgeon(s):  Artemio Martinez DPM    Anesthesia: General    Staff:   Circulator: Danelle Bernal RN; Refugio Denton RN  Scrub Person: Thiago Wise; Krystal Santos         Estimated Blood Loss: minimal    Urine Voided: * No values recorded between 8/18/2021  7:00 AM and 8/18/2021  7:31 AM *    Specimens:                Specimens     ID Source Type Tests Collected By Collected At Frozen?    1 Toe, Left Tissue · TISSUE / BONE CULTURE   Artemio Martinez DPM 8/18/21 0721     Description: LEFT 2ND TOE CULTURE    A Toe, Left Tissue · TISSUE PATHOLOGY EXAM   Artemio Martinez DPM 8/18/21 0719 No    Description: LEFT 2ND TOE                Drains: * No LDAs found *    Findings: Consistent with pre-operative diagnoses.     Complications: None       Assistant: CINDY Murphy was responsible for performing the following activities: Retraction, Suction, Irrigation and Closing and their skilled assistance was necessary for the success of this case.    Artemio Martinez DPM     Date: 8/18/2021  Time: 07:36 CDT

## 2021-08-18 NOTE — ANESTHESIA POSTPROCEDURE EVALUATION
Patient: Nuha Cali    Procedure Summary     Date: 08/18/21 Room / Location:  [facility] OR  /  [facility] OR    Anesthesia Start: 0700 Anesthesia Stop:     Procedure: COMPLETE AMPUTATION of 2nd TOE - LEFT FOOT (Left Toes) Diagnosis:       Non-pressure chronic ulcer of other part of left foot with necrosis of bone (CMS/HCC)      (Non-pressure chronic ulcer of other part of left foot with necrosis of bone (CMS/HCC) [L97.524])    Surgeons: Artemio Martinez DPM Provider: Cole Aguila CRNA    Anesthesia Type: general ASA Status: 3          Anesthesia Type: general    Vitals  Vitals Value Taken Time   /63 08/18/21 0800   Temp 97.5 °F (36.4 °C) 08/18/21 0800   Pulse 85 08/18/21 0801   Resp 12 08/18/21 0800   SpO2 95 % 08/18/21 0801   Vitals shown include unvalidated device data.        Post Anesthesia Care and Evaluation    Patient location during evaluation: PACU  Patient participation: complete - patient participated  Level of consciousness: awake and alert  Pain management: adequate  Airway patency: patent  Anesthetic complications: No anesthetic complications    Cardiovascular status: acceptable  Respiratory status: acceptable  Hydration status: acceptable    Comments: Blood pressure 116/63, pulse 84, temperature 97.5 °F (36.4 °C), temperature source Temporal, resp. rate 12, SpO2 95 %, not currently breastfeeding.    Pt discharged from PACU based on fox score >8

## 2021-08-18 NOTE — PROGRESS NOTES
[facility] - PODIATRY    Today's Date: 08/26/21    Patient Name: Nuha Cali  MRN: [id number]  CSN: [id number]  PCP: Juan Echavarria MD  Referring Provider: No ref. provider found    SUBJECTIVE     Chief Complaint   Patient presents with   • Follow-up     pcp07/20/2021 1 week po-COMPLETE AMPUTATION of 2nd TOE - LEFT FOOT- pt states no pain in left foot, has been doing okay- pt denies pain- pt presents with bandage and surgical shoe to left foot   • Diabetes     pt does not check BG     HPI: Nuha Cali, a 76 y.o.female, comes to clinic as a(n) established patient for post-op appt 1 weeks s/p left 2nd toe amputation. Patient has h/o arthritis, CKD3, DM2, GERD, HLD, HTN, Macular degeneration, RA, Vit D Def. Patient is NIDDM and unsure of last BG level. She has kept the bandage c/d/i. She has been NWB in surgical shoe. Denies complications. Denies pain. She has taken medications as prescribed. Denies any constitutional symptoms. No other pedal complaints at this time.    Past Medical History:   Diagnosis Date   • Arthritis    • Chronic kidney disease     stage 3   • Diabetes mellitus (CMS/HCC)    • Elevated cholesterol    • GERD (gastroesophageal reflux disease)    • History of transfusion    • Hx of colonic polyp    • Hyperlipidemia    • Hypertension    • Macular degeneration     Left eye injections   • PONV (postoperative nausea and vomiting)    • Rheumatoid arthritis (CMS/HCC)    • Vitamin D deficiency      Past Surgical History:   Procedure Laterality Date   • AMPUTATION DIGIT Left 8/18/2021    Procedure: COMPLETE AMPUTATION of 2nd TOE - LEFT FOOT;  Surgeon: Artemio Martinez DPM;  Location:  [facility] OR;  Service: Podiatry;  Laterality: Left;   • AORTAGRAM Left 8/13/2021    Procedure: LEFT LOWER EXTREMITY ANGIOGRAM;  Surgeon: Peter Huddleston DO;  Location:  [facility] HYBRID OR 12;  Service: Vascular;  Laterality: Left;   • ARM LESION/CYST EXCISION      nodules   • BACK SURGERY      x 2    • BREAST BIOPSY      x 2   • CATARACT EXTRACTION Bilateral     with lens inplant   • COLON RESECTION N/A 9/14/2018    Procedure: COLON RESECTION LAPAROSCOPIC SIGMOID OR LOW ANTERIOR, 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 Hart;  Surgeon: Princess Demarco MD;  Location: [facility] OR;  Service: General   • COLONOSCOPY  01/19/2015    Diverticulosis repeat exam in 5 years   • COLONOSCOPY W/ POLYPECTOMY  11/24/2009    Tubular adenoma cecal pit mild atypia repeat exam in 5 years   • CYSTOSCOPY, RETROGRADE PYELOGRAM AND STENT INSERTION Bilateral 9/14/2018    Procedure: CYSTOSCOPY BILATERAL RETROGRADE PYELOGRAM AND BILATERAL STENT INSERTION;  Surgeon: Dick Hart MD;  Location:  [facility] OR;  Service: Urology   • ENDOSCOPY  1999    Chronic active gastritis severe with focal superficial ulceration urea + treated   • HYSTERECTOMY  1981    Heavy, painful periods.    • TOTAL KNEE ARTHROPLASTY Bilateral     x 2     Family History   Problem Relation Age of Onset   • Hypertension Other    • Diabetes Other    • Coronary artery disease Other    • Cancer Other    • No Known Problems Son    • No Known Problems Son    • No Known Problems Son    • Breast cancer Other    • Colon cancer Neg Hx    • Colon polyps Neg Hx    • Ovarian cancer Neg Hx      Social History     Socioeconomic History   • Marital status:      Spouse name: Not on file   • Number of children: Not on file   • Years of education: Not on file   • Highest education level: Not on file   Tobacco Use   • Smoking status: Never Smoker   • Smokeless tobacco: Never Used   Vaping Use   • Vaping Use: Never used   Substance and Sexual Activity   • Alcohol use: No   • Drug use: No   • Sexual activity: Defer     Birth control/protection: Surgical     Allergies   Allergen Reactions   • Aspirin Anaphylaxis and Swelling     Current Outpatient Medications   Medication Sig Dispense Refill   • allopurinol (ZYLOPRIM)  100 MG tablet Take 100 mg by mouth Daily.     • amLODIPine-valsartan (EXFORGE)  MG per tablet Take 1 tablet by mouth.     • carvedilol (COREG) 12.5 MG tablet 2 (Two) Times a Day With Meals.     • cholecalciferol (VITAMIN D3) 1000 units tablet Take 2,000 Units by mouth Daily.     • cimetidine (TAGAMET) 200 MG tablet Take 200 mg by mouth Daily.     • folic acid (FOLVITE) 1 MG tablet Take 1,000 mcg by mouth Daily.     • furosemide (LASIX) 20 MG tablet Daily.     • leflunomide (ARAVA) 20 MG tablet Take 20 mg by mouth Daily.     • polyethyl glycol-propyl glycol (SYSTANE) 0.4-0.3 % solution ophthalmic solution Administer 1 drop to both eyes Every 1 (One) Hour As Needed.     • potassium chloride (K-DUR) 10 MEQ CR tablet Daily.     • pravastatin (PRAVACHOL) 20 MG tablet Take 40 mg by mouth Daily.       No current facility-administered medications for this visit.     Review of Systems   Constitutional: Negative for chills and fever.   HENT: Negative for congestion.    Respiratory: Negative for shortness of breath.    Cardiovascular: Negative for chest pain and leg swelling.   Gastrointestinal: Negative for constipation, diarrhea, nausea and vomiting.   Musculoskeletal:        Foot pain   Skin: Positive for wound.   Neurological: Positive for numbness.       OBJECTIVE     Vitals:    08/26/21 1307   BP: 114/58   Pulse: 86   SpO2: 97%       PHYSICAL EXAM  GEN:   Accompanied by .     Foot/Ankle Exam:       General:   Appearance: appears stated age and healthy and elderly    Orientation: AAOx3    Affect: appropriate    Shoe Gear:  Surgical shoe    VASCULAR      Right Foot Vascularity   Dorsalis pedis:  1+  Posterior tibial:  1+  Skin Temperature: warm    Edema Grading:  Trace  CFT:  3  Pedal Hair Growth:  Present  Varicosities: none       Left Foot Vascularity   Dorsalis pedis:  1+  Posterior tibial:  1+  Skin Temperature: warm    Edema Grading:  Trace  CFT:  3  Pedal Hair Growth:  Present  Varicosities: none         NEUROLOGIC     Right Foot Neurologic   Light touch sensation:  Diminished  Vibratory sensation:  Diminished  Hot/Cold sensation: diminished       Left Foot Neurologic   Light touch sensation:  Diminished  Vibratory sensation:  Diminished  Hot/cold sensation: diminished       MUSCULOSKELETAL      Right Foot Musculoskeletal   Ecchymosis:  None  Tenderness: none    Arch:  Normal  Hammertoe:  Second toe  Hallux valgus: Yes       Left Foot Musculoskeletal    Amputation   Toes amputated: second toe  Ecchymosis:  None  Tenderness comment:  Minimally to surgical site  Arch:  Normal  Hallux valgus: Yes       MUSCLE STRENGTH     Right Foot Muscle Strength   Foot dorsiflexion:  4+  Foot plantar flexion:  4+  Foot inversion:  4+  Foot eversion:  4+     Left Foot Muscle Strength   Foot dorsiflexion:  4+  Foot plantar flexion:  4+  Foot inversion:  4+  Foot eversion:  4+     RANGE OF MOTION      Right Foot Range of Motion   Foot and ankle ROM within normal limits       Left Foot Range of Motion   Foot and ankle ROM within normal limits       DERMATOLOGIC     Right Foot Dermatologic   Skin: skin intact        Left Foot Additional Comments: Surgical site to left foot with sutures intact. Healing well. No SOI or dehiscence.       RADIOLOGY/NUCLEAR:  XR Chest 2 View    Result Date: 8/11/2021  Narrative: EXAMINATION: XR CHEST 2 VW-  8/11/2021 10:20 AM CDT  HISTORY: pre-op; L97.524-Non-pressure chronic ulcer of other part of left foot with necrosis of bone Preoperative evaluation. History of anemia, diabetes, and hypertension.  Two-view chest x-ray compared with 4/1/2019.  Mild cardiomegaly. Aortic arch calcification.  Hyperexpanded lungs. Chronic interstitial disease with scattered granulomas.  No pneumonia, pneumothorax, or pleural effusion.  Right lower lobe lung nodule measures approximately 2 cm. This was noted on previous exams and underwent CT-guided biopsy on 4/1/2019.  Chronic anterior wedge compression of T10.  Summary: 1.  Chronic lung changes with no acute disease. This report was finalized on 08/11/2021 10:45 by Dr. Rick Gilman MD.    XR Foot 3+ View Left    Result Date: 8/18/2021  Narrative: LEFT FOOT 3 views 8/18/2021 7:51 AM CDT  HISTORY: post op amputation; L97.524-Non-pressure chronic ulcer of other part of left foot with necrosis of bone  COMPARISON: None  FINDINGS: Frontal, lateral and oblique radiographs of the left foot were provided for review.  Evidence of previous second digit amputation. No definite destructive change in the second metatarsal. Moderate hallux valgus with degenerative change in the first MTP joint and medial first metatarsal bony bunion. Probable old healed third metatarsal fracture. Probable cystic degenerative change at the fifth MTP joint. Degenerative joint space loss throughout the foot. Vascular calcifications are noted in the ankle and foot. Small plantar calcaneal spur noted.       Impression:  1.  Previous second digit amputation.  2.  No definite destructive bony changes. If there is high clinical concern for osteomyelitis, MRI should be considered.  3.  Moderate hallux valgus with degenerative change and bony bunion.  4.  Degenerative changes elsewhere throughout the LEFT foot as discussed above. This report was finalized on 08/18/2021 08:36 by Dr. Kyle Gomez MD.    IR Angiogram Extremity, FL C Arm During Surgery    Result Date: 8/13/2021  Narrative: Performed by Dr. Huddleston. Please see procedure note. This report was finalized on 08/13/2021 12:21 by Dr. Peter Huddleston MD.      LABORATORY/CULTURE RESULTS:      PATHOLOGY RESULTS:       ASSESSMENT/PLAN     Diagnoses and all orders for this visit:    1. S/P foot surgery (Primary)    2. Amputated toe, left (CMS/[facility])    3. Controlled type 2 diabetes mellitus without complication, without long-term current use of insulin (CMS/[facility])    4. PAD (peripheral artery disease) (CMS/[facility])      Comprehensive lower extremity examination and evaluation  was performed.  Discussed findings and treatment plan including risks, benefits, and treatment options with patient in detail. Patient agreed with treatment plan.  Bandage removed and surgical site evaluated.  Healing well  Reapplied similar bandage.  PWB in surgical shoe.  An After Visit Summary was printed and given to the patient at discharge, including (if requested) any available informative/educational handouts regarding diagnosis, treatment, or medications. All questions were answered to patient/family satisfaction. Should symptoms fail to improve or worsen they agree to call or return to clinic or to go to the Emergency Department. Discussed the importance of following up with any needed screening tests/labs/specialist appointments and any requested follow-up recommended by me today. Importance of maintaining follow-up discussed and patient accepts that missed appointments can delay diagnosis and potentially lead to worsening of conditions.  Return in about 2 weeks (around 9/9/2021)., or sooner if acute issues arise.    Lab Frequency Next Occurrence   Follow Anesthesia Guidelines / Standing Orders Once 08/03/2018   Follow Anesthesia Guidelines / Standing Orders Once 08/03/2018   US Ankle / Brachial Indices Extremity Complete Once 08/02/2021   Follow Anesthesia Guidelines / Protocol Once 08/03/2021   Provide NPO Instructions to Patient Once 08/08/2021   Chlorhexidine Skin Prep Once 08/08/2021   Follow Anesthesia Guidelines / Protocol Once 08/06/2021   Obtain Informed Consent Once 08/11/2021   Provide Instructions to Patient Regarding NPO Status Once 08/11/2021   Chlorhexidine Skin Prep - Educate and Review With Patient Once 08/11/2021   Instructions on coughing, deep breathing, and incentive spirometry. Once 08/11/2021   CBC & Differential Once 08/11/2021   Comprehensive Metabolic Panel Once 08/11/2021       This document has been electronically signed by Artemio Martinez DPM on August 26, 2021 13:16 CDT

## 2021-08-18 NOTE — ANESTHESIA PROCEDURE NOTES
Airway  Urgency: elective    Date/Time: 8/18/2021 7:04 AM  Airway not difficult    General Information and Staff    Patient location during procedure: OR  CRNA: Cloe Aguila CRNA    Indications and Patient Condition    Preoxygenated: yes  Mask difficulty assessment: 0 - not attempted    Final Airway Details  Final airway type: supraglottic airway      Successful airway: I-gel  Size 3    Number of attempts at approach: 1  Assessment: lips, teeth, and gum same as pre-op

## 2021-08-18 NOTE — OP NOTE
POST-OPERATIVE NOTE  Patient: Nuha Cali  MRN: [id number]    YOB: 1945  Age: 76 y.o.  Sex: female  Unit:  [facility] OR Room/Bed: [facility] OR/MAIN OR Location[facility]    Date of Operation: 8/18/2021     Pre-Operative Diagnosis:  Non-pressure chronic ulcer of other part of left foot with necrosis of bone (CMS/HCC) [L97.524]     Post-Operative Diagnosis:  1. Same as pre-operative    Operative Procedures:  1. COMPLETE AMPUTATION of 2nd TOE - LEFT FOOT    Surgeon: Surgeon(s) and Role:     * Artemio Martinez DPM - Primary    Assistant: BRIAN Murphy was responsible for performing the following activities: Retraction, Suction, Irrigation and Closing and their skilled assistance was necessary for the success of this case.     Anesthesia: General     Hemostasis: Anatomic Dissection, Electric cauterization    Estimated Blood Loss: minimal    Pathology:   Specimens     ID Source Type Tests Collected By Collected At Frozen?    1 Toe, Left Tissue · TISSUE / BONE CULTURE   Artemio Martinez DPM 8/18/21 0721     Description: LEFT 2ND TOE CULTURE    A Toe, Left Tissue · TISSUE PATHOLOGY EXAM   Artemio Martinez DPM 8/18/21 0719 No    Description: LEFT 2ND TOE          Materials: Nothing was implanted during the procedure    Injectables: 7mL 0.5% Marcaine Plain    Fluids: See anesthesia log.    Drains: None    Complications: None    Post-Op Condition: Stable. Patient tolerated procedure and anesthesia well. Patient left the operating room with vital signs stable and vascular status intact.     Operative Findings: Consistent with pre-operative diagnosis.    Indications for Procedure: This 76 y.o. patient presents with chronic ulceration with bone infection of the left second toe.  Patient states that she has failed conservative therapy and opts for surgical correction at this time. The patient has been NPO for greater than 8 hours. The patient is ready for surgical intervention.    DESCRIPTION OF  PROCEDURE  Under mild sedation, patient was brought into the operating room and place on the operating room table in supine position. Pre-operative antibiotic was given. The patient was placed under General anesthesia, then local block was performed at the surgical site using the above mentioned local anesthesia. The foot and ankle were then scrubbed, prepped and draped in the usual aseptic manner.     Attention was then directed to the dorsal aspect of the left second toe where a modified racket incision was made encompassed the digit.  The incision was made down to the level of bone. Healthy bleeding was noted to soft tissues.  All bleeders were ligated and cauterized as necessary.  The second toe was then disarticulated at the MTPJ and will be sent to pathology for analysis.  A small section of tissue was removed for culture.  Extensor and flexor tendons were resected as far proximal within the wound as possible.  The metatarsal head was inspected for any changes, and while there was mild loss of cartilage, this felt to be more arthritic in nature.  No proximal infection was noted.  The wound was then flushed with copious amounts of sterile normal saline.  Deep and subcutaneous tissues were then reapproximated utilizing 4-0 Vicryl.  Skin was reapproximated coapted utilizing 4-0 nylon in horizontal mattress and simple suturing technique.     Capillary refill remained to remaining digits of the left foot.  Hemostasis had been obtained.     A bandage consisting of Adaptic, betadine, 4 x 4's, Kerlix, and Coban with minimal compression was applied.     The patient tolerated the procedure and anesthesia well.  She was transferred to the recovery room with vital signs stable and vascular status intact to the remaining aspects of the left foot.  After period of postoperative monitoring, the patient will be discharged home with oral and postop instructions.  She will follow-up in office within 1 week.  She is to be  nonweightbearing to the surgical foot.

## 2021-08-19 ENCOUNTER — TELEPHONE (OUTPATIENT)
Dept: PODIATRY | Facility: CLINIC | Age: 76
End: 2021-08-19

## 2021-08-19 ENCOUNTER — APPOINTMENT (OUTPATIENT)
Dept: ULTRASOUND IMAGING | Facility: HOSPITAL | Age: 76
End: 2021-08-19

## 2021-08-19 NOTE — TELEPHONE ENCOUNTER
Spoke to patient 1 day after surgery.  Relates that she has kept the bandage clean, dry, and intact.  Admits to walking short distances while wearing surgical shoe but does place weight to her heel.  Denies strikethrough to the bandage.  Denies pain.  Confirmed postoperative appointment.

## 2021-08-21 LAB
BACTERIA SPEC AEROBE CULT: NORMAL
GRAM STN SPEC: NORMAL
GRAM STN SPEC: NORMAL

## 2021-08-24 LAB
CYTO UR: NORMAL
LAB AP CASE REPORT: NORMAL
PATH REPORT.FINAL DX SPEC: NORMAL
PATH REPORT.GROSS SPEC: NORMAL

## 2021-08-25 ENCOUNTER — TELEPHONE (OUTPATIENT)
Dept: VASCULAR SURGERY | Facility: CLINIC | Age: 76
End: 2021-08-25

## 2021-08-26 ENCOUNTER — OFFICE VISIT (OUTPATIENT)
Dept: VASCULAR SURGERY | Facility: CLINIC | Age: 76
End: 2021-08-26

## 2021-08-26 ENCOUNTER — OFFICE VISIT (OUTPATIENT)
Dept: PODIATRY | Facility: CLINIC | Age: 76
End: 2021-08-26

## 2021-08-26 VITALS
BODY MASS INDEX: 30.02 KG/M2 | HEART RATE: 86 BPM | WEIGHT: 143 LBS | HEIGHT: 58 IN | DIASTOLIC BLOOD PRESSURE: 58 MMHG | SYSTOLIC BLOOD PRESSURE: 114 MMHG

## 2021-08-26 VITALS
HEIGHT: 58 IN | SYSTOLIC BLOOD PRESSURE: 114 MMHG | DIASTOLIC BLOOD PRESSURE: 58 MMHG | OXYGEN SATURATION: 97 % | WEIGHT: 143 LBS | BODY MASS INDEX: 30.02 KG/M2 | HEART RATE: 86 BPM

## 2021-08-26 DIAGNOSIS — I73.9 PAD (PERIPHERAL ARTERY DISEASE) (HCC): ICD-10-CM

## 2021-08-26 DIAGNOSIS — S98.132A AMPUTATED TOE, LEFT (HCC): ICD-10-CM

## 2021-08-26 DIAGNOSIS — Z98.890 S/P FOOT SURGERY: Primary | ICD-10-CM

## 2021-08-26 DIAGNOSIS — I10 HTN (HYPERTENSION), BENIGN: ICD-10-CM

## 2021-08-26 DIAGNOSIS — E11.9 CONTROLLED TYPE 2 DIABETES MELLITUS WITHOUT COMPLICATION, WITHOUT LONG-TERM CURRENT USE OF INSULIN (HCC): ICD-10-CM

## 2021-08-26 DIAGNOSIS — I73.9 PAD (PERIPHERAL ARTERY DISEASE) (HCC): Primary | ICD-10-CM

## 2021-08-26 PROBLEM — Z01.818 PREOP TESTING: Status: RESOLVED | Noted: 2021-08-09 | Resolved: 2021-08-26

## 2021-08-26 PROCEDURE — 99212 OFFICE O/P EST SF 10 MIN: CPT | Performed by: PODIATRIST

## 2021-08-26 PROCEDURE — 99213 OFFICE O/P EST LOW 20 MIN: CPT | Performed by: SURGERY

## 2021-08-26 NOTE — PROGRESS NOTES
"8/26/2021       Juan Echavarria MD  [address]  [place] [address]    Nuha Cali  1945    Chief Complaint   Patient presents with   • Follow-up     2 Week Post-Op Follow Up For LEFT LOWER EXTREMITY ANGIOGRAM. Patient denies any stroke like symptoms. Pt has no issues, states she is doing well   • Non Smoker     Patient is a Non Smoker    • Med Management     verbally reviewed meds with pt       Dear Juan Echavarria MD             I had the pleasure of seeing your patient Nuha Cali in the office today.   As you recall, Nuha Cali is a 76 y.o.  female who developed wound to her left second toe and was following with wound care.  She did undergo angiogram on 8/13/2021 and unfortunately Dr. Huddleston was not able to cross through the distal anterior tibial artery chronic total occlusion.  She did have maintained 2-1/2 vessel runoff.  She did undergo complete amputation of this left second toe on 8/18/2021 with Dr. Martinez.  Her foot is currently wrapped and she did see Dr. Martinez today as well.    Review of Systems   Constitutional: Negative.    HENT: Negative.    Eyes: Negative.    Respiratory: Negative.    Cardiovascular: Negative.    Gastrointestinal: Negative.    Endocrine: Negative.    Genitourinary: Negative.    Musculoskeletal: Negative.    Skin: Negative for wound.        Surgical shoe and dressing in place from recent surgery to the left second toe.   Allergic/Immunologic: Negative.    Neurological: Negative.    Hematological: Negative.    Psychiatric/Behavioral: Negative.    All other systems reviewed and are negative.      /58 (BP Location: Left arm, Patient Position: Sitting, Cuff Size: Adult)   Pulse 86   Ht 147.3 cm (58\")   Wt 64.9 kg (143 lb)   BMI 29.89 kg/m²   Physical Exam  Vitals and nursing note reviewed.   Constitutional:       General: She is not in acute distress.     Appearance: She is well-developed. She is not diaphoretic.   HENT:      Head: " Normocephalic and atraumatic.   Eyes:      General: No scleral icterus.     Pupils: Pupils are equal, round, and reactive to light.   Neck:      Thyroid: No thyromegaly.      Vascular: No carotid bruit or JVD.   Cardiovascular:      Rate and Rhythm: Normal rate and regular rhythm.      Pulses:           Dorsalis pedis pulses are detected w/ Doppler on the right side and detected w/ Doppler on the left side.        Posterior tibial pulses are detected w/ Doppler on the right side and detected w/ Doppler on the left side.      Heart sounds: Normal heart sounds and S2 normal. No murmur heard.   No friction rub. No gallop.       Comments: Surgical shoe and dressing in place to left foot  Pulmonary:      Effort: Pulmonary effort is normal.      Breath sounds: Normal breath sounds.   Abdominal:      General: Bowel sounds are normal.      Palpations: Abdomen is soft.   Musculoskeletal:         General: Normal range of motion.      Cervical back: Normal range of motion and neck supple.   Skin:     General: Skin is warm and dry.   Neurological:      Mental Status: She is alert and oriented to person, place, and time.      Cranial Nerves: No cranial nerve deficit.   Psychiatric:         Behavior: Behavior normal.         Thought Content: Thought content normal.         Judgment: Judgment normal.          Patient Active Problem List   Diagnosis   • Anemia   • Heme positive stool   • HTN (hypertension), benign   • Change in bowel habits   • Controlled type 2 diabetes mellitus without complication, without long-term current use of insulin (CMS/[facility])   • Colovaginal fistula   • PAD (peripheral artery disease) (CMS/[facility])         ICD-10-CM ICD-9-CM   1. PAD (peripheral artery disease) (CMS/[facility])  I73.9 443.9   2. HTN (hypertension), benign  I10 401.1         Plan: After thoroughly evaluating Nuha Cali, I believe the best course of action is to remain conservative from vascular surgery standpoint.  Her groin has healed.  She  did undergo angiogram and unfortunately Dr. Huddleston was not able to cross the chronic total occlusion however she did have 2-1/2 vessel runoff to her left foot.  She also underwent surgery for amputation of second toe on the left with Dr. Martinez and should have adequate blood flow to be able to heal amputation.  We will see her back in 6 months with repeat noninvasive testing for continued surveillance, including ABIs.  I did discuss vascular risk factors as the pertain to the progression of vascular disease including controlling her hypertension and diabetes mellitus.  Her blood pressure stable on her current medication regimen.  The patient can continue taking their current medication regimen as previously planned.  This was all discussed in full with complete understanding.  Thank you for allowing me to participate in the care of your patient.  Please do not hesitate with any questions or concerns.  I will keep you aware of any further encounters with Nuha Cali.        Sincerely yours,         CINDY Kelly

## 2021-09-01 NOTE — PROGRESS NOTES
[facility] - PODIATRY    Today's Date: 09/07/21    Patient Name: Nuha Cali  MRN: [id number]  CSN: [id number]  PCP: Jaun Echavarria MD  Referring Provider: No ref. provider found    SUBJECTIVE     Chief Complaint   Patient presents with   • Follow-up     pcp07/20/2021 2 WEEK f/u- 3wk po- COMPLETE AMPUTATION of 2nd TOE - LEFT FOOT- pt states some burning in left foot- pt denies pain at this time- pt presents with wheelchair and bandage to left foot   • Diabetes     pt does not check BG or take med for it     HPI: Nuha Cali, a 76 y.o.female, comes to clinic as a(n) established patient for post-op appt 3 weeks s/p left 2nd toe amputation. Patient has h/o arthritis, CKD3, DM2, GERD, HLD, HTN, Macular degeneration, RA, Vit D Def. Patient is NIDDM and unsure of last BG level. She has kept the bandage c/d/i. She has been NWB in surgical shoe. Denies complications. Denies pain. She has taken medications as prescribed. Denies any constitutional symptoms. No other pedal complaints at this time.    Past Medical History:   Diagnosis Date   • Arthritis    • Chronic kidney disease     stage 3   • Diabetes mellitus (CMS/HCC)    • Elevated cholesterol    • GERD (gastroesophageal reflux disease)    • History of transfusion    • Hx of colonic polyp    • Hyperlipidemia    • Hypertension    • Macular degeneration     Left eye injections   • PONV (postoperative nausea and vomiting)    • Rheumatoid arthritis (CMS/HCC)    • Vitamin D deficiency      Past Surgical History:   Procedure Laterality Date   • AMPUTATION DIGIT Left 8/18/2021    Procedure: COMPLETE AMPUTATION of 2nd TOE - LEFT FOOT;  Surgeon: Artemio Martinez DPM;  Location:  [facility] OR;  Service: Podiatry;  Laterality: Left;   • AORTAGRAM Left 8/13/2021    Procedure: LEFT LOWER EXTREMITY ANGIOGRAM;  Surgeon: Peter Huddleston DO;  Location:  [facility] HYBRID OR 12;  Service: Vascular;  Laterality: Left;   • ARM LESION/CYST EXCISION      nodules   •  BACK SURGERY      x 2   • BREAST BIOPSY      x 2   • CATARACT EXTRACTION Bilateral     with lens inplant   • COLON RESECTION N/A 9/14/2018    Procedure: COLON RESECTION LAPAROSCOPIC SIGMOID OR LOW ANTERIOR, 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 Hart;  Surgeon: Princess Demarco MD;  Location: [facility] OR;  Service: General   • COLONOSCOPY  01/19/2015    Diverticulosis repeat exam in 5 years   • COLONOSCOPY W/ POLYPECTOMY  11/24/2009    Tubular adenoma cecal pit mild atypia repeat exam in 5 years   • CYSTOSCOPY, RETROGRADE PYELOGRAM AND STENT INSERTION Bilateral 9/14/2018    Procedure: CYSTOSCOPY BILATERAL RETROGRADE PYELOGRAM AND BILATERAL STENT INSERTION;  Surgeon: Dick Hart MD;  Location: [facility] OR;  Service: Urology   • ENDOSCOPY  1999    Chronic active gastritis severe with focal superficial ulceration urea + treated   • HYSTERECTOMY  1981    Heavy, painful periods.    • TOTAL KNEE ARTHROPLASTY Bilateral     x 2     Family History   Problem Relation Age of Onset   • Hypertension Other    • Diabetes Other    • Coronary artery disease Other    • Cancer Other    • No Known Problems Son    • No Known Problems Son    • No Known Problems Son    • Breast cancer Other    • Colon cancer Neg Hx    • Colon polyps Neg Hx    • Ovarian cancer Neg Hx      Social History     Socioeconomic History   • Marital status:      Spouse name: Not on file   • Number of children: Not on file   • Years of education: Not on file   • Highest education level: Not on file   Tobacco Use   • Smoking status: Never Smoker   • Smokeless tobacco: Never Used   Vaping Use   • Vaping Use: Never used   Substance and Sexual Activity   • Alcohol use: No   • Drug use: No   • Sexual activity: Defer     Birth control/protection: Surgical     Allergies   Allergen Reactions   • Aspirin Anaphylaxis and Swelling     Current Outpatient Medications   Medication Sig Dispense Refill   •  allopurinol (ZYLOPRIM) 100 MG tablet Take 100 mg by mouth Daily.     • amLODIPine-valsartan (EXFORGE)  MG per tablet Take 1 tablet by mouth.     • carvedilol (COREG) 12.5 MG tablet 2 (Two) Times a Day With Meals.     • cholecalciferol (VITAMIN D3) 1000 units tablet Take 2,000 Units by mouth Daily.     • cimetidine (TAGAMET) 200 MG tablet Take 200 mg by mouth Daily.     • folic acid (FOLVITE) 1 MG tablet Take 1,000 mcg by mouth Daily.     • furosemide (LASIX) 20 MG tablet Daily.     • leflunomide (ARAVA) 20 MG tablet Take 20 mg by mouth Daily.     • polyethyl glycol-propyl glycol (SYSTANE) 0.4-0.3 % solution ophthalmic solution Administer 1 drop to both eyes Every 1 (One) Hour As Needed.     • potassium chloride (K-DUR) 10 MEQ CR tablet Daily.     • pravastatin (PRAVACHOL) 20 MG tablet Take 40 mg by mouth Daily.       No current facility-administered medications for this visit.     Review of Systems   Constitutional: Negative for chills and fever.   HENT: Negative for congestion.    Respiratory: Negative for shortness of breath.    Cardiovascular: Negative for chest pain and leg swelling.   Gastrointestinal: Negative for constipation, diarrhea, nausea and vomiting.   Musculoskeletal:        Foot pain   Skin: Negative for wound.   Neurological: Positive for numbness.       OBJECTIVE     Vitals:    09/07/21 0941   BP: 108/70   Pulse: 78   SpO2: 98%       PHYSICAL EXAM  GEN:   Accompanied by .     Foot/Ankle Exam:       General:   Appearance: appears stated age and healthy and elderly    Orientation: AAOx3    Affect: appropriate    Shoe Gear:  Surgical shoe    VASCULAR      Right Foot Vascularity   Dorsalis pedis:  1+  Posterior tibial:  1+  Skin Temperature: warm    Edema Grading:  Trace  CFT:  3  Pedal Hair Growth:  Present  Varicosities: none       Left Foot Vascularity   Dorsalis pedis:  1+  Posterior tibial:  1+  Skin Temperature: warm    Edema Grading:  Trace  CFT:  3  Pedal Hair Growth:   Present  Varicosities: none        NEUROLOGIC     Right Foot Neurologic   Light touch sensation:  Diminished  Vibratory sensation:  Diminished  Hot/Cold sensation: diminished       Left Foot Neurologic   Light touch sensation:  Diminished  Vibratory sensation:  Diminished  Hot/cold sensation: diminished       MUSCULOSKELETAL      Right Foot Musculoskeletal   Ecchymosis:  None  Tenderness: none    Arch:  Normal  Hammertoe:  Second toe  Hallux valgus: Yes       Left Foot Musculoskeletal    Amputation   Toes amputated: second toe  Ecchymosis:  None  Tenderness comment:  Minimally to surgical site  Arch:  Normal  Hallux valgus: Yes       MUSCLE STRENGTH     Right Foot Muscle Strength   Foot dorsiflexion:  4+  Foot plantar flexion:  4+  Foot inversion:  4+  Foot eversion:  4+     Left Foot Muscle Strength   Foot dorsiflexion:  4+  Foot plantar flexion:  4+  Foot inversion:  4+  Foot eversion:  4+     RANGE OF MOTION      Right Foot Range of Motion   Foot and ankle ROM within normal limits       Left Foot Range of Motion   Foot and ankle ROM within normal limits       DERMATOLOGIC     Right Foot Dermatologic   Skin: skin intact       Left Foot Dermatologic   Skin: skin intact        Left Foot Additional Comments: Surgical site to left foot with sutures intact. Upon removal of sutures, fully coapted.       RADIOLOGY/NUCLEAR:  XR Chest 2 View    Result Date: 8/11/2021  Narrative: EXAMINATION: XR CHEST 2 VW-  8/11/2021 10:20 AM CDT  HISTORY: pre-op; L97.524-Non-pressure chronic ulcer of other part of left foot with necrosis of bone Preoperative evaluation. History of anemia, diabetes, and hypertension.  Two-view chest x-ray compared with 4/1/2019.  Mild cardiomegaly. Aortic arch calcification.  Hyperexpanded lungs. Chronic interstitial disease with scattered granulomas.  No pneumonia, pneumothorax, or pleural effusion.  Right lower lobe lung nodule measures approximately 2 cm. This was noted on previous exams and underwent  CT-guided biopsy on 4/1/2019.  Chronic anterior wedge compression of T10.  Summary: 1. Chronic lung changes with no acute disease. This report was finalized on 08/11/2021 10:45 by Dr. Rick Gilman MD.    XR Foot 3+ View Left    Result Date: 8/18/2021  Narrative: LEFT FOOT 3 views 8/18/2021 7:51 AM CDT  HISTORY: post op amputation; L97.524-Non-pressure chronic ulcer of other part of left foot with necrosis of bone  COMPARISON: None  FINDINGS: Frontal, lateral and oblique radiographs of the left foot were provided for review.  Evidence of previous second digit amputation. No definite destructive change in the second metatarsal. Moderate hallux valgus with degenerative change in the first MTP joint and medial first metatarsal bony bunion. Probable old healed third metatarsal fracture. Probable cystic degenerative change at the fifth MTP joint. Degenerative joint space loss throughout the foot. Vascular calcifications are noted in the ankle and foot. Small plantar calcaneal spur noted.       Impression:  1.  Previous second digit amputation.  2.  No definite destructive bony changes. If there is high clinical concern for osteomyelitis, MRI should be considered.  3.  Moderate hallux valgus with degenerative change and bony bunion.  4.  Degenerative changes elsewhere throughout the LEFT foot as discussed above. This report was finalized on 08/18/2021 08:36 by Dr. Kyle Gomez MD.    IR Angiogram Extremity, FL C Arm During Surgery    Result Date: 8/13/2021  Narrative: Performed by Dr. Huddleston. Please see procedure note. This report was finalized on 08/13/2021 12:21 by Dr. Peter Huddleston MD.      LABORATORY/CULTURE RESULTS:      PATHOLOGY RESULTS:       ASSESSMENT/PLAN     Diagnoses and all orders for this visit:    1. S/P foot surgery (Primary)    2. Amputated toe, left (CMS/HCC)    3. Controlled type 2 diabetes mellitus without complication, without long-term current use of insulin (CMS/[facility])    4. PAD (peripheral  artery disease) (CMS/HCC)      Comprehensive lower extremity examination and evaluation was performed.  Discussed findings and treatment plan including risks, benefits, and treatment options with patient in detail. Patient agreed with treatment plan.  Bandage removed and surgical site evaluated.  Sutures removed. Fully coapted.   FWB in surgical shoe for 1 week then return to regular shoes.  An After Visit Summary was printed and given to the patient at discharge, including (if requested) any available informative/educational handouts regarding diagnosis, treatment, or medications. All questions were answered to patient/family satisfaction. Should symptoms fail to improve or worsen they agree to call or return to clinic or to go to the Emergency Department. Discussed the importance of following up with any needed screening tests/labs/specialist appointments and any requested follow-up recommended by me today. Importance of maintaining follow-up discussed and patient accepts that missed appointments can delay diagnosis and potentially lead to worsening of conditions.  Return if symptoms worsen or fail to improve., or sooner if acute issues arise.    Lab Frequency Next Occurrence   Follow Anesthesia Guidelines / Standing Orders Once 08/03/2018   Follow Anesthesia Guidelines / Standing Orders Once 08/03/2018   US Ankle / Brachial Indices Extremity Complete Once 08/02/2021   Follow Anesthesia Guidelines / Protocol Once 08/03/2021   Provide NPO Instructions to Patient Once 08/08/2021   Chlorhexidine Skin Prep Once 08/08/2021   Follow Anesthesia Guidelines / Protocol Once 08/06/2021   Obtain Informed Consent Once 08/11/2021   Provide Instructions to Patient Regarding NPO Status Once 08/11/2021   Chlorhexidine Skin Prep - Educate and Review With Patient Once 08/11/2021   Instructions on coughing, deep breathing, and incentive spirometry. Once 08/11/2021   CBC & Differential Once 08/11/2021   Comprehensive Metabolic Panel  Once 08/11/2021       This document has been electronically signed by Artemio Martinez DPM on September 7, 2021 09:51 CDT

## 2021-09-03 ENCOUNTER — TELEPHONE (OUTPATIENT)
Dept: PODIATRY | Facility: CLINIC | Age: 76
End: 2021-09-03

## 2021-09-07 ENCOUNTER — OFFICE VISIT (OUTPATIENT)
Dept: PODIATRY | Facility: CLINIC | Age: 76
End: 2021-09-07

## 2021-09-07 VITALS
OXYGEN SATURATION: 98 % | SYSTOLIC BLOOD PRESSURE: 108 MMHG | BODY MASS INDEX: 30.02 KG/M2 | HEIGHT: 58 IN | WEIGHT: 143 LBS | HEART RATE: 78 BPM | DIASTOLIC BLOOD PRESSURE: 70 MMHG

## 2021-09-07 DIAGNOSIS — S98.132A AMPUTATED TOE, LEFT (HCC): ICD-10-CM

## 2021-09-07 DIAGNOSIS — E11.9 CONTROLLED TYPE 2 DIABETES MELLITUS WITHOUT COMPLICATION, WITHOUT LONG-TERM CURRENT USE OF INSULIN (HCC): ICD-10-CM

## 2021-09-07 DIAGNOSIS — I73.9 PAD (PERIPHERAL ARTERY DISEASE) (HCC): ICD-10-CM

## 2021-09-07 DIAGNOSIS — Z98.890 S/P FOOT SURGERY: Primary | ICD-10-CM

## 2021-09-07 PROCEDURE — 99212 OFFICE O/P EST SF 10 MIN: CPT | Performed by: PODIATRIST

## 2022-02-24 ENCOUNTER — TELEPHONE (OUTPATIENT)
Dept: VASCULAR SURGERY | Facility: CLINIC | Age: 77
End: 2022-02-24

## 2022-02-24 ENCOUNTER — APPOINTMENT (OUTPATIENT)
Dept: ULTRASOUND IMAGING | Facility: HOSPITAL | Age: 77
End: 2022-02-24

## 2022-02-24 NOTE — TELEPHONE ENCOUNTER
Trying to reach patient to give her new appointment date and time . Testing has been rescheduled to. She is now scheduled on 03/31. I  Will mail appointment reminders.

## 2022-03-23 NOTE — PROGRESS NOTES
"3/31/2022       Juan Echavarria MD  [address]  [facility] [address]    Nuha Cali  1945    Chief Complaint   Patient presents with   • Peripheral Vascular Disease     6 month follow-up for PAD with testing pad ankle/brach ind ext comp.  Pt denies any stroke-like symptoms.   • Never Smoker     Pt verified Never Smoker         Dear Juan Echavarria MD             I had the pleasure of seeing your patient Nuha Cali in the office today.   As you recall, Nuha Cali is a 77 y.o.  female who developed wound to her left second toe and was following with wound care.  She did undergo angiogram on 8/13/2021 and unfortunately Dr. Huddleston was not able to cross through the distal anterior tibial artery chronic total occlusion.  She did undergo amputation of the left second toe on 8/18/2021 with Dr. Martinez which healed.  She did have noninvasive testing performed today, which I did review in office.      Review of Systems   Constitutional: Negative.    HENT: Negative.    Eyes: Negative.    Respiratory: Negative.    Cardiovascular: Negative.    Gastrointestinal: Negative.    Endocrine: Negative.    Genitourinary: Negative.    Musculoskeletal: Negative.    Skin: Negative.  Negative for wound.   Allergic/Immunologic: Negative.    Neurological: Negative.    Hematological: Negative.    Psychiatric/Behavioral: Negative.    All other systems reviewed and are negative.      /100 (BP Location: Left arm, Patient Position: Sitting, Cuff Size: Adult)   Pulse 75   Resp 18   Ht 147.3 cm (58\")   Wt 63.5 kg (140 lb)   SpO2 96%   BMI 29.26 kg/m²   Physical Exam  Vitals and nursing note reviewed.   Constitutional:       General: She is not in acute distress.     Appearance: She is well-developed. She is not diaphoretic.   HENT:      Head: Normocephalic and atraumatic.   Eyes:      General: No scleral icterus.     Pupils: Pupils are equal, round, and reactive to light.   Neck:      Thyroid: No " thyromegaly.      Vascular: No carotid bruit or JVD.   Cardiovascular:      Rate and Rhythm: Normal rate and regular rhythm.      Pulses:           Dorsalis pedis pulses are detected w/ Doppler on the right side and detected w/ Doppler on the left side.        Posterior tibial pulses are detected w/ Doppler on the right side and detected w/ Doppler on the left side.      Heart sounds: Normal heart sounds and S2 normal. No murmur heard.    No friction rub. No gallop.   Pulmonary:      Effort: Pulmonary effort is normal.      Breath sounds: Normal breath sounds.   Abdominal:      General: Bowel sounds are normal.      Palpations: Abdomen is soft.   Musculoskeletal:         General: Normal range of motion.      Cervical back: Normal range of motion and neck supple.   Skin:     General: Skin is warm and dry.   Neurological:      Mental Status: She is alert and oriented to person, place, and time.      Cranial Nerves: No cranial nerve deficit.   Psychiatric:         Behavior: Behavior normal.         Thought Content: Thought content normal.         Judgment: Judgment normal.        US Ankle / Brachial Indices Extremity Complete    Result Date: 3/31/2022  Narrative:  History: [facility]  Comments: Bilateral lower extremity arterial with multi-level pulse volume recordings and segmental pressures were performed at rest and stress.  The right ankle/brachial index is 0.85. The waveforms are biphasic without dampening.These findings are consistent with mild arterial insufficiency of the right lower extremity at rest.  The left ankle/brachial index is 1.2. The waveforms are biphasic without dampening. These findings are consistent with no significant arterial insufficiency of the left lower extremity at rest.      Impression: Impression: 1. Mild arterial insufficiency of the right lower extremity at rest. 2. No significant arterial insufficiency of the left lower extremity at rest.   This report was finalized on 03/31/2022 12:58 by  Dr. Peter Huddleston MD.     Patient Active Problem List   Diagnosis   • Anemia   • Heme positive stool   • HTN (hypertension), benign   • Change in bowel habits   • Controlled type 2 diabetes mellitus without complication, without long-term current use of insulin (HCC)   • Colovaginal fistula   • PAD (peripheral artery disease) ([facility])         ICD-10-CM ICD-9-CM   1. PAD (peripheral artery disease) (HCC)  I73.9 443.9   2. HTN (hypertension), benign  I10 401.1         Plan: After thoroughly evaluating Nuha Cali, I believe the best course of action is remain conservative from vascular surgery standpoint.  Currently she is doing well denies any claudication.  Her previous amputation site of her left second toe healed.  I did review her testing which shows mild right lower extremity arterial insufficiency and no arterial insufficiency to the left.  Previous angiogram did show 2-1/2 vessel runoff to her left foot.  We will see her back in 6 months with repeat noninvasive testing for continued surveillance, including ABIs.  I did discuss vascular risk factors as the pertain to the progression of vascular disease including controlling her hypertension and diabetes mellitus.  Her blood pressure stable on her current medications.  The patient can continue taking their current medication regimen as previously planned.  This was all discussed in full with complete understanding.  Thank you for allowing me to participate in the care of your patient.  Please do not hesitate with any questions or concerns.  I will keep you aware of any further encounters with Nuha Cali.        Sincerely yours,         CINDY Kelly

## 2022-03-30 ENCOUNTER — TELEPHONE (OUTPATIENT)
Dept: VASCULAR SURGERY | Facility: CLINIC | Age: 77
End: 2022-03-30

## 2022-03-30 NOTE — TELEPHONE ENCOUNTER
Confirmed appt with pt tomorrow 3/31/22 with [facility] at 1:00 and follow-up with Dr Huddleston at 2:00.

## 2022-03-31 ENCOUNTER — HOSPITAL ENCOUNTER (OUTPATIENT)
Dept: ULTRASOUND IMAGING | Facility: HOSPITAL | Age: 77
Discharge: HOME | End: 2022-03-31
Admitting: NURSE PRACTITIONER

## 2022-03-31 ENCOUNTER — OFFICE VISIT (OUTPATIENT)
Dept: VASCULAR SURGERY | Facility: CLINIC | Age: 77
End: 2022-03-31

## 2022-03-31 VITALS
OXYGEN SATURATION: 96 % | DIASTOLIC BLOOD PRESSURE: 100 MMHG | RESPIRATION RATE: 18 BRPM | WEIGHT: 140 LBS | SYSTOLIC BLOOD PRESSURE: 162 MMHG | HEIGHT: 58 IN | BODY MASS INDEX: 29.39 KG/M2 | HEART RATE: 75 BPM

## 2022-03-31 DIAGNOSIS — I73.9 PAD (PERIPHERAL ARTERY DISEASE): Primary | ICD-10-CM

## 2022-03-31 DIAGNOSIS — I10 HTN (HYPERTENSION), BENIGN: ICD-10-CM

## 2022-03-31 DIAGNOSIS — I73.9 PAD (PERIPHERAL ARTERY DISEASE): ICD-10-CM

## 2022-03-31 PROCEDURE — 93923 UPR/LXTR ART STDY 3+ LVLS: CPT

## 2022-03-31 PROCEDURE — 99214 OFFICE O/P EST MOD 30 MIN: CPT | Performed by: SURGERY

## 2022-03-31 PROCEDURE — 93923 UPR/LXTR ART STDY 3+ LVLS: CPT | Performed by: SURGERY

## 2022-06-20 NOTE — PROGRESS NOTES
"[facility] GROUP HEMATOLOGY & ONCOLOGY  [address]  [facility] [address] [phone]    Patient Name: Nuha Cali  Encounter Date: 06/21/2022  YOB: 1945  Patient Number: [id number]    Initial Note    REASON FOR CONSULTATION: Patient states \" my blood count is low \".    HISTORY OF PRESENT ILLNESS: Nuha Cali is a 77 y.o. female referred by CINDY Richmond for diagnostic and management recommendations for anemia in chronic kidney disease. History is obtained from patient. History is considered to be accurate.    She has health history significant for  Stage IV CKD, Diabetes w/neuropathy, HTN,Hyperlipidemia, GERD, Rheumatoid Arthritis. S/P repair of rectovaginal fistula in 2018.    In review of May 26, 2022 labs, Hgb 9.1, Hct 29.4, BUN 36, Creatinine 2.25, GFR 22.  No anemia profile was sent.  She was referred by nephrology.    Pt complains of fatigue and back pain.  But otherwise states she feels well.     Denies smoking, drinking or illicit drug use.     Colonoscopy 2015 Diverticulosis.  Pt does not want to do another one.   Mammogram 01/25/22  Dexa Scan - 2016 Osteopenia    LABS    Lab Results - Last 18 Months   Lab Units 08/11/21  1015   HEMOGLOBIN g/dL 9.5*   HEMATOCRIT % 31.6*   MCV fL 90.5   WBC 10*3/mm3 5.60   RDW % 14.4   MPV fL 9.2   PLATELETS 10*3/mm3 224   IMM GRAN % % 0.5   NEUTROS ABS 10*3/mm3 4.03   LYMPHS ABS 10*3/mm3 0.99   MONOS ABS 10*3/mm3 0.35   EOS ABS 10*3/mm3 0.17   BASOS ABS 10*3/mm3 0.03   IMMATURE GRANS (ABS) 10*3/mm3 0.03   NRBC /100 WBC 0.0       Lab Results - Last 18 Months   Lab Units 08/11/21  1014   GLUCOSE mg/dL 127*   SODIUM mmol/L 142   POTASSIUM mmol/L 4.2   CO2 mmol/L 26.0   CHLORIDE mmol/L 107   ANION GAP mmol/L 9.0   CREATININE mg/dL 1.76*   BUN mg/dL 25*   BUN / CREAT RATIO  14.2   CALCIUM mg/dL 9.8   EGFR IF NONAFRICN AM mL/min/1.73 28*       No results for input(s): KONRAD, " KAPPALAMB, IGLFLC, URICACID, FREEKAPPAL, CEA, LDH, REFLABREPO in the last 39853 hours.    No results for input(s): IRON, TIBC, LABIRON, FERRITIN, Y2XSMOL, TSH, FOLATE in the last 34279 hours.    Invalid input(s): VITB12      PAST MEDICAL HISTORY:  ALLERGIES:  Allergies   Allergen Reactions   • Aspirin Anaphylaxis and Swelling     CURRENT MEDICATIONS:  Outpatient Encounter Medications as of 6/21/2022   Medication Sig Dispense Refill   • amLODIPine-valsartan (EXFORGE)  MG per tablet Take 1 tablet by mouth.     • carvedilol (COREG) 12.5 MG tablet 2 (Two) Times a Day With Meals.     • cholecalciferol (VITAMIN D3) 1000 units tablet Take 2,000 Units by mouth Daily.     • cimetidine (TAGAMET) 200 MG tablet Take 200 mg by mouth Daily.     • folic acid (FOLVITE) 1 MG tablet Take 1,000 mcg by mouth Daily.     • furosemide (LASIX) 20 MG tablet Daily.     • leflunomide (ARAVA) 20 MG tablet Take 20 mg by mouth Daily.     • polyethyl glycol-propyl glycol (SYSTANE) 0.4-0.3 % solution ophthalmic solution Administer 1 drop to both eyes Every 1 (One) Hour As Needed.     • potassium chloride (K-DUR) 10 MEQ CR tablet Daily.     • pravastatin (PRAVACHOL) 20 MG tablet Take 40 mg by mouth Daily.       No facility-administered encounter medications on file as of 6/21/2022.     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   • PAD (peripheral artery disease) ([facility]) I73.9       HEALTH MAINTENANCE ITEMS:  Health Maintenance Due   Topic Date Due   • URINE MICROALBUMIN  Never done   • COVID-19 Vaccine (1) Never done   • Pneumococcal Vaccine 65+ (1 - PCV) Never done   • TDAP/TD VACCINES (1 - Tdap) Never done   • ZOSTER VACCINE (1 of 2) Never done   • HEPATITIS C  SCREENING  Never done   • DIABETIC EYE EXAM  Never done   • DXA SCAN  01/02/2022       <no information>  Last Completed Colonoscopy          COLORECTAL CANCER SCREENING (COLONOSCOPY - Every 10 Years) Next due on 1/19/2025 01/19/2015  SCANNED - COLONOSCOPY    11/24/2009  SCANNED - COLONOSCOPY    02/04/2003  SCANNED - COLONOSCOPY                There is no immunization history on file for this patient.  Last Completed Mammogram          MAMMOGRAM (Every 2 Years) Next due on 1/25/2024 01/25/2022  Outside Claim: HC MAMMOGRAM SCREENING BILAT DIGITAL W CAD,CHG SCREENING DIGITAL BREAST TOMOSYNTHESIS BI    01/15/2021  Outside Claim: HC MAMMOGRAM SCREENING BILAT DIGITAL W CAD,CHG SCREENING DIGITAL BREAST TOMOSYNTHESIS BI    01/02/2020  Outside Claim: HC MAMMOGRAM SCREENING BILAT DIGITAL W CAD,CHG SCREENING DIGITAL BREAST TOMOSYNTHESIS BI    12/13/2018  Outside Claim: HC MAMMOGRAM SCREENING BILAT DIGITAL W CAD,CHG SCREENING DIGITAL BREAST TOMOSYNTHESIS BI    09/20/2017  Outside Claim: [place] Scr mammo bi incl cad    Only the first 5 history entries have been loaded, but more history exists.                  FAMILY HISTORY:  Family History   Problem Relation Age of Onset   • Hypertension Other    • Diabetes Other    • Coronary artery disease Other    • Cancer Other    • No Known Problems Son    • No Known Problems Son    • No Known Problems Son    • Breast cancer Other    • Colon cancer Neg Hx    • Colon polyps Neg Hx    • Ovarian cancer Neg Hx      SOCIAL HISTORY:  Social History     Socioeconomic History   • Marital status:    Tobacco Use   • Smoking status: Never Smoker   • Smokeless tobacco: Never Used   Vaping Use   • Vaping Use: Never used   Substance and Sexual Activity   • Alcohol use: No   • Drug use: No   • Sexual activity: Defer     Birth control/protection: Surgical       REVIEW OF SYSTEMS:  Review of Systems   Constitutional: Positive for fatigue.   HENT: Negative for swollen glands and trouble  "swallowing.    Eyes:        Macular degeneration. Gets monthly injections     Respiratory: Negative for shortness of breath and wheezing.    Cardiovascular: Negative for chest pain and palpitations.   Gastrointestinal: Negative for abdominal pain, blood in stool, nausea and vomiting.   Genitourinary: Negative for difficulty urinating, dysuria and hematuria.   Musculoskeletal: Positive for back pain.   Neurological:        Diabetic Neuropathy   Psychiatric/Behavioral: Negative for suicidal ideas and depressed mood. The patient is nervous/anxious (Nervous about her visit today).        /96   Pulse 91   Temp 97.3 °F (36.3 °C) (Temporal)   Resp 16   Ht 147.3 cm (58\")   Wt 64.3 kg (141 lb 11.2 oz)   SpO2 97%   Breastfeeding No   BMI 29.62 kg/m²  Body surface area is 1.57 meters squared.    Pain Score    06/21/22 1333   PainSc: 0-No pain       Physical Exam:  Physical Exam  Constitutional:       Appearance: Normal appearance.   HENT:      Head: Normocephalic and atraumatic.   Eyes:      Extraocular Movements: Extraocular movements intact.   Cardiovascular:      Rate and Rhythm: Normal rate and regular rhythm.   Pulmonary:      Effort: Pulmonary effort is normal.      Breath sounds: Normal breath sounds.   Abdominal:      General: Bowel sounds are normal.      Palpations: Abdomen is soft.   Musculoskeletal:      Right lower leg: Edema present.      Left lower leg: Edema present.   Skin:     General: Skin is warm and dry.      Coloration: Skin is pale.   Neurological:      General: No focal deficit present.      Mental Status: She is alert and oriented to person, place, and time.   Psychiatric:         Mood and Affect: Mood normal.         Behavior: Behavior normal.         Nuha Cali reports a pain score of 0.  No intervention indicated.   ASSESSMENT / PLAN:    1. Anemia due to stage 4 chronic kidney disease (HCC)    2. HTN (hypertension), benign    3. Controlled type 2 diabetes mellitus without " complication, without long-term current use of insulin (HCC)    4. Post-menopausal       1.  Anemia in Chronic Kidney Disease   -Hgb 9.1, Hct 29.4,  -BUN 36, Creatinine 2.25, GFR 22.    -No anemia profile was sent.    -Will order CBC, CMP, Anemia profile, SPEP, RENATO, Light chains with ratio, LDH, Erythropoietin, and peripheral blood smear.  -Discussed the need for CHANTALE and why it is indicated.   -Will start erythropoeitin 40,000 units weekly If Hb is <10, Hct <30, ferritin >100 and iron saturation >20%      2.  Hypertension / Edema  -On Lasix, Amlodipine-Valsartan, Coreg  -Managed  By PCP and nephrology      3.  Diabetes w/ Neuropathy   -s/p left second toe amputation August 2021        PLAN:   1.   regarding the reason for the referral.   2.   regarding anemia in chronic kidney disease    3.  Labs for CBC, CMP, Anemia Profile with Ferritin, SPEP, REANTO, Light Chains w/ratio   4.  Continue current medications, follow up, treatment plans per PCP and any other provider.   5.  Return to office in 1 week to review lab and start CHANTALE as indicated.    6.  Care discussed with patient.  Understanding expressed.  Patient agreeable with plan.  7.  Further recommendations pending.  8.  Will order DEXA Scan        Thank you for the referral.    I spent 30 minutes caring for Nuha on this date of service. This time includes time spent by me in the following activities: preparing for the visit, reviewing tests, performing a medically appropriate examination and/or evaluation, counseling and educating the patient/family/caregiver, ordering medications, tests, or procedures and documenting information in the medical record.     Marilu Lackey, APRN  06/21/2022

## 2022-06-21 ENCOUNTER — CONSULT (OUTPATIENT)
Dept: ONCOLOGY | Facility: CLINIC | Age: 77
End: 2022-06-21

## 2022-06-21 ENCOUNTER — LAB (OUTPATIENT)
Dept: LAB | Facility: HOSPITAL | Age: 77
End: 2022-06-21

## 2022-06-21 VITALS
DIASTOLIC BLOOD PRESSURE: 96 MMHG | SYSTOLIC BLOOD PRESSURE: 156 MMHG | OXYGEN SATURATION: 97 % | TEMPERATURE: 97.3 F | WEIGHT: 141.7 LBS | RESPIRATION RATE: 16 BRPM | HEIGHT: 58 IN | BODY MASS INDEX: 29.74 KG/M2 | HEART RATE: 91 BPM

## 2022-06-21 DIAGNOSIS — I10 HTN (HYPERTENSION), BENIGN: ICD-10-CM

## 2022-06-21 DIAGNOSIS — D63.1 ANEMIA DUE TO STAGE 4 CHRONIC KIDNEY DISEASE: ICD-10-CM

## 2022-06-21 DIAGNOSIS — E11.9 CONTROLLED TYPE 2 DIABETES MELLITUS WITHOUT COMPLICATION, WITHOUT LONG-TERM CURRENT USE OF INSULIN: ICD-10-CM

## 2022-06-21 DIAGNOSIS — D63.1 ANEMIA DUE TO STAGE 4 CHRONIC KIDNEY DISEASE: Primary | ICD-10-CM

## 2022-06-21 DIAGNOSIS — N18.4 ANEMIA DUE TO STAGE 4 CHRONIC KIDNEY DISEASE: ICD-10-CM

## 2022-06-21 DIAGNOSIS — N18.4 ANEMIA DUE TO STAGE 4 CHRONIC KIDNEY DISEASE: Primary | ICD-10-CM

## 2022-06-21 DIAGNOSIS — Z78.0 POST-MENOPAUSAL: ICD-10-CM

## 2022-06-21 LAB
ALBUMIN SERPL-MCNC: 4.4 G/DL (ref 3.5–5.2)
ALBUMIN/GLOB SERPL: 1.3 G/DL
ALP SERPL-CCNC: 104 U/L (ref 39–117)
ALT SERPL W P-5'-P-CCNC: 13 U/L (ref 1–33)
ANION GAP SERPL CALCULATED.3IONS-SCNC: 10 MMOL/L (ref 5–15)
AST SERPL-CCNC: 16 U/L (ref 1–32)
BASOPHILS # BLD AUTO: 0.03 10*3/MM3 (ref 0–0.2)
BASOPHILS NFR BLD AUTO: 0.5 % (ref 0–1.5)
BILIRUB SERPL-MCNC: 0.2 MG/DL (ref 0–1.2)
BUN SERPL-MCNC: 33 MG/DL (ref 8–23)
BUN/CREAT SERPL: 14.2 (ref 7–25)
CALCIUM SPEC-SCNC: 9.8 MG/DL (ref 8.6–10.5)
CHLORIDE SERPL-SCNC: 106 MMOL/L (ref 98–107)
CO2 SERPL-SCNC: 24 MMOL/L (ref 22–29)
CREAT SERPL-MCNC: 2.32 MG/DL (ref 0.57–1)
DEPRECATED RDW RBC AUTO: 47.4 FL (ref 37–54)
EGFRCR SERPLBLD CKD-EPI 2021: 20.9 ML/MIN/1.73
EOSINOPHIL # BLD AUTO: 0.24 10*3/MM3 (ref 0–0.4)
EOSINOPHIL NFR BLD AUTO: 3.8 % (ref 0.3–6.2)
ERYTHROCYTE [DISTWIDTH] IN BLOOD BY AUTOMATED COUNT: 14 % (ref 12.3–15.4)
FERRITIN SERPL-MCNC: 111 NG/ML (ref 13–150)
GLOBULIN UR ELPH-MCNC: 3.3 GM/DL
GLUCOSE SERPL-MCNC: 112 MG/DL (ref 65–99)
HCT VFR BLD AUTO: 32.6 % (ref 34–46.6)
HGB BLD-MCNC: 9.9 G/DL (ref 12–15.9)
IMM GRANULOCYTES # BLD AUTO: 0.03 10*3/MM3 (ref 0–0.05)
IMM GRANULOCYTES NFR BLD AUTO: 0.5 % (ref 0–0.5)
IRON 24H UR-MRATE: 49 MCG/DL (ref 37–145)
IRON SATN MFR SERPL: 13 % (ref 20–50)
LDH SERPL-CCNC: 183 U/L (ref 135–214)
LYMPHOCYTES # BLD AUTO: 1.15 10*3/MM3 (ref 0.7–3.1)
LYMPHOCYTES NFR BLD AUTO: 18.4 % (ref 19.6–45.3)
MCH RBC QN AUTO: 28.2 PG (ref 26.6–33)
MCHC RBC AUTO-ENTMCNC: 30.4 G/DL (ref 31.5–35.7)
MCV RBC AUTO: 92.9 FL (ref 79–97)
MONOCYTES # BLD AUTO: 0.5 10*3/MM3 (ref 0.1–0.9)
MONOCYTES NFR BLD AUTO: 8 % (ref 5–12)
NEUTROPHILS NFR BLD AUTO: 4.3 10*3/MM3 (ref 1.7–7)
NEUTROPHILS NFR BLD AUTO: 68.8 % (ref 42.7–76)
NRBC BLD AUTO-RTO: 0 /100 WBC (ref 0–0.2)
PLATELET # BLD AUTO: 237 10*3/MM3 (ref 140–450)
PMV BLD AUTO: 8.9 FL (ref 6–12)
POTASSIUM SERPL-SCNC: 5.1 MMOL/L (ref 3.5–5.2)
PROT SERPL-MCNC: 7.7 G/DL (ref 6–8.5)
RBC # BLD AUTO: 3.51 10*6/MM3 (ref 3.77–5.28)
SODIUM SERPL-SCNC: 140 MMOL/L (ref 136–145)
TIBC SERPL-MCNC: 367 MCG/DL (ref 298–536)
TRANSFERRIN SERPL-MCNC: 246 MG/DL (ref 200–360)
WBC NRBC COR # BLD: 6.25 10*3/MM3 (ref 3.4–10.8)

## 2022-06-21 PROCEDURE — 83521 IG LIGHT CHAINS FREE EACH: CPT

## 2022-06-21 PROCEDURE — 84165 PROTEIN E-PHORESIS SERUM: CPT

## 2022-06-21 PROCEDURE — 82668 ASSAY OF ERYTHROPOIETIN: CPT

## 2022-06-21 PROCEDURE — 85025 COMPLETE CBC W/AUTO DIFF WBC: CPT

## 2022-06-21 PROCEDURE — 83615 LACTATE (LD) (LDH) ENZYME: CPT

## 2022-06-21 PROCEDURE — 86334 IMMUNOFIX E-PHORESIS SERUM: CPT

## 2022-06-21 PROCEDURE — 85060 BLOOD SMEAR INTERPRETATION: CPT

## 2022-06-21 PROCEDURE — 82728 ASSAY OF FERRITIN: CPT

## 2022-06-21 PROCEDURE — 83540 ASSAY OF IRON: CPT

## 2022-06-21 PROCEDURE — 82784 ASSAY IGA/IGD/IGG/IGM EACH: CPT

## 2022-06-21 PROCEDURE — 36415 COLL VENOUS BLD VENIPUNCTURE: CPT

## 2022-06-21 PROCEDURE — 99215 OFFICE O/P EST HI 40 MIN: CPT | Performed by: NURSE PRACTITIONER

## 2022-06-21 PROCEDURE — 80053 COMPREHEN METABOLIC PANEL: CPT

## 2022-06-21 PROCEDURE — 84466 ASSAY OF TRANSFERRIN: CPT

## 2022-06-22 LAB
ALBUMIN SERPL ELPH-MCNC: 3.4 G/DL (ref 2.9–4.4)
ALBUMIN/GLOB SERPL: 1 {RATIO} (ref 0.7–1.7)
ALPHA1 GLOB SERPL ELPH-MCNC: 0.3 G/DL (ref 0–0.4)
ALPHA2 GLOB SERPL ELPH-MCNC: 1.1 G/DL (ref 0.4–1)
B-GLOBULIN SERPL ELPH-MCNC: 1.1 G/DL (ref 0.7–1.3)
CYTOLOGIST CVX/VAG CYTO: NORMAL
EPO SERPL-ACNC: 8.8 MIU/ML (ref 2.6–18.5)
GAMMA GLOB SERPL ELPH-MCNC: 1.2 G/DL (ref 0.4–1.8)
GLOBULIN SER-MCNC: 3.7 G/DL (ref 2.2–3.9)
IGA SERPL-MCNC: 236 MG/DL (ref 64–422)
IGG SERPL-MCNC: 950 MG/DL (ref 586–1602)
IGM SERPL-MCNC: 299 MG/DL (ref 26–217)
INTERPRETATION SERPL IEP-IMP: ABNORMAL
KAPPA LC FREE SER-MCNC: 88.8 MG/L (ref 3.3–19.4)
KAPPA LC FREE/LAMBDA FREE SER: 2.11 {RATIO} (ref 0.26–1.65)
LABORATORY COMMENT REPORT: ABNORMAL
LAMBDA LC FREE SERPL-MCNC: 42.1 MG/L (ref 5.7–26.3)
M PROTEIN SERPL ELPH-MCNC: ABNORMAL G/DL
PATH INTERP BLD-IMP: NORMAL
PROT SERPL-MCNC: 7.1 G/DL (ref 6–8.5)

## 2022-06-23 ENCOUNTER — PATIENT ROUNDING (BHMG ONLY) (OUTPATIENT)
Dept: ONCOLOGY | Facility: CLINIC | Age: 77
End: 2022-06-23

## 2022-06-23 NOTE — PROGRESS NOTES
June 23, 2022    Hello, may I speak with Toby NINFA Karely?    My name is MARCELLO     I am  with [facility] HEMATOLOGY & ONCOLOGY  [address]  [facility] [place] [facility] [address] [phone].    Before we get started may I verify your date of birth? 1945     Lissette TOBY VAUGHN. My name is Marcello and I am calling from [facility]ematology/Oncology      [facility] office. I wanted to welcome you to our practice and ensure that your first visit went smoothly. If you have any questions or concerns, feel free to reach out to our practice manager Crystal directly at [phone]. Thank you, and have a great day!

## 2022-06-30 ENCOUNTER — OFFICE VISIT (OUTPATIENT)
Dept: ONCOLOGY | Facility: CLINIC | Age: 77
End: 2022-06-30

## 2022-06-30 VITALS
RESPIRATION RATE: 16 BRPM | HEIGHT: 58 IN | BODY MASS INDEX: 30.12 KG/M2 | TEMPERATURE: 97.7 F | WEIGHT: 143.5 LBS | HEART RATE: 84 BPM | DIASTOLIC BLOOD PRESSURE: 84 MMHG | OXYGEN SATURATION: 96 % | SYSTOLIC BLOOD PRESSURE: 156 MMHG

## 2022-06-30 DIAGNOSIS — D63.1 ANEMIA IN STAGE 4 CHRONIC KIDNEY DISEASE: ICD-10-CM

## 2022-06-30 DIAGNOSIS — D50.9 IRON DEFICIENCY ANEMIA, UNSPECIFIED IRON DEFICIENCY ANEMIA TYPE: Primary | ICD-10-CM

## 2022-06-30 DIAGNOSIS — I10 HTN (HYPERTENSION), BENIGN: ICD-10-CM

## 2022-06-30 DIAGNOSIS — N18.4 ANEMIA IN STAGE 4 CHRONIC KIDNEY DISEASE: ICD-10-CM

## 2022-06-30 PROCEDURE — 99214 OFFICE O/P EST MOD 30 MIN: CPT | Performed by: NURSE PRACTITIONER

## 2022-06-30 NOTE — PROGRESS NOTES
[facility] GROUP HEMATOLOGY & ONCOLOGY  [address]  [facility] [address] [phone]    Patient Name: Nuha Cali  Encounter Date: 06/21/2022  YOB: 1945  Patient Number: [id number]    Progress Note    HISTORY OF PRESENT ILLNESS: Nuha Cali is a 77 y.o. female who was seen on 06/21/22 for consultation and management recommendations for anemia in chronic kidney disease. History is obtained from patient. History is considered to be accurate.    She has health history significant for  Stage IV CKD, Diabetes w/neuropathy, HTN,Hyperlipidemia, GERD, Rheumatoid Arthritis. S/P repair of rectovaginal fistula in 2018.    In review of labs from June 21, 2022, BUN 33, Creatinine 2.32, GFR 20.9  Anemia profile with iron 49, Ferritin 111, Saturation 13%, TIBC 367.  Kappa Light Chains 88.8, Lambda Light Chains 42.1 with ratio of 2.11  No M Ambrose or monoclonality detected.       LABS    Lab Results - Last 18 Months   Lab Units 06/21/22  1426 08/11/21  1015   HEMOGLOBIN g/dL 9.9* 9.5*   HEMATOCRIT % 32.6* 31.6*   MCV fL 92.9 90.5   WBC 10*3/mm3 6.25 5.60   RDW % 14.0 14.4   MPV fL 8.9 9.2   PLATELETS 10*3/mm3 237 224   IMM GRAN % % 0.5 0.5   NEUTROS ABS 10*3/mm3 4.30 4.03   LYMPHS ABS 10*3/mm3 1.15 0.99   MONOS ABS 10*3/mm3 0.50 0.35   EOS ABS 10*3/mm3 0.24 0.17   BASOS ABS 10*3/mm3 0.03 0.03   IMMATURE GRANS (ABS) 10*3/mm3 0.03 0.03   NRBC /100 WBC 0.0 0.0       Lab Results - Last 18 Months   Lab Units 06/21/22  1426 08/11/21  1014   GLUCOSE mg/dL 112* 127*   SODIUM mmol/L 140 142   POTASSIUM mmol/L 5.1 4.2   CO2 mmol/L 24.0 26.0   CHLORIDE mmol/L 106 107   ANION GAP mmol/L 10.0 9.0   CREATININE mg/dL 2.32* 1.76*   BUN mg/dL 33* 25*   BUN / CREAT RATIO  14.2 14.2   CALCIUM mg/dL 9.8 9.8   EGFR IF NONAFRICN AM mL/min/1.73  --  28*   ALK PHOS U/L 104  --    TOTAL PROTEIN g/dL 7.7  --    ALT (SGPT) U/L 13  --    AST (SGOT) U/L 16  --     BILIRUBIN mg/dL 0.2  --    ALBUMIN g/dL 4.40  3.4  --    GLOBULIN gm/dL 3.3  --        Lab Results - Last 18 Months   Lab Units 06/21/22  1426   M-SPIKE g/dL Not Observed   KAPPA/LAMBDA RATIO, S  2.11*   FREE LAMBDA LIGHT CHAINS mg/L 42.1*   IG KAPPA FREE LIGHT CHAIN mg/L 88.8*   LDH U/L 183       Lab Results - Last 18 Months   Lab Units 06/21/22  1426   IRON mcg/dL 49   TIBC mcg/dL 367   IRON SATURATION % 13*   FERRITIN ng/mL 111.00         PAST MEDICAL HISTORY:  ALLERGIES:  Allergies   Allergen Reactions   • Aspirin Anaphylaxis and Swelling     CURRENT MEDICATIONS:  Outpatient Encounter Medications as of 6/30/2022   Medication Sig Dispense Refill   • amLODIPine-valsartan (EXFORGE)  MG per tablet Take 1 tablet by mouth.     • carvedilol (COREG) 12.5 MG tablet 2 (Two) Times a Day With Meals.     • cholecalciferol (VITAMIN D3) 1000 units tablet Take 2,000 Units by mouth Daily.     • cimetidine (TAGAMET) 200 MG tablet Take 200 mg by mouth Daily.     • folic acid (FOLVITE) 1 MG tablet Take 1,000 mcg by mouth Daily.     • furosemide (LASIX) 20 MG tablet Daily.     • leflunomide (ARAVA) 20 MG tablet Take 20 mg by mouth Daily.     • polyethyl glycol-propyl glycol (SYSTANE) 0.4-0.3 % solution ophthalmic solution Administer 1 drop to both eyes Every 1 (One) Hour As Needed.     • potassium chloride (K-DUR) 10 MEQ CR tablet Daily.     • pravastatin (PRAVACHOL) 20 MG tablet Take 40 mg by mouth Daily.       No facility-administered encounter medications on file as of 6/30/2022.     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   • PAD (peripheral artery disease) (HCC) I73.9       HEALTH MAINTENANCE ITEMS:  Health Maintenance Due   Topic Date Due    • URINE MICROALBUMIN  Never done   • COVID-19 Vaccine (1) Never done   • Pneumococcal Vaccine 65+ (1 - PCV) Never done   • TDAP/TD VACCINES (1 - Tdap) Never done   • ZOSTER VACCINE (1 of 2) Never done   • HEPATITIS C SCREENING  Never done   • DIABETIC EYE EXAM  Never done   • DXA SCAN  01/02/2022       <no information>  Last Completed Colonoscopy          COLORECTAL CANCER SCREENING (COLONOSCOPY - Every 10 Years) Next due on 1/19/2025 01/19/2015  SCANNED - COLONOSCOPY    11/24/2009  SCANNED - COLONOSCOPY    02/04/2003  SCANNED - COLONOSCOPY                There is no immunization history on file for this patient.  Last Completed Mammogram          MAMMOGRAM (Every 2 Years) Next due on 1/25/2024 01/25/2022  Outside Claim: HC MAMMOGRAM SCREENING BILAT DIGITAL W CAD,CHG SCREENING DIGITAL BREAST TOMOSYNTHESIS BI    01/15/2021  Outside Claim: HC MAMMOGRAM SCREENING BILAT DIGITAL W CAD,CHG SCREENING DIGITAL BREAST TOMOSYNTHESIS BI    01/02/2020  Outside Claim: HC MAMMOGRAM SCREENING BILAT DIGITAL W CAD,CHG SCREENING DIGITAL BREAST TOMOSYNTHESIS BI    12/13/2018  Outside Claim: HC MAMMOGRAM SCREENING BILAT DIGITAL W CAD,CHG SCREENING DIGITAL BREAST TOMOSYNTHESIS BI    09/20/2017  Outside Claim: HC MAMMOGRAM SCREENING BILAT DIGITAL W CAD,CHG SCREENING DIGITAL BREAST TOMOSYNTHESIS BI    Only the first 5 history entries have been loaded, but more history exists.                  FAMILY HISTORY:  Family History   Problem Relation Age of Onset   • Hypertension Other    • Diabetes Other    • Coronary artery disease Other    • Cancer Other    • No Known Problems Son    • No Known Problems Son    • No Known Problems Son    • Breast cancer Other    • Colon cancer Neg Hx    • Colon polyps Neg Hx    • Ovarian cancer Neg Hx      SOCIAL HISTORY:  Social History     Socioeconomic History   • Marital status:    Tobacco Use   • Smoking status: Never Smoker   • Smokeless tobacco: Never Used   Vaping Use   • Vaping Use:  Never used   Substance and Sexual Activity   • Alcohol use: No   • Drug use: No   • Sexual activity: Defer     Birth control/protection: Surgical       REVIEW OF SYSTEMS:  Review of Systems   Constitutional: Positive for fatigue.   HENT: Negative for swollen glands and trouble swallowing.    Eyes:        Macular degeneration. Gets monthly injections     Respiratory: Negative for shortness of breath and wheezing.    Cardiovascular: Negative for chest pain and palpitations.   Gastrointestinal: Negative for abdominal pain, blood in stool, nausea and vomiting.   Genitourinary: Negative for difficulty urinating, dysuria and hematuria.   Musculoskeletal: Positive for back pain.   Neurological:        Diabetic Neuropathy   Psychiatric/Behavioral: Negative for suicidal ideas and depressed mood. The patient is nervous/anxious (Nervous about her visit today).        There were no vitals taken for this visit. There is no height or weight on file to calculate BSA.    There were no vitals filed for this visit.    Physical Exam:  Physical Exam  Constitutional:       Appearance: Normal appearance.   HENT:      Head: Normocephalic and atraumatic.   Eyes:      Extraocular Movements: Extraocular movements intact.   Cardiovascular:      Rate and Rhythm: Normal rate and regular rhythm.   Pulmonary:      Effort: Pulmonary effort is normal.      Breath sounds: Normal breath sounds.   Abdominal:      General: Bowel sounds are normal.      Palpations: Abdomen is soft.   Musculoskeletal:      Right lower leg: Edema present.      Left lower leg: Edema present.   Skin:     General: Skin is warm and dry.      Coloration: Skin is pale.   Neurological:      General: No focal deficit present.      Mental Status: She is alert and oriented to person, place, and time.   Psychiatric:         Mood and Affect: Mood normal.         Behavior: Behavior normal.         Nuha Cali reports a pain score of 0.  No intervention indicated.   ASSESSMENT /  PLAN:    1. Iron deficiency anemia, unspecified iron deficiency anemia type    2. Anemia in stage 4 chronic kidney disease (HCC)    3. HTN (hypertension), benign       1.  Anemia in Chronic Kidney Disease   -Hgb 9.9, Hct 32.6  -BUN 33, Creatinine 2.32, GFR 20.9  Anemia profile with iron 49, Ferritin 111, Saturation 13%, TIBC 367.  Kappa Light Chains 88.8, Lambda Light Chains 42.1 with ratio of 2.11  No M Ambrose or monoclonality detected.   -Discussed the need for CHANTALE and why it is indicated.   -Will start erythropoeitin 40,000 units weekly If Hb is <10, Hct <30, ferritin >100 and iron saturation >20%  -Will start patient on oral iron once daily      2.  Hypertension / Edema  -On Lasix, Amlodipine-Valsartan, Coreg  -Managed  By PCP and nephrology         PLAN:  -Pt will start oral iron once daily  Continue current medications, treatment plans and follow up with PCP and any other providers  Return to office in 4 weeks  Pre-office labs for CBC, CMP, Iron Profile with Ferritin  Pt will call office is she is unable to tolerate oral iron.  Advised it may cause constipation and/or dark stools  Care discussed with patient.  Understanding expressed.  Patient agreeable with plan.    Health Maintenance   Colonoscopy 2015 Diverticulosis.  Pt does not want to do another one.   Mammogram 01/25/22  Dexa Scan - 2016 Osteopenia      I spent 21 minutes caring for Nuha on this date of service. This time includes time spent by me in the following activities: preparing for the visit, reviewing tests, performing a medically appropriate examination and/or evaluation, counseling and educating the patient/family/caregiver, ordering medications, tests, or procedures and documenting information in the medical record.     Marilu Lackey, APRN  06/30/2022

## 2022-06-30 NOTE — PROGRESS NOTES
[facility] HEMATOLOGY & ONCOLOGY  [address]  [facility] [address] [phone]    Patient Name: Nuha Cali  Encounter Date: 06/21/2022  YOB: 1945  Patient Number: [id number]    Progress Note    HISTORY OF PRESENT ILLNESS: Nuha Cali is a 77 y.o. female who was seen on 06/21/22 for consultation and management recommendations for anemia in chronic kidney disease. History is obtained from patient. History is considered to be accurate.    She has health history significant for  Stage IV CKD, Diabetes w/neuropathy, HTN,Hyperlipidemia, GERD, Rheumatoid Arthritis. S/P repair of rectovaginal fistula in 2018.    In review of labs from June 21, 2022, BUN 33, Creatinine 2.32, GFR 20.9  Anemia profile with iron 49, Ferritin 111, Saturation 13%, TIBC 367.  Kappa Light Chains 88.8, Lambda Light Chains 42.1 with ratio of 2.11  No M Ambrose or monoclonality detected.       LABS    Lab Results - Last 18 Months   Lab Units 06/21/22  1426 08/11/21  1015   HEMOGLOBIN g/dL 9.9* 9.5*   HEMATOCRIT % 32.6* 31.6*   MCV fL 92.9 90.5   WBC 10*3/mm3 6.25 5.60   RDW % 14.0 14.4   MPV fL 8.9 9.2   PLATELETS 10*3/mm3 237 224   IMM GRAN % % 0.5 0.5   NEUTROS ABS 10*3/mm3 4.30 4.03   LYMPHS ABS 10*3/mm3 1.15 0.99   MONOS ABS 10*3/mm3 0.50 0.35   EOS ABS 10*3/mm3 0.24 0.17   BASOS ABS 10*3/mm3 0.03 0.03   IMMATURE GRANS (ABS) 10*3/mm3 0.03 0.03   NRBC /100 WBC 0.0 0.0       Lab Results - Last 18 Months   Lab Units 06/21/22  1426 08/11/21  1014   GLUCOSE mg/dL 112* 127*   SODIUM mmol/L 140 142   POTASSIUM mmol/L 5.1 4.2   CO2 mmol/L 24.0 26.0   CHLORIDE mmol/L 106 107   ANION GAP mmol/L 10.0 9.0   CREATININE mg/dL 2.32* 1.76*   BUN mg/dL 33* 25*   BUN / CREAT RATIO  14.2 14.2   CALCIUM mg/dL 9.8 9.8   EGFR IF NONAFRICN AM mL/min/1.73  --  28*   ALK PHOS U/L 104  --    TOTAL PROTEIN g/dL 7.7  --    ALT (SGPT) U/L 13  --    AST (SGOT) U/L 16  --     BILIRUBIN mg/dL 0.2  --    ALBUMIN g/dL 4.40  3.4  --    GLOBULIN gm/dL 3.3  --        Lab Results - Last 18 Months   Lab Units 06/21/22  1426   M-SPIKE g/dL Not Observed   KAPPA/LAMBDA RATIO, S  2.11*   FREE LAMBDA LIGHT CHAINS mg/L 42.1*   IG KAPPA FREE LIGHT CHAIN mg/L 88.8*   LDH U/L 183       Lab Results - Last 18 Months   Lab Units 06/21/22  1426   IRON mcg/dL 49   TIBC mcg/dL 367   IRON SATURATION % 13*   FERRITIN ng/mL 111.00         PAST MEDICAL HISTORY:  ALLERGIES:  Allergies   Allergen Reactions   • Aspirin Anaphylaxis and Swelling     CURRENT MEDICATIONS:  Outpatient Encounter Medications as of 6/30/2022   Medication Sig Dispense Refill   • amLODIPine-valsartan (EXFORGE)  MG per tablet Take 1 tablet by mouth.     • carvedilol (COREG) 12.5 MG tablet 2 (Two) Times a Day With Meals.     • cholecalciferol (VITAMIN D3) 1000 units tablet Take 2,000 Units by mouth Daily.     • cimetidine (TAGAMET) 200 MG tablet Take 200 mg by mouth Daily.     • folic acid (FOLVITE) 1 MG tablet Take 1,000 mcg by mouth Daily.     • furosemide (LASIX) 20 MG tablet Daily.     • leflunomide (ARAVA) 20 MG tablet Take 20 mg by mouth Daily.     • polyethyl glycol-propyl glycol (SYSTANE) 0.4-0.3 % solution ophthalmic solution Administer 1 drop to both eyes Every 1 (One) Hour As Needed.     • potassium chloride (K-DUR) 10 MEQ CR tablet Daily.     • pravastatin (PRAVACHOL) 20 MG tablet Take 40 mg by mouth Daily.       No facility-administered encounter medications on file as of 6/30/2022.     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   • PAD (peripheral artery disease) (HCC) I73.9       HEALTH MAINTENANCE ITEMS:  Health Maintenance Due   Topic Date Due    • URINE MICROALBUMIN  Never done   • COVID-19 Vaccine (1) Never done   • Pneumococcal Vaccine 65+ (1 - PCV) Never done   • TDAP/TD VACCINES (1 - Tdap) Never done   • ZOSTER VACCINE (1 of 2) Never done   • HEPATITIS C SCREENING  Never done   • DIABETIC EYE EXAM  Never done   • DXA SCAN  01/02/2022       <no information>  Last Completed Colonoscopy          COLORECTAL CANCER SCREENING (COLONOSCOPY - Every 10 Years) Next due on 1/19/2025 01/19/2015  SCANNED - COLONOSCOPY    11/24/2009  SCANNED - COLONOSCOPY    02/04/2003  SCANNED - COLONOSCOPY                There is no immunization history on file for this patient.  Last Completed Mammogram          MAMMOGRAM (Every 2 Years) Next due on 1/25/2024 01/25/2022  Outside Claim: HC MAMMOGRAM SCREENING BILAT DIGITAL W CAD,CHG SCREENING DIGITAL BREAST TOMOSYNTHESIS BI    01/15/2021  Outside Claim: HC MAMMOGRAM SCREENING BILAT DIGITAL W CAD,CHG SCREENING DIGITAL BREAST TOMOSYNTHESIS BI    01/02/2020  Outside Claim: HC MAMMOGRAM SCREENING BILAT DIGITAL W CAD,CHG SCREENING DIGITAL BREAST TOMOSYNTHESIS BI    12/13/2018  Outside Claim: HC MAMMOGRAM SCREENING BILAT DIGITAL W CAD,CHG SCREENING DIGITAL BREAST TOMOSYNTHESIS BI    09/20/2017  Outside Claim: HC MAMMOGRAM SCREENING BILAT DIGITAL W CAD,CHG SCREENING DIGITAL BREAST TOMOSYNTHESIS BI    Only the first 5 history entries have been loaded, but more history exists.                  FAMILY HISTORY:  Family History   Problem Relation Age of Onset   • Hypertension Other    • Diabetes Other    • Coronary artery disease Other    • Cancer Other    • No Known Problems Son    • No Known Problems Son    • No Known Problems Son    • Breast cancer Other    • Colon cancer Neg Hx    • Colon polyps Neg Hx    • Ovarian cancer Neg Hx      SOCIAL HISTORY:  Social History     Socioeconomic History   • Marital status:    Tobacco Use   • Smoking status: Never Smoker   • Smokeless tobacco: Never Used   Vaping Use   • Vaping Use:  "Never used   Substance and Sexual Activity   • Alcohol use: No   • Drug use: No   • Sexual activity: Defer     Birth control/protection: Surgical       REVIEW OF SYSTEMS:  Review of Systems   Constitutional: Positive for fatigue.   HENT: Negative for swollen glands and trouble swallowing.    Eyes:        Macular degeneration. Gets monthly injections     Respiratory: Negative for shortness of breath and wheezing.    Cardiovascular: Negative for chest pain and palpitations.   Gastrointestinal: Negative for abdominal pain, blood in stool, nausea and vomiting.   Genitourinary: Negative for difficulty urinating, dysuria and hematuria.   Musculoskeletal: Positive for back pain.   Neurological:        Diabetic Neuropathy   Psychiatric/Behavioral: Negative for suicidal ideas and depressed mood. The patient is nervous/anxious (Nervous about her visit today).        /84   Pulse 84   Temp 97.7 °F (36.5 °C) (Temporal)   Resp 16   Ht 147.3 cm (58\")   Wt 65.1 kg (143 lb 8 oz)   SpO2 96%   Breastfeeding No   BMI 29.99 kg/m²  Body surface area is 1.58 meters squared.    Pain Score    06/30/22 1312   PainSc: 0-No pain       Physical Exam:  Physical Exam  Constitutional:       Appearance: Normal appearance.   HENT:      Head: Normocephalic and atraumatic.   Eyes:      Extraocular Movements: Extraocular movements intact.   Cardiovascular:      Rate and Rhythm: Normal rate and regular rhythm.   Pulmonary:      Effort: Pulmonary effort is normal.      Breath sounds: Normal breath sounds.   Abdominal:      General: Bowel sounds are normal.      Palpations: Abdomen is soft.   Musculoskeletal:      Right lower leg: Edema present.      Left lower leg: Edema present.   Skin:     General: Skin is warm and dry.      Coloration: Skin is pale.   Neurological:      General: No focal deficit present.      Mental Status: She is alert and oriented to person, place, and time.   Psychiatric:         Mood and Affect: Mood normal.         " Behavior: Behavior normal.         Nuha Cali reports a pain score of 0.  No intervention indicated.     ASSESSMENT / PLAN:    1. Iron deficiency anemia, unspecified iron deficiency anemia type    2. Anemia in stage 4 chronic kidney disease (HCC)    3. HTN (hypertension), benign       1.  Anemia in Chronic Kidney Disease   -Hgb 9.9, Hct 32.6  -BUN 33, Creatinine 2.32, GFR 20.9  Anemia profile with iron 49, Ferritin 111, Saturation 13%, TIBC 367.  Kappa Light Chains 88.8, Lambda Light Chains 42.1 with ratio of 2.11  No M Ambrose or monoclonality detected.   -Discussed the need for CHANTALE and why it is indicated.   -Will start erythropoeitin 40,000 units weekly If Hb is <10, Hct <30, ferritin >100 and iron saturation >20%  -Will start patient on oral iron once daily      2.  Hypertension / Edema  -BP today is 156/84.   -On Lasix, Amlodipine-Valsartan, Coreg  -Managed  By PCP and nephrology         PLAN:  -Pt will start oral iron once daily  Continue current medications, treatment plans and follow up with PCP and any other providers  Return to office in 4 weeks  Pre-office labs for CBC, CMP, Iron Profile with Ferritin  Pt will call office is she is unable to tolerate oral iron.  Advised it may cause constipation and/or dark stools  Care discussed with patient.  Understanding expressed.  Patient agreeable with plan.    Health Maintenance   Colonoscopy 2015 Diverticulosis.  Pt does not want to do another one.   Mammogram 01/25/22  Dexa Scan - 2016 Osteopenia      I spent 21 minutes caring for Nuha on this date of service. This time includes time spent by me in the following activities: preparing for the visit, reviewing tests, performing a medically appropriate examination and/or evaluation, counseling and educating the patient/family/caregiver, ordering medications, tests, or procedures and documenting information in the medical record.     Marilu Lackey, APRN  06/30/2022

## 2022-07-07 ENCOUNTER — TELEPHONE (OUTPATIENT)
Dept: ONCOLOGY | Facility: CLINIC | Age: 77
End: 2022-07-07

## 2022-07-07 NOTE — TELEPHONE ENCOUNTER
Caller: Nuha Cali    Relationship: Self    [phone]      What was the call regarding: PATIENT CALLED SHE MISSED HER BONE SCAN TODAY BUT SHE STATED SHE WASN'T AWARE SHE HAD ONE. SHE DOESN'T WANT TO RESCHEDULE RIGHT NOW

## 2022-07-28 ENCOUNTER — LAB (OUTPATIENT)
Dept: LAB | Facility: HOSPITAL | Age: 77
End: 2022-07-28

## 2022-07-28 ENCOUNTER — INFUSION (OUTPATIENT)
Dept: ONCOLOGY | Facility: HOSPITAL | Age: 77
End: 2022-07-28

## 2022-07-28 ENCOUNTER — OFFICE VISIT (OUTPATIENT)
Dept: ONCOLOGY | Facility: CLINIC | Age: 77
End: 2022-07-28

## 2022-07-28 VITALS
WEIGHT: 143 LBS | BODY MASS INDEX: 28.83 KG/M2 | OXYGEN SATURATION: 97 % | SYSTOLIC BLOOD PRESSURE: 155 MMHG | HEIGHT: 59 IN | TEMPERATURE: 97.7 F | HEART RATE: 84 BPM | RESPIRATION RATE: 18 BRPM | DIASTOLIC BLOOD PRESSURE: 54 MMHG

## 2022-07-28 VITALS
TEMPERATURE: 97.2 F | BODY MASS INDEX: 30.19 KG/M2 | DIASTOLIC BLOOD PRESSURE: 88 MMHG | WEIGHT: 143.8 LBS | HEIGHT: 58 IN | SYSTOLIC BLOOD PRESSURE: 146 MMHG | RESPIRATION RATE: 16 BRPM | OXYGEN SATURATION: 97 % | HEART RATE: 81 BPM

## 2022-07-28 DIAGNOSIS — D50.9 IRON DEFICIENCY ANEMIA, UNSPECIFIED IRON DEFICIENCY ANEMIA TYPE: ICD-10-CM

## 2022-07-28 DIAGNOSIS — D64.9 ANEMIA, UNSPECIFIED TYPE: Primary | ICD-10-CM

## 2022-07-28 DIAGNOSIS — I10 HTN (HYPERTENSION), BENIGN: ICD-10-CM

## 2022-07-28 DIAGNOSIS — N18.4 ANEMIA IN STAGE 4 CHRONIC KIDNEY DISEASE: Primary | ICD-10-CM

## 2022-07-28 DIAGNOSIS — D50.9 IRON DEFICIENCY ANEMIA, UNSPECIFIED IRON DEFICIENCY ANEMIA TYPE: Primary | ICD-10-CM

## 2022-07-28 DIAGNOSIS — D63.1 ANEMIA IN STAGE 4 CHRONIC KIDNEY DISEASE: Primary | ICD-10-CM

## 2022-07-28 DIAGNOSIS — D63.1 ANEMIA IN STAGE 4 CHRONIC KIDNEY DISEASE: ICD-10-CM

## 2022-07-28 DIAGNOSIS — N18.4 ANEMIA IN STAGE 4 CHRONIC KIDNEY DISEASE: ICD-10-CM

## 2022-07-28 LAB
ALBUMIN SERPL-MCNC: 3.9 G/DL (ref 3.5–5.2)
ALBUMIN/GLOB SERPL: 1.2 G/DL
ALP SERPL-CCNC: 92 U/L (ref 39–117)
ALT SERPL W P-5'-P-CCNC: 9 U/L (ref 1–33)
ANION GAP SERPL CALCULATED.3IONS-SCNC: 7 MMOL/L (ref 5–15)
AST SERPL-CCNC: 12 U/L (ref 1–32)
BASOPHILS # BLD AUTO: 0.02 10*3/MM3 (ref 0–0.2)
BASOPHILS NFR BLD AUTO: 0.4 % (ref 0–1.5)
BILIRUB SERPL-MCNC: 0.2 MG/DL (ref 0–1.2)
BUN SERPL-MCNC: 36 MG/DL (ref 8–23)
BUN/CREAT SERPL: 19.5 (ref 7–25)
CALCIUM SPEC-SCNC: 9.5 MG/DL (ref 8.6–10.5)
CHLORIDE SERPL-SCNC: 107 MMOL/L (ref 98–107)
CO2 SERPL-SCNC: 26 MMOL/L (ref 22–29)
CREAT SERPL-MCNC: 1.85 MG/DL (ref 0.57–1)
DEPRECATED RDW RBC AUTO: 45.1 FL (ref 37–54)
EGFRCR SERPLBLD CKD-EPI 2021: 27.4 ML/MIN/1.73
EOSINOPHIL # BLD AUTO: 0.16 10*3/MM3 (ref 0–0.4)
EOSINOPHIL NFR BLD AUTO: 3 % (ref 0.3–6.2)
ERYTHROCYTE [DISTWIDTH] IN BLOOD BY AUTOMATED COUNT: 13.5 % (ref 12.3–15.4)
FERRITIN SERPL-MCNC: 107 NG/ML (ref 13–150)
GLOBULIN UR ELPH-MCNC: 3.3 GM/DL
GLUCOSE SERPL-MCNC: 140 MG/DL (ref 65–99)
HCT VFR BLD AUTO: 30.1 % (ref 34–46.6)
HGB BLD-MCNC: 9.1 G/DL (ref 12–15.9)
HOLD SPECIMEN: NORMAL
IMM GRANULOCYTES # BLD AUTO: 0.01 10*3/MM3 (ref 0–0.05)
IMM GRANULOCYTES NFR BLD AUTO: 0.2 % (ref 0–0.5)
IRON 24H UR-MRATE: 58 MCG/DL (ref 37–145)
IRON SATN MFR SERPL: 17 % (ref 20–50)
LYMPHOCYTES # BLD AUTO: 1.22 10*3/MM3 (ref 0.7–3.1)
LYMPHOCYTES NFR BLD AUTO: 22.8 % (ref 19.6–45.3)
MCH RBC QN AUTO: 27.8 PG (ref 26.6–33)
MCHC RBC AUTO-ENTMCNC: 30.2 G/DL (ref 31.5–35.7)
MCV RBC AUTO: 92 FL (ref 79–97)
MONOCYTES # BLD AUTO: 0.53 10*3/MM3 (ref 0.1–0.9)
MONOCYTES NFR BLD AUTO: 9.9 % (ref 5–12)
NEUTROPHILS NFR BLD AUTO: 3.41 10*3/MM3 (ref 1.7–7)
NEUTROPHILS NFR BLD AUTO: 63.7 % (ref 42.7–76)
NRBC BLD AUTO-RTO: 0 /100 WBC (ref 0–0.2)
PLATELET # BLD AUTO: 184 10*3/MM3 (ref 140–450)
PMV BLD AUTO: 8.4 FL (ref 6–12)
POTASSIUM SERPL-SCNC: 4.3 MMOL/L (ref 3.5–5.2)
PROT SERPL-MCNC: 7.2 G/DL (ref 6–8.5)
RBC # BLD AUTO: 3.27 10*6/MM3 (ref 3.77–5.28)
SODIUM SERPL-SCNC: 140 MMOL/L (ref 136–145)
TIBC SERPL-MCNC: 337 MCG/DL (ref 298–536)
TRANSFERRIN SERPL-MCNC: 226 MG/DL (ref 200–360)
WBC NRBC COR # BLD: 5.35 10*3/MM3 (ref 3.4–10.8)

## 2022-07-28 PROCEDURE — 82728 ASSAY OF FERRITIN: CPT

## 2022-07-28 PROCEDURE — 25010000002 EPOETIN ALFA-EPBX 40000 UNIT/ML SOLUTION: Performed by: NURSE PRACTITIONER

## 2022-07-28 PROCEDURE — 99214 OFFICE O/P EST MOD 30 MIN: CPT | Performed by: NURSE PRACTITIONER

## 2022-07-28 PROCEDURE — 83540 ASSAY OF IRON: CPT

## 2022-07-28 PROCEDURE — 36415 COLL VENOUS BLD VENIPUNCTURE: CPT

## 2022-07-28 PROCEDURE — 84466 ASSAY OF TRANSFERRIN: CPT

## 2022-07-28 PROCEDURE — 85025 COMPLETE CBC W/AUTO DIFF WBC: CPT

## 2022-07-28 PROCEDURE — 80053 COMPREHEN METABOLIC PANEL: CPT

## 2022-07-28 PROCEDURE — 96372 THER/PROPH/DIAG INJ SC/IM: CPT

## 2022-07-28 RX ORDER — FERROUS SULFATE 325(65) MG
325 TABLET ORAL 2 TIMES DAILY
Qty: 60 TABLET | Refills: 3 | Status: SHIPPED | OUTPATIENT
Start: 2022-07-28 | End: 2022-09-29

## 2022-07-28 RX ADMIN — EPOETIN ALFA-EPBX 40000 UNITS: 40000 INJECTION, SOLUTION INTRAVENOUS; SUBCUTANEOUS at 14:57

## 2022-07-28 NOTE — PROGRESS NOTES
[facility] HEMATOLOGY & ONCOLOGY  [address]  [facility] [address] [phone]    Patient Name: Nuha Cali  Encounter Date: 07/28/2022  YOB: 1945  Patient Number: [id number]    Progress Note    HISTORY OF PRESENT ILLNESS: Nuha Cali is a 77 y.o. female who was seen on 06/21/22 for consultation and management recommendations for anemia in chronic kidney disease. History is obtained from patient. History is considered to be accurate.    She has health history significant for  Stage IV CKD, Diabetes w/neuropathy, HTN,Hyperlipidemia, GERD, Rheumatoid Arthritis. S/P repair of rectovaginal fistula in 2018.    Patient was started on oral iron.    She presents to clinic today for continued follow-up.  She complains of persistent fatigue.  She had labs drawn today which were reviewed with her.  Chemistry with nonfasting glucose of 140.  Bun 36, creatinine 1.85, GFR 27.4.  CBC with WBC 5.35, hemoglobin 9.1, hematocrit 30.1, platelet count 184  Anemia profile with serum iron 58, ferritin 107, saturation 17%, TIBC 337.  Per patient's insurance, we are able to start Retacrit injections today        LABS    Lab Results - Last 18 Months   Lab Units 07/28/22  1325 06/21/22  1426 08/11/21  1015   HEMOGLOBIN g/dL 9.1* 9.9* 9.5*   HEMATOCRIT % 30.1* 32.6* 31.6*   MCV fL 92.0 92.9 90.5   WBC 10*3/mm3 5.35 6.25 5.60   RDW % 13.5 14.0 14.4   MPV fL 8.4 8.9 9.2   PLATELETS 10*3/mm3 184 237 224   IMM GRAN % % 0.2 0.5 0.5   NEUTROS ABS 10*3/mm3 3.41 4.30 4.03   LYMPHS ABS 10*3/mm3 1.22 1.15 0.99   MONOS ABS 10*3/mm3 0.53 0.50 0.35   EOS ABS 10*3/mm3 0.16 0.24 0.17   BASOS ABS 10*3/mm3 0.02 0.03 0.03   IMMATURE GRANS (ABS) 10*3/mm3 0.01 0.03 0.03   NRBC /100 WBC 0.0 0.0 0.0       Lab Results - Last 18 Months   Lab Units 07/28/22  1325 06/21/22  1426 08/11/21  1014   GLUCOSE mg/dL 140* 112* 127*   SODIUM mmol/L 140 140 142   POTASSIUM mmol/L  4.3 5.1 4.2   CO2 mmol/L 26.0 24.0 26.0   CHLORIDE mmol/L 107 106 107   ANION GAP mmol/L 7.0 10.0 9.0   CREATININE mg/dL 1.85* 2.32* 1.76*   BUN mg/dL 36* 33* 25*   BUN / CREAT RATIO  19.5 14.2 14.2   CALCIUM mg/dL 9.5 9.8 9.8   EGFR IF NONAFRICN AM mL/min/1.73  --   --  28*   ALK PHOS U/L 92 104  --    TOTAL PROTEIN g/dL 7.2 7.7  --    ALT (SGPT) U/L 9 13  --    AST (SGOT) U/L 12 16  --    BILIRUBIN mg/dL 0.2 0.2  --    ALBUMIN g/dL 3.90 4.40  3.4  --    GLOBULIN gm/dL 3.3 3.3  --        Lab Results - Last 18 Months   Lab Units 06/21/22  1426   M-SPIKE g/dL Not Observed   KAPPA/LAMBDA RATIO, S  2.11*   FREE LAMBDA LIGHT CHAINS mg/L 42.1*   IG KAPPA FREE LIGHT CHAIN mg/L 88.8*   LDH U/L 183       Lab Results - Last 18 Months   Lab Units 07/28/22  1325 06/21/22  1426   IRON mcg/dL 58 49   TIBC mcg/dL 337 367   IRON SATURATION % 17* 13*   FERRITIN ng/mL 107.00 111.00         PAST MEDICAL HISTORY:  ALLERGIES:  Allergies   Allergen Reactions   • Aspirin Anaphylaxis and Swelling     CURRENT MEDICATIONS:  Outpatient Encounter Medications as of 7/28/2022   Medication Sig Dispense Refill   • amLODIPine-valsartan (EXFORGE)  MG per tablet Take 1 tablet by mouth.     • carvedilol (COREG) 12.5 MG tablet 2 (Two) Times a Day With Meals.     • cholecalciferol (VITAMIN D3) 1000 units tablet Take 2,000 Units by mouth Daily.     • cimetidine (TAGAMET) 200 MG tablet Take 200 mg by mouth Daily.     • folic acid (FOLVITE) 1 MG tablet Take 1,000 mcg by mouth Daily.     • furosemide (LASIX) 20 MG tablet Daily.     • leflunomide (ARAVA) 20 MG tablet Take 20 mg by mouth Daily.     • polyethyl glycol-propyl glycol (SYSTANE) 0.4-0.3 % solution ophthalmic solution Administer 1 drop to both eyes Every 1 (One) Hour As Needed.     • potassium chloride (K-DUR) 10 MEQ CR tablet Daily.     • pravastatin (PRAVACHOL) 20 MG tablet Take 40 mg by mouth Daily.     • ferrous sulfate 325 (65 FE) MG tablet Take 1 tablet by mouth 2 (Two) Times a Day.  Indications: Iron Deficiency 60 tablet 3     No facility-administered encounter medications on file as of 7/28/2022.     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   • PAD (peripheral artery disease) (HCC) I73.9       HEALTH MAINTENANCE ITEMS:  Health Maintenance Due   Topic Date Due   • URINE MICROALBUMIN  Never done   • Pneumococcal Vaccine 65+ (1 - PCV) Never done   • TDAP/TD VACCINES (1 - Tdap) Never done   • ZOSTER VACCINE (1 of 2) Never done   • HEPATITIS C SCREENING  Never done   • DIABETIC EYE EXAM  Never done   • DXA SCAN  01/02/2022   • COVID-19 Vaccine (4 - Booster for Moderna series) 03/19/2022       <no information>  Last Completed Colonoscopy          COLORECTAL CANCER SCREENING (COLONOSCOPY - Every 10 Years) Next due on 1/19/2025 01/19/2015  SCANNED - COLONOSCOPY    11/24/2009  SCANNED - COLONOSCOPY    02/04/2003  SCANNED - COLONOSCOPY                There is no immunization history on file for this patient.  Last Completed Mammogram          MAMMOGRAM (Every 2 Years) Next due on 1/25/2024 01/25/2022  Outside Claim:  MAMMOGRAM SCREENING BILAT DIGITAL W CAD,CHG SCREENING DIGITAL BREAST TOMOSYNTHESIS BI    01/15/2021  Outside Claim:  MAMMOGRAM SCREENING BILAT DIGITAL W CAD,CHG SCREENING DIGITAL BREAST TOMOSYNTHESIS BI    01/02/2020  Outside Claim:  MAMMOGRAM SCREENING BILAT DIGITAL W CAD,CHG SCREENING DIGITAL BREAST TOMOSYNTHESIS BI    12/13/2018  Outside Claim:  MAMMOGRAM SCREENING BILAT DIGITAL W CAD,CHG SCREENING DIGITAL BREAST TOMOSYNTHESIS BI    09/20/2017  Outside Claim:  MAMMOGRAM SCREENING BILAT DIGITAL W CAD,CHG SCREENING DIGITAL BREAST TOMOSYNTHESIS BI    Only the first 5 history entries have been  "loaded, but more history exists.                  FAMILY HISTORY:  Family History   Problem Relation Age of Onset   • Hypertension Other    • Diabetes Other    • Coronary artery disease Other    • Cancer Other    • No Known Problems Son    • No Known Problems Son    • No Known Problems Son    • Breast cancer Other    • Colon cancer Neg Hx    • Colon polyps Neg Hx    • Ovarian cancer Neg Hx      SOCIAL HISTORY:  Social History     Socioeconomic History   • Marital status:    Tobacco Use   • Smoking status: Never Smoker   • Smokeless tobacco: Never Used   Vaping Use   • Vaping Use: Never used   Substance and Sexual Activity   • Alcohol use: No   • Drug use: No   • Sexual activity: Defer     Birth control/protection: Surgical       REVIEW OF SYSTEMS:  Review of Systems   Constitutional: Positive for fatigue.   HENT: Negative for swollen glands and trouble swallowing.    Eyes:        Macular degeneration. Gets monthly injections     Respiratory: Negative for shortness of breath and wheezing.    Cardiovascular: Negative for chest pain and palpitations.   Gastrointestinal: Negative for abdominal pain, blood in stool, nausea and vomiting.   Genitourinary: Negative for difficulty urinating, dysuria and hematuria.   Musculoskeletal: Positive for back pain.   Neurological:        Diabetic Neuropathy   Psychiatric/Behavioral: Negative for suicidal ideas and depressed mood. The patient is nervous/anxious (Nervous about her visit today).        /88   Pulse 81   Temp 97.2 °F (36.2 °C) (Temporal)   Resp 16   Ht 147.3 cm (58\")   Wt 65.2 kg (143 lb 12.8 oz)   SpO2 97%   Breastfeeding No   BMI 30.05 kg/m²  Body surface area is 1.58 meters squared.    Pain Score    07/28/22 1343   PainSc: 0-No pain       Physical Exam:  Physical Exam  Constitutional:       Appearance: Normal appearance.   HENT:      Head: Normocephalic and atraumatic.   Eyes:      Extraocular Movements: Extraocular movements intact. "   Cardiovascular:      Rate and Rhythm: Normal rate and regular rhythm.   Pulmonary:      Effort: Pulmonary effort is normal.      Breath sounds: Normal breath sounds.   Abdominal:      General: Bowel sounds are normal.      Palpations: Abdomen is soft.   Musculoskeletal:      Right lower leg: Edema present.      Left lower leg: Edema present.   Skin:     General: Skin is warm and dry.      Coloration: Skin is pale.   Neurological:      General: No focal deficit present.      Mental Status: She is alert and oriented to person, place, and time.   Psychiatric:         Mood and Affect: Mood normal.         Behavior: Behavior normal.         Nuha Cali reports a pain score of 0.  No intervention indicated.     ASSESSMENT / PLAN:    1. Anemia in stage 4 chronic kidney disease (HCC)    2. HTN (hypertension), benign    3. Iron deficiency anemia, unspecified iron deficiency anemia type         1.  Anemia in Chronic Kidney Disease   -Hgb 9.1, Hct 30.1  -BUN 36 , Creatinine 1.85, GFR 27.4  Anemia profile with iron 58, Ferritin 107, Saturation 17, TIBC 337  -PER PATIENT'S INSURANCE PARAMATERS, OK TO START CHANTALE   -Will start Retacrit 40,000 units TODAY for Hb is <10  -Pt will get CBC weekly and will continue retacrit for Hb < 11 OR Hct < 33    2. Iron Deficiency  -Anemia profile with iron 58, Ferritin 107, Saturation 17, TIBC 337  -Will increase iron to BID.   -If increasing iron causes GI distress, patient will decrease back to once daily     3.  Hypertension / Edema  -BP today is 146/88  -On Lasix, Amlodipine-Valsartan, Coreg  -Managed  By PCP and nephrology       PLAN:  -Will start Retacrit 40,000 units TODAY for Hb is <10  -Pt will get CBC weekly and will continue retacrit for Hb < 11 OR Hct < 33  -Oral iron will be increased to twice daily.  Return to clinic in 2 months for continued follow-up and evaluation of Retacrit therapy.  Continue all medications and treatment plans per PCP and any other providers  Patient  voices understanding and agrees to treatment plan.      Health Maintenance   Colonoscopy 2015 Diverticulosis.  Pt does not want to do another one.   Mammogram 01/25/22  Dexa Scan - 2016 Osteopenia        Marilu Lackey, APRN  07/28/2022

## 2022-07-28 NOTE — PATIENT INSTRUCTIONS
You will get blood drawn every week to check Hemoglobin (blood count)  If it is less than 11, you will get an injection.    Return to clinic to see Marilu in 2 months.  You will get labs before that appointment.     Increase oral iron to twice daily.   If it causes stomach problems, go back to once daily.    Please call us with any questions.    -Marilu

## 2022-08-04 ENCOUNTER — TELEPHONE (OUTPATIENT)
Dept: ONCOLOGY | Facility: CLINIC | Age: 77
End: 2022-08-04

## 2022-08-04 ENCOUNTER — INFUSION (OUTPATIENT)
Dept: ONCOLOGY | Facility: HOSPITAL | Age: 77
End: 2022-08-04

## 2022-08-04 ENCOUNTER — LAB (OUTPATIENT)
Dept: LAB | Facility: HOSPITAL | Age: 77
End: 2022-08-04

## 2022-08-04 VITALS
HEART RATE: 87 BPM | DIASTOLIC BLOOD PRESSURE: 61 MMHG | TEMPERATURE: 97.6 F | WEIGHT: 142 LBS | SYSTOLIC BLOOD PRESSURE: 178 MMHG | BODY MASS INDEX: 28.63 KG/M2 | RESPIRATION RATE: 18 BRPM | OXYGEN SATURATION: 97 % | HEIGHT: 59 IN

## 2022-08-04 DIAGNOSIS — D63.1 ANEMIA IN STAGE 4 CHRONIC KIDNEY DISEASE: ICD-10-CM

## 2022-08-04 DIAGNOSIS — N18.4 ANEMIA IN STAGE 4 CHRONIC KIDNEY DISEASE: ICD-10-CM

## 2022-08-04 LAB
DEPRECATED RDW RBC AUTO: 47.5 FL (ref 37–54)
ERYTHROCYTE [DISTWIDTH] IN BLOOD BY AUTOMATED COUNT: 14.7 % (ref 12.3–15.4)
HCT VFR BLD AUTO: 33.4 % (ref 34–46.6)
HGB BLD-MCNC: 9.6 G/DL (ref 12–15.9)
MCH RBC QN AUTO: 27 PG (ref 26.6–33)
MCHC RBC AUTO-ENTMCNC: 28.7 G/DL (ref 31.5–35.7)
MCV RBC AUTO: 94.1 FL (ref 79–97)
PLATELET # BLD AUTO: 201 10*3/MM3 (ref 140–450)
PMV BLD AUTO: 8.6 FL (ref 6–12)
RBC # BLD AUTO: 3.55 10*6/MM3 (ref 3.77–5.28)
WBC NRBC COR # BLD: 5.76 10*3/MM3 (ref 3.4–10.8)

## 2022-08-04 PROCEDURE — 85027 COMPLETE CBC AUTOMATED: CPT

## 2022-08-04 PROCEDURE — 36415 COLL VENOUS BLD VENIPUNCTURE: CPT

## 2022-08-04 PROCEDURE — G0463 HOSPITAL OUTPT CLINIC VISIT: HCPCS

## 2022-08-04 NOTE — TELEPHONE ENCOUNTER
Received call from Nurse [facility]. She calls for clarification of Retacrit parameters?  Todays labs are:  HGB: 9.6  HCT: 33.4  Iron Profile: 7/28/22  17%  Per Marilu Lackey's NP parameters in her note dated 7/28/22   Administer Retacrit 40,000 units SC if HGB below 11 or HCT below 33    Discussed with  Ivon Abrams and Dr Austin patient current lab values, after review Dr Austin does not feel that patient meets Hospital Protocol measures for Retacrit/Procrit   HCT: 33.4  Iron Sat: 17%    Notified Susanna/Nurse to please hold Retacrit injection today, she v/u.

## 2022-08-04 NOTE — PROGRESS NOTES
1348 called the office and spoke with rio heck to clarify santo's order for retacrit for this pt. I think she can have it according to santo's office note,bc the pt is 9.6 and 33.4, and her office note states OR. awaitng return call from maria r.  1400 return call from rio heck, she spoke with dr blanc, and he reviewed all of the pt's labs he stated she will not qualify for the injection today.

## 2022-08-11 ENCOUNTER — LAB (OUTPATIENT)
Dept: LAB | Facility: HOSPITAL | Age: 77
End: 2022-08-11

## 2022-08-11 ENCOUNTER — INFUSION (OUTPATIENT)
Dept: ONCOLOGY | Facility: HOSPITAL | Age: 77
End: 2022-08-11

## 2022-08-11 VITALS
BODY MASS INDEX: 28.75 KG/M2 | RESPIRATION RATE: 18 BRPM | HEIGHT: 59 IN | TEMPERATURE: 97.8 F | WEIGHT: 142.6 LBS | OXYGEN SATURATION: 96 % | SYSTOLIC BLOOD PRESSURE: 156 MMHG | DIASTOLIC BLOOD PRESSURE: 63 MMHG | HEART RATE: 91 BPM

## 2022-08-11 DIAGNOSIS — D63.1 ANEMIA IN STAGE 4 CHRONIC KIDNEY DISEASE: ICD-10-CM

## 2022-08-11 DIAGNOSIS — N18.4 ANEMIA IN STAGE 4 CHRONIC KIDNEY DISEASE: ICD-10-CM

## 2022-08-11 LAB
DEPRECATED RDW RBC AUTO: 50.7 FL (ref 37–54)
ERYTHROCYTE [DISTWIDTH] IN BLOOD BY AUTOMATED COUNT: 14.8 % (ref 12.3–15.4)
HCT VFR BLD AUTO: 33.2 % (ref 34–46.6)
HGB BLD-MCNC: 10.2 G/DL (ref 12–15.9)
MCH RBC QN AUTO: 28.7 PG (ref 26.6–33)
MCHC RBC AUTO-ENTMCNC: 30.7 G/DL (ref 31.5–35.7)
MCV RBC AUTO: 93.5 FL (ref 79–97)
PLATELET # BLD AUTO: 165 10*3/MM3 (ref 140–450)
PMV BLD AUTO: 8.8 FL (ref 6–12)
RBC # BLD AUTO: 3.55 10*6/MM3 (ref 3.77–5.28)
WBC NRBC COR # BLD: 5.43 10*3/MM3 (ref 3.4–10.8)

## 2022-08-11 PROCEDURE — 85027 COMPLETE CBC AUTOMATED: CPT

## 2022-08-11 PROCEDURE — 36415 COLL VENOUS BLD VENIPUNCTURE: CPT

## 2022-08-11 PROCEDURE — G0463 HOSPITAL OUTPT CLINIC VISIT: HCPCS

## 2022-08-11 NOTE — PROGRESS NOTES
1340 called the office and left a message with salvatore for maria r to please call me when she is available regarding this pt.  1400  No return call yet, sent a message to the office for them to review labs and order and phone message from last week about this same issue with this order and labs, and let me know how to proceed today.  1418 called the office again and spoke with VINH Still, pt is needing to leave, she came back to the call and said no injection today. Will let pt leave.

## 2022-08-18 ENCOUNTER — INFUSION (OUTPATIENT)
Dept: ONCOLOGY | Facility: HOSPITAL | Age: 77
End: 2022-08-18

## 2022-08-18 ENCOUNTER — LAB (OUTPATIENT)
Dept: LAB | Facility: HOSPITAL | Age: 77
End: 2022-08-18

## 2022-08-18 VITALS
DIASTOLIC BLOOD PRESSURE: 57 MMHG | HEART RATE: 80 BPM | TEMPERATURE: 96.9 F | RESPIRATION RATE: 18 BRPM | OXYGEN SATURATION: 97 % | SYSTOLIC BLOOD PRESSURE: 170 MMHG

## 2022-08-18 DIAGNOSIS — D63.1 ANEMIA IN STAGE 4 CHRONIC KIDNEY DISEASE: ICD-10-CM

## 2022-08-18 DIAGNOSIS — D64.9 ANEMIA, UNSPECIFIED TYPE: ICD-10-CM

## 2022-08-18 DIAGNOSIS — N18.4 ANEMIA IN STAGE 4 CHRONIC KIDNEY DISEASE: ICD-10-CM

## 2022-08-18 LAB
ALBUMIN SERPL-MCNC: 4 G/DL (ref 3.5–5.2)
ALBUMIN/GLOB SERPL: 1.3 G/DL
ALP SERPL-CCNC: 93 U/L (ref 39–117)
ALT SERPL W P-5'-P-CCNC: 8 U/L (ref 1–33)
ANION GAP SERPL CALCULATED.3IONS-SCNC: 8 MMOL/L (ref 5–15)
AST SERPL-CCNC: 11 U/L (ref 1–32)
BILIRUB SERPL-MCNC: 0.2 MG/DL (ref 0–1.2)
BUN SERPL-MCNC: 37 MG/DL (ref 8–23)
BUN/CREAT SERPL: 20.8 (ref 7–25)
CALCIUM SPEC-SCNC: 9.6 MG/DL (ref 8.6–10.5)
CHLORIDE SERPL-SCNC: 108 MMOL/L (ref 98–107)
CO2 SERPL-SCNC: 24 MMOL/L (ref 22–29)
CREAT SERPL-MCNC: 1.78 MG/DL (ref 0.57–1)
DEPRECATED RDW RBC AUTO: 47.1 FL (ref 37–54)
EGFRCR SERPLBLD CKD-EPI 2021: 28.7 ML/MIN/1.73
ERYTHROCYTE [DISTWIDTH] IN BLOOD BY AUTOMATED COUNT: 14 % (ref 12.3–15.4)
GLOBULIN UR ELPH-MCNC: 3 GM/DL
GLUCOSE SERPL-MCNC: 122 MG/DL (ref 65–99)
HCT VFR BLD AUTO: 33.2 % (ref 34–46.6)
HGB BLD-MCNC: 10.2 G/DL (ref 12–15.9)
MCH RBC QN AUTO: 28.3 PG (ref 26.6–33)
MCHC RBC AUTO-ENTMCNC: 30.7 G/DL (ref 31.5–35.7)
MCV RBC AUTO: 92.2 FL (ref 79–97)
PLATELET # BLD AUTO: 207 10*3/MM3 (ref 140–450)
PMV BLD AUTO: 8.8 FL (ref 6–12)
POTASSIUM SERPL-SCNC: 4.5 MMOL/L (ref 3.5–5.2)
PROT SERPL-MCNC: 7 G/DL (ref 6–8.5)
RBC # BLD AUTO: 3.6 10*6/MM3 (ref 3.77–5.28)
SODIUM SERPL-SCNC: 140 MMOL/L (ref 136–145)
WBC NRBC COR # BLD: 5.98 10*3/MM3 (ref 3.4–10.8)

## 2022-08-18 PROCEDURE — G0463 HOSPITAL OUTPT CLINIC VISIT: HCPCS

## 2022-08-18 PROCEDURE — 36415 COLL VENOUS BLD VENIPUNCTURE: CPT

## 2022-08-18 PROCEDURE — 85027 COMPLETE CBC AUTOMATED: CPT

## 2022-08-18 PROCEDURE — 80053 COMPREHEN METABOLIC PANEL: CPT

## 2022-08-18 NOTE — PROGRESS NOTES
1342 cbc same exact as last week, hgb and hct the same. Will not meet requirements for procrit today, hct is 33.2.

## 2022-08-25 ENCOUNTER — INFUSION (OUTPATIENT)
Dept: ONCOLOGY | Facility: HOSPITAL | Age: 77
End: 2022-08-25

## 2022-08-25 ENCOUNTER — LAB (OUTPATIENT)
Dept: LAB | Facility: HOSPITAL | Age: 77
End: 2022-08-25

## 2022-08-25 VITALS
WEIGHT: 141 LBS | SYSTOLIC BLOOD PRESSURE: 160 MMHG | HEIGHT: 59 IN | HEART RATE: 85 BPM | RESPIRATION RATE: 18 BRPM | TEMPERATURE: 97.1 F | DIASTOLIC BLOOD PRESSURE: 76 MMHG | OXYGEN SATURATION: 97 % | BODY MASS INDEX: 28.43 KG/M2

## 2022-08-25 DIAGNOSIS — D63.1 ANEMIA IN STAGE 4 CHRONIC KIDNEY DISEASE: Primary | ICD-10-CM

## 2022-08-25 DIAGNOSIS — D64.9 ANEMIA, UNSPECIFIED TYPE: Primary | ICD-10-CM

## 2022-08-25 DIAGNOSIS — N18.4 ANEMIA IN STAGE 4 CHRONIC KIDNEY DISEASE: Primary | ICD-10-CM

## 2022-08-25 LAB
DEPRECATED RDW RBC AUTO: 46.4 FL (ref 37–54)
ERYTHROCYTE [DISTWIDTH] IN BLOOD BY AUTOMATED COUNT: 13.6 % (ref 12.3–15.4)
HCT VFR BLD AUTO: 32.9 % (ref 34–46.6)
HGB BLD-MCNC: 9.8 G/DL (ref 12–15.9)
HOLD SPECIMEN: NORMAL
MCH RBC QN AUTO: 27.5 PG (ref 26.6–33)
MCHC RBC AUTO-ENTMCNC: 29.8 G/DL (ref 31.5–35.7)
MCV RBC AUTO: 92.2 FL (ref 79–97)
PLATELET # BLD AUTO: 215 10*3/MM3 (ref 140–450)
PMV BLD AUTO: 8.5 FL (ref 6–12)
RBC # BLD AUTO: 3.57 10*6/MM3 (ref 3.77–5.28)
WBC NRBC COR # BLD: 6.39 10*3/MM3 (ref 3.4–10.8)

## 2022-08-25 PROCEDURE — 25010000002 EPOETIN ALFA PER 1000 UNITS: Performed by: NURSE PRACTITIONER

## 2022-08-25 PROCEDURE — 36415 COLL VENOUS BLD VENIPUNCTURE: CPT

## 2022-08-25 PROCEDURE — 96372 THER/PROPH/DIAG INJ SC/IM: CPT

## 2022-08-25 PROCEDURE — 85027 COMPLETE CBC AUTOMATED: CPT

## 2022-08-25 RX ADMIN — ERYTHROPOIETIN 40000 UNITS: 40000 INJECTION, SOLUTION INTRAVENOUS; SUBCUTANEOUS at 13:39

## 2022-09-01 ENCOUNTER — INFUSION (OUTPATIENT)
Dept: ONCOLOGY | Facility: HOSPITAL | Age: 77
End: 2022-09-01

## 2022-09-01 ENCOUNTER — LAB (OUTPATIENT)
Dept: LAB | Facility: HOSPITAL | Age: 77
End: 2022-09-01

## 2022-09-01 VITALS
SYSTOLIC BLOOD PRESSURE: 179 MMHG | OXYGEN SATURATION: 96 % | TEMPERATURE: 97.2 F | DIASTOLIC BLOOD PRESSURE: 71 MMHG | RESPIRATION RATE: 20 BRPM | HEART RATE: 95 BPM

## 2022-09-01 DIAGNOSIS — D64.9 ANEMIA, UNSPECIFIED TYPE: Primary | ICD-10-CM

## 2022-09-01 DIAGNOSIS — N18.4 ANEMIA IN STAGE 4 CHRONIC KIDNEY DISEASE: ICD-10-CM

## 2022-09-01 DIAGNOSIS — D63.1 ANEMIA IN STAGE 4 CHRONIC KIDNEY DISEASE: ICD-10-CM

## 2022-09-01 LAB
DEPRECATED RDW RBC AUTO: 45.9 FL (ref 37–54)
ERYTHROCYTE [DISTWIDTH] IN BLOOD BY AUTOMATED COUNT: 14.4 % (ref 12.3–15.4)
HCT VFR BLD AUTO: 32.1 % (ref 34–46.6)
HGB BLD-MCNC: 9.7 G/DL (ref 12–15.9)
MCH RBC QN AUTO: 27.7 PG (ref 26.6–33)
MCHC RBC AUTO-ENTMCNC: 30.2 G/DL (ref 31.5–35.7)
MCV RBC AUTO: 91.7 FL (ref 79–97)
PLATELET # BLD AUTO: 203 10*3/MM3 (ref 140–450)
PMV BLD AUTO: 8.7 FL (ref 6–12)
RBC # BLD AUTO: 3.5 10*6/MM3 (ref 3.77–5.28)
WBC NRBC COR # BLD: 8.07 10*3/MM3 (ref 3.4–10.8)

## 2022-09-01 PROCEDURE — 96372 THER/PROPH/DIAG INJ SC/IM: CPT

## 2022-09-01 PROCEDURE — 36415 COLL VENOUS BLD VENIPUNCTURE: CPT

## 2022-09-01 PROCEDURE — 85027 COMPLETE CBC AUTOMATED: CPT

## 2022-09-01 PROCEDURE — 25010000002 EPOETIN ALFA PER 1000 UNITS: Performed by: NURSE PRACTITIONER

## 2022-09-01 RX ADMIN — ERYTHROPOIETIN 40000 UNITS: 40000 INJECTION, SOLUTION INTRAVENOUS; SUBCUTANEOUS at 12:26

## 2022-09-15 ENCOUNTER — APPOINTMENT (OUTPATIENT)
Dept: LAB | Facility: HOSPITAL | Age: 77
End: 2022-09-15

## 2022-09-15 ENCOUNTER — APPOINTMENT (OUTPATIENT)
Dept: ONCOLOGY | Facility: HOSPITAL | Age: 77
End: 2022-09-15

## 2022-09-22 ENCOUNTER — INFUSION (OUTPATIENT)
Dept: ONCOLOGY | Facility: HOSPITAL | Age: 77
End: 2022-09-22

## 2022-09-22 ENCOUNTER — LAB (OUTPATIENT)
Dept: LAB | Facility: HOSPITAL | Age: 77
End: 2022-09-22

## 2022-09-22 VITALS
DIASTOLIC BLOOD PRESSURE: 64 MMHG | OXYGEN SATURATION: 96 % | SYSTOLIC BLOOD PRESSURE: 166 MMHG | HEART RATE: 77 BPM | RESPIRATION RATE: 18 BRPM | TEMPERATURE: 97.7 F

## 2022-09-22 DIAGNOSIS — D63.1 ANEMIA IN STAGE 4 CHRONIC KIDNEY DISEASE: ICD-10-CM

## 2022-09-22 DIAGNOSIS — N18.4 ANEMIA IN STAGE 4 CHRONIC KIDNEY DISEASE: ICD-10-CM

## 2022-09-22 LAB
DEPRECATED RDW RBC AUTO: 47.8 FL (ref 37–54)
ERYTHROCYTE [DISTWIDTH] IN BLOOD BY AUTOMATED COUNT: 14.4 % (ref 12.3–15.4)
HCT VFR BLD AUTO: 38.8 % (ref 34–46.6)
HGB BLD-MCNC: 11.3 G/DL (ref 12–15.9)
MCH RBC QN AUTO: 26.5 PG (ref 26.6–33)
MCHC RBC AUTO-ENTMCNC: 29.1 G/DL (ref 31.5–35.7)
MCV RBC AUTO: 90.9 FL (ref 79–97)
PLATELET # BLD AUTO: 237 10*3/MM3 (ref 140–450)
PMV BLD AUTO: 9.5 FL (ref 6–12)
RBC # BLD AUTO: 4.27 10*6/MM3 (ref 3.77–5.28)
WBC NRBC COR # BLD: 5.3 10*3/MM3 (ref 3.4–10.8)

## 2022-09-22 PROCEDURE — G0463 HOSPITAL OUTPT CLINIC VISIT: HCPCS

## 2022-09-22 PROCEDURE — 85027 COMPLETE CBC AUTOMATED: CPT

## 2022-09-22 PROCEDURE — 36415 COLL VENOUS BLD VENIPUNCTURE: CPT

## 2022-09-29 ENCOUNTER — OFFICE VISIT (OUTPATIENT)
Dept: ONCOLOGY | Facility: CLINIC | Age: 77
End: 2022-09-29

## 2022-09-29 ENCOUNTER — LAB (OUTPATIENT)
Dept: LAB | Facility: HOSPITAL | Age: 77
End: 2022-09-29

## 2022-09-29 VITALS
WEIGHT: 137.3 LBS | SYSTOLIC BLOOD PRESSURE: 158 MMHG | OXYGEN SATURATION: 93 % | TEMPERATURE: 97.5 F | HEIGHT: 59 IN | BODY MASS INDEX: 27.68 KG/M2 | DIASTOLIC BLOOD PRESSURE: 88 MMHG | RESPIRATION RATE: 16 BRPM | HEART RATE: 78 BPM

## 2022-09-29 DIAGNOSIS — N18.4 ANEMIA IN STAGE 4 CHRONIC KIDNEY DISEASE: ICD-10-CM

## 2022-09-29 DIAGNOSIS — D63.1 ANEMIA IN STAGE 4 CHRONIC KIDNEY DISEASE: ICD-10-CM

## 2022-09-29 DIAGNOSIS — D50.9 IRON DEFICIENCY ANEMIA, UNSPECIFIED IRON DEFICIENCY ANEMIA TYPE: ICD-10-CM

## 2022-09-29 DIAGNOSIS — D64.9 ANEMIA, UNSPECIFIED TYPE: Primary | ICD-10-CM

## 2022-09-29 LAB
ALBUMIN SERPL-MCNC: 3.9 G/DL (ref 3.5–5.2)
ALBUMIN/GLOB SERPL: 1.2 G/DL
ALP SERPL-CCNC: 103 U/L (ref 39–117)
ALT SERPL W P-5'-P-CCNC: 8 U/L (ref 1–33)
ANION GAP SERPL CALCULATED.3IONS-SCNC: 11 MMOL/L (ref 5–15)
AST SERPL-CCNC: 12 U/L (ref 1–32)
BASOPHILS # BLD AUTO: 0.03 10*3/MM3 (ref 0–0.2)
BASOPHILS NFR BLD AUTO: 0.5 % (ref 0–1.5)
BILIRUB SERPL-MCNC: 0.2 MG/DL (ref 0–1.2)
BUN SERPL-MCNC: 35 MG/DL (ref 8–23)
BUN/CREAT SERPL: 17.7 (ref 7–25)
CALCIUM SPEC-SCNC: 8.8 MG/DL (ref 8.6–10.5)
CHLORIDE SERPL-SCNC: 106 MMOL/L (ref 98–107)
CO2 SERPL-SCNC: 22 MMOL/L (ref 22–29)
CREAT SERPL-MCNC: 1.98 MG/DL (ref 0.57–1)
DEPRECATED RDW RBC AUTO: 47.2 FL (ref 37–54)
EGFRCR SERPLBLD CKD-EPI 2021: 25.3 ML/MIN/1.73
EOSINOPHIL # BLD AUTO: 0.23 10*3/MM3 (ref 0–0.4)
EOSINOPHIL NFR BLD AUTO: 3.8 % (ref 0.3–6.2)
ERYTHROCYTE [DISTWIDTH] IN BLOOD BY AUTOMATED COUNT: 14.3 % (ref 12.3–15.4)
FERRITIN SERPL-MCNC: 146.6 NG/ML (ref 13–150)
GLOBULIN UR ELPH-MCNC: 3.3 GM/DL
GLUCOSE SERPL-MCNC: 134 MG/DL (ref 65–99)
HCT VFR BLD AUTO: 36.9 % (ref 34–46.6)
HGB BLD-MCNC: 10.7 G/DL (ref 12–15.9)
IMM GRANULOCYTES # BLD AUTO: 0.02 10*3/MM3 (ref 0–0.05)
IMM GRANULOCYTES NFR BLD AUTO: 0.3 % (ref 0–0.5)
IRON 24H UR-MRATE: 64 MCG/DL (ref 37–145)
IRON SATN MFR SERPL: 20 % (ref 20–50)
LYMPHOCYTES # BLD AUTO: 1.22 10*3/MM3 (ref 0.7–3.1)
LYMPHOCYTES NFR BLD AUTO: 20.2 % (ref 19.6–45.3)
MCH RBC QN AUTO: 26.1 PG (ref 26.6–33)
MCHC RBC AUTO-ENTMCNC: 29 G/DL (ref 31.5–35.7)
MCV RBC AUTO: 90 FL (ref 79–97)
MONOCYTES # BLD AUTO: 0.45 10*3/MM3 (ref 0.1–0.9)
MONOCYTES NFR BLD AUTO: 7.4 % (ref 5–12)
NEUTROPHILS NFR BLD AUTO: 4.1 10*3/MM3 (ref 1.7–7)
NEUTROPHILS NFR BLD AUTO: 67.8 % (ref 42.7–76)
NRBC BLD AUTO-RTO: 0 /100 WBC (ref 0–0.2)
PLATELET # BLD AUTO: 226 10*3/MM3 (ref 140–450)
PMV BLD AUTO: 9.3 FL (ref 6–12)
POTASSIUM SERPL-SCNC: 5 MMOL/L (ref 3.5–5.2)
PROT SERPL-MCNC: 7.2 G/DL (ref 6–8.5)
RBC # BLD AUTO: 4.1 10*6/MM3 (ref 3.77–5.28)
SODIUM SERPL-SCNC: 139 MMOL/L (ref 136–145)
TIBC SERPL-MCNC: 320 MCG/DL (ref 298–536)
TRANSFERRIN SERPL-MCNC: 215 MG/DL (ref 200–360)
WBC NRBC COR # BLD: 6.05 10*3/MM3 (ref 3.4–10.8)

## 2022-09-29 PROCEDURE — 99214 OFFICE O/P EST MOD 30 MIN: CPT | Performed by: NURSE PRACTITIONER

## 2022-09-29 PROCEDURE — 80053 COMPREHEN METABOLIC PANEL: CPT

## 2022-09-29 PROCEDURE — 84466 ASSAY OF TRANSFERRIN: CPT

## 2022-09-29 PROCEDURE — 85025 COMPLETE CBC W/AUTO DIFF WBC: CPT

## 2022-09-29 PROCEDURE — 83540 ASSAY OF IRON: CPT

## 2022-09-29 PROCEDURE — 82728 ASSAY OF FERRITIN: CPT

## 2022-09-29 PROCEDURE — 36415 COLL VENOUS BLD VENIPUNCTURE: CPT

## 2022-09-29 NOTE — PROGRESS NOTES
[facility] GROUP HEMATOLOGY & ONCOLOGY  [address]  [place] [place] [address] [phone]    Patient Name: Nuha Cali  Encounter Date: 09/29/2022  YOB: 1945  Patient Number: [id number]    Progress Note    HISTORY OF PRESENT ILLNESS: Nuha Cali is a 77 y.o. female who was seen on 06/21/22 for consultation and management recommendations for anemia in chronic kidney disease and iron deficiency.  She has health history significant for  Stage IV CKD, Diabetes w/neuropathy, HTN,Hyperlipidemia, GERD, Rheumatoid Arthritis. S/P repair of rectovaginal fistula in 2018.    Interval History  She presents to clinic today for continued follow-up.  She complains of persistent fatigue.  She has been taking oral iron twice daily but has stopped related to constipation.    She has received 3 Retacrit injections so far.   Received Procrit July 28, August 25 and September 1st.  Hgb/Hct lst week was 11.3 / 38.8 and she was ineligible for injection.      Will recheck iron, CBC, CMP today.  She will come tomorrow if she needs reatcirt.     LABS    Lab Results - Last 18 Months   Lab Units 09/22/22  1316 09/01/22  1148 08/25/22  1314 08/18/22  1313 08/11/22  1311 08/04/22  1318 07/28/22  1325 06/21/22  1426 08/11/21  1015   HEMOGLOBIN g/dL 11.3* 9.7* 9.8* 10.2* 10.2* 9.6* 9.1* 9.9* 9.5*   HEMATOCRIT % 38.8 32.1* 32.9* 33.2* 33.2* 33.4* 30.1* 32.6* 31.6*   MCV fL 90.9 91.7 92.2 92.2 93.5 94.1 92.0 92.9 90.5   WBC 10*3/mm3 5.30 8.07 6.39 5.98 5.43 5.76 5.35 6.25 5.60   RDW % 14.4 14.4 13.6 14.0 14.8 14.7 13.5 14.0 14.4   MPV fL 9.5 8.7 8.5 8.8 8.8 8.6 8.4 8.9 9.2   PLATELETS 10*3/mm3 237 203 215 207 165 201 184 237 224   IMM GRAN % %  --   --   --   --   --   --  0.2 0.5 0.5   NEUTROS ABS 10*3/mm3  --   --   --   --   --   --  3.41 4.30 4.03   LYMPHS ABS 10*3/mm3  --   --   --   --   --   --  1.22 1.15 0.99   MONOS ABS 10*3/mm3  --   --   --   --   --    --  0.53 0.50 0.35   EOS ABS 10*3/mm3  --   --   --   --   --   --  0.16 0.24 0.17   BASOS ABS 10*3/mm3  --   --   --   --   --   --  0.02 0.03 0.03   IMMATURE GRANS (ABS) 10*3/mm3  --   --   --   --   --   --  0.01 0.03 0.03   NRBC /100 WBC  --   --   --   --   --   --  0.0 0.0 0.0       Lab Results - Last 18 Months   Lab Units 08/18/22  1313 07/28/22  1325 06/21/22  1426 08/11/21  1014   GLUCOSE mg/dL 122* 140* 112* 127*   SODIUM mmol/L 140 140 140 142   POTASSIUM mmol/L 4.5 4.3 5.1 4.2   CO2 mmol/L 24.0 26.0 24.0 26.0   CHLORIDE mmol/L 108* 107 106 107   ANION GAP mmol/L 8.0 7.0 10.0 9.0   CREATININE mg/dL 1.78* 1.85* 2.32* 1.76*   BUN mg/dL 37* 36* 33* 25*   BUN / CREAT RATIO  20.8 19.5 14.2 14.2   CALCIUM mg/dL 9.6 9.5 9.8 9.8   EGFR IF NONAFRICN AM mL/min/1.73  --   --   --  28*   ALK PHOS U/L 93 92 104  --    TOTAL PROTEIN g/dL 7.0 7.2 7.7  --    ALT (SGPT) U/L 8 9 13  --    AST (SGOT) U/L 11 12 16  --    BILIRUBIN mg/dL 0.2 0.2 0.2  --    ALBUMIN g/dL 4.00 3.90 4.40  3.4  --    GLOBULIN gm/dL 3.0 3.3 3.3  --        Lab Results - Last 18 Months   Lab Units 06/21/22  1426   M-SPIKE g/dL Not Observed   KAPPA/LAMBDA RATIO, S  2.11*   FREE LAMBDA LIGHT CHAINS mg/L 42.1*   IG KAPPA FREE LIGHT CHAIN mg/L 88.8*   LDH U/L 183       Lab Results - Last 18 Months   Lab Units 07/28/22  1325 06/21/22  1426   IRON mcg/dL 58 49   TIBC mcg/dL 337 367   IRON SATURATION % 17* 13*   FERRITIN ng/mL 107.00 111.00         PAST MEDICAL HISTORY:  ALLERGIES:  Allergies   Allergen Reactions   • Aspirin Anaphylaxis and Swelling     CURRENT MEDICATIONS:  Outpatient Encounter Medications as of 9/29/2022   Medication Sig Dispense Refill   • amLODIPine-valsartan (EXFORGE)  MG per tablet Take 1 tablet by mouth.     • carvedilol (COREG) 12.5 MG tablet 2 (Two) Times a Day With Meals.     • cholecalciferol (VITAMIN D3) 1000 units tablet Take 2,000 Units by mouth Daily.     • cimetidine (TAGAMET) 200 MG tablet Take 200 mg by mouth Daily.      • folic acid (FOLVITE) 1 MG tablet Take 1,000 mcg by mouth Daily.     • furosemide (LASIX) 20 MG tablet Daily.     • leflunomide (ARAVA) 20 MG tablet Take 20 mg by mouth Daily.     • polyethyl glycol-propyl glycol (SYSTANE) 0.4-0.3 % solution ophthalmic solution Administer 1 drop to both eyes Every 1 (One) Hour As Needed.     • potassium chloride (K-DUR) 10 MEQ CR tablet Daily.     • pravastatin (PRAVACHOL) 20 MG tablet Take 40 mg by mouth Daily.     • [DISCONTINUED] ferrous sulfate 325 (65 FE) MG tablet Take 1 tablet by mouth 2 (Two) Times a Day. Indications: Iron Deficiency 60 tablet 3     No facility-administered encounter medications on file as of 9/29/2022.     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   • PAD (peripheral artery disease) (HCC) I73.9       HEALTH MAINTENANCE ITEMS:  Health Maintenance Due   Topic Date Due   • URINE MICROALBUMIN  Never done   • Pneumococcal Vaccine 65+ (1 - PCV) Never done   • TDAP/TD VACCINES (1 - Tdap) Never done   • ZOSTER VACCINE (1 of 2) Never done   • HEPATITIS C SCREENING  Never done   • DIABETIC EYE EXAM  Never done   • DXA SCAN  01/02/2022   • COVID-19 Vaccine (4 - Booster for Moderna series) 01/14/2022   • INFLUENZA VACCINE  08/01/2022       <no information>  Last Completed Colonoscopy          COLORECTAL CANCER SCREENING (COLONOSCOPY - Every 10 Years) Next due on 1/19/2025 01/19/2015  SCANNED - COLONOSCOPY    11/24/2009  SCANNED - COLONOSCOPY    02/04/2003  SCANNED - COLONOSCOPY                There is no immunization history on file for this patient.  Last Completed Mammogram          MAMMOGRAM (Every 2 Years) Next due on 1/25/2024 01/25/2022  Outside Claim:  MAMMOGRAM SCREENING  BILAT DIGITAL W CAD,CHG SCREENING DIGITAL BREAST TOMOSYNTHESIS BI    01/15/2021  Outside Claim: HC MAMMOGRAM SCREENING BILAT DIGITAL W CAD,CHG SCREENING DIGITAL BREAST TOMOSYNTHESIS BI    01/02/2020  Outside Claim: HC MAMMOGRAM SCREENING BILAT DIGITAL W CAD,CHG SCREENING DIGITAL BREAST TOMOSYNTHESIS BI    12/13/2018  Outside Claim: HC MAMMOGRAM SCREENING BILAT DIGITAL W CAD,CHG SCREENING DIGITAL BREAST TOMOSYNTHESIS BI    09/20/2017  Outside Claim: HC MAMMOGRAM SCREENING BILAT DIGITAL W CAD,CHG SCREENING DIGITAL BREAST TOMOSYNTHESIS BI    Only the first 5 history entries have been loaded, but more history exists.                  FAMILY HISTORY:  Family History   Problem Relation Age of Onset   • Hypertension Other    • Diabetes Other    • Coronary artery disease Other    • Cancer Other    • No Known Problems Son    • No Known Problems Son    • No Known Problems Son    • Breast cancer Other    • Colon cancer Neg Hx    • Colon polyps Neg Hx    • Ovarian cancer Neg Hx      SOCIAL HISTORY:  Social History     Socioeconomic History   • Marital status:    Tobacco Use   • Smoking status: Never Smoker   • Smokeless tobacco: Never Used   Vaping Use   • Vaping Use: Never used   Substance and Sexual Activity   • Alcohol use: No   • Drug use: No   • Sexual activity: Defer     Birth control/protection: Surgical       REVIEW OF SYSTEMS:  Review of Systems   Constitutional: Positive for fatigue.   HENT: Negative for swollen glands and trouble swallowing.    Eyes:        Macular degeneration. Gets monthly injections     Respiratory: Negative for shortness of breath and wheezing.    Cardiovascular: Negative for chest pain and palpitations.   Gastrointestinal: Negative for abdominal pain, blood in stool, nausea and vomiting.   Genitourinary: Negative for difficulty urinating, dysuria and hematuria.   Musculoskeletal: Positive for back pain.   Neurological:        Diabetic Neuropathy   Psychiatric/Behavioral: Negative for  "suicidal ideas and depressed mood. The patient is nervous/anxious (Continues to be nervous about her visit today).        /88   Pulse 78   Temp 97.5 °F (36.4 °C) (Temporal)   Resp 16   Ht 149.9 cm (59\")   Wt 62.3 kg (137 lb 4.8 oz)   SpO2 93%   Breastfeeding No   BMI 27.73 kg/m²  Body surface area is 1.57 meters squared.    Pain Score    09/29/22 1350   PainSc: 0-No pain       Physical Exam:  Physical Exam  Constitutional:       Appearance: Normal appearance.   HENT:      Head: Normocephalic and atraumatic.   Eyes:      Extraocular Movements: Extraocular movements intact.   Cardiovascular:      Rate and Rhythm: Normal rate and regular rhythm.   Pulmonary:      Effort: Pulmonary effort is normal.      Breath sounds: Normal breath sounds.   Abdominal:      General: Bowel sounds are normal.      Palpations: Abdomen is soft.   Musculoskeletal:      Right lower leg: Edema present.      Left lower leg: Edema present.   Skin:     General: Skin is warm and dry.      Coloration: Skin is pale.   Neurological:      General: No focal deficit present.      Mental Status: She is alert and oriented to person, place, and time.   Psychiatric:         Mood and Affect: Mood normal.         Behavior: Behavior normal.         [facility] NINFA Cali reports a pain score of 0.  No intervention indicated.     ASSESSMENT / PLAN:    1. Anemia, unspecified type    2. Anemia in stage 4 chronic kidney disease (HCC)    3. Iron deficiency anemia, unspecified iron deficiency anemia type         1.  Anemia in Chronic Kidney Disease   Received Procrit July 28, August 25 and September 1st.  Hgb/Hct lst week was 11.3 / 38.8 and she was ineligible for injection.    -Pt will get CBC weekly and will continue retacrit for Hb < 11 OR Hct < 33  -Labs pending     2. Iron Deficiency  -Pt has stopped oral iron r/t constipation   -Labs pending     3.  Hypertension / Edema  -BP today is 158/88  -On Lasix, Amlodipine-Valsartan, Coreg  -Managed  By PCP " and nephrology       PLAN:  -She will get labs today and RTC tomorrow if she needs Retacrit injection.   -Pt will get CBC weekly and will continue retacrit for Hb < 11 OR Hct < 33  -She stopped oral iron r/t constipation  Return to clinic in 2 months for continued follow-up and evaluation of Retacrit therapy.  Continue all medications and treatment plans per PCP and any other providers  Patient voices understanding and agrees to treatment plan.      Health Maintenance   Colonoscopy 2015 Diverticulosis.  Pt does not want to do another one.   Mammogram 01/25/22  Dexa Scan - 2016 Osteopenia        Marilu Lackey, APRN  09/29/2022

## 2022-09-30 ENCOUNTER — INFUSION (OUTPATIENT)
Dept: ONCOLOGY | Facility: HOSPITAL | Age: 77
End: 2022-09-30

## 2022-09-30 ENCOUNTER — TELEPHONE (OUTPATIENT)
Dept: ONCOLOGY | Facility: CLINIC | Age: 77
End: 2022-09-30

## 2022-09-30 VITALS
RESPIRATION RATE: 18 BRPM | BODY MASS INDEX: 27.42 KG/M2 | WEIGHT: 136 LBS | HEART RATE: 81 BPM | OXYGEN SATURATION: 97 % | SYSTOLIC BLOOD PRESSURE: 157 MMHG | HEIGHT: 59 IN | DIASTOLIC BLOOD PRESSURE: 55 MMHG | TEMPERATURE: 97.4 F

## 2022-09-30 DIAGNOSIS — D64.9 ANEMIA, UNSPECIFIED TYPE: Primary | ICD-10-CM

## 2022-09-30 PROCEDURE — 96372 THER/PROPH/DIAG INJ SC/IM: CPT

## 2022-09-30 PROCEDURE — 25010000002 EPOETIN ALFA PER 1000 UNITS: Performed by: NURSE PRACTITIONER

## 2022-09-30 RX ADMIN — ERYTHROPOIETIN 40000 UNITS: 40000 INJECTION, SOLUTION INTRAVENOUS; SUBCUTANEOUS at 13:16

## 2022-09-30 NOTE — TELEPHONE ENCOUNTER
Pt notified of procrit injection that is needed today. She will be here at 1pm. Jacqueline added her Celine notified        ----- Message from CINDY Torres sent at 9/29/2022  6:45 PM CDT -----  Please call her to come get a procrit injection

## 2022-10-06 ENCOUNTER — APPOINTMENT (OUTPATIENT)
Dept: LAB | Facility: HOSPITAL | Age: 77
End: 2022-10-06

## 2022-10-10 ENCOUNTER — TELEPHONE (OUTPATIENT)
Dept: VASCULAR SURGERY | Facility: CLINIC | Age: 77
End: 2022-10-10

## 2022-10-10 NOTE — TELEPHONE ENCOUNTER
Pt confirmed appointment of testing and fu with Dr. Huddleston.    I advised pt her testing will end 1 hour before her appoint with Dr. Huddleston. I explained to patient that she could go have lunch in the cafeteria until the appointment time.

## 2022-10-11 ENCOUNTER — HOSPITAL ENCOUNTER (OUTPATIENT)
Dept: ULTRASOUND IMAGING | Facility: HOSPITAL | Age: 77
Discharge: HOME | End: 2022-10-11
Admitting: NURSE PRACTITIONER

## 2022-10-11 ENCOUNTER — OFFICE VISIT (OUTPATIENT)
Dept: VASCULAR SURGERY | Facility: CLINIC | Age: 77
End: 2022-10-11

## 2022-10-11 VITALS
WEIGHT: 136 LBS | HEIGHT: 59 IN | DIASTOLIC BLOOD PRESSURE: 82 MMHG | HEART RATE: 80 BPM | SYSTOLIC BLOOD PRESSURE: 118 MMHG | OXYGEN SATURATION: 95 % | BODY MASS INDEX: 27.42 KG/M2

## 2022-10-11 DIAGNOSIS — I73.9 PAD (PERIPHERAL ARTERY DISEASE): Primary | ICD-10-CM

## 2022-10-11 DIAGNOSIS — E11.9 CONTROLLED TYPE 2 DIABETES MELLITUS WITHOUT COMPLICATION, WITHOUT LONG-TERM CURRENT USE OF INSULIN: ICD-10-CM

## 2022-10-11 DIAGNOSIS — I65.23 BILATERAL CAROTID ARTERY STENOSIS: ICD-10-CM

## 2022-10-11 DIAGNOSIS — I10 HTN (HYPERTENSION), BENIGN: ICD-10-CM

## 2022-10-11 DIAGNOSIS — I73.9 PAD (PERIPHERAL ARTERY DISEASE): ICD-10-CM

## 2022-10-11 PROCEDURE — 99213 OFFICE O/P EST LOW 20 MIN: CPT | Performed by: SURGERY

## 2022-10-11 PROCEDURE — 93923 UPR/LXTR ART STDY 3+ LVLS: CPT | Performed by: SURGERY

## 2022-10-11 PROCEDURE — 93923 UPR/LXTR ART STDY 3+ LVLS: CPT

## 2022-10-13 ENCOUNTER — LAB (OUTPATIENT)
Dept: LAB | Facility: HOSPITAL | Age: 77
End: 2022-10-13

## 2022-10-13 ENCOUNTER — INFUSION (OUTPATIENT)
Dept: ONCOLOGY | Facility: HOSPITAL | Age: 77
End: 2022-10-13

## 2022-10-13 ENCOUNTER — TELEPHONE (OUTPATIENT)
Dept: VASCULAR SURGERY | Facility: CLINIC | Age: 77
End: 2022-10-13

## 2022-10-13 VITALS
BODY MASS INDEX: 27.54 KG/M2 | DIASTOLIC BLOOD PRESSURE: 60 MMHG | SYSTOLIC BLOOD PRESSURE: 157 MMHG | OXYGEN SATURATION: 96 % | TEMPERATURE: 97.7 F | HEIGHT: 59 IN | WEIGHT: 136.6 LBS | HEART RATE: 92 BPM | RESPIRATION RATE: 18 BRPM

## 2022-10-13 DIAGNOSIS — D63.1 ANEMIA IN STAGE 4 CHRONIC KIDNEY DISEASE: Primary | ICD-10-CM

## 2022-10-13 DIAGNOSIS — N18.4 ANEMIA IN STAGE 4 CHRONIC KIDNEY DISEASE: Primary | ICD-10-CM

## 2022-10-13 LAB
DEPRECATED RDW RBC AUTO: 48.1 FL (ref 37–54)
ERYTHROCYTE [DISTWIDTH] IN BLOOD BY AUTOMATED COUNT: 15.5 % (ref 12.3–15.4)
HCT VFR BLD AUTO: 36 % (ref 34–46.6)
HGB BLD-MCNC: 11 G/DL (ref 12–15.9)
HOLD SPECIMEN: NORMAL
MCH RBC QN AUTO: 26.8 PG (ref 26.6–33)
MCHC RBC AUTO-ENTMCNC: 30.6 G/DL (ref 31.5–35.7)
MCV RBC AUTO: 87.6 FL (ref 79–97)
PLATELET # BLD AUTO: 202 10*3/MM3 (ref 140–450)
PMV BLD AUTO: 9.8 FL (ref 6–12)
RBC # BLD AUTO: 4.11 10*6/MM3 (ref 3.77–5.28)
WBC NRBC COR # BLD: 5.87 10*3/MM3 (ref 3.4–10.8)

## 2022-10-13 PROCEDURE — 85027 COMPLETE CBC AUTOMATED: CPT

## 2022-10-13 PROCEDURE — 36415 COLL VENOUS BLD VENIPUNCTURE: CPT

## 2022-10-13 PROCEDURE — G0463 HOSPITAL OUTPT CLINIC VISIT: HCPCS

## 2022-10-20 ENCOUNTER — INFUSION (OUTPATIENT)
Dept: ONCOLOGY | Facility: HOSPITAL | Age: 77
End: 2022-10-20

## 2022-10-20 ENCOUNTER — LAB (OUTPATIENT)
Dept: LAB | Facility: HOSPITAL | Age: 77
End: 2022-10-20

## 2022-10-20 VITALS
BODY MASS INDEX: 28.18 KG/M2 | WEIGHT: 139.8 LBS | DIASTOLIC BLOOD PRESSURE: 56 MMHG | SYSTOLIC BLOOD PRESSURE: 156 MMHG | HEIGHT: 59 IN | TEMPERATURE: 97.8 F | RESPIRATION RATE: 18 BRPM | OXYGEN SATURATION: 96 % | HEART RATE: 88 BPM

## 2022-10-20 DIAGNOSIS — D64.9 ANEMIA, UNSPECIFIED TYPE: Primary | ICD-10-CM

## 2022-10-20 DIAGNOSIS — D63.1 ANEMIA IN STAGE 4 CHRONIC KIDNEY DISEASE: Primary | ICD-10-CM

## 2022-10-20 DIAGNOSIS — N18.4 ANEMIA IN STAGE 4 CHRONIC KIDNEY DISEASE: Primary | ICD-10-CM

## 2022-10-20 LAB
DEPRECATED RDW RBC AUTO: 51.8 FL (ref 37–54)
ERYTHROCYTE [DISTWIDTH] IN BLOOD BY AUTOMATED COUNT: 15.9 % (ref 12.3–15.4)
HCT VFR BLD AUTO: 35.5 % (ref 34–46.6)
HGB BLD-MCNC: 10.5 G/DL (ref 12–15.9)
HOLD SPECIMEN: NORMAL
MCH RBC QN AUTO: 26.4 PG (ref 26.6–33)
MCHC RBC AUTO-ENTMCNC: 29.6 G/DL (ref 31.5–35.7)
MCV RBC AUTO: 89.4 FL (ref 79–97)
PLATELET # BLD AUTO: 217 10*3/MM3 (ref 140–450)
PMV BLD AUTO: 9.4 FL (ref 6–12)
RBC # BLD AUTO: 3.97 10*6/MM3 (ref 3.77–5.28)
WBC NRBC COR # BLD: 7.56 10*3/MM3 (ref 3.4–10.8)

## 2022-10-20 PROCEDURE — 96372 THER/PROPH/DIAG INJ SC/IM: CPT

## 2022-10-20 PROCEDURE — 85027 COMPLETE CBC AUTOMATED: CPT

## 2022-10-20 PROCEDURE — 25010000002 EPOETIN ALFA PER 1000 UNITS: Performed by: NURSE PRACTITIONER

## 2022-10-20 PROCEDURE — 36415 COLL VENOUS BLD VENIPUNCTURE: CPT

## 2022-10-20 RX ADMIN — ERYTHROPOIETIN 40000 UNITS: 40000 INJECTION, SOLUTION INTRAVENOUS; SUBCUTANEOUS at 13:34

## 2022-10-27 ENCOUNTER — INFUSION (OUTPATIENT)
Dept: ONCOLOGY | Facility: HOSPITAL | Age: 77
End: 2022-10-27

## 2022-10-27 ENCOUNTER — LAB (OUTPATIENT)
Dept: LAB | Facility: HOSPITAL | Age: 77
End: 2022-10-27

## 2022-10-27 VITALS
DIASTOLIC BLOOD PRESSURE: 65 MMHG | HEART RATE: 89 BPM | RESPIRATION RATE: 20 BRPM | WEIGHT: 137 LBS | SYSTOLIC BLOOD PRESSURE: 167 MMHG | HEIGHT: 59 IN | TEMPERATURE: 97.6 F | BODY MASS INDEX: 27.62 KG/M2 | OXYGEN SATURATION: 96 %

## 2022-10-27 DIAGNOSIS — D64.9 ANEMIA, UNSPECIFIED TYPE: Primary | ICD-10-CM

## 2022-10-27 DIAGNOSIS — N18.4 ANEMIA IN STAGE 4 CHRONIC KIDNEY DISEASE: Primary | ICD-10-CM

## 2022-10-27 DIAGNOSIS — D63.1 ANEMIA IN STAGE 4 CHRONIC KIDNEY DISEASE: Primary | ICD-10-CM

## 2022-10-27 LAB
DEPRECATED RDW RBC AUTO: 51.6 FL (ref 37–54)
ERYTHROCYTE [DISTWIDTH] IN BLOOD BY AUTOMATED COUNT: 15.9 % (ref 12.3–15.4)
HCT VFR BLD AUTO: 35.9 % (ref 34–46.6)
HGB BLD-MCNC: 10.6 G/DL (ref 12–15.9)
HOLD SPECIMEN: NORMAL
MCH RBC QN AUTO: 26.4 PG (ref 26.6–33)
MCHC RBC AUTO-ENTMCNC: 29.5 G/DL (ref 31.5–35.7)
MCV RBC AUTO: 89.3 FL (ref 79–97)
PLATELET # BLD AUTO: 224 10*3/MM3 (ref 140–450)
PMV BLD AUTO: 8.4 FL (ref 6–12)
RBC # BLD AUTO: 4.02 10*6/MM3 (ref 3.77–5.28)
WBC NRBC COR # BLD: 6.63 10*3/MM3 (ref 3.4–10.8)

## 2022-10-27 PROCEDURE — 25010000002 EPOETIN ALFA PER 1000 UNITS: Performed by: NURSE PRACTITIONER

## 2022-10-27 PROCEDURE — 96372 THER/PROPH/DIAG INJ SC/IM: CPT

## 2022-10-27 PROCEDURE — 85027 COMPLETE CBC AUTOMATED: CPT

## 2022-10-27 PROCEDURE — 36415 COLL VENOUS BLD VENIPUNCTURE: CPT

## 2022-10-27 RX ADMIN — ERYTHROPOIETIN 40000 UNITS: 40000 INJECTION, SOLUTION INTRAVENOUS; SUBCUTANEOUS at 13:33

## 2022-11-03 ENCOUNTER — INFUSION (OUTPATIENT)
Dept: ONCOLOGY | Facility: HOSPITAL | Age: 77
End: 2022-11-03

## 2022-11-03 ENCOUNTER — LAB (OUTPATIENT)
Dept: LAB | Facility: HOSPITAL | Age: 77
End: 2022-11-03

## 2022-11-03 VITALS
OXYGEN SATURATION: 97 % | TEMPERATURE: 97.2 F | SYSTOLIC BLOOD PRESSURE: 158 MMHG | DIASTOLIC BLOOD PRESSURE: 68 MMHG | HEART RATE: 82 BPM | RESPIRATION RATE: 16 BRPM

## 2022-11-03 DIAGNOSIS — N18.4 ANEMIA IN STAGE 4 CHRONIC KIDNEY DISEASE: ICD-10-CM

## 2022-11-03 DIAGNOSIS — D63.1 ANEMIA IN STAGE 4 CHRONIC KIDNEY DISEASE: ICD-10-CM

## 2022-11-03 DIAGNOSIS — D64.9 ANEMIA, UNSPECIFIED TYPE: ICD-10-CM

## 2022-11-03 LAB
DEPRECATED RDW RBC AUTO: 51.9 FL (ref 37–54)
ERYTHROCYTE [DISTWIDTH] IN BLOOD BY AUTOMATED COUNT: 16.3 % (ref 12.3–15.4)
HCT VFR BLD AUTO: 36.5 % (ref 34–46.6)
HGB BLD-MCNC: 10.6 G/DL (ref 12–15.9)
MCH RBC QN AUTO: 25.8 PG (ref 26.6–33)
MCHC RBC AUTO-ENTMCNC: 29 G/DL (ref 31.5–35.7)
MCV RBC AUTO: 88.8 FL (ref 79–97)
PLATELET # BLD AUTO: 195 10*3/MM3 (ref 140–450)
PMV BLD AUTO: 9.5 FL (ref 6–12)
RBC # BLD AUTO: 4.11 10*6/MM3 (ref 3.77–5.28)
WBC NRBC COR # BLD: 6.22 10*3/MM3 (ref 3.4–10.8)

## 2022-11-03 PROCEDURE — 36415 COLL VENOUS BLD VENIPUNCTURE: CPT

## 2022-11-03 PROCEDURE — 85027 COMPLETE CBC AUTOMATED: CPT

## 2022-11-03 PROCEDURE — G0463 HOSPITAL OUTPT CLINIC VISIT: HCPCS

## 2022-11-10 ENCOUNTER — APPOINTMENT (OUTPATIENT)
Dept: LAB | Facility: HOSPITAL | Age: 77
End: 2022-11-10

## 2022-11-10 ENCOUNTER — APPOINTMENT (OUTPATIENT)
Dept: ONCOLOGY | Facility: HOSPITAL | Age: 77
End: 2022-11-10

## 2022-11-17 ENCOUNTER — INFUSION (OUTPATIENT)
Dept: ONCOLOGY | Facility: HOSPITAL | Age: 77
End: 2022-11-17

## 2022-11-17 ENCOUNTER — LAB (OUTPATIENT)
Dept: LAB | Facility: HOSPITAL | Age: 77
End: 2022-11-17

## 2022-11-17 VITALS
SYSTOLIC BLOOD PRESSURE: 158 MMHG | HEIGHT: 59 IN | BODY MASS INDEX: 27.5 KG/M2 | RESPIRATION RATE: 18 BRPM | TEMPERATURE: 97.8 F | DIASTOLIC BLOOD PRESSURE: 44 MMHG | HEART RATE: 89 BPM | OXYGEN SATURATION: 97 % | WEIGHT: 136.4 LBS

## 2022-11-17 DIAGNOSIS — D63.1 ANEMIA IN STAGE 4 CHRONIC KIDNEY DISEASE: Primary | ICD-10-CM

## 2022-11-17 DIAGNOSIS — N18.4 ANEMIA IN STAGE 4 CHRONIC KIDNEY DISEASE: Primary | ICD-10-CM

## 2022-11-17 DIAGNOSIS — D64.9 ANEMIA, UNSPECIFIED TYPE: Primary | ICD-10-CM

## 2022-11-17 LAB
DEPRECATED RDW RBC AUTO: 50.4 FL (ref 37–54)
ERYTHROCYTE [DISTWIDTH] IN BLOOD BY AUTOMATED COUNT: 15.7 % (ref 12.3–15.4)
HCT VFR BLD AUTO: 38 % (ref 34–46.6)
HGB BLD-MCNC: 10.8 G/DL (ref 12–15.9)
HOLD SPECIMEN: NORMAL
MCH RBC QN AUTO: 24.8 PG (ref 26.6–33)
MCHC RBC AUTO-ENTMCNC: 28.4 G/DL (ref 31.5–35.7)
MCV RBC AUTO: 87.4 FL (ref 79–97)
PLATELET # BLD AUTO: 275 10*3/MM3 (ref 140–450)
PMV BLD AUTO: 9.1 FL (ref 6–12)
RBC # BLD AUTO: 4.35 10*6/MM3 (ref 3.77–5.28)
WBC NRBC COR # BLD: 5.95 10*3/MM3 (ref 3.4–10.8)

## 2022-11-17 PROCEDURE — 96372 THER/PROPH/DIAG INJ SC/IM: CPT

## 2022-11-17 PROCEDURE — 36415 COLL VENOUS BLD VENIPUNCTURE: CPT

## 2022-11-17 PROCEDURE — 85027 COMPLETE CBC AUTOMATED: CPT

## 2022-11-17 PROCEDURE — 25010000002 EPOETIN ALFA PER 1000 UNITS: Performed by: NURSE PRACTITIONER

## 2022-11-17 RX ADMIN — ERYTHROPOIETIN 40000 UNITS: 40000 INJECTION, SOLUTION INTRAVENOUS; SUBCUTANEOUS at 14:05

## 2022-11-30 ENCOUNTER — APPOINTMENT (OUTPATIENT)
Dept: LAB | Facility: HOSPITAL | Age: 77
End: 2022-11-30

## 2022-11-30 DIAGNOSIS — D50.9 IRON DEFICIENCY ANEMIA, UNSPECIFIED IRON DEFICIENCY ANEMIA TYPE: Primary | ICD-10-CM

## 2022-12-01 ENCOUNTER — LAB (OUTPATIENT)
Dept: LAB | Facility: HOSPITAL | Age: 77
End: 2022-12-01

## 2022-12-01 ENCOUNTER — OFFICE VISIT (OUTPATIENT)
Dept: ONCOLOGY | Facility: CLINIC | Age: 77
End: 2022-12-01

## 2022-12-01 VITALS
DIASTOLIC BLOOD PRESSURE: 76 MMHG | HEIGHT: 59 IN | RESPIRATION RATE: 16 BRPM | WEIGHT: 138.66 LBS | OXYGEN SATURATION: 96 % | BODY MASS INDEX: 27.96 KG/M2 | TEMPERATURE: 97.3 F | SYSTOLIC BLOOD PRESSURE: 142 MMHG | HEART RATE: 84 BPM

## 2022-12-01 DIAGNOSIS — N18.4 ANEMIA IN STAGE 4 CHRONIC KIDNEY DISEASE: Primary | ICD-10-CM

## 2022-12-01 DIAGNOSIS — N18.32 STAGE 3B CHRONIC KIDNEY DISEASE: Primary | ICD-10-CM

## 2022-12-01 DIAGNOSIS — D63.1 ANEMIA IN STAGE 4 CHRONIC KIDNEY DISEASE: Primary | ICD-10-CM

## 2022-12-01 DIAGNOSIS — D50.9 IRON DEFICIENCY ANEMIA, UNSPECIFIED IRON DEFICIENCY ANEMIA TYPE: ICD-10-CM

## 2022-12-01 LAB
ALBUMIN SERPL-MCNC: 4 G/DL (ref 3.5–5.2)
ALBUMIN/GLOB SERPL: 1.3 G/DL
ALP SERPL-CCNC: 96 U/L (ref 39–117)
ALT SERPL W P-5'-P-CCNC: 10 U/L (ref 1–33)
ANION GAP SERPL CALCULATED.3IONS-SCNC: 9 MMOL/L (ref 5–15)
AST SERPL-CCNC: 15 U/L (ref 1–32)
BILIRUB SERPL-MCNC: 0.2 MG/DL (ref 0–1.2)
BUN SERPL-MCNC: 27 MG/DL (ref 8–23)
BUN/CREAT SERPL: 17.1 (ref 7–25)
CALCIUM SPEC-SCNC: 9.4 MG/DL (ref 8.6–10.5)
CHLORIDE SERPL-SCNC: 107 MMOL/L (ref 98–107)
CO2 SERPL-SCNC: 26 MMOL/L (ref 22–29)
CREAT SERPL-MCNC: 1.58 MG/DL (ref 0.57–1)
DEPRECATED RDW RBC AUTO: 56.2 FL (ref 37–54)
EGFRCR SERPLBLD CKD-EPI 2021: 33.2 ML/MIN/1.73
ERYTHROCYTE [DISTWIDTH] IN BLOOD BY AUTOMATED COUNT: 17.3 % (ref 12.3–15.4)
FERRITIN SERPL-MCNC: 70.8 NG/ML (ref 13–150)
GLOBULIN UR ELPH-MCNC: 3.1 GM/DL
GLUCOSE SERPL-MCNC: 134 MG/DL (ref 65–99)
HCT VFR BLD AUTO: 40.3 % (ref 34–46.6)
HGB BLD-MCNC: 11.3 G/DL (ref 12–15.9)
HOLD SPECIMEN: NORMAL
IRON 24H UR-MRATE: 32 MCG/DL (ref 37–145)
IRON SATN MFR SERPL: 10 % (ref 20–50)
MCH RBC QN AUTO: 25.1 PG (ref 26.6–33)
MCHC RBC AUTO-ENTMCNC: 28 G/DL (ref 31.5–35.7)
MCV RBC AUTO: 89.4 FL (ref 79–97)
PLATELET # BLD AUTO: 186 10*3/MM3 (ref 140–450)
PMV BLD AUTO: 8.5 FL (ref 6–12)
POTASSIUM SERPL-SCNC: 4.6 MMOL/L (ref 3.5–5.2)
PROT SERPL-MCNC: 7.1 G/DL (ref 6–8.5)
RBC # BLD AUTO: 4.51 10*6/MM3 (ref 3.77–5.28)
SODIUM SERPL-SCNC: 142 MMOL/L (ref 136–145)
TIBC SERPL-MCNC: 337 MCG/DL (ref 298–536)
TRANSFERRIN SERPL-MCNC: 226 MG/DL (ref 200–360)
WBC NRBC COR # BLD: 4.93 10*3/MM3 (ref 3.4–10.8)

## 2022-12-01 PROCEDURE — 85027 COMPLETE CBC AUTOMATED: CPT

## 2022-12-01 PROCEDURE — 82728 ASSAY OF FERRITIN: CPT

## 2022-12-01 PROCEDURE — 99214 OFFICE O/P EST MOD 30 MIN: CPT | Performed by: NURSE PRACTITIONER

## 2022-12-01 PROCEDURE — 84466 ASSAY OF TRANSFERRIN: CPT

## 2022-12-01 PROCEDURE — 80053 COMPREHEN METABOLIC PANEL: CPT

## 2022-12-01 PROCEDURE — 83540 ASSAY OF IRON: CPT

## 2022-12-01 PROCEDURE — 36415 COLL VENOUS BLD VENIPUNCTURE: CPT

## 2022-12-01 NOTE — PROGRESS NOTES
[facility] HEMATOLOGY & ONCOLOGY  [address]  [place] [facility] [address] [phone]    Patient Name: Nuha Cali  Encounter Date: 12/01/2022  YOB: 1945  Patient Number: [id number]    Progress Note    HISTORY OF PRESENT ILLNESS: Nuha Cali is a 77 y.o. female who was seen on 06/21/22 for consultation and management recommendations for anemia in chronic kidney disease and iron deficiency.  She has health history significant for  Stage IV CKD, Diabetes w/neuropathy, HTN,Hyperlipidemia, GERD, Rheumatoid Arthritis. S/P repair of rectovaginal fistula in 2018.    Interval History  She presents to clinic today for continued follow-up.  She complains of persistent fatigue.  She has been taking oral iron twice daily but has stopped related to constipation.    She has been receiving Retacrit injections for Hgb < 11 or Hct < 33.     Labs were reviewed today and results reviewed with patient.     Bun 27, Creatinine 1.58, GFR 33.2  Iron 32, Ferritin 70, Sat 10%, TIBC 337  Hgb 11.3, Hct 40.3    LABS    Lab Results - Last 18 Months   Lab Units 12/01/22  1303 11/17/22  1317 11/03/22  1316 10/27/22  1259 10/20/22  1259 10/13/22  1315 09/29/22  1423 08/04/22  1318 07/28/22  1325 06/21/22  1426 08/11/21  1015   HEMOGLOBIN g/dL 11.3* 10.8* 10.6* 10.6* 10.5* 11.0* 10.7*   < > 9.1* 9.9* 9.5*   HEMATOCRIT % 40.3 38.0 36.5 35.9 35.5 36.0 36.9   < > 30.1* 32.6* 31.6*   MCV fL 89.4 87.4 88.8 89.3 89.4 87.6 90.0   < > 92.0 92.9 90.5   WBC 10*3/mm3 4.93 5.95 6.22 6.63 7.56 5.87 6.05   < > 5.35 6.25 5.60   RDW % 17.3* 15.7* 16.3* 15.9* 15.9* 15.5* 14.3   < > 13.5 14.0 14.4   MPV fL 8.5 9.1 9.5 8.4 9.4 9.8 9.3   < > 8.4 8.9 9.2   PLATELETS 10*3/mm3 186 275 195 224 217 202 226   < > 184 237 224   IMM GRAN % %  --   --   --   --   --   --  0.3  --  0.2 0.5 0.5   NEUTROS ABS 10*3/mm3  --   --   --   --   --   --  4.10  --  3.41 4.30 4.03   LYMPHS ABS  10*3/mm3  --   --   --   --   --   --  1.22  --  1.22 1.15 0.99   MONOS ABS 10*3/mm3  --   --   --   --   --   --  0.45  --  0.53 0.50 0.35   EOS ABS 10*3/mm3  --   --   --   --   --   --  0.23  --  0.16 0.24 0.17   BASOS ABS 10*3/mm3  --   --   --   --   --   --  0.03  --  0.02 0.03 0.03   IMMATURE GRANS (ABS) 10*3/mm3  --   --   --   --   --   --  0.02  --  0.01 0.03 0.03   NRBC /100 WBC  --   --   --   --   --   --  0.0  --  0.0 0.0 0.0    < > = values in this interval not displayed.       Lab Results - Last 18 Months   Lab Units 12/01/22  1303 09/29/22  1423 08/18/22  1313 07/28/22  1325 06/21/22  1426 08/11/21  1014   GLUCOSE mg/dL 134* 134* 122* 140* 112* 127*   SODIUM mmol/L 142 139 140 140 140 142   POTASSIUM mmol/L 4.6 5.0 4.5 4.3 5.1 4.2   CO2 mmol/L 26.0 22.0 24.0 26.0 24.0 26.0   CHLORIDE mmol/L 107 106 108* 107 106 107   ANION GAP mmol/L 9.0 11.0 8.0 7.0 10.0 9.0   CREATININE mg/dL 1.58* 1.98* 1.78* 1.85* 2.32* 1.76*   BUN mg/dL 27* 35* 37* 36* 33* 25*   BUN / CREAT RATIO  17.1 17.7 20.8 19.5 14.2 14.2   CALCIUM mg/dL 9.4 8.8 9.6 9.5 9.8 9.8   EGFR IF NONAFRICN AM mL/min/1.73  --   --   --   --   --  28*   ALK PHOS U/L 96 103 93 92 104  --    TOTAL PROTEIN g/dL 7.1 7.2 7.0 7.2 7.7  --    ALT (SGPT) U/L 10 8 8 9 13  --    AST (SGOT) U/L 15 12 11 12 16  --    BILIRUBIN mg/dL 0.2 0.2 0.2 0.2 0.2  --    ALBUMIN g/dL 4.00 3.90 4.00 3.90 4.40  3.4  --    GLOBULIN gm/dL 3.1 3.3 3.0 3.3 3.3  --        Lab Results - Last 18 Months   Lab Units 06/21/22  1426   M-SPIKE g/dL Not Observed   KAPPA/LAMBDA RATIO, S  2.11*   FREE LAMBDA LIGHT CHAINS mg/L 42.1*   IG KAPPA FREE LIGHT CHAIN mg/L 88.8*   LDH U/L 183       Lab Results - Last 18 Months   Lab Units 12/01/22  1303 09/29/22  1423 07/28/22  1325 06/21/22  1426   IRON mcg/dL 32* 64 58 49   TIBC mcg/dL 337 320 337 367   IRON SATURATION % 10* 20 17* 13*   FERRITIN ng/mL 70.80 146.60 107.00 111.00         PAST MEDICAL HISTORY:  ALLERGIES:  Allergies   Allergen  Reactions   • Aspirin Anaphylaxis and Swelling     CURRENT MEDICATIONS:  Outpatient Encounter Medications as of 12/1/2022   Medication Sig Dispense Refill   • amLODIPine-valsartan (EXFORGE)  MG per tablet Take 1 tablet by mouth.     • carvedilol (COREG) 12.5 MG tablet 2 (Two) Times a Day With Meals.     • cholecalciferol (VITAMIN D3) 1000 units tablet Take 2,000 Units by mouth Daily.     • cimetidine (TAGAMET) 200 MG tablet Take 200 mg by mouth Daily.     • folic acid (FOLVITE) 1 MG tablet Take 1,000 mcg by mouth Daily.     • furosemide (LASIX) 20 MG tablet Daily.     • leflunomide (ARAVA) 20 MG tablet Take 20 mg by mouth Daily.     • polyethyl glycol-propyl glycol (SYSTANE) 0.4-0.3 % solution ophthalmic solution Administer 1 drop to both eyes Every 1 (One) Hour As Needed.     • potassium chloride (K-DUR) 10 MEQ CR tablet Daily.     • pravastatin (PRAVACHOL) 20 MG tablet Take 40 mg by mouth Daily.       No facility-administered encounter medications on file as of 12/1/2022.     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   • PAD (peripheral artery disease) (HCC) I73.9   • Anemia in stage 4 chronic kidney disease (HCC) N18.4, D63.1       HEALTH MAINTENANCE ITEMS:  Health Maintenance Due   Topic Date Due   • URINE MICROALBUMIN  Never done   • COVID-19 Vaccine (1) Never done   • Pneumococcal Vaccine 65+ (1 - PCV) Never done   • TDAP/TD VACCINES (1 - Tdap) Never done   • ZOSTER VACCINE (1 of 2) Never done   • HEPATITIS C SCREENING  Never done   • DIABETIC EYE EXAM  Never done   • DXA SCAN  01/02/2022       <no information>  Last Completed Colonoscopy          COLORECTAL CANCER SCREENING (COLONOSCOPY - Every 10 Years) Next due on 1/19/2025     01/19/2015  SCANNED - COLONOSCOPY    11/24/2009  SCANNED - COLONOSCOPY    02/04/2003  SCANNED - COLONOSCOPY                There is no immunization history on file for this patient.  Last Completed Mammogram          MAMMOGRAM (Every 2 Years) Next due on 1/25/2024 01/25/2022  Outside Claim: HC MAMMOGRAM SCREENING BILAT DIGITAL W CAD,CHG SCREENING DIGITAL BREAST TOMOSYNTHESIS BI    01/15/2021  Outside Claim: HC MAMMOGRAM SCREENING BILAT DIGITAL W CAD,CHG SCREENING DIGITAL BREAST TOMOSYNTHESIS BI    01/02/2020  Outside Claim: HC MAMMOGRAM SCREENING BILAT DIGITAL W CAD,CHG SCREENING DIGITAL BREAST TOMOSYNTHESIS BI    12/13/2018  Outside Claim: HC MAMMOGRAM SCREENING BILAT DIGITAL W CAD,CHG SCREENING DIGITAL BREAST TOMOSYNTHESIS BI    09/20/2017  Outside Claim: CHG SCREENING DIGITAL BREAST TOMOSYNTHESIS BI,HC MAMMOGRAM SCREENING BILAT DIGITAL W CAD    Only the first 5 history entries have been loaded, but more history exists.                  FAMILY HISTORY:  Family History   Problem Relation Age of Onset   • Hypertension Other    • Diabetes Other    • Coronary artery disease Other    • Cancer Other    • No Known Problems Son    • No Known Problems Son    • No Known Problems Son    • Breast cancer Other    • Colon cancer Neg Hx    • Colon polyps Neg Hx    • Ovarian cancer Neg Hx      SOCIAL HISTORY:  Social History     Socioeconomic History   • Marital status:    Tobacco Use   • Smoking status: Never   • Smokeless tobacco: Never   Vaping Use   • Vaping Use: Never used   Substance and Sexual Activity   • Alcohol use: No   • Drug use: No   • Sexual activity: Defer     Birth control/protection: Surgical       REVIEW OF SYSTEMS:  Review of Systems   Constitutional: Positive for fatigue.   HENT: Negative for swollen glands and trouble swallowing.    Eyes:        Macular degeneration. Gets monthly injections     Respiratory: Negative for shortness of breath and wheezing.    Cardiovascular: Negative for chest  "pain and palpitations.   Gastrointestinal: Negative for abdominal pain, blood in stool, nausea and vomiting.   Genitourinary: Negative for difficulty urinating, dysuria and hematuria.   Musculoskeletal: Positive for back pain.   Neurological:        Diabetic Neuropathy   Psychiatric/Behavioral: Negative for suicidal ideas and depressed mood. The patient is nervous/anxious (Continues to be nervous about her visit today).        /76   Pulse 84   Temp 97.3 °F (36.3 °C)   Resp 16   Ht 149.9 cm (59\")   Wt 62.9 kg (138 lb 10.6 oz)   SpO2 96%   BMI 28.01 kg/m²  Body surface area is 1.58 meters squared.    Pain Score    12/01/22 1318   PainSc: 0-No pain       Physical Exam:  Physical Exam  Constitutional:       Appearance: Normal appearance.   HENT:      Head: Normocephalic and atraumatic.   Eyes:      Extraocular Movements: Extraocular movements intact.   Cardiovascular:      Rate and Rhythm: Normal rate and regular rhythm.   Pulmonary:      Effort: Pulmonary effort is normal.      Breath sounds: Normal breath sounds.   Abdominal:      General: Bowel sounds are normal.      Palpations: Abdomen is soft.   Musculoskeletal:      Right lower leg: Edema present.      Left lower leg: Edema present.   Skin:     General: Skin is warm and dry.      Coloration: Skin is pale.   Neurological:      General: No focal deficit present.      Mental Status: She is alert and oriented to person, place, and time.   Psychiatric:         Mood and Affect: Mood normal.         Behavior: Behavior normal.         Rayne NINFA BeebeCail reports a pain score of 0.  No intervention indicated.     ASSESSMENT / PLAN:    1. Stage 3b chronic kidney disease (HCC)    2. Iron deficiency anemia, unspecified iron deficiency anemia type         1.  Anemia in Chronic Kidney Disease   -Bun 27, Creatinine 1.58, GFR 33.2  Hgb/Hct today 11.3 / 40.3 .  -No injections indicated   -Pt will get CBC every 3 weeks and will continue retacrit for Hb < 11 OR Hct < " 33      2. Iron Deficiency  Iron 32, Ferritin 70, Sat 10%, TIBC 337  Iron is deficient and will need to supplement in order to continue retacrit therapy.     3.  Hypertension / Edema  -BP today is 158/88  -On Lasix, Amlodipine-Valsartan, Coreg  -Managed  By PCP and nephrology       PLAN:  -Pt will get CBC 3 weeks and will continue retacrit for Hb < 11 OR Hct < 33  Will need iron supplementation.  Recheck iron in 3 weeks.   Return to clinic in 3 months for continued follow-up and evaluation of Retacrit therapy.  Continue all medications and treatment plans per PCP and any other providers  Patient voices understanding and agrees to treatment plan.      Health Maintenance   Colonoscopy 2015 Diverticulosis.  Pt does not want to do another one.   Mammogram 01/25/22  Dexa Scan - 2016 Osteopenia        Marilu Lackey, APRN  12/01/2022

## 2022-12-05 ENCOUNTER — TELEPHONE (OUTPATIENT)
Dept: ONCOLOGY | Facility: CLINIC | Age: 77
End: 2022-12-05

## 2022-12-05 RX ORDER — DIPHENHYDRAMINE HYDROCHLORIDE 50 MG/ML
50 INJECTION INTRAMUSCULAR; INTRAVENOUS AS NEEDED
Status: CANCELLED | OUTPATIENT
Start: 2022-12-16

## 2022-12-05 RX ORDER — ACETAMINOPHEN 325 MG/1
650 TABLET ORAL ONCE
Status: CANCELLED | OUTPATIENT
Start: 2022-12-16

## 2022-12-05 RX ORDER — FAMOTIDINE 10 MG/ML
20 INJECTION, SOLUTION INTRAVENOUS AS NEEDED
Status: CANCELLED | OUTPATIENT
Start: 2022-12-16

## 2022-12-05 RX ORDER — FAMOTIDINE 10 MG/ML
20 INJECTION, SOLUTION INTRAVENOUS AS NEEDED
Status: CANCELLED | OUTPATIENT
Start: 2022-12-09

## 2022-12-05 RX ORDER — ACETAMINOPHEN 325 MG/1
650 TABLET ORAL ONCE
Status: CANCELLED | OUTPATIENT
Start: 2022-12-09

## 2022-12-05 RX ORDER — SODIUM CHLORIDE 9 MG/ML
250 INJECTION, SOLUTION INTRAVENOUS ONCE
Status: CANCELLED | OUTPATIENT
Start: 2022-12-16

## 2022-12-05 RX ORDER — SODIUM CHLORIDE 9 MG/ML
250 INJECTION, SOLUTION INTRAVENOUS ONCE
Status: CANCELLED | OUTPATIENT
Start: 2022-12-09

## 2022-12-05 RX ORDER — DIPHENHYDRAMINE HYDROCHLORIDE 50 MG/ML
50 INJECTION INTRAMUSCULAR; INTRAVENOUS AS NEEDED
Status: CANCELLED | OUTPATIENT
Start: 2022-12-09

## 2022-12-06 NOTE — PROGRESS NOTES
"10/11/2022       Juan Echavarria MD  [address]  [place] [address]    Nuha Cali  1945    Chief Complaint   Patient presents with   • Follow-up     6 month f/u with ABIs.  Last seen in the office on 3/31/22.  Pt denies any changes in her legs.        Dear Juan Echavarria MD   I had the pleasure of seeing your patient Nuha Cali in the office today.   As you recall, Nuha Cali is a 77 y.o.  female who developed a wound to her left second toe and was following with wound care.  She did undergo left lower extremity angiogram on 8/13/2021 and unfortunately Dr. 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  She did undergo amputation of the left second toe on 8/18/2021 with Dr. Martinez which healed.  Currently, she appears to be doing quite well without any significant complaints.  She did have noninvasive testing performed today, which I did review in office.      Review of Systems   Constitutional: Negative.    HENT: Negative.    Eyes: Negative.    Respiratory: Negative.    Cardiovascular: Negative.    Gastrointestinal: Negative.    Endocrine: Negative.    Genitourinary: Negative.    Musculoskeletal: Negative.    Skin: Negative.  Negative for wound.   Allergic/Immunologic: Negative.    Neurological: Negative.    Hematological: Negative.    Psychiatric/Behavioral: Negative.    All other systems reviewed and are negative.      /82   Pulse 80   Ht 149.9 cm (59\")   Wt 61.7 kg (136 lb)   SpO2 95%   BMI 27.47 kg/m²   Physical Exam  Vitals and nursing note reviewed.   Constitutional:       General: She is not in acute distress.     Appearance: She is well-developed. She is not diaphoretic.   HENT:      Head: Normocephalic and atraumatic.   Eyes:      General: No scleral icterus.     Pupils: Pupils are equal, round, and reactive to light.   Neck:      Thyroid: No thyromegaly.      Vascular: No carotid bruit or JVD. "   Cardiovascular:      Rate and Rhythm: Normal rate and regular rhythm.      Pulses:           Dorsalis pedis pulses are detected w/ Doppler on the right side and detected w/ Doppler on the left side.        Posterior tibial pulses are detected w/ Doppler on the right side and detected w/ Doppler on the left side.      Heart sounds: Normal heart sounds and S2 normal. No murmur heard.    No friction rub. No gallop.   Pulmonary:      Effort: Pulmonary effort is normal.      Breath sounds: Normal breath sounds.   Abdominal:      General: Bowel sounds are normal.      Palpations: Abdomen is soft.   Musculoskeletal:         General: Normal range of motion.      Cervical back: Normal range of motion and neck supple.   Skin:     General: Skin is warm and dry.   Neurological:      Mental Status: She is alert and oriented to person, place, and time.      Cranial Nerves: No cranial nerve deficit.   Psychiatric:         Behavior: Behavior normal.         Thought Content: Thought content normal.         Judgment: Judgment normal.        Diagnostic data:    US Ankle / Brachial Indices Extremity Complete    Result Date: 10/11/2022  Narrative:  History: PAD  Comments: Bilateral lower extremity arterial with multi-level pulse volume recordings and segmental pressures were performed at rest and stress.  The right ankle/brachial index is not obtainable due to noncompressibility of the vessels. The waveforms are biphasic without dampening.  The left ankle/brachial index is 1.45. The waveforms are biphasic without dampening. These findings are consistent with no significant arterial insufficiency of the left lower extremity at rest.      Impression: Impression: 1. The right ankle/brachial index was not obtainable due to noncompressibility of the vessels. 2. No significant arterial insufficiency of the left lower extremity at rest. 3. There is evidence of medial calcinosis in both lower extremities.   This report was finalized on  10/11/2022 14:41 by Dr. Peter Huddleston MD.        Patient Active Problem List   Diagnosis   • Anemia   • Heme positive stool   • HTN (hypertension), benign   • Change in bowel habits   • Controlled type 2 diabetes mellitus without complication, without long-term current use of insulin ([facility])   • Colovaginal fistula   • PAD (peripheral artery disease) ([facility])         ICD-10-CM ICD-9-CM   1. PAD (peripheral artery disease) ([facility])  I73.9 443.9   2. HTN (hypertension), benign  I10 401.1   3. Controlled type 2 diabetes mellitus without complication, without long-term current use of insulin ([facility])  E11.9 250.00   4. Bilateral carotid artery stenosis  I65.23 433.10     433.30         Plan: After thoroughly evaluating Nuha Cali, I believe the best course of action is remain conservative from vascular surgery standpoint.  Currently she is doing well and denies any claudication.  Her previous amputation site of her left second toe remains healed.  I did review her testing which shows noncompressible vessels on the right and no significant disease on the left.  Previous angiogram did show 2-1/2 vessel runoff to her left foot.  We will see her back in 6 months with repeat noninvasive testing for continued surveillance, including ABIs and a carotid duplex.  I did discuss vascular risk factors as the pertain to the progression of vascular disease including controlling her hypertension and diabetes mellitus.  Her blood pressure stable on her current medications.  The patient can continue taking their current medication regimen as previously planned.  This was all discussed in full with complete understanding.  Thank you for allowing me to participate in the care of your patient.  Please do not hesitate with any questions or concerns.  I will keep you aware of any further encounters with Nuha Cali.        Sincerely yours,         Peter Huddleston, DO           Siliq Counseling:  I discussed with the patient the risks of Siliq including but not limited to new or worsening depression, suicidal thoughts and behavior, immunosuppression, malignancy, posterior leukoencephalopathy syndrome, and serious infections.  The patient understands that monitoring is required including a PPD at baseline and must alert us or the primary physician if symptoms of infection or other concerning signs are noted. There is also a special program designed to monitor depression which is required with Siliq.

## 2022-12-08 ENCOUNTER — INFUSION (OUTPATIENT)
Dept: ONCOLOGY | Facility: HOSPITAL | Age: 77
End: 2022-12-08

## 2022-12-08 ENCOUNTER — LAB (OUTPATIENT)
Dept: LAB | Facility: HOSPITAL | Age: 77
End: 2022-12-08

## 2022-12-08 VITALS
WEIGHT: 137 LBS | SYSTOLIC BLOOD PRESSURE: 175 MMHG | HEIGHT: 59 IN | BODY MASS INDEX: 27.62 KG/M2 | HEART RATE: 92 BPM | DIASTOLIC BLOOD PRESSURE: 57 MMHG | TEMPERATURE: 97.2 F | RESPIRATION RATE: 18 BRPM | OXYGEN SATURATION: 98 %

## 2022-12-08 DIAGNOSIS — N18.4 ANEMIA IN STAGE 4 CHRONIC KIDNEY DISEASE: Primary | ICD-10-CM

## 2022-12-08 DIAGNOSIS — D63.1 ANEMIA IN STAGE 4 CHRONIC KIDNEY DISEASE: Primary | ICD-10-CM

## 2022-12-08 LAB
DEPRECATED RDW RBC AUTO: 53.6 FL (ref 37–54)
ERYTHROCYTE [DISTWIDTH] IN BLOOD BY AUTOMATED COUNT: 16.6 % (ref 12.3–15.4)
HCT VFR BLD AUTO: 37.7 % (ref 34–46.6)
HGB BLD-MCNC: 10.7 G/DL (ref 12–15.9)
HOLD SPECIMEN: NORMAL
MCH RBC QN AUTO: 25.2 PG (ref 26.6–33)
MCHC RBC AUTO-ENTMCNC: 28.4 G/DL (ref 31.5–35.7)
MCV RBC AUTO: 88.9 FL (ref 79–97)
PLATELET # BLD AUTO: 201 10*3/MM3 (ref 140–450)
PMV BLD AUTO: 9 FL (ref 6–12)
RBC # BLD AUTO: 4.24 10*6/MM3 (ref 3.77–5.28)
WBC NRBC COR # BLD: 6.28 10*3/MM3 (ref 3.4–10.8)

## 2022-12-08 PROCEDURE — G0463 HOSPITAL OUTPT CLINIC VISIT: HCPCS

## 2022-12-08 PROCEDURE — 85027 COMPLETE CBC AUTOMATED: CPT

## 2022-12-08 PROCEDURE — 36415 COLL VENOUS BLD VENIPUNCTURE: CPT

## 2022-12-08 NOTE — PROGRESS NOTES
1111 Called Marilu Lackey's APRN office to report patient is scheduled for iron infusions tomorrow and ask if we are to give retacrit today. Tessy OSMANN to discuss and call back. Chaz called back to report to hold retacrit today. Melissa Ferrera RN    1112 Notified scheduling to contact office related to gap in appointments after iron infusions scheduled and next appointment in March? Scheduling to resolve issue. Melissa Ferrera RN

## 2022-12-09 ENCOUNTER — INFUSION (OUTPATIENT)
Dept: ONCOLOGY | Facility: HOSPITAL | Age: 77
End: 2022-12-09

## 2022-12-09 VITALS
OXYGEN SATURATION: 97 % | HEART RATE: 81 BPM | BODY MASS INDEX: 27.82 KG/M2 | RESPIRATION RATE: 18 BRPM | SYSTOLIC BLOOD PRESSURE: 160 MMHG | WEIGHT: 138 LBS | TEMPERATURE: 97.5 F | HEIGHT: 59 IN | DIASTOLIC BLOOD PRESSURE: 61 MMHG

## 2022-12-09 DIAGNOSIS — N18.4 ANEMIA IN STAGE 4 CHRONIC KIDNEY DISEASE: Primary | ICD-10-CM

## 2022-12-09 DIAGNOSIS — D63.1 ANEMIA IN STAGE 4 CHRONIC KIDNEY DISEASE: Primary | ICD-10-CM

## 2022-12-09 PROCEDURE — 25010000002 IRON SUCROSE PER 1 MG: Performed by: NURSE PRACTITIONER

## 2022-12-09 PROCEDURE — 96365 THER/PROPH/DIAG IV INF INIT: CPT

## 2022-12-09 RX ORDER — FAMOTIDINE 10 MG/ML
20 INJECTION, SOLUTION INTRAVENOUS AS NEEDED
Status: DISCONTINUED | OUTPATIENT
Start: 2022-12-09 | End: 2022-12-09 | Stop reason: HOSPADM

## 2022-12-09 RX ORDER — DIPHENHYDRAMINE HYDROCHLORIDE 50 MG/ML
50 INJECTION INTRAMUSCULAR; INTRAVENOUS AS NEEDED
Status: DISCONTINUED | OUTPATIENT
Start: 2022-12-09 | End: 2022-12-09 | Stop reason: HOSPADM

## 2022-12-09 RX ORDER — ACETAMINOPHEN 325 MG/1
650 TABLET ORAL ONCE
Status: COMPLETED | OUTPATIENT
Start: 2022-12-09 | End: 2022-12-09

## 2022-12-09 RX ORDER — SODIUM CHLORIDE 9 MG/ML
250 INJECTION, SOLUTION INTRAVENOUS ONCE
Status: COMPLETED | OUTPATIENT
Start: 2022-12-09 | End: 2022-12-09

## 2022-12-09 RX ADMIN — IRON SUCROSE 200 MG: 20 INJECTION, SOLUTION INTRAVENOUS at 13:50

## 2022-12-09 RX ADMIN — SODIUM CHLORIDE 250 ML: 9 INJECTION, SOLUTION INTRAVENOUS at 13:48

## 2022-12-09 RX ADMIN — ACETAMINOPHEN 650 MG: 325 TABLET, FILM COATED ORAL at 13:48

## 2022-12-16 ENCOUNTER — INFUSION (OUTPATIENT)
Dept: ONCOLOGY | Facility: HOSPITAL | Age: 77
End: 2022-12-16

## 2022-12-16 VITALS
DIASTOLIC BLOOD PRESSURE: 58 MMHG | HEIGHT: 59 IN | WEIGHT: 138 LBS | RESPIRATION RATE: 18 BRPM | HEART RATE: 81 BPM | OXYGEN SATURATION: 95 % | TEMPERATURE: 97.6 F | SYSTOLIC BLOOD PRESSURE: 166 MMHG | BODY MASS INDEX: 27.82 KG/M2

## 2022-12-16 DIAGNOSIS — D63.1 ANEMIA IN STAGE 4 CHRONIC KIDNEY DISEASE: Primary | ICD-10-CM

## 2022-12-16 DIAGNOSIS — N18.4 ANEMIA IN STAGE 4 CHRONIC KIDNEY DISEASE: Primary | ICD-10-CM

## 2022-12-16 PROCEDURE — 25010000002 IRON SUCROSE PER 1 MG: Performed by: NURSE PRACTITIONER

## 2022-12-16 PROCEDURE — 96365 THER/PROPH/DIAG IV INF INIT: CPT

## 2022-12-16 RX ORDER — FAMOTIDINE 10 MG/ML
20 INJECTION, SOLUTION INTRAVENOUS AS NEEDED
Status: DISCONTINUED | OUTPATIENT
Start: 2022-12-16 | End: 2022-12-16 | Stop reason: HOSPADM

## 2022-12-16 RX ORDER — ACETAMINOPHEN 325 MG/1
650 TABLET ORAL ONCE
Status: DISCONTINUED | OUTPATIENT
Start: 2022-12-16 | End: 2022-12-16 | Stop reason: HOSPADM

## 2022-12-16 RX ORDER — SODIUM CHLORIDE 9 MG/ML
250 INJECTION, SOLUTION INTRAVENOUS ONCE
Status: COMPLETED | OUTPATIENT
Start: 2022-12-16 | End: 2022-12-16

## 2022-12-16 RX ORDER — DIPHENHYDRAMINE HYDROCHLORIDE 50 MG/ML
50 INJECTION INTRAMUSCULAR; INTRAVENOUS AS NEEDED
Status: DISCONTINUED | OUTPATIENT
Start: 2022-12-16 | End: 2022-12-16 | Stop reason: HOSPADM

## 2022-12-16 RX ADMIN — IRON SUCROSE 200 MG: 20 INJECTION, SOLUTION INTRAVENOUS at 13:24

## 2022-12-16 RX ADMIN — SODIUM CHLORIDE 250 ML: 9 INJECTION, SOLUTION INTRAVENOUS at 13:12

## 2022-12-23 ENCOUNTER — APPOINTMENT (OUTPATIENT)
Dept: LAB | Facility: HOSPITAL | Age: 77
End: 2022-12-23

## 2022-12-23 ENCOUNTER — APPOINTMENT (OUTPATIENT)
Dept: ONCOLOGY | Facility: HOSPITAL | Age: 77
End: 2022-12-23

## 2023-01-13 ENCOUNTER — LAB (OUTPATIENT)
Dept: LAB | Facility: HOSPITAL | Age: 78
End: 2023-01-13
Payer: MEDICARE

## 2023-01-13 ENCOUNTER — INFUSION (OUTPATIENT)
Dept: ONCOLOGY | Facility: HOSPITAL | Age: 78
End: 2023-01-13
Payer: MEDICARE

## 2023-01-13 VITALS
HEIGHT: 59 IN | OXYGEN SATURATION: 99 % | BODY MASS INDEX: 28.02 KG/M2 | TEMPERATURE: 97.2 F | DIASTOLIC BLOOD PRESSURE: 52 MMHG | HEART RATE: 84 BPM | WEIGHT: 139 LBS | RESPIRATION RATE: 16 BRPM | SYSTOLIC BLOOD PRESSURE: 169 MMHG

## 2023-01-13 DIAGNOSIS — D63.1 ANEMIA IN STAGE 4 CHRONIC KIDNEY DISEASE: Primary | ICD-10-CM

## 2023-01-13 DIAGNOSIS — N18.4 ANEMIA IN STAGE 4 CHRONIC KIDNEY DISEASE: Primary | ICD-10-CM

## 2023-01-13 LAB
DEPRECATED RDW RBC AUTO: 53.1 FL (ref 37–54)
ERYTHROCYTE [DISTWIDTH] IN BLOOD BY AUTOMATED COUNT: 16.3 % (ref 12.3–15.4)
HCT VFR BLD AUTO: 34 % (ref 34–46.6)
HGB BLD-MCNC: 9.8 G/DL (ref 12–15.9)
HOLD SPECIMEN: NORMAL
MCH RBC QN AUTO: 25.9 PG (ref 26.6–33)
MCHC RBC AUTO-ENTMCNC: 28.8 G/DL (ref 31.5–35.7)
MCV RBC AUTO: 89.7 FL (ref 79–97)
PLATELET # BLD AUTO: 179 10*3/MM3 (ref 140–450)
PMV BLD AUTO: 8.5 FL (ref 6–12)
RBC # BLD AUTO: 3.79 10*6/MM3 (ref 3.77–5.28)
WBC NRBC COR # BLD: 5.63 10*3/MM3 (ref 3.4–10.8)

## 2023-01-13 PROCEDURE — 96372 THER/PROPH/DIAG INJ SC/IM: CPT

## 2023-01-13 PROCEDURE — 85027 COMPLETE CBC AUTOMATED: CPT

## 2023-01-13 PROCEDURE — 36415 COLL VENOUS BLD VENIPUNCTURE: CPT

## 2023-01-13 PROCEDURE — 25010000002 EPOETIN ALFA PER 1000 UNITS: Performed by: NURSE PRACTITIONER

## 2023-01-13 RX ADMIN — ERYTHROPOIETIN 40000 UNITS: 40000 INJECTION, SOLUTION INTRAVENOUS; SUBCUTANEOUS at 11:26

## 2023-02-03 ENCOUNTER — INFUSION (OUTPATIENT)
Dept: ONCOLOGY | Facility: HOSPITAL | Age: 78
End: 2023-02-03
Payer: MEDICARE

## 2023-02-03 ENCOUNTER — LAB (OUTPATIENT)
Dept: LAB | Facility: HOSPITAL | Age: 78
End: 2023-02-03
Payer: MEDICARE

## 2023-02-03 VITALS
WEIGHT: 142.2 LBS | BODY MASS INDEX: 28.67 KG/M2 | RESPIRATION RATE: 16 BRPM | OXYGEN SATURATION: 98 % | HEIGHT: 59 IN | DIASTOLIC BLOOD PRESSURE: 56 MMHG | HEART RATE: 81 BPM | TEMPERATURE: 97.6 F | SYSTOLIC BLOOD PRESSURE: 158 MMHG

## 2023-02-03 DIAGNOSIS — N18.4 ANEMIA IN STAGE 4 CHRONIC KIDNEY DISEASE: ICD-10-CM

## 2023-02-03 DIAGNOSIS — D63.1 ANEMIA IN STAGE 4 CHRONIC KIDNEY DISEASE: ICD-10-CM

## 2023-02-03 DIAGNOSIS — N18.4 ANEMIA IN STAGE 4 CHRONIC KIDNEY DISEASE: Primary | ICD-10-CM

## 2023-02-03 DIAGNOSIS — D63.1 ANEMIA IN STAGE 4 CHRONIC KIDNEY DISEASE: Primary | ICD-10-CM

## 2023-02-03 LAB
DEPRECATED RDW RBC AUTO: 54.8 FL (ref 37–54)
ERYTHROCYTE [DISTWIDTH] IN BLOOD BY AUTOMATED COUNT: 16.5 % (ref 12.3–15.4)
HCT VFR BLD AUTO: 35.5 % (ref 34–46.6)
HGB BLD-MCNC: 10.6 G/DL (ref 12–15.9)
MCH RBC QN AUTO: 27 PG (ref 26.6–33)
MCHC RBC AUTO-ENTMCNC: 29.9 G/DL (ref 31.5–35.7)
MCV RBC AUTO: 90.6 FL (ref 79–97)
PLATELET # BLD AUTO: 189 10*3/MM3 (ref 140–450)
PMV BLD AUTO: 8.2 FL (ref 6–12)
RBC # BLD AUTO: 3.92 10*6/MM3 (ref 3.77–5.28)
WBC NRBC COR # BLD: 5.52 10*3/MM3 (ref 3.4–10.8)

## 2023-02-03 PROCEDURE — 85027 COMPLETE CBC AUTOMATED: CPT

## 2023-02-03 PROCEDURE — 25010000002 EPOETIN ALFA PER 1000 UNITS: Performed by: NURSE PRACTITIONER

## 2023-02-03 PROCEDURE — 96372 THER/PROPH/DIAG INJ SC/IM: CPT

## 2023-02-03 PROCEDURE — 36415 COLL VENOUS BLD VENIPUNCTURE: CPT

## 2023-02-03 RX ADMIN — ERYTHROPOIETIN 40000 UNITS: 40000 INJECTION, SOLUTION INTRAVENOUS; SUBCUTANEOUS at 10:58

## 2023-02-24 ENCOUNTER — LAB (OUTPATIENT)
Dept: LAB | Facility: HOSPITAL | Age: 78
End: 2023-02-24
Payer: MEDICARE

## 2023-02-24 ENCOUNTER — INFUSION (OUTPATIENT)
Dept: ONCOLOGY | Facility: HOSPITAL | Age: 78
End: 2023-02-24
Payer: MEDICARE

## 2023-02-24 VITALS
HEIGHT: 59 IN | OXYGEN SATURATION: 97 % | HEART RATE: 86 BPM | WEIGHT: 140 LBS | TEMPERATURE: 97.2 F | DIASTOLIC BLOOD PRESSURE: 56 MMHG | BODY MASS INDEX: 28.22 KG/M2 | SYSTOLIC BLOOD PRESSURE: 155 MMHG | RESPIRATION RATE: 16 BRPM

## 2023-02-24 DIAGNOSIS — N18.4 ANEMIA IN STAGE 4 CHRONIC KIDNEY DISEASE: ICD-10-CM

## 2023-02-24 DIAGNOSIS — D63.1 ANEMIA IN STAGE 4 CHRONIC KIDNEY DISEASE: Primary | ICD-10-CM

## 2023-02-24 DIAGNOSIS — N18.4 ANEMIA IN STAGE 4 CHRONIC KIDNEY DISEASE: Primary | ICD-10-CM

## 2023-02-24 DIAGNOSIS — D63.1 ANEMIA IN STAGE 4 CHRONIC KIDNEY DISEASE: ICD-10-CM

## 2023-02-24 LAB
DEPRECATED RDW RBC AUTO: 52.1 FL (ref 37–54)
ERYTHROCYTE [DISTWIDTH] IN BLOOD BY AUTOMATED COUNT: 15.6 % (ref 12.3–15.4)
HCT VFR BLD AUTO: 35.8 % (ref 34–46.6)
HGB BLD-MCNC: 10.7 G/DL (ref 12–15.9)
MCH RBC QN AUTO: 27.5 PG (ref 26.6–33)
MCHC RBC AUTO-ENTMCNC: 29.9 G/DL (ref 31.5–35.7)
MCV RBC AUTO: 92 FL (ref 79–97)
PLATELET # BLD AUTO: 229 10*3/MM3 (ref 140–450)
PMV BLD AUTO: 8.2 FL (ref 6–12)
RBC # BLD AUTO: 3.89 10*6/MM3 (ref 3.77–5.28)
WBC NRBC COR # BLD: 5.84 10*3/MM3 (ref 3.4–10.8)

## 2023-02-24 PROCEDURE — 85027 COMPLETE CBC AUTOMATED: CPT

## 2023-02-24 PROCEDURE — 36415 COLL VENOUS BLD VENIPUNCTURE: CPT

## 2023-02-24 PROCEDURE — 25010000002 EPOETIN ALFA PER 1000 UNITS: Performed by: NURSE PRACTITIONER

## 2023-02-24 PROCEDURE — 96372 THER/PROPH/DIAG INJ SC/IM: CPT

## 2023-02-24 RX ADMIN — ERYTHROPOIETIN 40000 UNITS: 40000 INJECTION, SOLUTION INTRAVENOUS; SUBCUTANEOUS at 10:47

## 2023-03-02 ENCOUNTER — OFFICE VISIT (OUTPATIENT)
Dept: ONCOLOGY | Facility: CLINIC | Age: 78
End: 2023-03-02
Payer: MEDICARE

## 2023-03-02 ENCOUNTER — LAB (OUTPATIENT)
Dept: LAB | Facility: HOSPITAL | Age: 78
End: 2023-03-02
Payer: MEDICARE

## 2023-03-02 VITALS
WEIGHT: 141.1 LBS | HEIGHT: 59 IN | RESPIRATION RATE: 16 BRPM | HEART RATE: 82 BPM | DIASTOLIC BLOOD PRESSURE: 74 MMHG | TEMPERATURE: 97.3 F | SYSTOLIC BLOOD PRESSURE: 120 MMHG | OXYGEN SATURATION: 98 % | BODY MASS INDEX: 28.44 KG/M2

## 2023-03-02 DIAGNOSIS — N18.4 ANEMIA IN STAGE 4 CHRONIC KIDNEY DISEASE: Primary | ICD-10-CM

## 2023-03-02 DIAGNOSIS — D63.1 ANEMIA IN STAGE 3B CHRONIC KIDNEY DISEASE: Primary | ICD-10-CM

## 2023-03-02 DIAGNOSIS — I10 HTN (HYPERTENSION), BENIGN: ICD-10-CM

## 2023-03-02 DIAGNOSIS — D50.9 IRON DEFICIENCY ANEMIA, UNSPECIFIED IRON DEFICIENCY ANEMIA TYPE: ICD-10-CM

## 2023-03-02 DIAGNOSIS — N18.32 ANEMIA IN STAGE 3B CHRONIC KIDNEY DISEASE: Primary | ICD-10-CM

## 2023-03-02 DIAGNOSIS — D63.1 ANEMIA IN STAGE 4 CHRONIC KIDNEY DISEASE: Primary | ICD-10-CM

## 2023-03-02 LAB
ALBUMIN SERPL-MCNC: 3.9 G/DL (ref 3.5–5.2)
ALBUMIN/GLOB SERPL: 1.2 G/DL
ALP SERPL-CCNC: 99 U/L (ref 39–117)
ALT SERPL W P-5'-P-CCNC: 9 U/L (ref 1–33)
ANION GAP SERPL CALCULATED.3IONS-SCNC: 11 MMOL/L (ref 5–15)
AST SERPL-CCNC: 11 U/L (ref 1–32)
BASOPHILS # BLD AUTO: 0.03 10*3/MM3 (ref 0–0.2)
BASOPHILS NFR BLD AUTO: 0.4 % (ref 0–1.5)
BILIRUB SERPL-MCNC: 0.2 MG/DL (ref 0–1.2)
BUN SERPL-MCNC: 29 MG/DL (ref 8–23)
BUN/CREAT SERPL: 18.2 (ref 7–25)
CALCIUM SPEC-SCNC: 9.6 MG/DL (ref 8.6–10.5)
CHLORIDE SERPL-SCNC: 107 MMOL/L (ref 98–107)
CO2 SERPL-SCNC: 24 MMOL/L (ref 22–29)
CREAT SERPL-MCNC: 1.59 MG/DL (ref 0.57–1)
DEPRECATED RDW RBC AUTO: 51.5 FL (ref 37–54)
EGFRCR SERPLBLD CKD-EPI 2021: 32.7 ML/MIN/1.73
EOSINOPHIL # BLD AUTO: 0.06 10*3/MM3 (ref 0–0.4)
EOSINOPHIL NFR BLD AUTO: 0.9 % (ref 0.3–6.2)
ERYTHROCYTE [DISTWIDTH] IN BLOOD BY AUTOMATED COUNT: 16.4 % (ref 12.3–15.4)
FERRITIN SERPL-MCNC: 210.1 NG/ML (ref 13–150)
GLOBULIN UR ELPH-MCNC: 3.2 GM/DL
GLUCOSE SERPL-MCNC: 174 MG/DL (ref 65–99)
HCT VFR BLD AUTO: 38 % (ref 34–46.6)
HGB BLD-MCNC: 11.2 G/DL (ref 12–15.9)
HOLD SPECIMEN: NORMAL
HOLD SPECIMEN: NORMAL
IMM GRANULOCYTES # BLD AUTO: 0.04 10*3/MM3 (ref 0–0.05)
IMM GRANULOCYTES NFR BLD AUTO: 0.6 % (ref 0–0.5)
IRON 24H UR-MRATE: 53 MCG/DL (ref 37–145)
IRON SATN MFR SERPL: 18 % (ref 20–50)
LYMPHOCYTES # BLD AUTO: 0.85 10*3/MM3 (ref 0.7–3.1)
LYMPHOCYTES NFR BLD AUTO: 12.2 % (ref 19.6–45.3)
MCH RBC QN AUTO: 27.1 PG (ref 26.6–33)
MCHC RBC AUTO-ENTMCNC: 29.5 G/DL (ref 31.5–35.7)
MCV RBC AUTO: 91.8 FL (ref 79–97)
MONOCYTES # BLD AUTO: 0.38 10*3/MM3 (ref 0.1–0.9)
MONOCYTES NFR BLD AUTO: 5.5 % (ref 5–12)
NEUTROPHILS NFR BLD AUTO: 5.6 10*3/MM3 (ref 1.7–7)
NEUTROPHILS NFR BLD AUTO: 80.4 % (ref 42.7–76)
NRBC BLD AUTO-RTO: 0.3 /100 WBC (ref 0–0.2)
PLATELET # BLD AUTO: 275 10*3/MM3 (ref 140–450)
PMV BLD AUTO: 8.7 FL (ref 6–12)
POTASSIUM SERPL-SCNC: 4.4 MMOL/L (ref 3.5–5.2)
PROT SERPL-MCNC: 7.1 G/DL (ref 6–8.5)
RBC # BLD AUTO: 4.14 10*6/MM3 (ref 3.77–5.28)
SODIUM SERPL-SCNC: 142 MMOL/L (ref 136–145)
TIBC SERPL-MCNC: 299 MCG/DL (ref 298–536)
TRANSFERRIN SERPL-MCNC: 201 MG/DL (ref 200–360)
WBC NRBC COR # BLD: 6.96 10*3/MM3 (ref 3.4–10.8)

## 2023-03-02 PROCEDURE — 80053 COMPREHEN METABOLIC PANEL: CPT

## 2023-03-02 PROCEDURE — 83540 ASSAY OF IRON: CPT

## 2023-03-02 PROCEDURE — 82728 ASSAY OF FERRITIN: CPT

## 2023-03-02 PROCEDURE — 36415 COLL VENOUS BLD VENIPUNCTURE: CPT

## 2023-03-02 PROCEDURE — 85025 COMPLETE CBC W/AUTO DIFF WBC: CPT

## 2023-03-02 PROCEDURE — 99214 OFFICE O/P EST MOD 30 MIN: CPT | Performed by: NURSE PRACTITIONER

## 2023-03-02 PROCEDURE — 84466 ASSAY OF TRANSFERRIN: CPT

## 2023-03-02 RX ORDER — PREDNISONE 1 MG/1
5 TABLET ORAL DAILY
COMMUNITY

## 2023-03-02 NOTE — PROGRESS NOTES
[facility] HEMATOLOGY & ONCOLOGY  [address]  [place] [place] [address] [phone]    Patient Name: Nuha Cali  Encounter Date: 03/02/2023  YOB: 1945  Patient Number: [id number]    Progress Note    HISTORY OF PRESENT ILLNESS: Nuha Cali is a 78 y.o. female who was seen on 06/21/22 for consultation and management recommendations for anemia in chronic kidney disease and iron deficiency.  She has health history significant for  Stage IV CKD, Diabetes w/neuropathy, HTN,Hyperlipidemia, GERD, Rheumatoid Arthritis. S/P repair of rectovaginal fistula in 2018.    Interval History  She presents to clinic today for continued follow-up.  She complains of persistent fatigue.  She has been taking oral iron twice daily but has stopped related to constipation.    She has been receiving Retacrit injections for Hgb < 11 or Hct < 33.     Labs were reviewed today and results reviewed with patient.     B    LABS    Lab Results - Last 18 Months   Lab Units 03/02/23  1230 02/24/23  1002 02/03/23  1003 01/13/23  1008 12/08/22  1043 12/01/22  1303 10/13/22  1315 09/29/22  1423 08/04/22  1318 07/28/22  1325 06/21/22  1426   HEMOGLOBIN g/dL 11.2* 10.7* 10.6* 9.8* 10.7* 11.3*   < > 10.7*   < > 9.1* 9.9*   HEMATOCRIT % 38.0 35.8 35.5 34.0 37.7 40.3   < > 36.9   < > 30.1* 32.6*   MCV fL 91.8 92.0 90.6 89.7 88.9 89.4   < > 90.0   < > 92.0 92.9   WBC 10*3/mm3 6.96 5.84 5.52 5.63 6.28 4.93   < > 6.05   < > 5.35 6.25   RDW % 16.4* 15.6* 16.5* 16.3* 16.6* 17.3*   < > 14.3   < > 13.5 14.0   MPV fL 8.7 8.2 8.2 8.5 9.0 8.5   < > 9.3   < > 8.4 8.9   PLATELETS 10*3/mm3 275 229 189 179 201 186   < > 226   < > 184 237   IMM GRAN % % 0.6*  --   --   --   --   --   --  0.3  --  0.2 0.5   NEUTROS ABS 10*3/mm3 5.60  --   --   --   --   --   --  4.10  --  3.41 4.30   LYMPHS ABS 10*3/mm3 0.85  --   --   --   --   --   --  1.22  --  1.22 1.15   MONOS ABS 10*3/mm3 0.38   --   --   --   --   --   --  0.45  --  0.53 0.50   EOS ABS 10*3/mm3 0.06  --   --   --   --   --   --  0.23  --  0.16 0.24   BASOS ABS 10*3/mm3 0.03  --   --   --   --   --   --  0.03  --  0.02 0.03   IMMATURE GRANS (ABS) 10*3/mm3 0.04  --   --   --   --   --   --  0.02  --  0.01 0.03   NRBC /100 WBC 0.3*  --   --   --   --   --   --  0.0  --  0.0 0.0    < > = values in this interval not displayed.       Lab Results - Last 18 Months   Lab Units 03/02/23  1230 12/01/22  1303 09/29/22  1423 08/18/22  1313 07/28/22  1325 06/21/22  1426   GLUCOSE mg/dL 174* 134* 134* 122* 140* 112*   SODIUM mmol/L 142 142 139 140 140 140   POTASSIUM mmol/L 4.4 4.6 5.0 4.5 4.3 5.1   CO2 mmol/L 24.0 26.0 22.0 24.0 26.0 24.0   CHLORIDE mmol/L 107 107 106 108* 107 106   ANION GAP mmol/L 11.0 9.0 11.0 8.0 7.0 10.0   CREATININE mg/dL 1.59* 1.58* 1.98* 1.78* 1.85* 2.32*   BUN mg/dL 29* 27* 35* 37* 36* 33*   BUN / CREAT RATIO  18.2 17.1 17.7 20.8 19.5 14.2   CALCIUM mg/dL 9.6 9.4 8.8 9.6 9.5 9.8   ALK PHOS U/L 99 96 103 93 92 104   TOTAL PROTEIN g/dL 7.1 7.1 7.2 7.0 7.2 7.7   ALT (SGPT) U/L 9 10 8 8 9 13   AST (SGOT) U/L 11 15 12 11 12 16   BILIRUBIN mg/dL 0.2 0.2 0.2 0.2 0.2 0.2   ALBUMIN g/dL 3.9 4.00 3.90 4.00 3.90 4.40  3.4   GLOBULIN gm/dL 3.2 3.1 3.3 3.0 3.3 3.3       Lab Results - Last 18 Months   Lab Units 06/21/22  1426   M-SPIKE g/dL Not Observed   KAPPA/LAMBDA RATIO, S  2.11*   FREE LAMBDA LIGHT CHAINS mg/L 42.1*   IG KAPPA FREE LIGHT CHAIN mg/L 88.8*   LDH U/L 183       Lab Results - Last 18 Months   Lab Units 03/02/23  1230 12/01/22  1303 09/29/22  1423 07/28/22  1325 06/21/22  1426   IRON mcg/dL 53 32* 64 58 49   TIBC mcg/dL 299 337 320 337 367   IRON SATURATION % 18* 10* 20 17* 13*   FERRITIN ng/mL 210.10* 70.80 146.60 107.00 111.00         PAST MEDICAL HISTORY:  ALLERGIES:  Allergies   Allergen Reactions   • Aspirin Anaphylaxis and Swelling     CURRENT MEDICATIONS:  Outpatient Encounter Medications as of 3/2/2023   Medication  Sig Dispense Refill   • amLODIPine-valsartan (EXFORGE)  MG per tablet Take 1 tablet by mouth.     • carvedilol (COREG) 12.5 MG tablet 2 (Two) Times a Day With Meals.     • cholecalciferol (VITAMIN D3) 1000 units tablet Take 2 tablets by mouth Daily.     • cimetidine (TAGAMET) 200 MG tablet Take 1 tablet by mouth Daily.     • folic acid (FOLVITE) 1 MG tablet Take 1 tablet by mouth Daily.     • furosemide (LASIX) 20 MG tablet Daily.     • leflunomide (ARAVA) 20 MG tablet Take 1 tablet by mouth Daily.     • polyethyl glycol-propyl glycol (SYSTANE) 0.4-0.3 % solution ophthalmic solution Administer 1 drop to both eyes Every 1 (One) Hour As Needed.     • potassium chloride (K-DUR) 10 MEQ CR tablet Daily.     • pravastatin (PRAVACHOL) 20 MG tablet Take 2 tablets by mouth Daily.     • predniSONE (DELTASONE) 5 MG tablet Take 1 tablet by mouth Daily. Tapering dose       No facility-administered encounter medications on file as of 3/2/2023.     ADULT ILLNESSES:  Patient Active Problem List   Diagnosis Code   • Anemia D64.9   • Heme positive stool R19.5   • HTN (hypertension), benign I10   • Change in bowel habits R19.4   • Controlled type 2 diabetes mellitus without complication, without long-term current use of insulin ([facility]) E11.9   • 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   • PAD (peripheral artery disease) ([facility]) I73.9   • Anemia in stage 4 chronic kidney disease (HCC) N18.4, D63.1       HEALTH MAINTENANCE ITEMS:  Health Maintenance Due   Topic Date Due   • URINE MICROALBUMIN  Never done   • COVID-19 Vaccine (1) Never done   • Pneumococcal Vaccine 65+ (1 - PCV) Never done   • TDAP/TD VACCINES (1 - Tdap) Never done   • ZOSTER VACCINE (1 of 2) Never done   • DIABETIC EYE EXAM  Never done   • DXA SCAN  01/02/2022       <no information>  Last Completed Colonoscopy          COLORECTAL CANCER SCREENING (COLONOSCOPY - Every 10 Years) Next due on 1/19/2025 01/19/2015  SCANNED - COLONOSCOPY    11/24/2009  SCANNED - COLONOSCOPY     02/04/2003  SCANNED - COLONOSCOPY                There is no immunization history on file for this patient.  Last Completed Mammogram          MAMMOGRAM (Every 2 Years) Next due on 1/25/2024 01/25/2022  Outside Claim: HC MAMMOGRAM SCREENING BILAT DIGITAL W CAD,CHG SCREENING DIGITAL BREAST TOMOSYNTHESIS BI    01/15/2021  Outside Claim: HC MAMMOGRAM SCREENING BILAT DIGITAL W CAD,CHG SCREENING DIGITAL BREAST TOMOSYNTHESIS BI    01/02/2020  Outside Claim: HC MAMMOGRAM SCREENING BILAT DIGITAL W CAD,CHG SCREENING DIGITAL BREAST TOMOSYNTHESIS BI    12/13/2018  Outside Claim: HC MAMMOGRAM SCREENING BILAT DIGITAL W CAD,CHG SCREENING DIGITAL BREAST TOMOSYNTHESIS BI    09/20/2017  Outside Claim: CHG SCREENING DIGITAL BREAST TOMOSYNTHESIS BI,HC MAMMOGRAM SCREENING BILAT DIGITAL W CAD    Only the first 5 history entries have been loaded, but more history exists.                  FAMILY HISTORY:  Family History   Problem Relation Age of Onset   • Hypertension Other    • Diabetes Other    • Coronary artery disease Other    • Cancer Other    • No Known Problems Son    • No Known Problems Son    • No Known Problems Son    • Breast cancer Other    • Colon cancer Neg Hx    • Colon polyps Neg Hx    • Ovarian cancer Neg Hx      SOCIAL HISTORY:  Social History     Socioeconomic History   • Marital status:    Tobacco Use   • Smoking status: Never   • Smokeless tobacco: Never   Vaping Use   • Vaping Use: Never used   Substance and Sexual Activity   • Alcohol use: No   • Drug use: No   • Sexual activity: Defer     Birth control/protection: Surgical       REVIEW OF SYSTEMS:  Review of Systems   Constitutional: Positive for fatigue.   HENT: Negative for swollen glands and trouble swallowing.    Eyes:        Macular degeneration. Gets monthly injections     Respiratory: Negative for shortness of breath and wheezing.    Cardiovascular: Negative for chest pain and palpitations.   Gastrointestinal: Negative for abdominal pain, blood in  "stool, nausea and vomiting.   Genitourinary: Negative for difficulty urinating, dysuria and hematuria.   Musculoskeletal: Positive for back pain.        Currently on a tapering dose of prednisone for arthitis   Neurological:        Diabetic Neuropathy   Psychiatric/Behavioral: Negative for suicidal ideas and depressed mood.       /74   Pulse 82   Temp 97.3 °F (36.3 °C)   Resp 16   Ht 149.9 cm (59\")   Wt 64 kg (141 lb 1.6 oz)   SpO2 98%   BMI 28.50 kg/m²  Body surface area is 1.59 meters squared.    Pain Score    03/02/23 1302   PainSc: 0-No pain       Physical Exam:  Physical Exam  Constitutional:       Appearance: Normal appearance.   HENT:      Head: Normocephalic and atraumatic.   Eyes:      Extraocular Movements: Extraocular movements intact.   Cardiovascular:      Rate and Rhythm: Normal rate and regular rhythm.   Pulmonary:      Effort: Pulmonary effort is normal.      Breath sounds: Normal breath sounds.   Abdominal:      General: Bowel sounds are normal.      Palpations: Abdomen is soft.   Musculoskeletal:      Right lower leg: Edema present.      Left lower leg: Edema present.   Skin:     General: Skin is warm and dry.      Coloration: Skin is pale.   Neurological:      General: No focal deficit present.      Mental Status: She is alert and oriented to person, place, and time.   Psychiatric:         Mood and Affect: Mood normal.         Behavior: Behavior normal.         Nuha Cali reports a pain score of 0.  No intervention indicated.     ASSESSMENT / PLAN:    1. Anemia in stage 3b chronic kidney disease (HCC)    2. Iron deficiency anemia, unspecified iron deficiency anemia type    3. HTN (hypertension), benign         1.  Anemia in Chronic Kidney Disease Stage IIIb  2.  Iron Deficiency   -Pt received Venofer infusion x 1 on December 9, 2022  -Has been receiving Retacrit q 3 weeks   Last injection Retacrit 02/24/23  -Labs today Hgb 11.2, Hct 38.3, Iron 53, Ferritin 210, Sat 18%, TIBC " 299, BUN 29, Creatinine 1.59, GFR 32.7  -Pt will get CBC every 4 weeks and will continue retacrit for Hb < 11 OR Hct < 33    3.  Hypertension / Edema  -BP today is 120/74  -On Lasix, Amlodipine-Valsartan, Coreg  -Managed  By PCP and nephrology       PLAN:  Continue Retacrit 40,000 units q 4 weeks for Hgb < 11 or Hct < 33  Continue current medications, treatment plans and follow up with PCP and any other providers  RTC in 4 months  Pre-office labs for CBC, CMP,   Iron profile and ferritin will be checked in 3 months.  Orders have been placed.  Care discussed with patient.  Understanding expressed.  Patient agreeable with plan.            Health Maintenance   Colonoscopy 2015 Diverticulosis.  Pt does not want to do another one.   Mammogram 01/25/22  Dexa Scan - 2016 Osteopenia        Marilu Lackey, APRN  03/02/2023

## 2023-03-24 ENCOUNTER — INFUSION (OUTPATIENT)
Dept: ONCOLOGY | Facility: HOSPITAL | Age: 78
End: 2023-03-24
Payer: MEDICARE

## 2023-03-24 ENCOUNTER — LAB (OUTPATIENT)
Dept: LAB | Facility: HOSPITAL | Age: 78
End: 2023-03-24
Payer: MEDICARE

## 2023-03-24 VITALS
DIASTOLIC BLOOD PRESSURE: 58 MMHG | SYSTOLIC BLOOD PRESSURE: 164 MMHG | HEART RATE: 86 BPM | OXYGEN SATURATION: 100 % | BODY MASS INDEX: 28.63 KG/M2 | TEMPERATURE: 97.5 F | HEIGHT: 59 IN | RESPIRATION RATE: 16 BRPM | WEIGHT: 142 LBS

## 2023-03-24 DIAGNOSIS — N18.4 ANEMIA IN STAGE 4 CHRONIC KIDNEY DISEASE: ICD-10-CM

## 2023-03-24 DIAGNOSIS — D63.1 ANEMIA IN STAGE 4 CHRONIC KIDNEY DISEASE: ICD-10-CM

## 2023-03-24 DIAGNOSIS — N18.4 ANEMIA IN STAGE 4 CHRONIC KIDNEY DISEASE: Primary | ICD-10-CM

## 2023-03-24 DIAGNOSIS — D63.1 ANEMIA IN STAGE 4 CHRONIC KIDNEY DISEASE: Primary | ICD-10-CM

## 2023-03-24 LAB
DEPRECATED RDW RBC AUTO: 50.2 FL (ref 37–54)
ERYTHROCYTE [DISTWIDTH] IN BLOOD BY AUTOMATED COUNT: 14.8 % (ref 12.3–15.4)
HCT VFR BLD AUTO: 37.7 % (ref 34–46.6)
HGB BLD-MCNC: 11.3 G/DL (ref 12–15.9)
HOLD SPECIMEN: NORMAL
MCH RBC QN AUTO: 27.6 PG (ref 26.6–33)
MCHC RBC AUTO-ENTMCNC: 30 G/DL (ref 31.5–35.7)
MCV RBC AUTO: 92.2 FL (ref 79–97)
PLATELET # BLD AUTO: 189 10*3/MM3 (ref 140–450)
PMV BLD AUTO: 8.8 FL (ref 6–12)
RBC # BLD AUTO: 4.09 10*6/MM3 (ref 3.77–5.28)
WBC NRBC COR # BLD: 6.02 10*3/MM3 (ref 3.4–10.8)

## 2023-03-24 PROCEDURE — G0463 HOSPITAL OUTPT CLINIC VISIT: HCPCS

## 2023-03-24 PROCEDURE — 85027 COMPLETE CBC AUTOMATED: CPT

## 2023-03-24 PROCEDURE — 36415 COLL VENOUS BLD VENIPUNCTURE: CPT

## 2023-04-13 ENCOUNTER — OFFICE VISIT (OUTPATIENT)
Dept: VASCULAR SURGERY | Facility: CLINIC | Age: 78
End: 2023-04-13
Payer: MEDICARE

## 2023-04-13 ENCOUNTER — HOSPITAL ENCOUNTER (OUTPATIENT)
Dept: ULTRASOUND IMAGING | Facility: HOSPITAL | Age: 78
Discharge: HOME | End: 2023-04-13
Payer: MEDICARE

## 2023-04-13 VITALS — OXYGEN SATURATION: 94 % | DIASTOLIC BLOOD PRESSURE: 70 MMHG | SYSTOLIC BLOOD PRESSURE: 128 MMHG | HEART RATE: 84 BPM

## 2023-04-13 DIAGNOSIS — I73.9 PAD (PERIPHERAL ARTERY DISEASE): Primary | ICD-10-CM

## 2023-04-13 DIAGNOSIS — I65.23 BILATERAL CAROTID ARTERY STENOSIS: ICD-10-CM

## 2023-04-13 DIAGNOSIS — E11.9 CONTROLLED TYPE 2 DIABETES MELLITUS WITHOUT COMPLICATION, WITHOUT LONG-TERM CURRENT USE OF INSULIN: ICD-10-CM

## 2023-04-13 DIAGNOSIS — I10 HTN (HYPERTENSION), BENIGN: ICD-10-CM

## 2023-04-13 DIAGNOSIS — I73.9 PAD (PERIPHERAL ARTERY DISEASE): ICD-10-CM

## 2023-04-13 PROCEDURE — 93880 EXTRACRANIAL BILAT STUDY: CPT

## 2023-04-13 PROCEDURE — 93923 UPR/LXTR ART STDY 3+ LVLS: CPT

## 2023-04-13 NOTE — LETTER
April 13, 2023       No Recipients    Patient: Nuha Cali   YOB: 1945   Date of Visit: 4/13/2023       Dear Dr. Hinson Recipients:    Thank you for referring Nuha Cali to me for evaluation. Below are the relevant portions of my assessment and plan of care.    If you have questions, please do not hesitate to call me. I look forward to following Nuha along with you.         Sincerely,        Peter Huddleston DO        CC:   No Recipients    Peter Huddleston DO  04/13/23 1812  Signed  4/13/2023       Juan Echavarria MD  [address]  [place] [address]    Nuha Cali  1945    Chief Complaint   Patient presents with   • Follow-up     6 month f/u with ABIs and carotids.  Last seen in the office on 10/11/22.  Pt she has swelling of her legs during the day when she is up, but they go down during the nightor any stroke type symptoms.  Pt doesn't wear compression.       Dear Juan Echavarria MD   I had the pleasure of seeing your patient Nuha Cali in the office today.   As you recall, Nuha Cali is a 78 y.o.  female who developed a wound to her left second toe and was following with wound care.  She did undergo left lower extremity angiogram on 8/13/2021 and unfortunately Dr. 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  She did undergo amputation of the left second toe on 8/18/2021 with Dr. Martinez which healed.  She has developed no further wounds.  She does have swelling to her lower extremities which resolves at night per her report.  She did have noninvasive testing performed today, which I did review in office.     Review of Systems   Constitutional: Negative.    HENT: Negative.    Eyes: Negative.    Respiratory: Negative.    Cardiovascular: Positive for leg swelling.   Gastrointestinal: Negative.    Endocrine: Negative.    Genitourinary: Negative.    Musculoskeletal: Negative.    Skin:  Negative.  Negative for wound.   Allergic/Immunologic: Negative.    Neurological: Negative.    Hematological: Negative.    Psychiatric/Behavioral: Negative.    All other systems reviewed and are negative.      /70   Pulse 84   SpO2 94%   Physical Exam  Vitals and nursing note reviewed.   Constitutional:       General: She is not in acute distress.     Appearance: She is well-developed. She is not diaphoretic.   HENT:      Head: Normocephalic and atraumatic.   Eyes:      General: No scleral icterus.     Pupils: Pupils are equal, round, and reactive to light.   Neck:      Thyroid: No thyromegaly.      Vascular: No carotid bruit or JVD.   Cardiovascular:      Rate and Rhythm: Normal rate and regular rhythm.      Pulses:           Dorsalis pedis pulses are detected w/ Doppler on the right side and detected w/ Doppler on the left side.        Posterior tibial pulses are detected w/ Doppler on the right side and detected w/ Doppler on the left side.      Heart sounds: Normal heart sounds and S2 normal. No murmur heard.    No friction rub. No gallop.   Pulmonary:      Effort: Pulmonary effort is normal.      Breath sounds: Normal breath sounds.   Abdominal:      General: Bowel sounds are normal.      Palpations: Abdomen is soft.   Musculoskeletal:         General: Normal range of motion.      Cervical back: Normal range of motion and neck supple.      Right lower leg: Edema present.      Left lower leg: Edema present.   Skin:     General: Skin is warm and dry.   Neurological:      Mental Status: She is alert and oriented to person, place, and time.      Cranial Nerves: No cranial nerve deficit.   Psychiatric:         Mood and Affect: Mood normal.         Behavior: Behavior normal.         Thought Content: Thought content normal.         Judgment: Judgment normal.        Diagnostic data:    US Carotid Bilateral    Result Date: 4/13/2023  Narrative: History: Carotid occlusive disease      Impression: Impression: 1.  There is less than 50% stenosis of the right internal carotid artery. 2. There is less than 50% stenosis of the left internal carotid artery. 3. Antegrade flow is demonstrated in bilateral vertebral arteries.  Comments: Bilateral carotid vertebral arterial duplex scan was performed.  Grayscale imaging shows intimal thickening and calcified elements at the carotid bifurcation. The right internal carotid artery peak systolic velocity is 98.1 cm/sec. The end-diastolic velocity is 14.7 cm/sec. The right ICA/CCA ratio is approximately 1.0 . These findings correlate with less than 50% stenosis of the right internal carotid artery.  Grayscale imaging shows intimal thickening and calcified elements at the carotid bifurcation. The left internal carotid artery peak systolic velocity is 93.5 cm/sec. The end-diastolic velocity is 14.4 cm/sec. The left ICA/CCA ratio is approximately 0.9 . These findings correlate with less than 50% stenosis of the left internal carotid artery.  Antegrade flow is demonstrated in bilateral vertebral arteries.  This report was finalized on 04/13/2023 15:30 by Dr. Peter Huddleston MD.    US Ankle / Brachial Indices Extremity Complete    Result Date: 4/13/2023  Narrative: History: PAD  Comments: Bilateral lower extremity arterial with multi-level pulse volume recordings and segmental pressures were performed at rest and stress.  The right ankle/brachial index is not obtainable due to noncompressibility of the vessels. The waveforms are biphasic without dampening.  The left ankle/brachial index is not obtainable due to noncompressibility of the vessels. The waveforms are biphasic without dampening.      Impression: Impression: 1. The right ankle/brachial index was not obtainable due to noncompressibility of the vessels. 2. The left ankle/brachial index was not obtainable due to noncompressibility of the vessels. 3. The waveforms are biphasic and preserved at the ankle.  This report was finalized on  04/13/2023 15:16 by Dr. Peter Huddleston MD.        Patient Active Problem List   Diagnosis   • Anemia   • Heme positive stool   • HTN (hypertension), benign   • Change in bowel habits   • Controlled type 2 diabetes mellitus without complication, without long-term current use of insulin   • Colovaginal fistula   • PAD (peripheral artery disease)   • Anemia in stage 4 chronic kidney disease         ICD-10-CM ICD-9-CM   1. PAD (peripheral artery disease)  I73.9 443.9   2. Bilateral carotid artery stenosis  I65.23 433.10     433.30   3. Controlled type 2 diabetes mellitus without complication, without long-term current use of insulin  E11.9 250.00   4. HTN (hypertension), benign  I10 401.1         Plan: After thoroughly evaluating Nuha Cali, I believe the best course of action is to remain conservative from vascular surgery standpoint.  I did review her testing which shows less than 50% carotid stenosis bilaterally.  Her ABIs show noncompressible however waveforms are biphasic and maintained at the ankle.  I will see her back in 1 years time with repeat noninvasive testing for continued surveillance, including a carotid duplex and ABIs. I did discuss vascular risk factors as they pertain to the progression of vascular disease including controlling her hypertension and diabetes.  These risk factors are currently stable and controlled.  The patient can continue taking their current medication regimen as previously planned.  This was all discussed in full with complete understanding.  Thank you for allowing me to participate in the care of your patient.  Please do not hesitate with any questions or concerns.  I will keep you aware of any further encounters with Nuha Cali.        Sincerely yours,         Peter Huddleston, DO

## 2023-04-13 NOTE — PROGRESS NOTES
4/13/2023       Juan Echavarria MD  [address]  [place] [address]    Nuha Cali  1945    Chief Complaint   Patient presents with   • Follow-up     6 month f/u with ABIs and carotids.  Last seen in the office on 10/11/22.  Pt she has swelling of her legs during the day when she is up, but they go down during the nightor any stroke type symptoms.  Pt doesn't wear compression.       Dear Juan Echavarria MD   I had the pleasure of seeing your patient Nuha Cali in the office today.   As you recall, Nuha Cali is a 78 y.o.  female who developed a wound to her left second toe and was following with wound care.  She did undergo left lower extremity angiogram on 8/13/2021 and unfortunately Dr. 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  She did undergo amputation of the left second toe on 8/18/2021 with Dr. Martinez which healed.  She has developed no further wounds.  She does have swelling to her lower extremities which resolves at night per her report.  She did have noninvasive testing performed today, which I did review in office.     Review of Systems   Constitutional: Negative.    HENT: Negative.    Eyes: Negative.    Respiratory: Negative.    Cardiovascular: Positive for leg swelling.   Gastrointestinal: Negative.    Endocrine: Negative.    Genitourinary: Negative.    Musculoskeletal: Negative.    Skin: Negative.  Negative for wound.   Allergic/Immunologic: Negative.    Neurological: Negative.    Hematological: Negative.    Psychiatric/Behavioral: Negative.    All other systems reviewed and are negative.      /70   Pulse 84   SpO2 94%   Physical Exam  Vitals and nursing note reviewed.   Constitutional:       General: She is not in acute distress.     Appearance: She is well-developed. She is not diaphoretic.   HENT:      Head: Normocephalic and atraumatic.   Eyes:      General: No scleral icterus.     Pupils:  Pupils are equal, round, and reactive to light.   Neck:      Thyroid: No thyromegaly.      Vascular: No carotid bruit or JVD.   Cardiovascular:      Rate and Rhythm: Normal rate and regular rhythm.      Pulses:           Dorsalis pedis pulses are detected w/ Doppler on the right side and detected w/ Doppler on the left side.        Posterior tibial pulses are detected w/ Doppler on the right side and detected w/ Doppler on the left side.      Heart sounds: Normal heart sounds and S2 normal. No murmur heard.    No friction rub. No gallop.   Pulmonary:      Effort: Pulmonary effort is normal.      Breath sounds: Normal breath sounds.   Abdominal:      General: Bowel sounds are normal.      Palpations: Abdomen is soft.   Musculoskeletal:         General: Normal range of motion.      Cervical back: Normal range of motion and neck supple.      Right lower leg: Edema present.      Left lower leg: Edema present.   Skin:     General: Skin is warm and dry.   Neurological:      Mental Status: She is alert and oriented to person, place, and time.      Cranial Nerves: No cranial nerve deficit.   Psychiatric:         Mood and Affect: Mood normal.         Behavior: Behavior normal.         Thought Content: Thought content normal.         Judgment: Judgment normal.        Diagnostic data:    US Carotid Bilateral    Result Date: 4/13/2023  Narrative: History: Carotid occlusive disease      Impression: Impression: 1. There is less than 50% stenosis of the right internal carotid artery. 2. There is less than 50% stenosis of the left internal carotid artery. 3. Antegrade flow is demonstrated in bilateral vertebral arteries.  Comments: Bilateral carotid vertebral arterial duplex scan was performed.  Grayscale imaging shows intimal thickening and calcified elements at the carotid bifurcation. The right internal carotid artery peak systolic velocity is 98.1 cm/sec. The end-diastolic velocity is 14.7 cm/sec. The right ICA/CCA ratio is  approximately 1.0 . These findings correlate with less than 50% stenosis of the right internal carotid artery.  Grayscale imaging shows intimal thickening and calcified elements at the carotid bifurcation. The left internal carotid artery peak systolic velocity is 93.5 cm/sec. The end-diastolic velocity is 14.4 cm/sec. The left ICA/CCA ratio is approximately 0.9 . These findings correlate with less than 50% stenosis of the left internal carotid artery.  Antegrade flow is demonstrated in bilateral vertebral arteries.  This report was finalized on 04/13/2023 15:30 by Dr. Peter Huddleston MD.    US Ankle / Brachial Indices Extremity Complete    Result Date: 4/13/2023  Narrative: History: PAD  Comments: Bilateral lower extremity arterial with multi-level pulse volume recordings and segmental pressures were performed at rest and stress.  The right ankle/brachial index is not obtainable due to noncompressibility of the vessels. The waveforms are biphasic without dampening.  The left ankle/brachial index is not obtainable due to noncompressibility of the vessels. The waveforms are biphasic without dampening.      Impression: Impression: 1. The right ankle/brachial index was not obtainable due to noncompressibility of the vessels. 2. The left ankle/brachial index was not obtainable due to noncompressibility of the vessels. 3. The waveforms are biphasic and preserved at the ankle.  This report was finalized on 04/13/2023 15:16 by Dr. Peter Huddleston MD.        Patient Active Problem List   Diagnosis   • Anemia   • Heme positive stool   • HTN (hypertension), benign   • Change in bowel habits   • Controlled type 2 diabetes mellitus without complication, without long-term current use of insulin   • Colovaginal fistula   • PAD (peripheral artery disease)   • Anemia in stage 4 chronic kidney disease         ICD-10-CM ICD-9-CM   1. PAD (peripheral artery disease)  I73.9 443.9   2. Bilateral carotid artery stenosis  I65.23 433.10      433.30   3. Controlled type 2 diabetes mellitus without complication, without long-term current use of insulin  E11.9 250.00   4. HTN (hypertension), benign  I10 401.1         Plan: After thoroughly evaluating Nuha Cali, I believe the best course of action is to remain conservative from vascular surgery standpoint.  I did review her testing which shows less than 50% carotid stenosis bilaterally.  Her ABIs show noncompressible however waveforms are biphasic and maintained at the ankle.  I will see her back in 1 years time with repeat noninvasive testing for continued surveillance, including a carotid duplex and ABIs. I did discuss vascular risk factors as they pertain to the progression of vascular disease including controlling her hypertension and diabetes.  These risk factors are currently stable and controlled.  The patient can continue taking their current medication regimen as previously planned.  This was all discussed in full with complete understanding.  Thank you for allowing me to participate in the care of your patient.  Please do not hesitate with any questions or concerns.  I will keep you aware of any further encounters with Nuha Cali.        Sincerely yours,         Peter Huddleston, DO

## 2023-04-21 ENCOUNTER — LAB (OUTPATIENT)
Dept: LAB | Facility: HOSPITAL | Age: 78
End: 2023-04-21
Payer: MEDICARE

## 2023-04-21 ENCOUNTER — INFUSION (OUTPATIENT)
Dept: ONCOLOGY | Facility: HOSPITAL | Age: 78
End: 2023-04-21
Payer: MEDICARE

## 2023-04-21 VITALS
RESPIRATION RATE: 18 BRPM | TEMPERATURE: 97.3 F | HEART RATE: 85 BPM | HEIGHT: 59 IN | OXYGEN SATURATION: 96 % | DIASTOLIC BLOOD PRESSURE: 50 MMHG | WEIGHT: 143.6 LBS | BODY MASS INDEX: 28.95 KG/M2 | SYSTOLIC BLOOD PRESSURE: 152 MMHG

## 2023-04-21 DIAGNOSIS — D63.1 ANEMIA IN STAGE 3B CHRONIC KIDNEY DISEASE: ICD-10-CM

## 2023-04-21 DIAGNOSIS — N18.4 ANEMIA IN STAGE 4 CHRONIC KIDNEY DISEASE: Primary | ICD-10-CM

## 2023-04-21 DIAGNOSIS — D63.1 ANEMIA IN STAGE 4 CHRONIC KIDNEY DISEASE: Primary | ICD-10-CM

## 2023-04-21 DIAGNOSIS — N18.32 ANEMIA IN STAGE 3B CHRONIC KIDNEY DISEASE: ICD-10-CM

## 2023-04-21 DIAGNOSIS — D50.9 IRON DEFICIENCY ANEMIA, UNSPECIFIED IRON DEFICIENCY ANEMIA TYPE: ICD-10-CM

## 2023-04-21 LAB
ALBUMIN SERPL-MCNC: 3.5 G/DL (ref 3.5–5.2)
ALBUMIN/GLOB SERPL: 1 G/DL
ALP SERPL-CCNC: 111 U/L (ref 39–117)
ALT SERPL W P-5'-P-CCNC: 11 U/L (ref 1–33)
ANION GAP SERPL CALCULATED.3IONS-SCNC: 10 MMOL/L (ref 5–15)
AST SERPL-CCNC: 13 U/L (ref 1–32)
BASOPHILS # BLD AUTO: 0.03 10*3/MM3 (ref 0–0.2)
BASOPHILS NFR BLD AUTO: 0.5 % (ref 0–1.5)
BILIRUB SERPL-MCNC: 0.2 MG/DL (ref 0–1.2)
BUN SERPL-MCNC: 32 MG/DL (ref 8–23)
BUN/CREAT SERPL: 15.9 (ref 7–25)
CALCIUM SPEC-SCNC: 9.1 MG/DL (ref 8.6–10.5)
CHLORIDE SERPL-SCNC: 107 MMOL/L (ref 98–107)
CO2 SERPL-SCNC: 22 MMOL/L (ref 22–29)
CREAT SERPL-MCNC: 2.01 MG/DL (ref 0.57–1)
DEPRECATED RDW RBC AUTO: 50.7 FL (ref 37–54)
EGFRCR SERPLBLD CKD-EPI 2021: 24.7 ML/MIN/1.73
EOSINOPHIL # BLD AUTO: 0.17 10*3/MM3 (ref 0–0.4)
EOSINOPHIL NFR BLD AUTO: 2.9 % (ref 0.3–6.2)
ERYTHROCYTE [DISTWIDTH] IN BLOOD BY AUTOMATED COUNT: 15.2 % (ref 12.3–15.4)
GLOBULIN UR ELPH-MCNC: 3.5 GM/DL
GLUCOSE SERPL-MCNC: 179 MG/DL (ref 65–99)
HCT VFR BLD AUTO: 31.7 % (ref 34–46.6)
HGB BLD-MCNC: 9.3 G/DL (ref 12–15.9)
IMM GRANULOCYTES # BLD AUTO: 0.02 10*3/MM3 (ref 0–0.05)
IMM GRANULOCYTES NFR BLD AUTO: 0.3 % (ref 0–0.5)
LYMPHOCYTES # BLD AUTO: 0.94 10*3/MM3 (ref 0.7–3.1)
LYMPHOCYTES NFR BLD AUTO: 16.2 % (ref 19.6–45.3)
MCH RBC QN AUTO: 27 PG (ref 26.6–33)
MCHC RBC AUTO-ENTMCNC: 29.3 G/DL (ref 31.5–35.7)
MCV RBC AUTO: 91.9 FL (ref 79–97)
MONOCYTES # BLD AUTO: 0.51 10*3/MM3 (ref 0.1–0.9)
MONOCYTES NFR BLD AUTO: 8.8 % (ref 5–12)
NEUTROPHILS NFR BLD AUTO: 4.15 10*3/MM3 (ref 1.7–7)
NEUTROPHILS NFR BLD AUTO: 71.3 % (ref 42.7–76)
NRBC BLD AUTO-RTO: 0 /100 WBC (ref 0–0.2)
PLATELET # BLD AUTO: 190 10*3/MM3 (ref 140–450)
PMV BLD AUTO: 8.6 FL (ref 6–12)
POTASSIUM SERPL-SCNC: 4.6 MMOL/L (ref 3.5–5.2)
PROT SERPL-MCNC: 7 G/DL (ref 6–8.5)
RBC # BLD AUTO: 3.45 10*6/MM3 (ref 3.77–5.28)
SODIUM SERPL-SCNC: 139 MMOL/L (ref 136–145)
WBC NRBC COR # BLD: 5.82 10*3/MM3 (ref 3.4–10.8)

## 2023-04-21 PROCEDURE — 25010000002 EPOETIN ALFA PER 1000 UNITS: Performed by: NURSE PRACTITIONER

## 2023-04-21 PROCEDURE — 85025 COMPLETE CBC W/AUTO DIFF WBC: CPT

## 2023-04-21 PROCEDURE — 96372 THER/PROPH/DIAG INJ SC/IM: CPT

## 2023-04-21 PROCEDURE — 80053 COMPREHEN METABOLIC PANEL: CPT

## 2023-04-21 PROCEDURE — 36415 COLL VENOUS BLD VENIPUNCTURE: CPT

## 2023-04-21 RX ADMIN — ERYTHROPOIETIN 40000 UNITS: 40000 INJECTION, SOLUTION INTRAVENOUS; SUBCUTANEOUS at 09:58

## 2023-05-19 ENCOUNTER — LAB (OUTPATIENT)
Dept: LAB | Facility: HOSPITAL | Age: 78
End: 2023-05-19
Payer: MEDICARE

## 2023-05-19 ENCOUNTER — INFUSION (OUTPATIENT)
Dept: ONCOLOGY | Facility: HOSPITAL | Age: 78
End: 2023-05-19
Payer: MEDICARE

## 2023-05-19 VITALS
WEIGHT: 140 LBS | HEART RATE: 88 BPM | SYSTOLIC BLOOD PRESSURE: 126 MMHG | HEIGHT: 59 IN | BODY MASS INDEX: 28.22 KG/M2 | RESPIRATION RATE: 22 BRPM | TEMPERATURE: 97.8 F | DIASTOLIC BLOOD PRESSURE: 78 MMHG | OXYGEN SATURATION: 96 %

## 2023-05-19 DIAGNOSIS — N18.4 ANEMIA IN STAGE 4 CHRONIC KIDNEY DISEASE: Primary | ICD-10-CM

## 2023-05-19 DIAGNOSIS — D63.1 ANEMIA IN STAGE 4 CHRONIC KIDNEY DISEASE: Primary | ICD-10-CM

## 2023-05-19 DIAGNOSIS — D63.1 ANEMIA IN STAGE 3B CHRONIC KIDNEY DISEASE: ICD-10-CM

## 2023-05-19 DIAGNOSIS — N18.32 ANEMIA IN STAGE 3B CHRONIC KIDNEY DISEASE: ICD-10-CM

## 2023-05-19 DIAGNOSIS — D50.9 IRON DEFICIENCY ANEMIA, UNSPECIFIED IRON DEFICIENCY ANEMIA TYPE: ICD-10-CM

## 2023-05-19 LAB
ALBUMIN SERPL-MCNC: 4.1 G/DL (ref 3.5–5.2)
ALBUMIN/GLOB SERPL: 1.2 G/DL
ALP SERPL-CCNC: 103 U/L (ref 39–117)
ALT SERPL W P-5'-P-CCNC: 10 U/L (ref 1–33)
ANION GAP SERPL CALCULATED.3IONS-SCNC: 11 MMOL/L (ref 5–15)
AST SERPL-CCNC: 14 U/L (ref 1–32)
BASOPHILS # BLD AUTO: 0.03 10*3/MM3 (ref 0–0.2)
BASOPHILS NFR BLD AUTO: 0.4 % (ref 0–1.5)
BILIRUB SERPL-MCNC: 0.2 MG/DL (ref 0–1.2)
BUN SERPL-MCNC: 40 MG/DL (ref 8–23)
BUN/CREAT SERPL: 23.8 (ref 7–25)
CALCIUM SPEC-SCNC: 9.5 MG/DL (ref 8.6–10.5)
CHLORIDE SERPL-SCNC: 107 MMOL/L (ref 98–107)
CO2 SERPL-SCNC: 22 MMOL/L (ref 22–29)
CREAT SERPL-MCNC: 1.68 MG/DL (ref 0.57–1)
DEPRECATED RDW RBC AUTO: 49.9 FL (ref 37–54)
EGFRCR SERPLBLD CKD-EPI 2021: 30.6 ML/MIN/1.73
EOSINOPHIL # BLD AUTO: 0.07 10*3/MM3 (ref 0–0.4)
EOSINOPHIL NFR BLD AUTO: 0.9 % (ref 0.3–6.2)
ERYTHROCYTE [DISTWIDTH] IN BLOOD BY AUTOMATED COUNT: 14.7 % (ref 12.3–15.4)
GLOBULIN UR ELPH-MCNC: 3.4 GM/DL
GLUCOSE SERPL-MCNC: 135 MG/DL (ref 65–99)
HCT VFR BLD AUTO: 35.1 % (ref 34–46.6)
HGB BLD-MCNC: 10.3 G/DL (ref 12–15.9)
IMM GRANULOCYTES # BLD AUTO: 0.04 10*3/MM3 (ref 0–0.05)
IMM GRANULOCYTES NFR BLD AUTO: 0.5 % (ref 0–0.5)
LYMPHOCYTES # BLD AUTO: 1.02 10*3/MM3 (ref 0.7–3.1)
LYMPHOCYTES NFR BLD AUTO: 13.6 % (ref 19.6–45.3)
MCH RBC QN AUTO: 27 PG (ref 26.6–33)
MCHC RBC AUTO-ENTMCNC: 29.3 G/DL (ref 31.5–35.7)
MCV RBC AUTO: 92.1 FL (ref 79–97)
MONOCYTES # BLD AUTO: 0.6 10*3/MM3 (ref 0.1–0.9)
MONOCYTES NFR BLD AUTO: 8 % (ref 5–12)
NEUTROPHILS NFR BLD AUTO: 5.76 10*3/MM3 (ref 1.7–7)
NEUTROPHILS NFR BLD AUTO: 76.6 % (ref 42.7–76)
NRBC BLD AUTO-RTO: 0 /100 WBC (ref 0–0.2)
PLATELET # BLD AUTO: 245 10*3/MM3 (ref 140–450)
PMV BLD AUTO: 8.7 FL (ref 6–12)
POTASSIUM SERPL-SCNC: 5 MMOL/L (ref 3.5–5.2)
PROT SERPL-MCNC: 7.5 G/DL (ref 6–8.5)
RBC # BLD AUTO: 3.81 10*6/MM3 (ref 3.77–5.28)
SODIUM SERPL-SCNC: 140 MMOL/L (ref 136–145)
WBC NRBC COR # BLD: 7.52 10*3/MM3 (ref 3.4–10.8)

## 2023-05-19 PROCEDURE — 36415 COLL VENOUS BLD VENIPUNCTURE: CPT

## 2023-05-19 PROCEDURE — 85025 COMPLETE CBC W/AUTO DIFF WBC: CPT

## 2023-05-19 PROCEDURE — 25010000002 EPOETIN ALFA PER 1000 UNITS: Performed by: NURSE PRACTITIONER

## 2023-05-19 PROCEDURE — 80053 COMPREHEN METABOLIC PANEL: CPT

## 2023-05-19 RX ADMIN — ERYTHROPOIETIN 40000 UNITS: 40000 INJECTION, SOLUTION INTRAVENOUS; SUBCUTANEOUS at 10:29

## 2023-06-16 ENCOUNTER — LAB (OUTPATIENT)
Dept: LAB | Facility: HOSPITAL | Age: 78
End: 2023-06-16
Payer: MEDICARE

## 2023-06-16 ENCOUNTER — INFUSION (OUTPATIENT)
Dept: ONCOLOGY | Facility: HOSPITAL | Age: 78
End: 2023-06-16
Payer: MEDICARE

## 2023-06-16 VITALS
BODY MASS INDEX: 28.63 KG/M2 | OXYGEN SATURATION: 95 % | HEIGHT: 59 IN | SYSTOLIC BLOOD PRESSURE: 150 MMHG | DIASTOLIC BLOOD PRESSURE: 62 MMHG | RESPIRATION RATE: 17 BRPM | WEIGHT: 142 LBS | TEMPERATURE: 97.2 F | HEART RATE: 77 BPM

## 2023-06-16 DIAGNOSIS — N18.32 ANEMIA IN STAGE 3B CHRONIC KIDNEY DISEASE: ICD-10-CM

## 2023-06-16 DIAGNOSIS — D63.1 ANEMIA IN STAGE 3B CHRONIC KIDNEY DISEASE: ICD-10-CM

## 2023-06-16 DIAGNOSIS — D63.1 ANEMIA IN STAGE 4 CHRONIC KIDNEY DISEASE: Primary | ICD-10-CM

## 2023-06-16 DIAGNOSIS — D50.9 IRON DEFICIENCY ANEMIA, UNSPECIFIED IRON DEFICIENCY ANEMIA TYPE: ICD-10-CM

## 2023-06-16 DIAGNOSIS — N18.4 ANEMIA IN STAGE 4 CHRONIC KIDNEY DISEASE: Primary | ICD-10-CM

## 2023-06-16 LAB
ALBUMIN SERPL-MCNC: 3.4 G/DL (ref 3.5–5.2)
ALBUMIN/GLOB SERPL: 1 G/DL
ALP SERPL-CCNC: 95 U/L (ref 39–117)
ALT SERPL W P-5'-P-CCNC: 14 U/L (ref 1–33)
ANION GAP SERPL CALCULATED.3IONS-SCNC: 10 MMOL/L (ref 5–15)
AST SERPL-CCNC: 18 U/L (ref 1–32)
BASOPHILS # BLD AUTO: 0.02 10*3/MM3 (ref 0–0.2)
BASOPHILS NFR BLD AUTO: 0.3 % (ref 0–1.5)
BILIRUB SERPL-MCNC: 0.2 MG/DL (ref 0–1.2)
BUN SERPL-MCNC: 29 MG/DL (ref 8–23)
BUN/CREAT SERPL: 14.9 (ref 7–25)
CALCIUM SPEC-SCNC: 8.9 MG/DL (ref 8.6–10.5)
CHLORIDE SERPL-SCNC: 108 MMOL/L (ref 98–107)
CO2 SERPL-SCNC: 22 MMOL/L (ref 22–29)
CREAT SERPL-MCNC: 1.95 MG/DL (ref 0.57–1)
DEPRECATED RDW RBC AUTO: 49.3 FL (ref 37–54)
EGFRCR SERPLBLD CKD-EPI 2021: 25.6 ML/MIN/1.73
EOSINOPHIL # BLD AUTO: 0.14 10*3/MM3 (ref 0–0.4)
EOSINOPHIL NFR BLD AUTO: 2.4 % (ref 0.3–6.2)
ERYTHROCYTE [DISTWIDTH] IN BLOOD BY AUTOMATED COUNT: 14.6 % (ref 12.3–15.4)
FERRITIN SERPL-MCNC: 404.2 NG/ML (ref 13–150)
GLOBULIN UR ELPH-MCNC: 3.3 GM/DL
GLUCOSE SERPL-MCNC: 109 MG/DL (ref 65–99)
HCT VFR BLD AUTO: 32.5 % (ref 34–46.6)
HGB BLD-MCNC: 9.7 G/DL (ref 12–15.9)
IMM GRANULOCYTES # BLD AUTO: 0.03 10*3/MM3 (ref 0–0.05)
IMM GRANULOCYTES NFR BLD AUTO: 0.5 % (ref 0–0.5)
IRON 24H UR-MRATE: 63 MCG/DL (ref 37–145)
IRON SATN MFR SERPL: 25 % (ref 20–50)
LYMPHOCYTES # BLD AUTO: 1 10*3/MM3 (ref 0.7–3.1)
LYMPHOCYTES NFR BLD AUTO: 16.9 % (ref 19.6–45.3)
MCH RBC QN AUTO: 27.6 PG (ref 26.6–33)
MCHC RBC AUTO-ENTMCNC: 29.8 G/DL (ref 31.5–35.7)
MCV RBC AUTO: 92.6 FL (ref 79–97)
MONOCYTES # BLD AUTO: 0.49 10*3/MM3 (ref 0.1–0.9)
MONOCYTES NFR BLD AUTO: 8.3 % (ref 5–12)
NEUTROPHILS NFR BLD AUTO: 4.22 10*3/MM3 (ref 1.7–7)
NEUTROPHILS NFR BLD AUTO: 71.6 % (ref 42.7–76)
NRBC BLD AUTO-RTO: 0 /100 WBC (ref 0–0.2)
PLATELET # BLD AUTO: 197 10*3/MM3 (ref 140–450)
PMV BLD AUTO: 8.9 FL (ref 6–12)
POTASSIUM SERPL-SCNC: 5.1 MMOL/L (ref 3.5–5.2)
PROT SERPL-MCNC: 6.7 G/DL (ref 6–8.5)
RBC # BLD AUTO: 3.51 10*6/MM3 (ref 3.77–5.28)
SODIUM SERPL-SCNC: 140 MMOL/L (ref 136–145)
TIBC SERPL-MCNC: 253 MCG/DL (ref 298–536)
TRANSFERRIN SERPL-MCNC: 170 MG/DL (ref 200–360)
WBC NRBC COR # BLD: 5.9 10*3/MM3 (ref 3.4–10.8)

## 2023-06-16 PROCEDURE — 84466 ASSAY OF TRANSFERRIN: CPT

## 2023-06-16 PROCEDURE — 82728 ASSAY OF FERRITIN: CPT

## 2023-06-16 PROCEDURE — 85025 COMPLETE CBC W/AUTO DIFF WBC: CPT

## 2023-06-16 PROCEDURE — 96372 THER/PROPH/DIAG INJ SC/IM: CPT

## 2023-06-16 PROCEDURE — 36415 COLL VENOUS BLD VENIPUNCTURE: CPT

## 2023-06-16 PROCEDURE — 25010000002 EPOETIN ALFA PER 1000 UNITS: Performed by: NURSE PRACTITIONER

## 2023-06-16 PROCEDURE — 83540 ASSAY OF IRON: CPT

## 2023-06-16 PROCEDURE — 80053 COMPREHEN METABOLIC PANEL: CPT

## 2023-06-16 RX ADMIN — ERYTHROPOIETIN 40000 UNITS: 40000 INJECTION, SOLUTION INTRAVENOUS; SUBCUTANEOUS at 10:56

## 2023-06-26 ENCOUNTER — TELEPHONE (OUTPATIENT)
Dept: ONCOLOGY | Facility: CLINIC | Age: 78
End: 2023-06-26

## 2023-06-26 NOTE — TELEPHONE ENCOUNTER
Caller: Nuha Cali    Relationship to patient: Self    Best call back number: [phone]    Chief complaint: R/S    Type of visit: LAB AND FOLLOW UP    Requested date: 7-3 EARLIER OR THURSDAY, FRIDAY    If rescheduling, when is the original appointment: 7-3     Additional notes:PLEASE ADVISE

## 2023-06-26 NOTE — TELEPHONE ENCOUNTER
Caller: Nuha Cali    Relationship to patient: Self    Best call back number: [phone]    Type of visit: LAB & F/U 1     Requested date: CALL TO R/S     If rescheduling, when is the original appointment: 7/3/2023

## 2023-08-03 ENCOUNTER — INFUSION (OUTPATIENT)
Dept: ONCOLOGY | Facility: HOSPITAL | Age: 78
End: 2023-08-03
Payer: MEDICARE

## 2023-08-03 ENCOUNTER — LAB (OUTPATIENT)
Dept: LAB | Facility: HOSPITAL | Age: 78
End: 2023-08-03
Payer: MEDICARE

## 2023-08-03 ENCOUNTER — OFFICE VISIT (OUTPATIENT)
Dept: ONCOLOGY | Facility: CLINIC | Age: 78
End: 2023-08-03
Payer: MEDICARE

## 2023-08-03 VITALS
HEIGHT: 59 IN | OXYGEN SATURATION: 96 % | WEIGHT: 144 LBS | BODY MASS INDEX: 29.03 KG/M2 | TEMPERATURE: 97.3 F | DIASTOLIC BLOOD PRESSURE: 64 MMHG | HEART RATE: 90 BPM | RESPIRATION RATE: 16 BRPM | SYSTOLIC BLOOD PRESSURE: 177 MMHG

## 2023-08-03 VITALS
DIASTOLIC BLOOD PRESSURE: 82 MMHG | RESPIRATION RATE: 16 BRPM | BODY MASS INDEX: 29.05 KG/M2 | HEART RATE: 86 BPM | TEMPERATURE: 97 F | WEIGHT: 144.1 LBS | HEIGHT: 59 IN | OXYGEN SATURATION: 95 % | SYSTOLIC BLOOD PRESSURE: 126 MMHG

## 2023-08-03 DIAGNOSIS — N18.32 ANEMIA IN STAGE 3B CHRONIC KIDNEY DISEASE: Primary | ICD-10-CM

## 2023-08-03 DIAGNOSIS — D50.9 IRON DEFICIENCY ANEMIA, UNSPECIFIED IRON DEFICIENCY ANEMIA TYPE: ICD-10-CM

## 2023-08-03 DIAGNOSIS — D63.1 ANEMIA IN STAGE 3B CHRONIC KIDNEY DISEASE: Primary | ICD-10-CM

## 2023-08-03 DIAGNOSIS — I10 HTN (HYPERTENSION), BENIGN: ICD-10-CM

## 2023-08-03 DIAGNOSIS — D63.1 ANEMIA IN STAGE 4 CHRONIC KIDNEY DISEASE: Primary | ICD-10-CM

## 2023-08-03 DIAGNOSIS — N18.4 ANEMIA IN STAGE 4 CHRONIC KIDNEY DISEASE: Primary | ICD-10-CM

## 2023-08-03 LAB
ALBUMIN SERPL-MCNC: 4 G/DL (ref 3.5–5.2)
ALBUMIN/GLOB SERPL: 1.5 G/DL
ALP SERPL-CCNC: 92 U/L (ref 39–117)
ALT SERPL W P-5'-P-CCNC: 9 U/L (ref 1–33)
ANION GAP SERPL CALCULATED.3IONS-SCNC: 9 MMOL/L (ref 5–15)
AST SERPL-CCNC: 10 U/L (ref 1–32)
BASOPHILS # BLD AUTO: 0.02 10*3/MM3 (ref 0–0.2)
BASOPHILS NFR BLD AUTO: 0.3 % (ref 0–1.5)
BILIRUB SERPL-MCNC: 0.2 MG/DL (ref 0–1.2)
BUN SERPL-MCNC: 38 MG/DL (ref 8–23)
BUN/CREAT SERPL: 26 (ref 7–25)
CALCIUM SPEC-SCNC: 9.6 MG/DL (ref 8.6–10.5)
CHLORIDE SERPL-SCNC: 107 MMOL/L (ref 98–107)
CO2 SERPL-SCNC: 23 MMOL/L (ref 22–29)
CREAT SERPL-MCNC: 1.46 MG/DL (ref 0.57–1)
DEPRECATED RDW RBC AUTO: 51.8 FL (ref 37–54)
EGFRCR SERPLBLD CKD-EPI 2021: 36.2 ML/MIN/1.73
EOSINOPHIL # BLD AUTO: 0.05 10*3/MM3 (ref 0–0.4)
EOSINOPHIL NFR BLD AUTO: 0.6 % (ref 0.3–6.2)
ERYTHROCYTE [DISTWIDTH] IN BLOOD BY AUTOMATED COUNT: 15.1 % (ref 12.3–15.4)
FERRITIN SERPL-MCNC: 457.5 NG/ML (ref 13–150)
GLOBULIN UR ELPH-MCNC: 2.6 GM/DL
GLUCOSE SERPL-MCNC: 256 MG/DL (ref 65–99)
HCT VFR BLD AUTO: 32.6 % (ref 34–46.6)
HGB BLD-MCNC: 9.4 G/DL (ref 12–15.9)
HOLD SPECIMEN: NORMAL
IMM GRANULOCYTES # BLD AUTO: 0.1 10*3/MM3 (ref 0–0.05)
IMM GRANULOCYTES NFR BLD AUTO: 1.3 % (ref 0–0.5)
IRON 24H UR-MRATE: 102 MCG/DL (ref 37–145)
IRON SATN MFR SERPL: 33 % (ref 20–50)
LYMPHOCYTES # BLD AUTO: 1.01 10*3/MM3 (ref 0.7–3.1)
LYMPHOCYTES NFR BLD AUTO: 12.7 % (ref 19.6–45.3)
MCH RBC QN AUTO: 27 PG (ref 26.6–33)
MCHC RBC AUTO-ENTMCNC: 28.8 G/DL (ref 31.5–35.7)
MCV RBC AUTO: 93.7 FL (ref 79–97)
MONOCYTES # BLD AUTO: 0.67 10*3/MM3 (ref 0.1–0.9)
MONOCYTES NFR BLD AUTO: 8.4 % (ref 5–12)
NEUTROPHILS NFR BLD AUTO: 6.12 10*3/MM3 (ref 1.7–7)
NEUTROPHILS NFR BLD AUTO: 76.7 % (ref 42.7–76)
NRBC BLD AUTO-RTO: 0 /100 WBC (ref 0–0.2)
PLATELET # BLD AUTO: 211 10*3/MM3 (ref 140–450)
PMV BLD AUTO: 8.7 FL (ref 6–12)
POTASSIUM SERPL-SCNC: 4.8 MMOL/L (ref 3.5–5.2)
PROT SERPL-MCNC: 6.6 G/DL (ref 6–8.5)
RBC # BLD AUTO: 3.48 10*6/MM3 (ref 3.77–5.28)
SODIUM SERPL-SCNC: 139 MMOL/L (ref 136–145)
TIBC SERPL-MCNC: 305 MCG/DL (ref 298–536)
TRANSFERRIN SERPL-MCNC: 205 MG/DL (ref 200–360)
WBC NRBC COR # BLD: 7.97 10*3/MM3 (ref 3.4–10.8)

## 2023-08-03 PROCEDURE — 80053 COMPREHEN METABOLIC PANEL: CPT

## 2023-08-03 PROCEDURE — 84466 ASSAY OF TRANSFERRIN: CPT

## 2023-08-03 PROCEDURE — 96372 THER/PROPH/DIAG INJ SC/IM: CPT

## 2023-08-03 PROCEDURE — 25010000002 EPOETIN ALFA PER 1000 UNITS: Performed by: NURSE PRACTITIONER

## 2023-08-03 PROCEDURE — 85025 COMPLETE CBC W/AUTO DIFF WBC: CPT

## 2023-08-03 PROCEDURE — 36415 COLL VENOUS BLD VENIPUNCTURE: CPT

## 2023-08-03 PROCEDURE — 82728 ASSAY OF FERRITIN: CPT

## 2023-08-03 PROCEDURE — 83540 ASSAY OF IRON: CPT

## 2023-08-03 RX ADMIN — ERYTHROPOIETIN 40000 UNITS: 40000 INJECTION, SOLUTION INTRAVENOUS; SUBCUTANEOUS at 15:28

## 2023-08-17 ENCOUNTER — INFUSION (OUTPATIENT)
Dept: ONCOLOGY | Facility: HOSPITAL | Age: 78
End: 2023-08-17
Payer: MEDICARE

## 2023-08-17 ENCOUNTER — LAB (OUTPATIENT)
Dept: LAB | Facility: HOSPITAL | Age: 78
End: 2023-08-17
Payer: MEDICARE

## 2023-08-17 VITALS
HEART RATE: 89 BPM | TEMPERATURE: 97.6 F | SYSTOLIC BLOOD PRESSURE: 144 MMHG | DIASTOLIC BLOOD PRESSURE: 43 MMHG | BODY MASS INDEX: 28.63 KG/M2 | OXYGEN SATURATION: 98 % | WEIGHT: 142 LBS | RESPIRATION RATE: 18 BRPM | HEIGHT: 59 IN

## 2023-08-17 DIAGNOSIS — D63.1 ANEMIA IN STAGE 3B CHRONIC KIDNEY DISEASE: ICD-10-CM

## 2023-08-17 DIAGNOSIS — N18.32 ANEMIA IN STAGE 3B CHRONIC KIDNEY DISEASE: ICD-10-CM

## 2023-08-17 DIAGNOSIS — N18.4 ANEMIA IN STAGE 4 CHRONIC KIDNEY DISEASE: Primary | ICD-10-CM

## 2023-08-17 DIAGNOSIS — D63.1 ANEMIA IN STAGE 4 CHRONIC KIDNEY DISEASE: Primary | ICD-10-CM

## 2023-08-17 LAB
BASOPHILS # BLD AUTO: 0.02 10*3/MM3 (ref 0–0.2)
BASOPHILS NFR BLD AUTO: 0.4 % (ref 0–1.5)
DEPRECATED RDW RBC AUTO: 51.7 FL (ref 37–54)
EOSINOPHIL # BLD AUTO: 0.18 10*3/MM3 (ref 0–0.4)
EOSINOPHIL NFR BLD AUTO: 3.2 % (ref 0.3–6.2)
ERYTHROCYTE [DISTWIDTH] IN BLOOD BY AUTOMATED COUNT: 15.6 % (ref 12.3–15.4)
HCT VFR BLD AUTO: 30.3 % (ref 34–46.6)
HGB BLD-MCNC: 8.9 G/DL (ref 12–15.9)
IMM GRANULOCYTES # BLD AUTO: 0.01 10*3/MM3 (ref 0–0.05)
IMM GRANULOCYTES NFR BLD AUTO: 0.2 % (ref 0–0.5)
LYMPHOCYTES # BLD AUTO: 0.84 10*3/MM3 (ref 0.7–3.1)
LYMPHOCYTES NFR BLD AUTO: 15.1 % (ref 19.6–45.3)
MCH RBC QN AUTO: 27.1 PG (ref 26.6–33)
MCHC RBC AUTO-ENTMCNC: 29.4 G/DL (ref 31.5–35.7)
MCV RBC AUTO: 92.1 FL (ref 79–97)
MONOCYTES # BLD AUTO: 0.39 10*3/MM3 (ref 0.1–0.9)
MONOCYTES NFR BLD AUTO: 7 % (ref 5–12)
NEUTROPHILS NFR BLD AUTO: 4.14 10*3/MM3 (ref 1.7–7)
NEUTROPHILS NFR BLD AUTO: 74.1 % (ref 42.7–76)
NRBC BLD AUTO-RTO: 0 /100 WBC (ref 0–0.2)
PLATELET # BLD AUTO: 171 10*3/MM3 (ref 140–450)
PMV BLD AUTO: 8.6 FL (ref 6–12)
RBC # BLD AUTO: 3.29 10*6/MM3 (ref 3.77–5.28)
WBC NRBC COR # BLD: 5.58 10*3/MM3 (ref 3.4–10.8)

## 2023-08-17 PROCEDURE — 85025 COMPLETE CBC W/AUTO DIFF WBC: CPT

## 2023-08-17 PROCEDURE — 25010000002 EPOETIN ALFA PER 1000 UNITS: Performed by: NURSE PRACTITIONER

## 2023-08-17 PROCEDURE — 36415 COLL VENOUS BLD VENIPUNCTURE: CPT

## 2023-08-17 PROCEDURE — 96372 THER/PROPH/DIAG INJ SC/IM: CPT

## 2023-08-17 RX ADMIN — ERYTHROPOIETIN 40000 UNITS: 40000 INJECTION, SOLUTION INTRAVENOUS; SUBCUTANEOUS at 12:29

## 2023-08-31 ENCOUNTER — LAB (OUTPATIENT)
Dept: LAB | Facility: HOSPITAL | Age: 78
End: 2023-08-31
Payer: MEDICARE

## 2023-08-31 ENCOUNTER — INFUSION (OUTPATIENT)
Dept: ONCOLOGY | Facility: HOSPITAL | Age: 78
End: 2023-08-31
Payer: MEDICARE

## 2023-08-31 VITALS
RESPIRATION RATE: 18 BRPM | OXYGEN SATURATION: 96 % | DIASTOLIC BLOOD PRESSURE: 59 MMHG | SYSTOLIC BLOOD PRESSURE: 158 MMHG | WEIGHT: 141 LBS | HEIGHT: 59 IN | TEMPERATURE: 97.3 F | BODY MASS INDEX: 28.43 KG/M2 | HEART RATE: 99 BPM

## 2023-08-31 DIAGNOSIS — N18.4 ANEMIA IN STAGE 4 CHRONIC KIDNEY DISEASE: Primary | ICD-10-CM

## 2023-08-31 DIAGNOSIS — D63.1 ANEMIA IN STAGE 4 CHRONIC KIDNEY DISEASE: Primary | ICD-10-CM

## 2023-08-31 DIAGNOSIS — N18.32 ANEMIA IN STAGE 3B CHRONIC KIDNEY DISEASE: ICD-10-CM

## 2023-08-31 DIAGNOSIS — D63.1 ANEMIA IN STAGE 3B CHRONIC KIDNEY DISEASE: ICD-10-CM

## 2023-08-31 LAB
BASOPHILS # BLD AUTO: 0.03 10*3/MM3 (ref 0–0.2)
BASOPHILS NFR BLD AUTO: 0.5 % (ref 0–1.5)
DEPRECATED RDW RBC AUTO: 54.4 FL (ref 37–54)
EOSINOPHIL # BLD AUTO: 0.13 10*3/MM3 (ref 0–0.4)
EOSINOPHIL NFR BLD AUTO: 2.1 % (ref 0.3–6.2)
ERYTHROCYTE [DISTWIDTH] IN BLOOD BY AUTOMATED COUNT: 16 % (ref 12.3–15.4)
HCT VFR BLD AUTO: 35.9 % (ref 34–46.6)
HGB BLD-MCNC: 10.3 G/DL (ref 12–15.9)
IMM GRANULOCYTES # BLD AUTO: 0.04 10*3/MM3 (ref 0–0.05)
IMM GRANULOCYTES NFR BLD AUTO: 0.6 % (ref 0–0.5)
LYMPHOCYTES # BLD AUTO: 0.89 10*3/MM3 (ref 0.7–3.1)
LYMPHOCYTES NFR BLD AUTO: 14.4 % (ref 19.6–45.3)
MCH RBC QN AUTO: 26.7 PG (ref 26.6–33)
MCHC RBC AUTO-ENTMCNC: 28.7 G/DL (ref 31.5–35.7)
MCV RBC AUTO: 93 FL (ref 79–97)
MONOCYTES # BLD AUTO: 0.59 10*3/MM3 (ref 0.1–0.9)
MONOCYTES NFR BLD AUTO: 9.6 % (ref 5–12)
NEUTROPHILS NFR BLD AUTO: 4.48 10*3/MM3 (ref 1.7–7)
NEUTROPHILS NFR BLD AUTO: 72.8 % (ref 42.7–76)
NRBC BLD AUTO-RTO: 0 /100 WBC (ref 0–0.2)
PLATELET # BLD AUTO: 208 10*3/MM3 (ref 140–450)
PMV BLD AUTO: 8.9 FL (ref 6–12)
RBC # BLD AUTO: 3.86 10*6/MM3 (ref 3.77–5.28)
WBC NRBC COR # BLD: 6.16 10*3/MM3 (ref 3.4–10.8)

## 2023-08-31 PROCEDURE — 25010000002 EPOETIN ALFA PER 1000 UNITS: Performed by: NURSE PRACTITIONER

## 2023-08-31 PROCEDURE — 96372 THER/PROPH/DIAG INJ SC/IM: CPT

## 2023-08-31 PROCEDURE — 85025 COMPLETE CBC W/AUTO DIFF WBC: CPT

## 2023-08-31 PROCEDURE — 36415 COLL VENOUS BLD VENIPUNCTURE: CPT

## 2023-08-31 RX ADMIN — ERYTHROPOIETIN 40000 UNITS: 40000 INJECTION, SOLUTION INTRAVENOUS; SUBCUTANEOUS at 13:04

## 2023-09-14 ENCOUNTER — INFUSION (OUTPATIENT)
Dept: ONCOLOGY | Facility: HOSPITAL | Age: 78
End: 2023-09-14
Payer: MEDICARE

## 2023-09-14 ENCOUNTER — LAB (OUTPATIENT)
Dept: LAB | Facility: HOSPITAL | Age: 78
End: 2023-09-14
Payer: MEDICARE

## 2023-09-14 VITALS
BODY MASS INDEX: 28.1 KG/M2 | HEART RATE: 87 BPM | RESPIRATION RATE: 16 BRPM | SYSTOLIC BLOOD PRESSURE: 155 MMHG | DIASTOLIC BLOOD PRESSURE: 61 MMHG | TEMPERATURE: 96.8 F | HEIGHT: 59 IN | OXYGEN SATURATION: 97 % | WEIGHT: 139.4 LBS

## 2023-09-14 DIAGNOSIS — N18.4 ANEMIA IN STAGE 4 CHRONIC KIDNEY DISEASE: Primary | ICD-10-CM

## 2023-09-14 DIAGNOSIS — N18.32 ANEMIA IN STAGE 3B CHRONIC KIDNEY DISEASE: ICD-10-CM

## 2023-09-14 DIAGNOSIS — D63.1 ANEMIA IN STAGE 4 CHRONIC KIDNEY DISEASE: Primary | ICD-10-CM

## 2023-09-14 DIAGNOSIS — D63.1 ANEMIA IN STAGE 3B CHRONIC KIDNEY DISEASE: ICD-10-CM

## 2023-09-14 LAB
ALBUMIN SERPL-MCNC: 3.7 G/DL (ref 3.5–5.2)
ALBUMIN/GLOB SERPL: 1.1 G/DL
ALP SERPL-CCNC: 100 U/L (ref 39–117)
ALT SERPL W P-5'-P-CCNC: 8 U/L (ref 1–33)
ANION GAP SERPL CALCULATED.3IONS-SCNC: 10 MMOL/L (ref 5–15)
AST SERPL-CCNC: 13 U/L (ref 1–32)
BASOPHILS # BLD AUTO: 0.03 10*3/MM3 (ref 0–0.2)
BASOPHILS NFR BLD AUTO: 0.5 % (ref 0–1.5)
BILIRUB SERPL-MCNC: 0.3 MG/DL (ref 0–1.2)
BUN SERPL-MCNC: 31 MG/DL (ref 8–23)
BUN/CREAT SERPL: 15.2 (ref 7–25)
CALCIUM SPEC-SCNC: 9.8 MG/DL (ref 8.6–10.5)
CHLORIDE SERPL-SCNC: 107 MMOL/L (ref 98–107)
CO2 SERPL-SCNC: 24 MMOL/L (ref 22–29)
CREAT SERPL-MCNC: 2.04 MG/DL (ref 0.57–1)
DEPRECATED RDW RBC AUTO: 53.8 FL (ref 37–54)
EGFRCR SERPLBLD CKD-EPI 2021: 24.3 ML/MIN/1.73
EOSINOPHIL # BLD AUTO: 0.25 10*3/MM3 (ref 0–0.4)
EOSINOPHIL NFR BLD AUTO: 4.4 % (ref 0.3–6.2)
ERYTHROCYTE [DISTWIDTH] IN BLOOD BY AUTOMATED COUNT: 15.9 % (ref 12.3–15.4)
GLOBULIN UR ELPH-MCNC: 3.4 GM/DL
GLUCOSE SERPL-MCNC: 128 MG/DL (ref 65–99)
HCT VFR BLD AUTO: 38.1 % (ref 34–46.6)
HGB BLD-MCNC: 11.1 G/DL (ref 12–15.9)
IMM GRANULOCYTES # BLD AUTO: 0.01 10*3/MM3 (ref 0–0.05)
IMM GRANULOCYTES NFR BLD AUTO: 0.2 % (ref 0–0.5)
LYMPHOCYTES # BLD AUTO: 0.99 10*3/MM3 (ref 0.7–3.1)
LYMPHOCYTES NFR BLD AUTO: 17.3 % (ref 19.6–45.3)
MCH RBC QN AUTO: 26.9 PG (ref 26.6–33)
MCHC RBC AUTO-ENTMCNC: 29.1 G/DL (ref 31.5–35.7)
MCV RBC AUTO: 92.3 FL (ref 79–97)
MONOCYTES # BLD AUTO: 0.41 10*3/MM3 (ref 0.1–0.9)
MONOCYTES NFR BLD AUTO: 7.2 % (ref 5–12)
NEUTROPHILS NFR BLD AUTO: 4.04 10*3/MM3 (ref 1.7–7)
NEUTROPHILS NFR BLD AUTO: 70.4 % (ref 42.7–76)
NRBC BLD AUTO-RTO: 0 /100 WBC (ref 0–0.2)
PLATELET # BLD AUTO: 197 10*3/MM3 (ref 140–450)
PMV BLD AUTO: 9.4 FL (ref 6–12)
POTASSIUM SERPL-SCNC: 5.2 MMOL/L (ref 3.5–5.2)
PROT SERPL-MCNC: 7.1 G/DL (ref 6–8.5)
RBC # BLD AUTO: 4.13 10*6/MM3 (ref 3.77–5.28)
SODIUM SERPL-SCNC: 141 MMOL/L (ref 136–145)
WBC NRBC COR # BLD: 5.73 10*3/MM3 (ref 3.4–10.8)

## 2023-09-14 PROCEDURE — 36415 COLL VENOUS BLD VENIPUNCTURE: CPT

## 2023-09-14 PROCEDURE — G0463 HOSPITAL OUTPT CLINIC VISIT: HCPCS

## 2023-09-14 PROCEDURE — 80053 COMPREHEN METABOLIC PANEL: CPT

## 2023-09-14 PROCEDURE — 85025 COMPLETE CBC W/AUTO DIFF WBC: CPT

## 2023-09-28 ENCOUNTER — INFUSION (OUTPATIENT)
Dept: ONCOLOGY | Facility: HOSPITAL | Age: 78
End: 2023-09-28
Payer: MEDICARE

## 2023-09-28 ENCOUNTER — LAB (OUTPATIENT)
Dept: LAB | Facility: HOSPITAL | Age: 78
End: 2023-09-28
Payer: MEDICARE

## 2023-09-28 VITALS
HEART RATE: 88 BPM | RESPIRATION RATE: 18 BRPM | WEIGHT: 139 LBS | OXYGEN SATURATION: 99 % | DIASTOLIC BLOOD PRESSURE: 65 MMHG | HEIGHT: 59 IN | BODY MASS INDEX: 28.02 KG/M2 | SYSTOLIC BLOOD PRESSURE: 175 MMHG | TEMPERATURE: 97.8 F

## 2023-09-28 DIAGNOSIS — N18.32 ANEMIA IN STAGE 3B CHRONIC KIDNEY DISEASE: ICD-10-CM

## 2023-09-28 DIAGNOSIS — D63.1 ANEMIA IN STAGE 3B CHRONIC KIDNEY DISEASE: ICD-10-CM

## 2023-09-28 DIAGNOSIS — N18.4 ANEMIA IN STAGE 4 CHRONIC KIDNEY DISEASE: Primary | ICD-10-CM

## 2023-09-28 DIAGNOSIS — D63.1 ANEMIA IN STAGE 4 CHRONIC KIDNEY DISEASE: Primary | ICD-10-CM

## 2023-09-28 LAB
BASOPHILS # BLD AUTO: 0.04 10*3/MM3 (ref 0–0.2)
BASOPHILS NFR BLD AUTO: 0.5 % (ref 0–1.5)
DEPRECATED RDW RBC AUTO: 49.5 FL (ref 37–54)
EOSINOPHIL # BLD AUTO: 0.12 10*3/MM3 (ref 0–0.4)
EOSINOPHIL NFR BLD AUTO: 1.5 % (ref 0.3–6.2)
ERYTHROCYTE [DISTWIDTH] IN BLOOD BY AUTOMATED COUNT: 14.8 % (ref 12.3–15.4)
HCT VFR BLD AUTO: 35.6 % (ref 34–46.6)
HGB BLD-MCNC: 10.4 G/DL (ref 12–15.9)
IMM GRANULOCYTES # BLD AUTO: 0.04 10*3/MM3 (ref 0–0.05)
IMM GRANULOCYTES NFR BLD AUTO: 0.5 % (ref 0–0.5)
LYMPHOCYTES # BLD AUTO: 1.22 10*3/MM3 (ref 0.7–3.1)
LYMPHOCYTES NFR BLD AUTO: 15.6 % (ref 19.6–45.3)
MCH RBC QN AUTO: 26.6 PG (ref 26.6–33)
MCHC RBC AUTO-ENTMCNC: 29.2 G/DL (ref 31.5–35.7)
MCV RBC AUTO: 91 FL (ref 79–97)
MONOCYTES # BLD AUTO: 0.71 10*3/MM3 (ref 0.1–0.9)
MONOCYTES NFR BLD AUTO: 9.1 % (ref 5–12)
NEUTROPHILS NFR BLD AUTO: 5.7 10*3/MM3 (ref 1.7–7)
NEUTROPHILS NFR BLD AUTO: 72.8 % (ref 42.7–76)
NRBC BLD AUTO-RTO: 0 /100 WBC (ref 0–0.2)
PLATELET # BLD AUTO: 229 10*3/MM3 (ref 140–450)
PMV BLD AUTO: 8.6 FL (ref 6–12)
RBC # BLD AUTO: 3.91 10*6/MM3 (ref 3.77–5.28)
WBC NRBC COR # BLD: 7.83 10*3/MM3 (ref 3.4–10.8)

## 2023-09-28 PROCEDURE — 25010000002 EPOETIN ALFA PER 1000 UNITS: Performed by: NURSE PRACTITIONER

## 2023-09-28 PROCEDURE — 36415 COLL VENOUS BLD VENIPUNCTURE: CPT

## 2023-09-28 PROCEDURE — 96372 THER/PROPH/DIAG INJ SC/IM: CPT

## 2023-09-28 PROCEDURE — 85025 COMPLETE CBC W/AUTO DIFF WBC: CPT

## 2023-09-28 RX ADMIN — ERYTHROPOIETIN 40000 UNITS: 40000 INJECTION, SOLUTION INTRAVENOUS; SUBCUTANEOUS at 13:13

## 2023-10-12 ENCOUNTER — LAB (OUTPATIENT)
Dept: LAB | Facility: HOSPITAL | Age: 78
End: 2023-10-12
Payer: MEDICARE

## 2023-10-12 ENCOUNTER — INFUSION (OUTPATIENT)
Dept: ONCOLOGY | Facility: HOSPITAL | Age: 78
End: 2023-10-12
Payer: MEDICARE

## 2023-10-12 VITALS
WEIGHT: 139 LBS | DIASTOLIC BLOOD PRESSURE: 68 MMHG | TEMPERATURE: 96.5 F | BODY MASS INDEX: 28.02 KG/M2 | HEIGHT: 59 IN | RESPIRATION RATE: 18 BRPM | SYSTOLIC BLOOD PRESSURE: 148 MMHG | OXYGEN SATURATION: 95 % | HEART RATE: 90 BPM

## 2023-10-12 DIAGNOSIS — D63.1 ANEMIA IN STAGE 4 CHRONIC KIDNEY DISEASE: Primary | ICD-10-CM

## 2023-10-12 DIAGNOSIS — N18.4 ANEMIA IN STAGE 4 CHRONIC KIDNEY DISEASE: Primary | ICD-10-CM

## 2023-10-12 DIAGNOSIS — D63.1 ANEMIA IN STAGE 3B CHRONIC KIDNEY DISEASE: ICD-10-CM

## 2023-10-12 DIAGNOSIS — N18.32 ANEMIA IN STAGE 3B CHRONIC KIDNEY DISEASE: ICD-10-CM

## 2023-10-12 LAB
BASOPHILS # BLD AUTO: 0.03 10*3/MM3 (ref 0–0.2)
BASOPHILS NFR BLD AUTO: 0.6 % (ref 0–1.5)
DEPRECATED RDW RBC AUTO: 53.7 FL (ref 37–54)
EOSINOPHIL # BLD AUTO: 0.2 10*3/MM3 (ref 0–0.4)
EOSINOPHIL NFR BLD AUTO: 3.8 % (ref 0.3–6.2)
ERYTHROCYTE [DISTWIDTH] IN BLOOD BY AUTOMATED COUNT: 16 % (ref 12.3–15.4)
HCT VFR BLD AUTO: 36.2 % (ref 34–46.6)
HGB BLD-MCNC: 10.3 G/DL (ref 12–15.9)
IMM GRANULOCYTES # BLD AUTO: 0.01 10*3/MM3 (ref 0–0.05)
IMM GRANULOCYTES NFR BLD AUTO: 0.2 % (ref 0–0.5)
LYMPHOCYTES # BLD AUTO: 0.85 10*3/MM3 (ref 0.7–3.1)
LYMPHOCYTES NFR BLD AUTO: 16.1 % (ref 19.6–45.3)
MCH RBC QN AUTO: 26.4 PG (ref 26.6–33)
MCHC RBC AUTO-ENTMCNC: 28.5 G/DL (ref 31.5–35.7)
MCV RBC AUTO: 92.8 FL (ref 79–97)
MONOCYTES # BLD AUTO: 0.41 10*3/MM3 (ref 0.1–0.9)
MONOCYTES NFR BLD AUTO: 7.8 % (ref 5–12)
NEUTROPHILS NFR BLD AUTO: 3.77 10*3/MM3 (ref 1.7–7)
NEUTROPHILS NFR BLD AUTO: 71.5 % (ref 42.7–76)
NRBC BLD AUTO-RTO: 0 /100 WBC (ref 0–0.2)
PLATELET # BLD AUTO: 203 10*3/MM3 (ref 140–450)
PMV BLD AUTO: 9.5 FL (ref 6–12)
RBC # BLD AUTO: 3.9 10*6/MM3 (ref 3.77–5.28)
WBC NRBC COR # BLD: 5.27 10*3/MM3 (ref 3.4–10.8)

## 2023-10-12 PROCEDURE — 36415 COLL VENOUS BLD VENIPUNCTURE: CPT

## 2023-10-12 PROCEDURE — 25010000002 EPOETIN ALFA PER 1000 UNITS: Performed by: NURSE PRACTITIONER

## 2023-10-12 PROCEDURE — 85025 COMPLETE CBC W/AUTO DIFF WBC: CPT

## 2023-10-12 PROCEDURE — 96372 THER/PROPH/DIAG INJ SC/IM: CPT

## 2023-10-12 RX ADMIN — ERYTHROPOIETIN 40000 UNITS: 40000 INJECTION, SOLUTION INTRAVENOUS; SUBCUTANEOUS at 13:47

## 2023-10-12 NOTE — PROGRESS NOTES
[id number] sent a message to office for [facility]anto to clarify the order for procrit. The plan says prn 21 days, the office note says prn every 2 wks. Spoke with the pharmacist and we decided it needs to be clarified before moving forward with injection. Pt v/u  [id number] pt asked if I knew anything yet. So I called the office and reviewed this with rio heck. She stated she would look and call me back.

## 2023-10-18 DIAGNOSIS — D63.1 ANEMIA IN STAGE 4 CHRONIC KIDNEY DISEASE: Primary | ICD-10-CM

## 2023-10-18 DIAGNOSIS — N18.4 ANEMIA IN STAGE 4 CHRONIC KIDNEY DISEASE: Primary | ICD-10-CM

## 2023-10-26 ENCOUNTER — LAB (OUTPATIENT)
Dept: LAB | Facility: HOSPITAL | Age: 78
End: 2023-10-26
Payer: MEDICARE

## 2023-10-26 ENCOUNTER — APPOINTMENT (OUTPATIENT)
Dept: ONCOLOGY | Facility: HOSPITAL | Age: 78
End: 2023-10-26
Payer: MEDICARE

## 2023-10-26 ENCOUNTER — OFFICE VISIT (OUTPATIENT)
Dept: ONCOLOGY | Facility: CLINIC | Age: 78
End: 2023-10-26
Payer: MEDICARE

## 2023-10-26 VITALS
RESPIRATION RATE: 18 BRPM | DIASTOLIC BLOOD PRESSURE: 68 MMHG | WEIGHT: 141.2 LBS | TEMPERATURE: 96.9 F | SYSTOLIC BLOOD PRESSURE: 130 MMHG | HEART RATE: 85 BPM | HEIGHT: 59 IN | OXYGEN SATURATION: 95 % | BODY MASS INDEX: 28.47 KG/M2

## 2023-10-26 DIAGNOSIS — I10 HTN (HYPERTENSION), BENIGN: ICD-10-CM

## 2023-10-26 DIAGNOSIS — N18.4 ANEMIA IN STAGE 4 CHRONIC KIDNEY DISEASE: Primary | ICD-10-CM

## 2023-10-26 DIAGNOSIS — D50.9 IRON DEFICIENCY ANEMIA, UNSPECIFIED IRON DEFICIENCY ANEMIA TYPE: Primary | ICD-10-CM

## 2023-10-26 DIAGNOSIS — D63.1 ANEMIA IN STAGE 4 CHRONIC KIDNEY DISEASE: ICD-10-CM

## 2023-10-26 DIAGNOSIS — D63.1 ANEMIA IN STAGE 4 CHRONIC KIDNEY DISEASE: Primary | ICD-10-CM

## 2023-10-26 DIAGNOSIS — D63.1 ANEMIA IN STAGE 3B CHRONIC KIDNEY DISEASE: ICD-10-CM

## 2023-10-26 DIAGNOSIS — N18.32 ANEMIA IN STAGE 3B CHRONIC KIDNEY DISEASE: ICD-10-CM

## 2023-10-26 DIAGNOSIS — D50.9 IRON DEFICIENCY ANEMIA, UNSPECIFIED IRON DEFICIENCY ANEMIA TYPE: ICD-10-CM

## 2023-10-26 DIAGNOSIS — N18.4 ANEMIA IN STAGE 4 CHRONIC KIDNEY DISEASE: ICD-10-CM

## 2023-10-26 LAB
ALBUMIN SERPL-MCNC: 3.8 G/DL (ref 3.5–5.2)
ALBUMIN/GLOB SERPL: 1.2 G/DL
ALP SERPL-CCNC: 102 U/L (ref 39–117)
ALT SERPL W P-5'-P-CCNC: 9 U/L (ref 1–33)
ANION GAP SERPL CALCULATED.3IONS-SCNC: 8 MMOL/L (ref 5–15)
AST SERPL-CCNC: 14 U/L (ref 1–32)
BASOPHILS # BLD AUTO: 0.03 10*3/MM3 (ref 0–0.2)
BASOPHILS NFR BLD AUTO: 0.6 % (ref 0–1.5)
BILIRUB SERPL-MCNC: 0.2 MG/DL (ref 0–1.2)
BUN SERPL-MCNC: 35 MG/DL (ref 8–23)
BUN/CREAT SERPL: 15.9 (ref 7–25)
CALCIUM SPEC-SCNC: 9.6 MG/DL (ref 8.6–10.5)
CHLORIDE SERPL-SCNC: 106 MMOL/L (ref 98–107)
CO2 SERPL-SCNC: 25 MMOL/L (ref 22–29)
CREAT SERPL-MCNC: 2.2 MG/DL (ref 0.57–1)
DEPRECATED RDW RBC AUTO: 54.4 FL (ref 37–54)
EGFRCR SERPLBLD CKD-EPI 2021: 22.2 ML/MIN/1.73
EOSINOPHIL # BLD AUTO: 0.14 10*3/MM3 (ref 0–0.4)
EOSINOPHIL NFR BLD AUTO: 2.7 % (ref 0.3–6.2)
ERYTHROCYTE [DISTWIDTH] IN BLOOD BY AUTOMATED COUNT: 16.2 % (ref 12.3–15.4)
FERRITIN SERPL-MCNC: 185.2 NG/ML (ref 13–150)
GLOBULIN UR ELPH-MCNC: 3.3 GM/DL
GLUCOSE SERPL-MCNC: 130 MG/DL (ref 65–99)
HCT VFR BLD AUTO: 39 % (ref 34–46.6)
HGB BLD-MCNC: 11.1 G/DL (ref 12–15.9)
HOLD SPECIMEN: NORMAL
IMM GRANULOCYTES # BLD AUTO: 0.01 10*3/MM3 (ref 0–0.05)
IMM GRANULOCYTES NFR BLD AUTO: 0.2 % (ref 0–0.5)
IRON 24H UR-MRATE: 49 MCG/DL (ref 37–145)
IRON SATN MFR SERPL: 16 % (ref 20–50)
LYMPHOCYTES # BLD AUTO: 1.09 10*3/MM3 (ref 0.7–3.1)
LYMPHOCYTES NFR BLD AUTO: 21.2 % (ref 19.6–45.3)
MCH RBC QN AUTO: 26.2 PG (ref 26.6–33)
MCHC RBC AUTO-ENTMCNC: 28.5 G/DL (ref 31.5–35.7)
MCV RBC AUTO: 92.2 FL (ref 79–97)
MONOCYTES # BLD AUTO: 0.52 10*3/MM3 (ref 0.1–0.9)
MONOCYTES NFR BLD AUTO: 10.1 % (ref 5–12)
NEUTROPHILS NFR BLD AUTO: 3.36 10*3/MM3 (ref 1.7–7)
NEUTROPHILS NFR BLD AUTO: 65.2 % (ref 42.7–76)
NRBC BLD AUTO-RTO: 0 /100 WBC (ref 0–0.2)
PLATELET # BLD AUTO: 212 10*3/MM3 (ref 140–450)
PMV BLD AUTO: 8.5 FL (ref 6–12)
POTASSIUM SERPL-SCNC: 5.2 MMOL/L (ref 3.5–5.2)
PROT SERPL-MCNC: 7.1 G/DL (ref 6–8.5)
RBC # BLD AUTO: 4.23 10*6/MM3 (ref 3.77–5.28)
SODIUM SERPL-SCNC: 139 MMOL/L (ref 136–145)
TIBC SERPL-MCNC: 299 MCG/DL (ref 298–536)
TRANSFERRIN SERPL-MCNC: 201 MG/DL (ref 200–360)
WBC NRBC COR # BLD: 5.15 10*3/MM3 (ref 3.4–10.8)

## 2023-10-26 PROCEDURE — 84466 ASSAY OF TRANSFERRIN: CPT

## 2023-10-26 PROCEDURE — 85025 COMPLETE CBC W/AUTO DIFF WBC: CPT

## 2023-10-26 PROCEDURE — 83540 ASSAY OF IRON: CPT

## 2023-10-26 PROCEDURE — 36415 COLL VENOUS BLD VENIPUNCTURE: CPT

## 2023-10-26 PROCEDURE — 80053 COMPREHEN METABOLIC PANEL: CPT

## 2023-10-26 PROCEDURE — 82728 ASSAY OF FERRITIN: CPT

## 2023-10-26 NOTE — PROGRESS NOTES
[facility] HEMATOLOGY & ONCOLOGY  [address]  [place] [address] [phone]    Patient Name: Nuha Cali  Encounter Date: 10/26/2023  YOB: 1945  Patient Number: [id number]    Progress Note    HISTORY OF PRESENT ILLNESS: Nuha Cali is a 78 y.o. female who was seen on 06/21/22 for consultation and management recommendations for anemia in chronic kidney disease and iron deficiency.  She has health history significant for  Stage IV CKD, Diabetes w/neuropathy, HTN,Hyperlipidemia, GERD, Rheumatoid Arthritis. S/P repair of rectovaginal fistula in 2018.    Interval History  She presents to clinic today for continued follow-up.  She continues to complain of persistent fatigue and arthritis pain.   She had Venofer on December 9 and 16, 2022.    She has been receiving Retacrit injections for Hgb < 11 or Hct < 33.       Labs were reviewed today and results reviewed with patient.       LABS    Lab Results - Last 18 Months   Lab Units 10/26/23  1342 10/12/23  1245 09/28/23  1251 09/14/23  1255 08/31/23  1226 08/17/23  1154   HEMOGLOBIN g/dL 11.1* 10.3* 10.4* 11.1* 10.3* 8.9*   HEMATOCRIT % 39.0 36.2 35.6 38.1 35.9 30.3*   MCV fL 92.2 92.8 91.0 92.3 93.0 92.1   WBC 10*3/mm3 5.15 5.27 7.83 5.73 6.16 5.58   RDW % 16.2* 16.0* 14.8 15.9* 16.0* 15.6*   MPV fL 8.5 9.5 8.6 9.4 8.9 8.6   PLATELETS 10*3/mm3 212 203 229 197 208 171   IMM GRAN % % 0.2 0.2 0.5 0.2 0.6* 0.2   NEUTROS ABS 10*3/mm3 3.36 3.77 5.70 4.04 4.48 4.14   LYMPHS ABS 10*3/mm3 1.09 0.85 1.22 0.99 0.89 0.84   MONOS ABS 10*3/mm3 0.52 0.41 0.71 0.41 0.59 0.39   EOS ABS 10*3/mm3 0.14 0.20 0.12 0.25 0.13 0.18   BASOS ABS 10*3/mm3 0.03 0.03 0.04 0.03 0.03 0.02   IMMATURE GRANS (ABS) 10*3/mm3 0.01 0.01 0.04 0.01 0.04 0.01   NRBC /100 WBC 0.0 0.0 0.0 0.0 0.0 0.0       Lab Results - Last 18 Months   Lab Units 10/26/23  1342 09/14/23  1255 08/03/23  1359 06/16/23  0959  05/19/23  0957 04/21/23  0921   GLUCOSE mg/dL 130* 128* 256* 109* 135* 179*   SODIUM mmol/L 139 141 139 140 140 139   POTASSIUM mmol/L 5.2 5.2 4.8 5.1 5.0 4.6   CO2 mmol/L 25.0 24.0 23.0 22.0 22.0 22.0   CHLORIDE mmol/L 106 107 107 108* 107 107   ANION GAP mmol/L 8.0 10.0 9.0 10.0 11.0 10.0   CREATININE mg/dL 2.20* 2.04* 1.46* 1.95* 1.68* 2.01*   BUN mg/dL 35* 31* 38* 29* 40* 32*   BUN / CREAT RATIO  15.9 15.2 26.0* 14.9 23.8 15.9   CALCIUM mg/dL 9.6 9.8 9.6 8.9 9.5 9.1   ALK PHOS U/L 102 100 92 95 103 111   TOTAL PROTEIN g/dL 7.1 7.1 6.6 6.7 7.5 7.0   ALT (SGPT) U/L 9 8 9 14 10 11   AST (SGOT) U/L 14 13 10 18 14 13   BILIRUBIN mg/dL 0.2 0.3 0.2 0.2 0.2 0.2   ALBUMIN g/dL 3.8 3.7 4.0 3.4* 4.1 3.5   GLOBULIN gm/dL 3.3 3.4 2.6 3.3 3.4 3.5       Lab Results - Last 18 Months   Lab Units 06/21/22  1426   M-SPIKE g/dL Not Observed   KAPPA/LAMBDA RATIO, S  2.11*   FREE LAMBDA LIGHT CHAINS mg/L 42.1*   IG KAPPA FREE LIGHT CHAIN mg/L 88.8*   LDH U/L 183       Lab Results - Last 18 Months   Lab Units 10/26/23  1342 08/03/23  1359 06/16/23  0959 03/02/23  1230 12/01/22  1303 09/29/22  1423   IRON mcg/dL 49 102 63 53 32* 64   TIBC mcg/dL 299 305 253* 299 337 320   IRON SATURATION (TSAT) % 16* 33 25 18* 10* 20   FERRITIN ng/mL 185.20* 457.50* 404.20* 210.10* 70.80 146.60         PAST MEDICAL HISTORY:  ALLERGIES:  Allergies   Allergen Reactions    Aspirin Anaphylaxis and Swelling     CURRENT MEDICATIONS:  Outpatient Encounter Medications as of 10/26/2023   Medication Sig Dispense Refill    amLODIPine-valsartan (EXFORGE)  MG per tablet Take 1 tablet by mouth.      carvedilol (COREG) 12.5 MG tablet 2 (Two) Times a Day With Meals.      cholecalciferol (VITAMIN D3) 1000 units tablet Take 2 tablets by mouth Daily.      cimetidine (TAGAMET) 200 MG tablet Take 1 tablet by mouth Daily.      folic acid (FOLVITE) 1 MG tablet Take 1 tablet by mouth Daily.      furosemide (LASIX) 20 MG tablet Daily.      leflunomide (ARAVA) 20 MG  tablet Take 1 tablet by mouth Daily.      polyethyl glycol-propyl glycol (SYSTANE) 0.4-0.3 % solution ophthalmic solution Administer 1 drop to both eyes Every 1 (One) Hour As Needed.      potassium chloride (K-DUR) 10 MEQ CR tablet Daily.      pravastatin (PRAVACHOL) 20 MG tablet Take 2 tablets by mouth Daily.      predniSONE (DELTASONE) 5 MG tablet Take 1 tablet by mouth Daily. Tapering dose       No facility-administered encounter medications on file as of 10/26/2023.     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    PAD (peripheral artery disease) I73.9    Anemia in stage 4 chronic kidney disease N18.4, D63.1       HEALTH MAINTENANCE ITEMS:  Health Maintenance Due   Topic Date Due    URINE MICROALBUMIN  Never done    Pneumococcal Vaccine 65+ (1 - PCV) Never done    TDAP/TD VACCINES (1 - Tdap) Never done    ZOSTER VACCINE (1 of 2) Never done    DIABETIC EYE EXAM  Never done    BMI FOLLOWUP  09/11/2019    DXA SCAN  01/02/2022    INFLUENZA VACCINE  08/01/2023    COVID-19 Vaccine (5 - 2023-24 season) 09/01/2023       <no information>  Last Completed Colonoscopy            COLORECTAL CANCER SCREENING (COLONOSCOPY - Every 10 Years) Next due on 1/19/2025 01/19/2015  SCANNED - COLONOSCOPY    11/24/2009  SCANNED - COLONOSCOPY    02/04/2003  SCANNED - COLONOSCOPY                  Immunization History   Administered Date(s) Administered    COVID-19 (MODERNA) 1st,2nd,3rd Dose Monovalent 03/31/2021, 04/28/2021    COVID-19 (MODERNA) BIVALENT 12+YRS 11/28/2022    COVID-19 (MODERNA) Monovalent Original Booster 11/19/2021     Last Completed Mammogram       This patient has no relevant Health Maintenance data.              FAMILY HISTORY:  Family History  "  Problem Relation Age of Onset    Hypertension Other     Diabetes Other     Coronary artery disease Other     Cancer Other     No Known Problems Son     No Known Problems Son     No Known Problems Son     Breast cancer Other     Colon cancer Neg Hx     Colon polyps Neg Hx     Ovarian cancer Neg Hx      SOCIAL HISTORY:  Social History     Socioeconomic History    Marital status:    Tobacco Use    Smoking status: Never    Smokeless tobacco: Never   Vaping Use    Vaping Use: Never used   Substance and Sexual Activity    Alcohol use: No    Drug use: No    Sexual activity: Defer     Birth control/protection: Surgical       REVIEW OF SYSTEMS:  Review of Systems   Constitutional:  Positive for fatigue.   HENT:  Negative for swollen glands and trouble swallowing.    Eyes:         Macular degeneration. Gets monthly injections     Respiratory:  Negative for shortness of breath and wheezing.    Cardiovascular:  Negative for chest pain and palpitations.   Gastrointestinal:  Negative for abdominal pain, blood in stool, nausea and vomiting.   Genitourinary:  Negative for difficulty urinating, dysuria and hematuria.   Musculoskeletal:  Positive for back pain.        Currently on a tapering dose of prednisone for arthitis   Neurological:         Diabetic Neuropathy   Psychiatric/Behavioral:  Negative for suicidal ideas and depressed mood.        /68   Pulse 85   Temp 96.9 °F (36.1 °C)   Resp 18   Ht 149.9 cm (59\")   Wt 64 kg (141 lb 3.2 oz)   SpO2 95%   Breastfeeding No   BMI 28.52 kg/m²  Body surface area is 1.59 meters squared.    Pain Score    10/26/23 1406   PainSc: 0-No pain         Physical Exam  Constitutional:       Appearance: Normal appearance.   HENT:      Head: Normocephalic and atraumatic.   Eyes:      Extraocular Movements: Extraocular movements intact.   Cardiovascular:      Rate and Rhythm: Normal rate and regular rhythm.   Pulmonary:      Effort: Pulmonary effort is normal.      Breath sounds: " Normal breath sounds.   Abdominal:      General: Bowel sounds are normal.      Palpations: Abdomen is soft.   Musculoskeletal:      Right lower leg: Edema present.      Left lower leg: Edema present.   Skin:     General: Skin is warm and dry.      Coloration: Skin is pale.   Neurological:      General: No focal deficit present.      Mental Status: She is alert and oriented to person, place, and time.   Psychiatric:         Mood and Affect: Mood normal.         Behavior: Behavior normal.         Nuha Cali reports a pain score of 0.  No intervention indicated.     ASSESSMENT / PLAN:    1. Anemia in stage 4 chronic kidney disease    2. Iron deficiency anemia, unspecified iron deficiency anemia type    3. HTN (hypertension), benign         1.  Anemia in Chronic Kidney Disease Stage IIIb  2.  Iron Deficiency   -Pt received Venofer infusion x 1 on December 9, and 16 2022  -Has been receiving Retacrit injections.    Last injection Retacrit 10/12/23  Labs today: BUN 35, Creatinine 2.2, GFR 22.2  -Iron 49, Ferritin 185, Sat 16%, TIBC 299  -No Retacrit today r/t Hgb > 11  -Pt will get CBC every 2 weeks and will continue retacrit for Hb < 11 OR Hct < 33    3.  Hypertension / Edema  -BP today is 126/82  -On Lasix, Amlodipine-Valsartan, Coreg  -Managed  By PCP and nephrology       PLAN:  No Retacrit today   As iron sat is < 20, will order Venofer 200 mg x 2.   Continue Retacrit 40,000 units q 2 weeks for Hgb < 11 or Hct < 33  Continue current medications, treatment plans and follow up with PCP and any other providers  RTC in 3 months  Pre-office labs for CBC, CMP, Iron Profile and Ferritin   Iron profile and ferritin will be checked in 3 months.  Orders have been placed.  Care discussed with patient.  Understanding expressed.  Patient agreeable with      Health Maintenance   Colonoscopy 2015 Diverticulosis.  Pt does not want to do another one.   Mammogram 01/25/22  Dexa Scan - 2016 Osteopenia        Marilu Lackey,  APRN  10/26/2023

## 2023-11-09 ENCOUNTER — INFUSION (OUTPATIENT)
Dept: ONCOLOGY | Facility: HOSPITAL | Age: 78
End: 2023-11-09
Payer: MEDICARE

## 2023-11-09 VITALS
WEIGHT: 142 LBS | SYSTOLIC BLOOD PRESSURE: 159 MMHG | HEIGHT: 59 IN | BODY MASS INDEX: 28.63 KG/M2 | HEART RATE: 86 BPM | OXYGEN SATURATION: 98 % | TEMPERATURE: 97.3 F | DIASTOLIC BLOOD PRESSURE: 51 MMHG | RESPIRATION RATE: 18 BRPM

## 2023-11-09 DIAGNOSIS — N18.4 ANEMIA IN STAGE 4 CHRONIC KIDNEY DISEASE: Primary | ICD-10-CM

## 2023-11-09 DIAGNOSIS — D63.1 ANEMIA IN STAGE 4 CHRONIC KIDNEY DISEASE: Primary | ICD-10-CM

## 2023-11-09 PROCEDURE — A9270 NON-COVERED ITEM OR SERVICE: HCPCS | Performed by: NURSE PRACTITIONER

## 2023-11-09 PROCEDURE — 96365 THER/PROPH/DIAG IV INF INIT: CPT

## 2023-11-09 PROCEDURE — 25810000003 SODIUM CHLORIDE 0.9 % SOLUTION: Performed by: NURSE PRACTITIONER

## 2023-11-09 PROCEDURE — 25010000002 IRON SUCROSE PER 1 MG: Performed by: NURSE PRACTITIONER

## 2023-11-09 PROCEDURE — 96374 THER/PROPH/DIAG INJ IV PUSH: CPT

## 2023-11-09 PROCEDURE — 63710000001 ACETAMINOPHEN 325 MG TABLET: Performed by: NURSE PRACTITIONER

## 2023-11-09 RX ORDER — ACETAMINOPHEN 325 MG/1
650 TABLET ORAL ONCE
Status: CANCELLED | OUTPATIENT
Start: 2023-11-09

## 2023-11-09 RX ORDER — DIPHENHYDRAMINE HYDROCHLORIDE 50 MG/ML
50 INJECTION INTRAMUSCULAR; INTRAVENOUS AS NEEDED
OUTPATIENT
Start: 2023-11-16

## 2023-11-09 RX ORDER — ACETAMINOPHEN 325 MG/1
650 TABLET ORAL ONCE
OUTPATIENT
Start: 2023-11-16

## 2023-11-09 RX ORDER — DIPHENHYDRAMINE HYDROCHLORIDE 50 MG/ML
50 INJECTION INTRAMUSCULAR; INTRAVENOUS AS NEEDED
Status: CANCELLED | OUTPATIENT
Start: 2023-11-09

## 2023-11-09 RX ORDER — SODIUM CHLORIDE 9 MG/ML
250 INJECTION, SOLUTION INTRAVENOUS ONCE
OUTPATIENT
Start: 2023-11-16

## 2023-11-09 RX ORDER — DIPHENHYDRAMINE HYDROCHLORIDE 50 MG/ML
50 INJECTION INTRAMUSCULAR; INTRAVENOUS AS NEEDED
Status: ACTIVE | OUTPATIENT
Start: 2023-11-09

## 2023-11-09 RX ORDER — SODIUM CHLORIDE 9 MG/ML
250 INJECTION, SOLUTION INTRAVENOUS ONCE
Status: CANCELLED | OUTPATIENT
Start: 2023-11-09

## 2023-11-09 RX ORDER — FAMOTIDINE 10 MG/ML
20 INJECTION, SOLUTION INTRAVENOUS AS NEEDED
Status: CANCELLED | OUTPATIENT
Start: 2023-11-09

## 2023-11-09 RX ORDER — FAMOTIDINE 10 MG/ML
20 INJECTION, SOLUTION INTRAVENOUS AS NEEDED
Status: ACTIVE | OUTPATIENT
Start: 2023-11-09

## 2023-11-09 RX ORDER — ACETAMINOPHEN 325 MG/1
650 TABLET ORAL ONCE
Status: COMPLETED | OUTPATIENT
Start: 2023-11-09 | End: 2023-11-09

## 2023-11-09 RX ORDER — SODIUM CHLORIDE 9 MG/ML
250 INJECTION, SOLUTION INTRAVENOUS ONCE
Status: COMPLETED | OUTPATIENT
Start: 2023-11-09 | End: 2023-11-09

## 2023-11-09 RX ORDER — FAMOTIDINE 10 MG/ML
20 INJECTION, SOLUTION INTRAVENOUS AS NEEDED
OUTPATIENT
Start: 2023-11-16

## 2023-11-09 RX ADMIN — ACETAMINOPHEN 650 MG: 325 TABLET, FILM COATED ORAL at 14:00

## 2023-11-09 RX ADMIN — IRON SUCROSE 200 MG: 20 INJECTION, SOLUTION INTRAVENOUS at 14:01

## 2023-11-09 RX ADMIN — SODIUM CHLORIDE 250 ML: 9 INJECTION, SOLUTION INTRAVENOUS at 13:57

## 2023-11-30 ENCOUNTER — INFUSION (OUTPATIENT)
Dept: ONCOLOGY | Facility: HOSPITAL | Age: 78
End: 2023-11-30
Payer: MEDICARE

## 2023-11-30 ENCOUNTER — LAB (OUTPATIENT)
Dept: LAB | Facility: HOSPITAL | Age: 78
End: 2023-11-30
Payer: MEDICARE

## 2023-11-30 VITALS
RESPIRATION RATE: 18 BRPM | HEART RATE: 95 BPM | SYSTOLIC BLOOD PRESSURE: 154 MMHG | TEMPERATURE: 97.9 F | DIASTOLIC BLOOD PRESSURE: 47 MMHG | OXYGEN SATURATION: 92 %

## 2023-11-30 DIAGNOSIS — N18.4 ANEMIA IN STAGE 4 CHRONIC KIDNEY DISEASE: Primary | ICD-10-CM

## 2023-11-30 DIAGNOSIS — D63.1 ANEMIA IN STAGE 4 CHRONIC KIDNEY DISEASE: Primary | ICD-10-CM

## 2023-11-30 DIAGNOSIS — N18.4 ANEMIA IN STAGE 4 CHRONIC KIDNEY DISEASE: ICD-10-CM

## 2023-11-30 DIAGNOSIS — D63.1 ANEMIA IN STAGE 4 CHRONIC KIDNEY DISEASE: ICD-10-CM

## 2023-11-30 DIAGNOSIS — N18.32 ANEMIA IN STAGE 3B CHRONIC KIDNEY DISEASE: ICD-10-CM

## 2023-11-30 DIAGNOSIS — D63.1 ANEMIA IN STAGE 3B CHRONIC KIDNEY DISEASE: ICD-10-CM

## 2023-11-30 LAB
BASOPHILS # BLD AUTO: 0.02 10*3/MM3 (ref 0–0.2)
BASOPHILS NFR BLD AUTO: 0.3 % (ref 0–1.5)
DEPRECATED RDW RBC AUTO: 54.6 FL (ref 37–54)
EOSINOPHIL # BLD AUTO: 0.15 10*3/MM3 (ref 0–0.4)
EOSINOPHIL NFR BLD AUTO: 2.5 % (ref 0.3–6.2)
ERYTHROCYTE [DISTWIDTH] IN BLOOD BY AUTOMATED COUNT: 16.5 % (ref 12.3–15.4)
FERRITIN SERPL-MCNC: 647.8 NG/ML (ref 13–150)
HCT VFR BLD AUTO: 29.6 % (ref 34–46.6)
HGB BLD-MCNC: 8.5 G/DL (ref 12–15.9)
IMM GRANULOCYTES # BLD AUTO: 0.02 10*3/MM3 (ref 0–0.05)
IMM GRANULOCYTES NFR BLD AUTO: 0.3 % (ref 0–0.5)
IRON 24H UR-MRATE: 40 MCG/DL (ref 37–145)
IRON SATN MFR SERPL: 16 % (ref 20–50)
LYMPHOCYTES # BLD AUTO: 1.12 10*3/MM3 (ref 0.7–3.1)
LYMPHOCYTES NFR BLD AUTO: 18.7 % (ref 19.6–45.3)
MCH RBC QN AUTO: 26.3 PG (ref 26.6–33)
MCHC RBC AUTO-ENTMCNC: 28.7 G/DL (ref 31.5–35.7)
MCV RBC AUTO: 91.6 FL (ref 79–97)
MONOCYTES # BLD AUTO: 0.61 10*3/MM3 (ref 0.1–0.9)
MONOCYTES NFR BLD AUTO: 10.2 % (ref 5–12)
NEUTROPHILS NFR BLD AUTO: 4.07 10*3/MM3 (ref 1.7–7)
NEUTROPHILS NFR BLD AUTO: 68 % (ref 42.7–76)
NRBC BLD AUTO-RTO: 0 /100 WBC (ref 0–0.2)
PLATELET # BLD AUTO: 191 10*3/MM3 (ref 140–450)
PMV BLD AUTO: 9.3 FL (ref 6–12)
RBC # BLD AUTO: 3.23 10*6/MM3 (ref 3.77–5.28)
TIBC SERPL-MCNC: 244 MCG/DL (ref 298–536)
TRANSFERRIN SERPL-MCNC: 164 MG/DL (ref 200–360)
WBC NRBC COR # BLD AUTO: 5.99 10*3/MM3 (ref 3.4–10.8)

## 2023-11-30 PROCEDURE — 85025 COMPLETE CBC W/AUTO DIFF WBC: CPT

## 2023-11-30 PROCEDURE — 96372 THER/PROPH/DIAG INJ SC/IM: CPT

## 2023-11-30 PROCEDURE — 25010000002 EPOETIN ALFA PER 1000 UNITS: Performed by: NURSE PRACTITIONER

## 2023-11-30 PROCEDURE — 84466 ASSAY OF TRANSFERRIN: CPT

## 2023-11-30 PROCEDURE — 82728 ASSAY OF FERRITIN: CPT

## 2023-11-30 PROCEDURE — 83540 ASSAY OF IRON: CPT

## 2023-11-30 PROCEDURE — 36415 COLL VENOUS BLD VENIPUNCTURE: CPT

## 2023-11-30 RX ADMIN — ERYTHROPOIETIN 40000 UNITS: 40000 INJECTION, SOLUTION INTRAVENOUS; SUBCUTANEOUS at 13:49

## 2023-12-06 ENCOUNTER — TELEPHONE (OUTPATIENT)
Dept: UROLOGY | Age: 78
End: 2023-12-06

## 2023-12-06 NOTE — TELEPHONE ENCOUNTER
Jae Gallardo called in stating she does not want appointment and wants referral to be closed  Please advise  Thank you

## 2023-12-14 ENCOUNTER — LAB (OUTPATIENT)
Dept: LAB | Facility: HOSPITAL | Age: 78
End: 2023-12-14
Payer: MEDICARE

## 2023-12-14 ENCOUNTER — INFUSION (OUTPATIENT)
Dept: ONCOLOGY | Facility: HOSPITAL | Age: 78
End: 2023-12-14
Payer: MEDICARE

## 2023-12-14 VITALS
HEIGHT: 59 IN | SYSTOLIC BLOOD PRESSURE: 153 MMHG | RESPIRATION RATE: 18 BRPM | BODY MASS INDEX: 28.63 KG/M2 | TEMPERATURE: 98 F | DIASTOLIC BLOOD PRESSURE: 48 MMHG | WEIGHT: 142 LBS | OXYGEN SATURATION: 96 % | HEART RATE: 88 BPM

## 2023-12-14 DIAGNOSIS — N18.4 ANEMIA IN STAGE 4 CHRONIC KIDNEY DISEASE: Primary | ICD-10-CM

## 2023-12-14 DIAGNOSIS — D63.1 ANEMIA IN STAGE 4 CHRONIC KIDNEY DISEASE: Primary | ICD-10-CM

## 2023-12-14 LAB
BASOPHILS # BLD AUTO: 0.02 10*3/MM3 (ref 0–0.2)
BASOPHILS NFR BLD AUTO: 0.4 % (ref 0–1.5)
DEPRECATED RDW RBC AUTO: 59.3 FL (ref 37–54)
EOSINOPHIL # BLD AUTO: 0.15 10*3/MM3 (ref 0–0.4)
EOSINOPHIL NFR BLD AUTO: 2.7 % (ref 0.3–6.2)
ERYTHROCYTE [DISTWIDTH] IN BLOOD BY AUTOMATED COUNT: 17.6 % (ref 12.3–15.4)
HCT VFR BLD AUTO: 33.6 % (ref 34–46.6)
HGB BLD-MCNC: 9.8 G/DL (ref 12–15.9)
HOLD SPECIMEN: NORMAL
IMM GRANULOCYTES # BLD AUTO: 0.01 10*3/MM3 (ref 0–0.05)
IMM GRANULOCYTES NFR BLD AUTO: 0.2 % (ref 0–0.5)
LYMPHOCYTES # BLD AUTO: 1.01 10*3/MM3 (ref 0.7–3.1)
LYMPHOCYTES NFR BLD AUTO: 18 % (ref 19.6–45.3)
MCH RBC QN AUTO: 26.9 PG (ref 26.6–33)
MCHC RBC AUTO-ENTMCNC: 29.2 G/DL (ref 31.5–35.7)
MCV RBC AUTO: 92.3 FL (ref 79–97)
MONOCYTES # BLD AUTO: 0.5 10*3/MM3 (ref 0.1–0.9)
MONOCYTES NFR BLD AUTO: 8.9 % (ref 5–12)
NEUTROPHILS NFR BLD AUTO: 3.92 10*3/MM3 (ref 1.7–7)
NEUTROPHILS NFR BLD AUTO: 69.8 % (ref 42.7–76)
NRBC BLD AUTO-RTO: 0 /100 WBC (ref 0–0.2)
PLATELET # BLD AUTO: 168 10*3/MM3 (ref 140–450)
PMV BLD AUTO: 8.4 FL (ref 6–12)
RBC # BLD AUTO: 3.64 10*6/MM3 (ref 3.77–5.28)
WBC NRBC COR # BLD AUTO: 5.61 10*3/MM3 (ref 3.4–10.8)

## 2023-12-14 PROCEDURE — 25010000002 EPOETIN ALFA PER 1000 UNITS: Performed by: NURSE PRACTITIONER

## 2023-12-14 PROCEDURE — 96372 THER/PROPH/DIAG INJ SC/IM: CPT

## 2023-12-14 PROCEDURE — 36415 COLL VENOUS BLD VENIPUNCTURE: CPT

## 2023-12-14 PROCEDURE — 85025 COMPLETE CBC W/AUTO DIFF WBC: CPT

## 2023-12-14 RX ADMIN — ERYTHROPOIETIN 40000 UNITS: 40000 INJECTION, SOLUTION INTRAVENOUS; SUBCUTANEOUS at 13:06

## 2023-12-28 ENCOUNTER — LAB (OUTPATIENT)
Dept: LAB | Facility: HOSPITAL | Age: 78
End: 2023-12-28
Payer: MEDICARE

## 2023-12-28 ENCOUNTER — INFUSION (OUTPATIENT)
Dept: ONCOLOGY | Facility: HOSPITAL | Age: 78
End: 2023-12-28
Payer: MEDICARE

## 2023-12-28 VITALS
RESPIRATION RATE: 18 BRPM | DIASTOLIC BLOOD PRESSURE: 49 MMHG | OXYGEN SATURATION: 97 % | SYSTOLIC BLOOD PRESSURE: 157 MMHG | HEART RATE: 87 BPM | WEIGHT: 142.8 LBS | BODY MASS INDEX: 28.79 KG/M2 | TEMPERATURE: 97.3 F | HEIGHT: 59 IN

## 2023-12-28 DIAGNOSIS — N18.4 ANEMIA IN STAGE 4 CHRONIC KIDNEY DISEASE: ICD-10-CM

## 2023-12-28 DIAGNOSIS — D63.1 ANEMIA IN STAGE 4 CHRONIC KIDNEY DISEASE: Primary | ICD-10-CM

## 2023-12-28 DIAGNOSIS — N18.4 ANEMIA IN STAGE 4 CHRONIC KIDNEY DISEASE: Primary | ICD-10-CM

## 2023-12-28 DIAGNOSIS — D63.1 ANEMIA IN STAGE 4 CHRONIC KIDNEY DISEASE: ICD-10-CM

## 2023-12-28 LAB
BASOPHILS # BLD AUTO: 0.03 10*3/MM3 (ref 0–0.2)
BASOPHILS NFR BLD AUTO: 0.5 % (ref 0–1.5)
DEPRECATED RDW RBC AUTO: 58.1 FL (ref 37–54)
EOSINOPHIL # BLD AUTO: 0.14 10*3/MM3 (ref 0–0.4)
EOSINOPHIL NFR BLD AUTO: 2.5 % (ref 0.3–6.2)
ERYTHROCYTE [DISTWIDTH] IN BLOOD BY AUTOMATED COUNT: 17 % (ref 12.3–15.4)
HCT VFR BLD AUTO: 37.2 % (ref 34–46.6)
HGB BLD-MCNC: 10.7 G/DL (ref 12–15.9)
IMM GRANULOCYTES # BLD AUTO: 0.01 10*3/MM3 (ref 0–0.05)
IMM GRANULOCYTES NFR BLD AUTO: 0.2 % (ref 0–0.5)
LYMPHOCYTES # BLD AUTO: 0.96 10*3/MM3 (ref 0.7–3.1)
LYMPHOCYTES NFR BLD AUTO: 17.1 % (ref 19.6–45.3)
MCH RBC QN AUTO: 27 PG (ref 26.6–33)
MCHC RBC AUTO-ENTMCNC: 28.8 G/DL (ref 31.5–35.7)
MCV RBC AUTO: 93.7 FL (ref 79–97)
MONOCYTES # BLD AUTO: 0.63 10*3/MM3 (ref 0.1–0.9)
MONOCYTES NFR BLD AUTO: 11.3 % (ref 5–12)
NEUTROPHILS NFR BLD AUTO: 3.83 10*3/MM3 (ref 1.7–7)
NEUTROPHILS NFR BLD AUTO: 68.4 % (ref 42.7–76)
NRBC BLD AUTO-RTO: 0 /100 WBC (ref 0–0.2)
PLATELET # BLD AUTO: 176 10*3/MM3 (ref 140–450)
PMV BLD AUTO: 8.8 FL (ref 6–12)
RBC # BLD AUTO: 3.97 10*6/MM3 (ref 3.77–5.28)
WBC NRBC COR # BLD AUTO: 5.6 10*3/MM3 (ref 3.4–10.8)

## 2023-12-28 PROCEDURE — 36415 COLL VENOUS BLD VENIPUNCTURE: CPT

## 2023-12-28 PROCEDURE — 25010000002 EPOETIN ALFA PER 1000 UNITS: Performed by: NURSE PRACTITIONER

## 2023-12-28 PROCEDURE — 96372 THER/PROPH/DIAG INJ SC/IM: CPT

## 2023-12-28 PROCEDURE — 85025 COMPLETE CBC W/AUTO DIFF WBC: CPT

## 2023-12-28 RX ADMIN — ERYTHROPOIETIN 40000 UNITS: 40000 INJECTION, SOLUTION INTRAVENOUS; SUBCUTANEOUS at 13:07

## 2024-01-25 ENCOUNTER — LAB (OUTPATIENT)
Dept: LAB | Facility: HOSPITAL | Age: 79
End: 2024-01-25
Payer: MEDICARE

## 2024-01-25 ENCOUNTER — APPOINTMENT (OUTPATIENT)
Dept: ONCOLOGY | Facility: HOSPITAL | Age: 79
End: 2024-01-25
Payer: MEDICARE

## 2024-01-25 ENCOUNTER — OFFICE VISIT (OUTPATIENT)
Dept: ONCOLOGY | Facility: CLINIC | Age: 79
End: 2024-01-25
Payer: MEDICARE

## 2024-01-25 VITALS
OXYGEN SATURATION: 96 % | TEMPERATURE: 97.2 F | DIASTOLIC BLOOD PRESSURE: 76 MMHG | RESPIRATION RATE: 16 BRPM | SYSTOLIC BLOOD PRESSURE: 122 MMHG | HEART RATE: 85 BPM | WEIGHT: 142.4 LBS | HEIGHT: 59 IN | BODY MASS INDEX: 28.71 KG/M2

## 2024-01-25 DIAGNOSIS — I10 HTN (HYPERTENSION), BENIGN: ICD-10-CM

## 2024-01-25 DIAGNOSIS — N18.4 ANEMIA IN STAGE 4 CHRONIC KIDNEY DISEASE: Primary | ICD-10-CM

## 2024-01-25 DIAGNOSIS — D63.1 ANEMIA IN STAGE 4 CHRONIC KIDNEY DISEASE: Primary | ICD-10-CM

## 2024-01-25 DIAGNOSIS — D50.9 IRON DEFICIENCY ANEMIA, UNSPECIFIED IRON DEFICIENCY ANEMIA TYPE: ICD-10-CM

## 2024-01-25 LAB
ALBUMIN SERPL-MCNC: 3.8 G/DL (ref 3.5–5.2)
ALBUMIN/GLOB SERPL: 1.3 G/DL
ALP SERPL-CCNC: 104 U/L (ref 39–117)
ALT SERPL W P-5'-P-CCNC: 9 U/L (ref 1–33)
ANION GAP SERPL CALCULATED.3IONS-SCNC: 11 MMOL/L (ref 5–15)
AST SERPL-CCNC: 14 U/L (ref 1–32)
BASOPHILS # BLD AUTO: 0.03 10*3/MM3 (ref 0–0.2)
BASOPHILS NFR BLD AUTO: 0.6 % (ref 0–1.5)
BILIRUB SERPL-MCNC: 0.2 MG/DL (ref 0–1.2)
BUN SERPL-MCNC: 36 MG/DL (ref 8–23)
BUN/CREAT SERPL: 16.5 (ref 7–25)
CALCIUM SPEC-SCNC: 9.3 MG/DL (ref 8.6–10.5)
CHLORIDE SERPL-SCNC: 105 MMOL/L (ref 98–107)
CO2 SERPL-SCNC: 23 MMOL/L (ref 22–29)
CREAT SERPL-MCNC: 2.18 MG/DL (ref 0.57–1)
DEPRECATED RDW RBC AUTO: 49.9 FL (ref 37–54)
EGFRCR SERPLBLD CKD-EPI 2021: 22.3 ML/MIN/1.73
EOSINOPHIL # BLD AUTO: 0.14 10*3/MM3 (ref 0–0.4)
EOSINOPHIL NFR BLD AUTO: 2.6 % (ref 0.3–6.2)
ERYTHROCYTE [DISTWIDTH] IN BLOOD BY AUTOMATED COUNT: 14.8 % (ref 12.3–15.4)
FERRITIN SERPL-MCNC: 337.6 NG/ML (ref 13–150)
GLOBULIN UR ELPH-MCNC: 3 GM/DL
GLUCOSE SERPL-MCNC: 138 MG/DL (ref 65–99)
HCT VFR BLD AUTO: 37.6 % (ref 34–46.6)
HGB BLD-MCNC: 11.2 G/DL (ref 12–15.9)
HOLD SPECIMEN: NORMAL
IMM GRANULOCYTES # BLD AUTO: 0.02 10*3/MM3 (ref 0–0.05)
IMM GRANULOCYTES NFR BLD AUTO: 0.4 % (ref 0–0.5)
IRON 24H UR-MRATE: 62 MCG/DL (ref 37–145)
IRON SATN MFR SERPL: 22 % (ref 20–50)
LYMPHOCYTES # BLD AUTO: 1.14 10*3/MM3 (ref 0.7–3.1)
LYMPHOCYTES NFR BLD AUTO: 21 % (ref 19.6–45.3)
MCH RBC QN AUTO: 27.1 PG (ref 26.6–33)
MCHC RBC AUTO-ENTMCNC: 29.8 G/DL (ref 31.5–35.7)
MCV RBC AUTO: 91 FL (ref 79–97)
MONOCYTES # BLD AUTO: 0.51 10*3/MM3 (ref 0.1–0.9)
MONOCYTES NFR BLD AUTO: 9.4 % (ref 5–12)
NEUTROPHILS NFR BLD AUTO: 3.58 10*3/MM3 (ref 1.7–7)
NEUTROPHILS NFR BLD AUTO: 66 % (ref 42.7–76)
NRBC BLD AUTO-RTO: 0 /100 WBC (ref 0–0.2)
PLATELET # BLD AUTO: 208 10*3/MM3 (ref 140–450)
PMV BLD AUTO: 9.2 FL (ref 6–12)
POTASSIUM SERPL-SCNC: 4.8 MMOL/L (ref 3.5–5.2)
PROT SERPL-MCNC: 6.8 G/DL (ref 6–8.5)
RBC # BLD AUTO: 4.13 10*6/MM3 (ref 3.77–5.28)
SODIUM SERPL-SCNC: 139 MMOL/L (ref 136–145)
TIBC SERPL-MCNC: 276 MCG/DL (ref 298–536)
TRANSFERRIN SERPL-MCNC: 185 MG/DL (ref 200–360)
WBC NRBC COR # BLD AUTO: 5.42 10*3/MM3 (ref 3.4–10.8)

## 2024-01-25 PROCEDURE — 82728 ASSAY OF FERRITIN: CPT

## 2024-01-25 PROCEDURE — 84466 ASSAY OF TRANSFERRIN: CPT

## 2024-01-25 PROCEDURE — 83540 ASSAY OF IRON: CPT

## 2024-01-25 PROCEDURE — 36415 COLL VENOUS BLD VENIPUNCTURE: CPT

## 2024-01-25 PROCEDURE — 85025 COMPLETE CBC W/AUTO DIFF WBC: CPT

## 2024-01-25 PROCEDURE — 99214 OFFICE O/P EST MOD 30 MIN: CPT | Performed by: NURSE PRACTITIONER

## 2024-01-25 PROCEDURE — 80053 COMPREHEN METABOLIC PANEL: CPT

## 2024-01-25 NOTE — PROGRESS NOTES
[facility] HEMATOLOGY & ONCOLOGY  [address]  [place] [address] [phone]    Patient Name: Nuha Cali  Encounter Date: 01/25/2024  YOB: 1945  Patient Number: [id number]    Progress Note    HISTORY OF PRESENT ILLNESS: Nuha Cali is a 79 y.o. female who was seen on 06/21/22 for consultation and management recommendations for anemia in chronic kidney disease and iron deficiency.  She has health history significant for  Stage IV CKD, Diabetes w/neuropathy, HTN,Hyperlipidemia, GERD, Rheumatoid Arthritis. S/P repair of rectovaginal fistula in 2018.     She had Venofer on December 9 and 16, 2022.      Interval History  She presents to clinic today for continued follow-up.  She continues to complain of persistent fatigue and arthritis pain.    She has been receiving Retacrit injections for Hgb < 11 or Hct < 33.   Her last injection was 12/28.23.    Labs were reviewed today and results reviewed with patient.       LABS    Lab Results - Last 18 Months   Lab Units 01/25/24  1243 12/28/23  1229 12/14/23  1225 11/30/23  1302 10/26/23  1342 10/12/23  1245   HEMOGLOBIN g/dL 11.2* 10.7* 9.8* 8.5* 11.1* 10.3*   HEMATOCRIT % 37.6 37.2 33.6* 29.6* 39.0 36.2   MCV fL 91.0 93.7 92.3 91.6 92.2 92.8   WBC 10*3/mm3 5.42 5.60 5.61 5.99 5.15 5.27   RDW % 14.8 17.0* 17.6* 16.5* 16.2* 16.0*   MPV fL 9.2 8.8 8.4 9.3 8.5 9.5   PLATELETS 10*3/mm3 208 176 168 191 212 203   IMM GRAN % % 0.4 0.2 0.2 0.3 0.2 0.2   NEUTROS ABS 10*3/mm3 3.58 3.83 3.92 4.07 3.36 3.77   LYMPHS ABS 10*3/mm3 1.14 0.96 1.01 1.12 1.09 0.85   MONOS ABS 10*3/mm3 0.51 0.63 0.50 0.61 0.52 0.41   EOS ABS 10*3/mm3 0.14 0.14 0.15 0.15 0.14 0.20   BASOS ABS 10*3/mm3 0.03 0.03 0.02 0.02 0.03 0.03   IMMATURE GRANS (ABS) 10*3/mm3 0.02 0.01 0.01 0.02 0.01 0.01   NRBC /100 WBC 0.0 0.0 0.0 0.0 0.0 0.0       Lab Results - Last 18 Months   Lab Units 01/25/24  1243 10/26/23  8992  "09/14/23  1255 08/03/23  1359 06/16/23  0959 05/19/23  0957   GLUCOSE mg/dL 138* 130* 128* 256* 109* 135*   SODIUM mmol/L 139 139 141 139 140 140   POTASSIUM mmol/L 4.8 5.2 5.2 4.8 5.1 5.0   CO2 mmol/L 23.0 25.0 24.0 23.0 22.0 22.0   CHLORIDE mmol/L 105 106 107 107 108* 107   ANION GAP mmol/L 11.0 8.0 10.0 9.0 10.0 11.0   CREATININE mg/dL 2.18* 2.20* 2.04* 1.46* 1.95* 1.68*   BUN mg/dL 36* 35* 31* 38* 29* 40*   BUN / CREAT RATIO  16.5 15.9 15.2 26.0* 14.9 23.8   CALCIUM mg/dL 9.3 9.6 9.8 9.6 8.9 9.5   ALK PHOS U/L 104 102 100 92 95 103   TOTAL PROTEIN g/dL 6.8 7.1 7.1 6.6 6.7 7.5   ALT (SGPT) U/L 9 9 8 9 14 10   AST (SGOT) U/L 14 14 13 10 18 14   BILIRUBIN mg/dL 0.2 0.2 0.3 0.2 0.2 0.2   ALBUMIN g/dL 3.8 3.8 3.7 4.0 3.4* 4.1   GLOBULIN gm/dL 3.0 3.3 3.4 2.6 3.3 3.4       No results for input(s): \"MSPIKE\", \"KAPPALAMB\", \"IGLFLC\", \"URICACID\", \"FREEKAPPAL\", \"CEA\", \"LDH\", \"REFLABREPO\" in the last [id number] hours.      Lab Results - Last 18 Months   Lab Units 01/25/24  1243 11/30/23  1345 10/26/23  1342 08/03/23  1359 06/16/23  0959 03/02/23  1230   IRON mcg/dL 62 40 49 102 63 53   TIBC mcg/dL 276* 244* 299 305 253* 299   IRON SATURATION (TSAT) % 22 16* 16* 33 25 18*   FERRITIN ng/mL 337.60* 647.80* 185.20* 457.50* 404.20* 210.10*         PAST MEDICAL HISTORY:  ALLERGIES:  Allergies   Allergen Reactions    Aspirin Anaphylaxis and Swelling     CURRENT MEDICATIONS:  Outpatient Encounter Medications as of 1/25/2024   Medication Sig Dispense Refill    amLODIPine-valsartan (EXFORGE)  MG per tablet Take 1 tablet by mouth.      carvedilol (COREG) 12.5 MG tablet 2 (Two) Times a Day With Meals.      cholecalciferol (VITAMIN D3) 1000 units tablet Take 2 tablets by mouth Daily.      cimetidine (TAGAMET) 200 MG tablet Take 1 tablet by mouth Daily.      folic acid (FOLVITE) 1 MG tablet Take 1 tablet by mouth Daily.      furosemide (LASIX) 20 MG tablet Daily.      leflunomide (ARAVA) 20 MG tablet Take 1 tablet by mouth Daily.      " polyethyl glycol-propyl glycol (SYSTANE) 0.4-0.3 % solution ophthalmic solution Administer 1 drop to both eyes Every 1 (One) Hour As Needed.      potassium chloride (K-DUR) 10 MEQ CR tablet Daily.      pravastatin (PRAVACHOL) 20 MG tablet Take 2 tablets by mouth Daily.      [DISCONTINUED] predniSONE (DELTASONE) 5 MG tablet Take 1 tablet by mouth Daily. Tapering dose (Patient not taking: Reported on 1/25/2024)       Facility-Administered Encounter Medications as of 1/25/2024   Medication Dose Route Frequency Provider Last Rate Last Admin    diphenhydrAMINE (BENADRYL) injection 50 mg  50 mg Intravenous PRN Ziyad Marilu L, APRN        famotidine (PEPCID) injection 20 mg  20 mg Intravenous PRN Ziyad Marilu L, APRN        Hydrocortisone Sod Suc (PF) (Solu-CORTEF) injection 100 mg  100 mg Intravenous PRN Ziyad Marilu L, APRN         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    PAD (peripheral artery disease) I73.9    Anemia in stage 4 chronic kidney disease N18.4, D63.1       HEALTH MAINTENANCE ITEMS:  Health Maintenance Due   Topic Date Due    URINE MICROALBUMIN  Never done    Pneumococcal Vaccine 65+ (1 of 2 - PCV) Never done    TDAP/TD VACCINES (1 - Tdap) Never done    ZOSTER VACCINE (1 of 2) Never done    RSV Vaccine - Adults >60 Years or Pregnant 32-36 Weeks (1 - 1-dose 60+ series) Never done    DIABETIC EYE EXAM  Never done    BMI FOLLOWUP  09/11/2019    DXA SCAN  01/02/2022    INFLUENZA VACCINE  08/01/2023    COVID-19 Vaccine (5 - 2023-24 season) 09/01/2023    ANNUAL WELLNESS VISIT  11/15/2023    HEMOGLOBIN A1C  11/15/2023       <no information>  Last Completed Colonoscopy            COLORECTAL CANCER SCREENING (COLONOSCOPY - Every 10 Years) Next  due on 1/19/2025 01/19/2015  SCANNED - COLONOSCOPY    11/24/2009  SCANNED - COLONOSCOPY    02/04/2003  SCANNED - COLONOSCOPY                  Immunization History   Administered Date(s) Administered    COVID-19 (MODERNA) 1st,2nd,3rd Dose Monovalent 03/31/2021, 04/28/2021    COVID-19 (MODERNA) BIVALENT 12+YRS 11/28/2022    COVID-19 (MODERNA) Monovalent Original Booster 11/19/2021     Last Completed Mammogram       This patient has no relevant Health Maintenance data.              FAMILY HISTORY:  Family History   Problem Relation Age of Onset    Hypertension Other     Diabetes Other     Coronary artery disease Other     Cancer Other     No Known Problems Son     No Known Problems Son     No Known Problems Son     Breast cancer Other     Colon cancer Neg Hx     Colon polyps Neg Hx     Ovarian cancer Neg Hx      SOCIAL HISTORY:  Social History     Socioeconomic History    Marital status:    Tobacco Use    Smoking status: Never    Smokeless tobacco: Never   Vaping Use    Vaping Use: Never used   Substance and Sexual Activity    Alcohol use: No    Drug use: No    Sexual activity: Defer     Birth control/protection: Surgical       REVIEW OF SYSTEMS:  Review of Systems   Constitutional:  Positive for fatigue.   HENT:  Negative for swollen glands and trouble swallowing.    Eyes:         Macular degeneration. Gets monthly injections     Respiratory:  Negative for shortness of breath and wheezing.    Cardiovascular:  Negative for chest pain and palpitations.   Gastrointestinal:  Negative for abdominal pain, blood in stool, nausea and vomiting.   Genitourinary:  Negative for difficulty urinating, dysuria and hematuria.   Musculoskeletal:  Positive for back pain.        Currently on a tapering dose of prednisone for arthitis   Neurological:         Diabetic Neuropathy   Psychiatric/Behavioral:  Negative for suicidal ideas and depressed mood.        /76   Pulse 85   Temp 97.2 °F (36.2 °C)   Resp 16   Ht  "149.9 cm (59\")   Wt 64.6 kg (142 lb 6.4 oz)   SpO2 96%   BMI 28.76 kg/m²  Body surface area is 1.6 meters squared.    Pain Score    01/25/24 1304   PainSc: 0-No pain       Physical Exam  Constitutional:       Appearance: Normal appearance.   HENT:      Head: Normocephalic and atraumatic.   Eyes:      Extraocular Movements: Extraocular movements intact.   Cardiovascular:      Rate and Rhythm: Normal rate and regular rhythm.   Pulmonary:      Effort: Pulmonary effort is normal.      Breath sounds: Normal breath sounds.   Abdominal:      General: Bowel sounds are normal.      Palpations: Abdomen is soft.   Musculoskeletal:      Right lower leg: Edema present.      Left lower leg: Edema present.   Skin:     General: Skin is warm and dry.   Neurological:      General: No focal deficit present.      Mental Status: She is alert and oriented to person, place, and time.   Psychiatric:         Mood and Affect: Mood normal.         Behavior: Behavior normal.         Nuha Cali reports a pain score of 0.  No intervention indicated.     ASSESSMENT / PLAN:    1. Anemia in stage 4 chronic kidney disease    2. Iron deficiency anemia, unspecified iron deficiency anemia type    3. HTN (hypertension), benign         1.  Anemia in Chronic Kidney Disease Stage IV  2.  Iron Deficiency   -Pt received Venofer infusion x 1 on December 9, and 16 2022  -Received Venofer 200 mg IV on 11/9/23.    Last injection Retacrit 12/28/23  Labs today: BUN 36, Creatinine 2.18, GFR 22.3, Iron 62, Ferritin 337, Sat 22%, TIBC 276, Hgb 11.2, Hct 37.6  -No Retacrit indicated today    -Pt will get CBC every 2 weeks and will continue retacrit for Hb < 11 OR Hct < 33    3.  Hypertension / Edema  -BP today is 122/76  -On Lasix, Amlodipine-Valsartan, Coreg  -Managed  By PCP and nephrology   -Advised pt to monitor blood pressure with CHANTALE use      PLAN:  No Retacrit indicated today as Hgb> 11g   Continue Retacrit 40,000 units q 2 weeks for Hgb < 11 or Hct < " 33  Continue current medications, treatment plans and follow up with PCP and any other providers  RTC in 3 months  Pre-office labs for CBC, CMP, Iron Profile and Ferritin   Iron profile and ferritin will be checked in 3 months.  Orders have been placed.  Care discussed with patient.  Understanding expressed.  Patient agreeable with      Health Maintenance   Colonoscopy 2015 Diverticulosis.  Pt does not want to do another one.   Mammogram 01/25/22  Dexa Scan - 2016 Osteopenia        Marilu Lackey, CINDY  01/25/2024

## 2024-02-08 ENCOUNTER — INFUSION (OUTPATIENT)
Dept: ONCOLOGY | Facility: HOSPITAL | Age: 79
End: 2024-02-08
Payer: MEDICARE

## 2024-02-08 ENCOUNTER — LAB (OUTPATIENT)
Dept: LAB | Facility: HOSPITAL | Age: 79
End: 2024-02-08
Payer: MEDICARE

## 2024-02-08 VITALS
BODY MASS INDEX: 28.83 KG/M2 | WEIGHT: 143 LBS | DIASTOLIC BLOOD PRESSURE: 63 MMHG | TEMPERATURE: 97.4 F | HEIGHT: 59 IN | RESPIRATION RATE: 14 BRPM | HEART RATE: 85 BPM | OXYGEN SATURATION: 97 % | SYSTOLIC BLOOD PRESSURE: 159 MMHG

## 2024-02-08 DIAGNOSIS — N18.4 ANEMIA IN STAGE 4 CHRONIC KIDNEY DISEASE: ICD-10-CM

## 2024-02-08 DIAGNOSIS — N18.4 ANEMIA IN STAGE 4 CHRONIC KIDNEY DISEASE: Primary | ICD-10-CM

## 2024-02-08 DIAGNOSIS — D63.1 ANEMIA IN STAGE 4 CHRONIC KIDNEY DISEASE: Primary | ICD-10-CM

## 2024-02-08 DIAGNOSIS — D63.1 ANEMIA IN STAGE 4 CHRONIC KIDNEY DISEASE: ICD-10-CM

## 2024-02-08 LAB
BASOPHILS # BLD AUTO: 0.03 10*3/MM3 (ref 0–0.2)
BASOPHILS NFR BLD AUTO: 0.5 % (ref 0–1.5)
DEPRECATED RDW RBC AUTO: 49.6 FL (ref 37–54)
EOSINOPHIL # BLD AUTO: 0.17 10*3/MM3 (ref 0–0.4)
EOSINOPHIL NFR BLD AUTO: 3.1 % (ref 0.3–6.2)
ERYTHROCYTE [DISTWIDTH] IN BLOOD BY AUTOMATED COUNT: 14.9 % (ref 12.3–15.4)
HCT VFR BLD AUTO: 34.7 % (ref 34–46.6)
HGB BLD-MCNC: 10.4 G/DL (ref 12–15.9)
HOLD SPECIMEN: NORMAL
IMM GRANULOCYTES # BLD AUTO: 0.02 10*3/MM3 (ref 0–0.05)
IMM GRANULOCYTES NFR BLD AUTO: 0.4 % (ref 0–0.5)
LYMPHOCYTES # BLD AUTO: 1.07 10*3/MM3 (ref 0.7–3.1)
LYMPHOCYTES NFR BLD AUTO: 19.5 % (ref 19.6–45.3)
MCH RBC QN AUTO: 27.2 PG (ref 26.6–33)
MCHC RBC AUTO-ENTMCNC: 30 G/DL (ref 31.5–35.7)
MCV RBC AUTO: 90.6 FL (ref 79–97)
MONOCYTES # BLD AUTO: 0.53 10*3/MM3 (ref 0.1–0.9)
MONOCYTES NFR BLD AUTO: 9.7 % (ref 5–12)
NEUTROPHILS NFR BLD AUTO: 3.67 10*3/MM3 (ref 1.7–7)
NEUTROPHILS NFR BLD AUTO: 66.8 % (ref 42.7–76)
NRBC BLD AUTO-RTO: 0 /100 WBC (ref 0–0.2)
PLATELET # BLD AUTO: 180 10*3/MM3 (ref 140–450)
PMV BLD AUTO: 8.8 FL (ref 6–12)
RBC # BLD AUTO: 3.83 10*6/MM3 (ref 3.77–5.28)
WBC NRBC COR # BLD AUTO: 5.49 10*3/MM3 (ref 3.4–10.8)

## 2024-02-08 PROCEDURE — 25010000002 EPOETIN ALFA PER 1000 UNITS: Performed by: NURSE PRACTITIONER

## 2024-02-08 PROCEDURE — 96372 THER/PROPH/DIAG INJ SC/IM: CPT

## 2024-02-08 PROCEDURE — 85025 COMPLETE CBC W/AUTO DIFF WBC: CPT

## 2024-02-08 PROCEDURE — 36415 COLL VENOUS BLD VENIPUNCTURE: CPT

## 2024-02-08 RX ADMIN — ERYTHROPOIETIN 40000 UNITS: 40000 INJECTION, SOLUTION INTRAVENOUS; SUBCUTANEOUS at 13:04

## 2024-02-22 ENCOUNTER — INFUSION (OUTPATIENT)
Dept: ONCOLOGY | Facility: HOSPITAL | Age: 79
End: 2024-02-22
Payer: MEDICARE

## 2024-02-22 ENCOUNTER — LAB (OUTPATIENT)
Dept: LAB | Facility: HOSPITAL | Age: 79
End: 2024-02-22
Payer: MEDICARE

## 2024-02-22 VITALS
HEIGHT: 59 IN | TEMPERATURE: 96.8 F | SYSTOLIC BLOOD PRESSURE: 160 MMHG | RESPIRATION RATE: 20 BRPM | DIASTOLIC BLOOD PRESSURE: 66 MMHG | HEART RATE: 86 BPM | OXYGEN SATURATION: 96 % | BODY MASS INDEX: 29.11 KG/M2 | WEIGHT: 144.4 LBS

## 2024-02-22 DIAGNOSIS — D63.1 ANEMIA IN STAGE 4 CHRONIC KIDNEY DISEASE: Primary | ICD-10-CM

## 2024-02-22 DIAGNOSIS — D63.1 ANEMIA IN STAGE 4 CHRONIC KIDNEY DISEASE: ICD-10-CM

## 2024-02-22 DIAGNOSIS — N18.4 ANEMIA IN STAGE 4 CHRONIC KIDNEY DISEASE: ICD-10-CM

## 2024-02-22 DIAGNOSIS — N18.4 ANEMIA IN STAGE 4 CHRONIC KIDNEY DISEASE: Primary | ICD-10-CM

## 2024-02-22 LAB
BASOPHILS # BLD AUTO: 0.04 10*3/MM3 (ref 0–0.2)
BASOPHILS NFR BLD AUTO: 0.7 % (ref 0–1.5)
DEPRECATED RDW RBC AUTO: 52.5 FL (ref 37–54)
EOSINOPHIL # BLD AUTO: 0.15 10*3/MM3 (ref 0–0.4)
EOSINOPHIL NFR BLD AUTO: 2.8 % (ref 0.3–6.2)
ERYTHROCYTE [DISTWIDTH] IN BLOOD BY AUTOMATED COUNT: 15.9 % (ref 12.3–15.4)
HCT VFR BLD AUTO: 35.9 % (ref 34–46.6)
HGB BLD-MCNC: 10.8 G/DL (ref 12–15.9)
IMM GRANULOCYTES # BLD AUTO: 0.02 10*3/MM3 (ref 0–0.05)
IMM GRANULOCYTES NFR BLD AUTO: 0.4 % (ref 0–0.5)
LYMPHOCYTES # BLD AUTO: 0.95 10*3/MM3 (ref 0.7–3.1)
LYMPHOCYTES NFR BLD AUTO: 17.7 % (ref 19.6–45.3)
MCH RBC QN AUTO: 27.1 PG (ref 26.6–33)
MCHC RBC AUTO-ENTMCNC: 30.1 G/DL (ref 31.5–35.7)
MCV RBC AUTO: 90.2 FL (ref 79–97)
MONOCYTES # BLD AUTO: 0.53 10*3/MM3 (ref 0.1–0.9)
MONOCYTES NFR BLD AUTO: 9.9 % (ref 5–12)
NEUTROPHILS NFR BLD AUTO: 3.68 10*3/MM3 (ref 1.7–7)
NEUTROPHILS NFR BLD AUTO: 68.5 % (ref 42.7–76)
NRBC BLD AUTO-RTO: 0 /100 WBC (ref 0–0.2)
PLATELET # BLD AUTO: 165 10*3/MM3 (ref 140–450)
PMV BLD AUTO: 8.8 FL (ref 6–12)
RBC # BLD AUTO: 3.98 10*6/MM3 (ref 3.77–5.28)
WBC NRBC COR # BLD AUTO: 5.37 10*3/MM3 (ref 3.4–10.8)

## 2024-02-22 PROCEDURE — 85025 COMPLETE CBC W/AUTO DIFF WBC: CPT

## 2024-02-22 PROCEDURE — 36415 COLL VENOUS BLD VENIPUNCTURE: CPT

## 2024-02-22 PROCEDURE — 96372 THER/PROPH/DIAG INJ SC/IM: CPT

## 2024-02-22 PROCEDURE — 25010000002 EPOETIN ALFA PER 1000 UNITS: Performed by: NURSE PRACTITIONER

## 2024-02-22 RX ADMIN — ERYTHROPOIETIN 40000 UNITS: 40000 INJECTION, SOLUTION INTRAVENOUS; SUBCUTANEOUS at 14:07

## 2024-02-22 NOTE — PROGRESS NOTES
Message to office: Nuha Cali [birth year] CAITLIN Michel patient that received procrit today. Pt stated last Monday she fell and hit the middle of her stomach/ribs, left arm/hand and toes. Pt denied LOC, hitting head or any bleeding. Pt has bruise on right breast from fall. Pt stated she didn't go to ER or anything as she did not think she broke any bones. Pt states she is just sore and sometimes her ribs hurt when she coughs but states she is getting better. I told her I would let your office know and that she should contact her PCP regarding fall. thanks

## 2024-03-07 ENCOUNTER — LAB (OUTPATIENT)
Dept: LAB | Facility: HOSPITAL | Age: 79
End: 2024-03-07
Payer: MEDICARE

## 2024-03-07 ENCOUNTER — INFUSION (OUTPATIENT)
Dept: ONCOLOGY | Facility: HOSPITAL | Age: 79
End: 2024-03-07
Payer: MEDICARE

## 2024-03-07 VITALS
HEART RATE: 82 BPM | WEIGHT: 143.4 LBS | SYSTOLIC BLOOD PRESSURE: 160 MMHG | TEMPERATURE: 97.3 F | OXYGEN SATURATION: 97 % | RESPIRATION RATE: 18 BRPM | HEIGHT: 59 IN | DIASTOLIC BLOOD PRESSURE: 62 MMHG | BODY MASS INDEX: 28.91 KG/M2

## 2024-03-07 DIAGNOSIS — D63.1 ANEMIA IN STAGE 4 CHRONIC KIDNEY DISEASE: ICD-10-CM

## 2024-03-07 DIAGNOSIS — D63.1 ANEMIA IN STAGE 4 CHRONIC KIDNEY DISEASE: Primary | ICD-10-CM

## 2024-03-07 DIAGNOSIS — N18.4 ANEMIA IN STAGE 4 CHRONIC KIDNEY DISEASE: Primary | ICD-10-CM

## 2024-03-07 DIAGNOSIS — N18.4 ANEMIA IN STAGE 4 CHRONIC KIDNEY DISEASE: ICD-10-CM

## 2024-03-07 LAB
BASOPHILS # BLD AUTO: 0.03 10*3/MM3 (ref 0–0.2)
BASOPHILS NFR BLD AUTO: 0.6 % (ref 0–1.5)
DEPRECATED RDW RBC AUTO: 53.1 FL (ref 37–54)
EOSINOPHIL # BLD AUTO: 0.18 10*3/MM3 (ref 0–0.4)
EOSINOPHIL NFR BLD AUTO: 3.7 % (ref 0.3–6.2)
ERYTHROCYTE [DISTWIDTH] IN BLOOD BY AUTOMATED COUNT: 16.2 % (ref 12.3–15.4)
HCT VFR BLD AUTO: 37.3 % (ref 34–46.6)
HGB BLD-MCNC: 10.7 G/DL (ref 12–15.9)
IMM GRANULOCYTES # BLD AUTO: 0.01 10*3/MM3 (ref 0–0.05)
IMM GRANULOCYTES NFR BLD AUTO: 0.2 % (ref 0–0.5)
LYMPHOCYTES # BLD AUTO: 1 10*3/MM3 (ref 0.7–3.1)
LYMPHOCYTES NFR BLD AUTO: 20.4 % (ref 19.6–45.3)
MCH RBC QN AUTO: 25.9 PG (ref 26.6–33)
MCHC RBC AUTO-ENTMCNC: 28.7 G/DL (ref 31.5–35.7)
MCV RBC AUTO: 90.3 FL (ref 79–97)
MONOCYTES # BLD AUTO: 0.51 10*3/MM3 (ref 0.1–0.9)
MONOCYTES NFR BLD AUTO: 10.4 % (ref 5–12)
NEUTROPHILS NFR BLD AUTO: 3.16 10*3/MM3 (ref 1.7–7)
NEUTROPHILS NFR BLD AUTO: 64.7 % (ref 42.7–76)
NRBC BLD AUTO-RTO: 0 /100 WBC (ref 0–0.2)
PLATELET # BLD AUTO: 190 10*3/MM3 (ref 140–450)
PMV BLD AUTO: 8.8 FL (ref 6–12)
RBC # BLD AUTO: 4.13 10*6/MM3 (ref 3.77–5.28)
WBC NRBC COR # BLD AUTO: 4.89 10*3/MM3 (ref 3.4–10.8)

## 2024-03-07 PROCEDURE — 85025 COMPLETE CBC W/AUTO DIFF WBC: CPT

## 2024-03-07 PROCEDURE — 96372 THER/PROPH/DIAG INJ SC/IM: CPT

## 2024-03-07 PROCEDURE — 25010000002 EPOETIN ALFA PER 1000 UNITS: Performed by: NURSE PRACTITIONER

## 2024-03-07 PROCEDURE — 36415 COLL VENOUS BLD VENIPUNCTURE: CPT

## 2024-03-07 RX ADMIN — ERYTHROPOIETIN 40000 UNITS: 40000 INJECTION, SOLUTION INTRAVENOUS; SUBCUTANEOUS at 14:30

## 2024-03-21 ENCOUNTER — INFUSION (OUTPATIENT)
Dept: ONCOLOGY | Facility: HOSPITAL | Age: 79
End: 2024-03-21
Payer: MEDICARE

## 2024-03-21 ENCOUNTER — LAB (OUTPATIENT)
Dept: LAB | Facility: HOSPITAL | Age: 79
End: 2024-03-21
Payer: MEDICARE

## 2024-03-21 VITALS
HEART RATE: 80 BPM | TEMPERATURE: 97.6 F | RESPIRATION RATE: 18 BRPM | DIASTOLIC BLOOD PRESSURE: 62 MMHG | OXYGEN SATURATION: 95 % | SYSTOLIC BLOOD PRESSURE: 164 MMHG

## 2024-03-21 DIAGNOSIS — N18.4 ANEMIA IN STAGE 4 CHRONIC KIDNEY DISEASE: ICD-10-CM

## 2024-03-21 DIAGNOSIS — D63.1 ANEMIA IN STAGE 4 CHRONIC KIDNEY DISEASE: ICD-10-CM

## 2024-03-21 LAB
BASOPHILS # BLD AUTO: 0.02 10*3/MM3 (ref 0–0.2)
BASOPHILS NFR BLD AUTO: 0.4 % (ref 0–1.5)
DEPRECATED RDW RBC AUTO: 56 FL (ref 37–54)
EOSINOPHIL # BLD AUTO: 0.19 10*3/MM3 (ref 0–0.4)
EOSINOPHIL NFR BLD AUTO: 3.8 % (ref 0.3–6.2)
ERYTHROCYTE [DISTWIDTH] IN BLOOD BY AUTOMATED COUNT: 16.8 % (ref 12.3–15.4)
HCT VFR BLD AUTO: 37.9 % (ref 34–46.6)
HGB BLD-MCNC: 11.3 G/DL (ref 12–15.9)
IMM GRANULOCYTES # BLD AUTO: 0.01 10*3/MM3 (ref 0–0.05)
IMM GRANULOCYTES NFR BLD AUTO: 0.2 % (ref 0–0.5)
LYMPHOCYTES # BLD AUTO: 1.07 10*3/MM3 (ref 0.7–3.1)
LYMPHOCYTES NFR BLD AUTO: 21.3 % (ref 19.6–45.3)
MCH RBC QN AUTO: 26.9 PG (ref 26.6–33)
MCHC RBC AUTO-ENTMCNC: 29.8 G/DL (ref 31.5–35.7)
MCV RBC AUTO: 90.2 FL (ref 79–97)
MONOCYTES # BLD AUTO: 0.52 10*3/MM3 (ref 0.1–0.9)
MONOCYTES NFR BLD AUTO: 10.3 % (ref 5–12)
NEUTROPHILS NFR BLD AUTO: 3.22 10*3/MM3 (ref 1.7–7)
NEUTROPHILS NFR BLD AUTO: 64 % (ref 42.7–76)
NRBC BLD AUTO-RTO: 0 /100 WBC (ref 0–0.2)
PLATELET # BLD AUTO: 181 10*3/MM3 (ref 140–450)
PMV BLD AUTO: 9.1 FL (ref 6–12)
RBC # BLD AUTO: 4.2 10*6/MM3 (ref 3.77–5.28)
WBC NRBC COR # BLD AUTO: 5.03 10*3/MM3 (ref 3.4–10.8)

## 2024-03-21 PROCEDURE — 36415 COLL VENOUS BLD VENIPUNCTURE: CPT

## 2024-03-21 PROCEDURE — G0463 HOSPITAL OUTPT CLINIC VISIT: HCPCS

## 2024-03-21 PROCEDURE — 85025 COMPLETE CBC W/AUTO DIFF WBC: CPT

## 2024-03-27 ENCOUNTER — TELEPHONE (OUTPATIENT)
Dept: VASCULAR SURGERY | Facility: CLINIC | Age: 79
End: 2024-03-27
Payer: MEDICARE

## 2024-03-27 NOTE — TELEPHONE ENCOUNTER
Call placed to patient and reminded of appointments for testing and MD, patient has voiced understanding and will attend

## 2024-03-28 ENCOUNTER — OFFICE VISIT (OUTPATIENT)
Dept: VASCULAR SURGERY | Facility: CLINIC | Age: 79
End: 2024-03-28
Payer: MEDICARE

## 2024-03-28 ENCOUNTER — HOSPITAL ENCOUNTER (OUTPATIENT)
Dept: ULTRASOUND IMAGING | Facility: HOSPITAL | Age: 79
Discharge: HOME | End: 2024-03-28
Payer: MEDICARE

## 2024-03-28 VITALS
OXYGEN SATURATION: 95 % | HEIGHT: 59 IN | BODY MASS INDEX: 28.22 KG/M2 | SYSTOLIC BLOOD PRESSURE: 120 MMHG | WEIGHT: 140 LBS | HEART RATE: 85 BPM | DIASTOLIC BLOOD PRESSURE: 68 MMHG

## 2024-03-28 DIAGNOSIS — I65.23 BILATERAL CAROTID ARTERY STENOSIS: Primary | ICD-10-CM

## 2024-03-28 DIAGNOSIS — E11.9 CONTROLLED TYPE 2 DIABETES MELLITUS WITHOUT COMPLICATION, WITHOUT LONG-TERM CURRENT USE OF INSULIN: ICD-10-CM

## 2024-03-28 DIAGNOSIS — I73.9 PAD (PERIPHERAL ARTERY DISEASE): ICD-10-CM

## 2024-03-28 DIAGNOSIS — I65.23 BILATERAL CAROTID ARTERY STENOSIS: ICD-10-CM

## 2024-03-28 DIAGNOSIS — I10 HTN (HYPERTENSION), BENIGN: ICD-10-CM

## 2024-03-28 PROCEDURE — 93880 EXTRACRANIAL BILAT STUDY: CPT

## 2024-03-28 PROCEDURE — 93923 UPR/LXTR ART STDY 3+ LVLS: CPT

## 2024-03-28 RX ORDER — ALLOPURINOL 100 MG/1
100 TABLET ORAL DAILY
COMMUNITY
Start: 2024-01-23

## 2024-03-28 NOTE — PROGRESS NOTES
"3/28/2024       Juan Echavarria MD  [address] [facility] [address]  [facility] [address]    Nuha Cali  1945    Chief Complaint   Patient presents with    Follow-up     1 year follow up w/ testing. Last seen 4/13/23. Patient complains of swelling in both legs. Does not wear compressions, hard to get on. Also complains of some dizziness.        Dear Juan Echavarria MD   I had the pleasure of seeing your patient Nuha Cali in the office today.   As you recall, Nuha Cali is a 79 y.o.  female who developed a wound to her left second toe and was following with wound care.  She did undergo left lower extremity angiogram on 8/13/2021 and unfortunately Dr. 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  She did undergo amputation of the left second toe on 8/18/2021 with Dr. Martinez which healed.  She has developed no further wounds.  She does have swelling to her lower extremities which resolves at night per her report.  She did have a fall about a month ago.  She did have noninvasive testing performed today, which I did review in office.     Review of Systems   Constitutional: Negative.    HENT: Negative.     Eyes: Negative.    Respiratory: Negative.     Cardiovascular:  Positive for leg swelling.   Gastrointestinal: Negative.    Endocrine: Negative.    Genitourinary: Negative.    Musculoskeletal:  Positive for arthralgias (rheumatoid).   Skin: Negative.  Negative for wound.   Allergic/Immunologic: Negative.    Neurological:  Positive for dizziness.   Hematological: Negative.    Psychiatric/Behavioral: Negative.     All other systems reviewed and are negative.      /68   Pulse 85   Ht 149.9 cm (59\")   Wt 63.5 kg (140 lb)   SpO2 95%   BMI 28.28 kg/m²   Physical Exam  Vitals and nursing note reviewed.   Constitutional:       General: She is not in acute distress.     Appearance: Normal appearance. She is well-developed. She is obese. She is " not diaphoretic.   HENT:      Head: Normocephalic and atraumatic.   Eyes:      General: No scleral icterus.     Pupils: Pupils are equal, round, and reactive to light.   Neck:      Thyroid: No thyromegaly.      Vascular: No carotid bruit or JVD.   Cardiovascular:      Rate and Rhythm: Normal rate and regular rhythm.      Pulses:           Dorsalis pedis pulses are detected w/ Doppler on the right side and detected w/ Doppler on the left side.        Posterior tibial pulses are detected w/ Doppler on the right side and detected w/ Doppler on the left side.      Heart sounds: Normal heart sounds and S2 normal. No murmur heard.     No friction rub. No gallop.   Pulmonary:      Effort: Pulmonary effort is normal.      Breath sounds: Normal breath sounds.   Abdominal:      General: Bowel sounds are normal.      Palpations: Abdomen is soft.   Musculoskeletal:         General: Swelling present. Normal range of motion.      Cervical back: Normal range of motion and neck supple.      Right lower leg: Edema present.      Left lower leg: Edema present.   Skin:     General: Skin is warm and dry.   Neurological:      Mental Status: She is alert and oriented to person, place, and time.      Cranial Nerves: No cranial nerve deficit.   Psychiatric:         Mood and Affect: Mood normal.         Behavior: Behavior normal.         Thought Content: Thought content normal.         Judgment: Judgment normal.        Diagnostic data:  Noninvasive testing shows right VILLA noncompressible however waveforms are maintained left VILLA with no arterial insufficiency.  Carotid duplex shows less than 50% carotid stenosis bilaterally.  Right vertebral was not visualized left vertebral with antegrade flow.        Patient Active Problem List   Diagnosis    Anemia    Heme positive stool    HTN (hypertension), benign    Change in bowel habits    Controlled type 2 diabetes mellitus without complication, without long-term current use of insulin     Colovaginal fistula    PAD (peripheral artery disease)    Anemia in stage 4 chronic kidney disease         ICD-10-CM ICD-9-CM   1. Bilateral carotid artery stenosis  I65.23 433.10     433.30   2. PAD (peripheral artery disease)  I73.9 443.9   3. Controlled type 2 diabetes mellitus without complication, without long-term current use of insulin  E11.9 250.00   4. HTN (hypertension), benign  I10 401.1       Plan: After thoroughly evaluating Nuha Cali, I believe the best course of action is to remain conservative from vascular surgery standpoint.  Currently she is doing well and denies any strokelike symptoms.  She does continue to have some leg swelling has difficulty getting her compression stockings on.  Her Exforge can be contributing to her leg swelling.  I did review her testing which shows less than 50% carotid stenosis bilaterally.  Her ABIs are noncompressible on the right however waveforms are maintained and no arterial insufficiency to the left lower extremity.  We will see her back in 1 year with repeat noninvasive testing including ABIs and a carotid duplex.  She can continue to try to wear compression stockings and discuss possible change in medication that could be contributing to her swelling.  She should continue on her Pravachol 20 mg daily in addition to her other medications.   This was all discussed in full with complete understanding.  Thank you for allowing me to participate in the care of your patient.  Please do not hesitate with any questions or concerns.  I will keep you aware of any further encounters with Nuha Cali.        Sincerely yours,         CINDY Kelly

## 2024-03-28 NOTE — LETTER
March 28, 2024     Juan Echavarria MD  [address]  [address]  [place] [address]    Patient: Nuha Cali   YOB: 1945   Date of Visit: 3/28/2024     Dear Juan Echavarria MD:       Thank you for referring Nuha Cali to me for evaluation. Below are the relevant portions of my assessment and plan of care.    If you have questions, please do not hesitate to call me. I look forward to following Nuha along with you.         Sincerely,        CINDY Kelly        CC: No Recipients    Yvonne Brooks APRN  03/28/24 1407  Sign when Signing Visit  3/28/2024       Juan Echavarria MD  [address]  [address]  [place] [address]    Nuha Cali  1945    Chief Complaint   Patient presents with   • Follow-up     1 year follow up w/ testing. Last seen 4/13/23. Patient complains of swelling in both legs. Does not wear compressions, hard to get on. Also complains of some dizziness.        Dear Juan Echavarria MD   I had the pleasure of seeing your patient Nuha Cali in the office today.   As you recall, Nuha Cali is a 79 y.o.  female who developed a wound to her left second toe and was following with wound care.  She did undergo left lower extremity angiogram on 8/13/2021 and unfortunately Dr. 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  She did undergo amputation of the left second toe on 8/18/2021 with Dr. Martinez which healed.  She has developed no further wounds.  She does have swelling to her lower extremities which resolves at night per her report.  She did have a fall about a month ago.  She did have noninvasive testing performed today, which I did review in office.     Review of Systems   Constitutional: Negative.    HENT: Negative.     Eyes: Negative.    Respiratory: Negative.     Cardiovascular:  Positive for leg swelling.   Gastrointestinal: Negative.    Endocrine: Negative.    Genitourinary:  "Negative.    Musculoskeletal:  Positive for arthralgias (rheumatoid).   Skin: Negative.  Negative for wound.   Allergic/Immunologic: Negative.    Neurological:  Positive for dizziness.   Hematological: Negative.    Psychiatric/Behavioral: Negative.     All other systems reviewed and are negative.      /68   Pulse 85   Ht 149.9 cm (59\")   Wt 63.5 kg (140 lb)   SpO2 95%   BMI 28.28 kg/m²   Physical Exam  Vitals and nursing note reviewed.   Constitutional:       General: She is not in acute distress.     Appearance: Normal appearance. She is well-developed. She is obese. She is not diaphoretic.   HENT:      Head: Normocephalic and atraumatic.   Eyes:      General: No scleral icterus.     Pupils: Pupils are equal, round, and reactive to light.   Neck:      Thyroid: No thyromegaly.      Vascular: No carotid bruit or JVD.   Cardiovascular:      Rate and Rhythm: Normal rate and regular rhythm.      Pulses:           Dorsalis pedis pulses are detected w/ Doppler on the right side and detected w/ Doppler on the left side.        Posterior tibial pulses are detected w/ Doppler on the right side and detected w/ Doppler on the left side.      Heart sounds: Normal heart sounds and S2 normal. No murmur heard.     No friction rub. No gallop.   Pulmonary:      Effort: Pulmonary effort is normal.      Breath sounds: Normal breath sounds.   Abdominal:      General: Bowel sounds are normal.      Palpations: Abdomen is soft.   Musculoskeletal:         General: Swelling present. Normal range of motion.      Cervical back: Normal range of motion and neck supple.      Right lower leg: Edema present.      Left lower leg: Edema present.   Skin:     General: Skin is warm and dry.   Neurological:      Mental Status: She is alert and oriented to person, place, and time.      Cranial Nerves: No cranial nerve deficit.   Psychiatric:         Mood and Affect: Mood normal.         Behavior: Behavior normal.         Thought Content: " Thought content normal.         Judgment: Judgment normal.        Diagnostic data:  Noninvasive testing shows right VILLA noncompressible however waveforms are maintained left VILLA with no arterial insufficiency.  Carotid duplex shows less than 50% carotid stenosis bilaterally.  Right vertebral was not visualized left vertebral with antegrade flow.        Patient Active Problem List   Diagnosis   • Anemia   • Heme positive stool   • HTN (hypertension), benign   • Change in bowel habits   • Controlled type 2 diabetes mellitus without complication, without long-term current use of insulin   • Colovaginal fistula   • PAD (peripheral artery disease)   • Anemia in stage 4 chronic kidney disease         ICD-10-CM ICD-9-CM   1. Bilateral carotid artery stenosis  I65.23 433.10     433.30   2. PAD (peripheral artery disease)  I73.9 443.9   3. Controlled type 2 diabetes mellitus without complication, without long-term current use of insulin  E11.9 250.00   4. HTN (hypertension), benign  I10 401.1       Plan: After thoroughly evaluating Nuha Cali, I believe the best course of action is to remain conservative from vascular surgery standpoint.  Currently she is doing well and denies any strokelike symptoms.  She does continue to have some leg swelling has difficulty getting her compression stockings on.  Her Exforge can be contributing to her leg swelling.  I did review her testing which shows less than 50% carotid stenosis bilaterally.  Her ABIs are noncompressible on the right however waveforms are maintained and no arterial insufficiency to the left lower extremity.  We will see her back in 1 year with repeat noninvasive testing including ABIs and a carotid duplex.  She can continue to try to wear compression stockings and discuss possible change in medication that could be contributing to her swelling.  She should continue on her Pravachol 20 mg daily in addition to her other medications.   This was all discussed in  full with complete understanding.  Thank you for allowing me to participate in the care of your patient.  Please do not hesitate with any questions or concerns.  I will keep you aware of any further encounters with Nuha Cali.        Sincerely yours,         CINDY Kelly

## 2024-04-04 ENCOUNTER — INFUSION (OUTPATIENT)
Dept: ONCOLOGY | Facility: HOSPITAL | Age: 79
End: 2024-04-04
Payer: MEDICARE

## 2024-04-04 ENCOUNTER — LAB (OUTPATIENT)
Dept: LAB | Facility: HOSPITAL | Age: 79
End: 2024-04-04
Payer: MEDICARE

## 2024-04-04 VITALS
WEIGHT: 142 LBS | RESPIRATION RATE: 18 BRPM | BODY MASS INDEX: 28.63 KG/M2 | HEIGHT: 59 IN | SYSTOLIC BLOOD PRESSURE: 165 MMHG | OXYGEN SATURATION: 97 % | HEART RATE: 84 BPM | TEMPERATURE: 96.9 F | DIASTOLIC BLOOD PRESSURE: 71 MMHG

## 2024-04-04 DIAGNOSIS — N18.4 ANEMIA IN STAGE 4 CHRONIC KIDNEY DISEASE: ICD-10-CM

## 2024-04-04 DIAGNOSIS — D50.9 IRON DEFICIENCY ANEMIA, UNSPECIFIED IRON DEFICIENCY ANEMIA TYPE: ICD-10-CM

## 2024-04-04 DIAGNOSIS — D63.1 ANEMIA IN STAGE 4 CHRONIC KIDNEY DISEASE: Primary | ICD-10-CM

## 2024-04-04 DIAGNOSIS — N18.4 ANEMIA IN STAGE 4 CHRONIC KIDNEY DISEASE: Primary | ICD-10-CM

## 2024-04-04 DIAGNOSIS — D63.1 ANEMIA IN STAGE 4 CHRONIC KIDNEY DISEASE: ICD-10-CM

## 2024-04-04 LAB
BASOPHILS # BLD AUTO: 0.03 10*3/MM3 (ref 0–0.2)
BASOPHILS NFR BLD AUTO: 0.6 % (ref 0–1.5)
DEPRECATED RDW RBC AUTO: 51.4 FL (ref 37–54)
EOSINOPHIL # BLD AUTO: 0.18 10*3/MM3 (ref 0–0.4)
EOSINOPHIL NFR BLD AUTO: 3.6 % (ref 0.3–6.2)
ERYTHROCYTE [DISTWIDTH] IN BLOOD BY AUTOMATED COUNT: 15.6 % (ref 12.3–15.4)
HCT VFR BLD AUTO: 37 % (ref 34–46.6)
HGB BLD-MCNC: 10.9 G/DL (ref 12–15.9)
IMM GRANULOCYTES # BLD AUTO: 0.01 10*3/MM3 (ref 0–0.05)
IMM GRANULOCYTES NFR BLD AUTO: 0.2 % (ref 0–0.5)
LYMPHOCYTES # BLD AUTO: 1.08 10*3/MM3 (ref 0.7–3.1)
LYMPHOCYTES NFR BLD AUTO: 21.5 % (ref 19.6–45.3)
MCH RBC QN AUTO: 26.7 PG (ref 26.6–33)
MCHC RBC AUTO-ENTMCNC: 29.5 G/DL (ref 31.5–35.7)
MCV RBC AUTO: 90.5 FL (ref 79–97)
MONOCYTES # BLD AUTO: 0.48 10*3/MM3 (ref 0.1–0.9)
MONOCYTES NFR BLD AUTO: 9.5 % (ref 5–12)
NEUTROPHILS NFR BLD AUTO: 3.25 10*3/MM3 (ref 1.7–7)
NEUTROPHILS NFR BLD AUTO: 64.6 % (ref 42.7–76)
NRBC BLD AUTO-RTO: 0 /100 WBC (ref 0–0.2)
PLATELET # BLD AUTO: 201 10*3/MM3 (ref 140–450)
PMV BLD AUTO: 8.9 FL (ref 6–12)
RBC # BLD AUTO: 4.09 10*6/MM3 (ref 3.77–5.28)
WBC NRBC COR # BLD AUTO: 5.03 10*3/MM3 (ref 3.4–10.8)

## 2024-04-04 PROCEDURE — 36415 COLL VENOUS BLD VENIPUNCTURE: CPT

## 2024-04-04 PROCEDURE — 85025 COMPLETE CBC W/AUTO DIFF WBC: CPT

## 2024-04-04 PROCEDURE — 25010000002 EPOETIN ALFA PER 1000 UNITS: Performed by: NURSE PRACTITIONER

## 2024-04-04 PROCEDURE — 96372 THER/PROPH/DIAG INJ SC/IM: CPT

## 2024-04-04 RX ADMIN — ERYTHROPOIETIN 40000 UNITS: 40000 INJECTION, SOLUTION INTRAVENOUS; SUBCUTANEOUS at 14:49

## 2024-04-18 ENCOUNTER — LAB (OUTPATIENT)
Dept: LAB | Facility: HOSPITAL | Age: 79
End: 2024-04-18
Payer: MEDICARE

## 2024-04-18 ENCOUNTER — INFUSION (OUTPATIENT)
Dept: ONCOLOGY | Facility: HOSPITAL | Age: 79
End: 2024-04-18
Payer: MEDICARE

## 2024-04-18 ENCOUNTER — OFFICE VISIT (OUTPATIENT)
Dept: ONCOLOGY | Facility: CLINIC | Age: 79
End: 2024-04-18
Payer: MEDICARE

## 2024-04-18 VITALS
BODY MASS INDEX: 28.67 KG/M2 | SYSTOLIC BLOOD PRESSURE: 150 MMHG | TEMPERATURE: 97.5 F | DIASTOLIC BLOOD PRESSURE: 55 MMHG | RESPIRATION RATE: 20 BRPM | WEIGHT: 142.2 LBS | HEIGHT: 59 IN | HEART RATE: 80 BPM

## 2024-04-18 VITALS
DIASTOLIC BLOOD PRESSURE: 82 MMHG | SYSTOLIC BLOOD PRESSURE: 126 MMHG | OXYGEN SATURATION: 94 % | TEMPERATURE: 97.4 F | WEIGHT: 142.2 LBS | BODY MASS INDEX: 28.67 KG/M2 | HEIGHT: 59 IN | RESPIRATION RATE: 16 BRPM | HEART RATE: 102 BPM

## 2024-04-18 DIAGNOSIS — D63.1 ANEMIA IN STAGE 4 CHRONIC KIDNEY DISEASE: Primary | ICD-10-CM

## 2024-04-18 DIAGNOSIS — D50.9 IRON DEFICIENCY ANEMIA, UNSPECIFIED IRON DEFICIENCY ANEMIA TYPE: ICD-10-CM

## 2024-04-18 DIAGNOSIS — N18.4 ANEMIA IN STAGE 4 CHRONIC KIDNEY DISEASE: Primary | ICD-10-CM

## 2024-04-18 DIAGNOSIS — I10 HTN (HYPERTENSION), BENIGN: ICD-10-CM

## 2024-04-18 LAB
ALBUMIN SERPL-MCNC: 3.6 G/DL (ref 3.5–5.2)
ALBUMIN/GLOB SERPL: 1.2 G/DL
ALP SERPL-CCNC: 105 U/L (ref 39–117)
ALT SERPL W P-5'-P-CCNC: 6 U/L (ref 1–33)
ANION GAP SERPL CALCULATED.3IONS-SCNC: 10 MMOL/L (ref 5–15)
AST SERPL-CCNC: 9 U/L (ref 1–32)
BASOPHILS # BLD AUTO: 0.02 10*3/MM3 (ref 0–0.2)
BASOPHILS NFR BLD AUTO: 0.4 % (ref 0–1.5)
BILIRUB SERPL-MCNC: 0.3 MG/DL (ref 0–1.2)
BUN SERPL-MCNC: 33 MG/DL (ref 8–23)
BUN/CREAT SERPL: 17.6 (ref 7–25)
CALCIUM SPEC-SCNC: 9.4 MG/DL (ref 8.6–10.5)
CHLORIDE SERPL-SCNC: 108 MMOL/L (ref 98–107)
CO2 SERPL-SCNC: 23 MMOL/L (ref 22–29)
CREAT SERPL-MCNC: 1.87 MG/DL (ref 0.57–1)
DEPRECATED RDW RBC AUTO: 54.9 FL (ref 37–54)
EGFRCR SERPLBLD CKD-EPI 2021: 26.8 ML/MIN/1.73
EOSINOPHIL # BLD AUTO: 0.17 10*3/MM3 (ref 0–0.4)
EOSINOPHIL NFR BLD AUTO: 3.6 % (ref 0.3–6.2)
ERYTHROCYTE [DISTWIDTH] IN BLOOD BY AUTOMATED COUNT: 16.6 % (ref 12.3–15.4)
FERRITIN SERPL-MCNC: 323.6 NG/ML (ref 13–150)
GLOBULIN UR ELPH-MCNC: 3.1 GM/DL
GLUCOSE SERPL-MCNC: 208 MG/DL (ref 65–99)
HCT VFR BLD AUTO: 36.4 % (ref 34–46.6)
HGB BLD-MCNC: 10.7 G/DL (ref 12–15.9)
HOLD SPECIMEN: NORMAL
IMM GRANULOCYTES # BLD AUTO: 0.01 10*3/MM3 (ref 0–0.05)
IMM GRANULOCYTES NFR BLD AUTO: 0.2 % (ref 0–0.5)
IRON 24H UR-MRATE: 46 MCG/DL (ref 37–145)
IRON SATN MFR SERPL: 17 % (ref 20–50)
LYMPHOCYTES # BLD AUTO: 0.82 10*3/MM3 (ref 0.7–3.1)
LYMPHOCYTES NFR BLD AUTO: 17.3 % (ref 19.6–45.3)
MCH RBC QN AUTO: 27 PG (ref 26.6–33)
MCHC RBC AUTO-ENTMCNC: 29.4 G/DL (ref 31.5–35.7)
MCV RBC AUTO: 91.9 FL (ref 79–97)
MONOCYTES # BLD AUTO: 0.35 10*3/MM3 (ref 0.1–0.9)
MONOCYTES NFR BLD AUTO: 7.4 % (ref 5–12)
NEUTROPHILS NFR BLD AUTO: 3.36 10*3/MM3 (ref 1.7–7)
NEUTROPHILS NFR BLD AUTO: 71.1 % (ref 42.7–76)
NRBC BLD AUTO-RTO: 0 /100 WBC (ref 0–0.2)
PLATELET # BLD AUTO: 174 10*3/MM3 (ref 140–450)
PMV BLD AUTO: 9.2 FL (ref 6–12)
POTASSIUM SERPL-SCNC: 4.6 MMOL/L (ref 3.5–5.2)
PROT SERPL-MCNC: 6.7 G/DL (ref 6–8.5)
RBC # BLD AUTO: 3.96 10*6/MM3 (ref 3.77–5.28)
SODIUM SERPL-SCNC: 141 MMOL/L (ref 136–145)
TIBC SERPL-MCNC: 267 MCG/DL (ref 298–536)
TRANSFERRIN SERPL-MCNC: 179 MG/DL (ref 200–360)
WBC NRBC COR # BLD AUTO: 4.73 10*3/MM3 (ref 3.4–10.8)

## 2024-04-18 PROCEDURE — 25010000002 EPOETIN ALFA PER 1000 UNITS: Performed by: NURSE PRACTITIONER

## 2024-04-18 PROCEDURE — 80053 COMPREHEN METABOLIC PANEL: CPT

## 2024-04-18 PROCEDURE — 84466 ASSAY OF TRANSFERRIN: CPT

## 2024-04-18 PROCEDURE — 36415 COLL VENOUS BLD VENIPUNCTURE: CPT

## 2024-04-18 PROCEDURE — 85025 COMPLETE CBC W/AUTO DIFF WBC: CPT

## 2024-04-18 PROCEDURE — 83540 ASSAY OF IRON: CPT

## 2024-04-18 PROCEDURE — 96372 THER/PROPH/DIAG INJ SC/IM: CPT

## 2024-04-18 PROCEDURE — 82728 ASSAY OF FERRITIN: CPT

## 2024-04-18 RX ADMIN — ERYTHROPOIETIN 40000 UNITS: 40000 INJECTION, SOLUTION INTRAVENOUS; SUBCUTANEOUS at 14:10

## 2024-04-18 NOTE — PROGRESS NOTES
[facility]  [address]  [facility] [address] [phone]    Patient Name: Nuha Cali  Encounter Date: 04/18/2024  YOB: 1945  Patient Number: [id number]    Progress Note    HISTORY OF PRESENT ILLNESS: Nuha Cali is a 79 y.o. female who was seen on 06/21/22 for consultation and management recommendations for anemia in chronic kidney disease and iron deficiency.  She has health history significant for  Stage IV CKD, Diabetes w/neuropathy, HTN,Hyperlipidemia, GERD, Rheumatoid Arthritis. S/P repair of rectovaginal fistula in 2018.     She had Venofer on December 9 and 16, 2022.      I have reviewed the HPI and verified with the patient the accuracy of it. No changes to history since the information was documented.  Marilu Lackey, APRN.  04/18/2024     Interval History  She presents to clinic today for continued follow-up.  She continues to complain of persistent fatigue and arthritis pain.    She has been receiving Retacrit injections for Hgb < 11 or Hct < 33.   Her last injection was 4/4/24.    She states she fell Feb 12, 2024 but she didn't go get evaluated.    Labs were drawn today and results reviewed with patient.       LABS    Lab Results - Last 18 Months   Lab Units 05/02/24  1301 04/18/24  1301 04/04/24  1423 03/21/24  1420 03/07/24  1404 02/22/24  1341   HEMOGLOBIN g/dL 11.6* 10.7* 10.9* 11.3* 10.7* 10.8*   HEMATOCRIT % 39.6 36.4 37.0 37.9 37.3 35.9   MCV fL 91.7 91.9 90.5 90.2 90.3 90.2   WBC 10*3/mm3 4.20 4.73 5.03 5.03 4.89 5.37   RDW % 16.1* 16.6* 15.6* 16.8* 16.2* 15.9*   MPV fL 8.5 9.2 8.9 9.1 8.8 8.8   PLATELETS 10*3/mm3 155 174 201 181 190 165   IMM GRAN % % 0.2 0.2 0.2 0.2 0.2 0.4   NEUTROS ABS 10*3/mm3 2.68 3.36 3.25 3.22 3.16 3.68   LYMPHS ABS 10*3/mm3 0.88 0.82 1.08 1.07 1.00 0.95   MONOS ABS 10*3/mm3 0.44 0.35 0.48 0.52 0.51 0.53   EOS ABS 10*3/mm3 0.17 0.17 0.18 0.19 0.18  "0.15   BASOS ABS 10*3/mm3 0.02 0.02 0.03 0.02 0.03 0.04   IMMATURE GRANS (ABS) 10*3/mm3 0.01 0.01 0.01 0.01 0.01 0.02   NRBC /100 WBC 0.0 0.0 0.0 0.0 0.0 0.0       Lab Results - Last 18 Months   Lab Units 04/18/24  1301 01/25/24  1243 10/26/23  1342 09/14/23  1255 08/03/23  1359 06/16/23  0959   GLUCOSE mg/dL 208* 138* 130* 128* 256* 109*   SODIUM mmol/L 141 139 139 141 139 140   POTASSIUM mmol/L 4.6 4.8 5.2 5.2 4.8 5.1   CO2 mmol/L 23.0 23.0 25.0 24.0 23.0 22.0   CHLORIDE mmol/L 108* 105 106 107 107 108*   ANION GAP mmol/L 10.0 11.0 8.0 10.0 9.0 10.0   CREATININE mg/dL 1.87* 2.18* 2.20* 2.04* 1.46* 1.95*   BUN mg/dL 33* 36* 35* 31* 38* 29*   BUN / CREAT RATIO  17.6 16.5 15.9 15.2 26.0* 14.9   CALCIUM mg/dL 9.4 9.3 9.6 9.8 9.6 8.9   ALK PHOS U/L 105 104 102 100 92 95   TOTAL PROTEIN g/dL 6.7 6.8 7.1 7.1 6.6 6.7   ALT (SGPT) U/L 6 9 9 8 9 14   AST (SGOT) U/L 9 14 14 13 10 18   BILIRUBIN mg/dL 0.3 0.2 0.2 0.3 0.2 0.2   ALBUMIN g/dL 3.6 3.8 3.8 3.7 4.0 3.4*   GLOBULIN gm/dL 3.1 3.0 3.3 3.4 2.6 3.3       No results for input(s): \"MSPIKE\", \"KAPPALAMB\", \"IGLFLC\", \"URICACID\", \"FREEKAPPAL\", \"CEA\", \"LDH\", \"REFLABREPO\" in the last 20332 hours.      Lab Results - Last 18 Months   Lab Units 04/18/24  1301 01/25/24  1243 11/30/23  1345 10/26/23  1342 08/03/23  1359 06/16/23  0959   IRON mcg/dL 46 62 40 49 102 63   TIBC mcg/dL 267* 276* 244* 299 305 253*   IRON SATURATION (TSAT) % 17* 22 16* 16* 33 25   FERRITIN ng/mL 323.60* 337.60* 647.80* 185.20* 457.50* 404.20*         PAST MEDICAL HISTORY:  ALLERGIES:  Allergies   Allergen Reactions    Aspirin Anaphylaxis and Swelling     CURRENT MEDICATIONS:  Outpatient Encounter Medications as of 4/18/2024   Medication Sig Dispense Refill    allopurinol (ZYLOPRIM) 100 MG tablet Take 1 tablet by mouth Daily.      amLODIPine-valsartan (EXFORGE)  MG per tablet Take 1 tablet by mouth.      carvedilol (COREG) 12.5 MG tablet 2 (Two) Times a Day With Meals.      cholecalciferol (VITAMIN D3) " 1000 units tablet Take 2 tablets by mouth Daily.      cimetidine (TAGAMET) 200 MG tablet Take 1 tablet by mouth Daily.      folic acid (FOLVITE) 1 MG tablet Take 1 tablet by mouth Daily.      furosemide (LASIX) 20 MG tablet Daily.      leflunomide (ARAVA) 20 MG tablet Take 1 tablet by mouth Daily.      polyethyl glycol-propyl glycol (SYSTANE) 0.4-0.3 % solution ophthalmic solution Administer 1 drop to both eyes Every 1 (One) Hour As Needed.      potassium chloride (K-DUR) 10 MEQ CR tablet Daily.      pravastatin (PRAVACHOL) 20 MG tablet Take 2 tablets by mouth Daily.       Facility-Administered Encounter Medications as of 4/18/2024   Medication Dose Route Frequency Provider Last Rate Last Admin    diphenhydrAMINE (BENADRYL) injection 50 mg  50 mg Intravenous PRN Marilu Lackey APRN        famotidine (PEPCID) injection 20 mg  20 mg Intravenous PRN Marilu Lackey APRN        Hydrocortisone Sod Suc (PF) (Solu-CORTEF) injection 100 mg  100 mg Intravenous PRN Marilu Lackey APRN         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    PAD (peripheral artery disease) I73.9    Anemia in stage 4 chronic kidney disease N18.4, D63.1       HEALTH MAINTENANCE ITEMS:  Health Maintenance Due   Topic Date Due    URINE MICROALBUMIN  Never done    Pneumococcal Vaccine 65+ (1 of 2 - PCV) Never done    DIABETIC EYE EXAM  Never done    TDAP/TD VACCINES (1 - Tdap) Never done    ZOSTER VACCINE (1 of 2) Never done    RSV Vaccine - Adults (1 - 1-dose 60+ series) Never done    BMI FOLLOWUP  09/11/2019    DXA SCAN  01/02/2022    COVID-19 Vaccine (5 - 2023-24 season) 09/01/2023    ANNUAL WELLNESS VISIT  11/15/2023    HEMOGLOBIN A1C  11/15/2023    LIPID PANEL  05/15/2024        <no information>  Last Completed Colonoscopy            COLORECTAL CANCER SCREENING (COLONOSCOPY - Every 10 Years) Next due on 1/19/2025 01/19/2015  SCANNED - COLONOSCOPY    11/24/2009  SCANNED - COLONOSCOPY    02/04/2003  SCANNED - COLONOSCOPY                  Immunization History   Administered Date(s) Administered    COVID-19 (MODERNA) 1st,2nd,3rd Dose Monovalent 03/31/2021, 04/28/2021    COVID-19 (MODERNA) BIVALENT 12+YRS 11/28/2022    COVID-19 (MODERNA) Monovalent Original Booster 11/19/2021     Last Completed Mammogram       This patient has no relevant Health Maintenance data.              FAMILY HISTORY:  Family History   Problem Relation Age of Onset    Hypertension Other     Diabetes Other     Coronary artery disease Other     Cancer Other     No Known Problems Son     No Known Problems Son     No Known Problems Son     Breast cancer Other     Colon cancer Neg Hx     Colon polyps Neg Hx     Ovarian cancer Neg Hx      SOCIAL HISTORY:  Social History     Socioeconomic History    Marital status:    Tobacco Use    Smoking status: Never    Smokeless tobacco: Never   Vaping Use    Vaping status: Never Used   Substance and Sexual Activity    Alcohol use: No    Drug use: No    Sexual activity: Defer     Birth control/protection: Surgical       REVIEW OF SYSTEMS:  Review of Systems   Constitutional:  Positive for fatigue.   HENT:  Negative for swollen glands and trouble swallowing.    Eyes:         Macular degeneration. Gets monthly injections     Respiratory:  Negative for shortness of breath and wheezing.    Cardiovascular:  Negative for chest pain and palpitations.   Gastrointestinal:  Negative for abdominal pain, blood in stool, nausea and vomiting.   Genitourinary:  Negative for difficulty urinating, dysuria and hematuria.   Musculoskeletal:  Positive for back pain.        Currently on a tapering dose of prednisone for arthitis   Neurological:         Diabetic Neuropathy  "  Psychiatric/Behavioral:  Negative for suicidal ideas and depressed mood.      I have reviewed the ROS and verified with the patient the accuracy of it. No changes since the information was documented.  Marilu Lackey, APRN 04/18/2024       /82   Pulse 102   Temp 97.4 °F (36.3 °C)   Resp 16   Ht 149.9 cm (59\")   Wt 64.5 kg (142 lb 3.2 oz)   SpO2 94%   BMI 28.72 kg/m²  Body surface area is 1.6 meters squared.    Pain Score    04/18/24 1313   PainSc: 0-No pain       Physical Exam  Constitutional:       Appearance: Normal appearance.   HENT:      Head: Normocephalic and atraumatic.   Eyes:      Extraocular Movements: Extraocular movements intact.   Cardiovascular:      Rate and Rhythm: Normal rate and regular rhythm.   Pulmonary:      Effort: Pulmonary effort is normal.      Breath sounds: Normal breath sounds.   Abdominal:      General: Bowel sounds are normal.      Palpations: Abdomen is soft.   Musculoskeletal:      Right lower leg: Edema present.      Left lower leg: Edema present.   Skin:     General: Skin is warm and dry.   Neurological:      General: No focal deficit present.      Mental Status: She is alert and oriented to person, place, and time.   Psychiatric:         Mood and Affect: Mood normal.         Behavior: Behavior normal.       Nuha Cali reports a pain score of 0.  Given her pain assessment as noted, treatment options were discussed and the following options were decided upon as a follow-up plan to address the patient's pain:  No intervention indicated. .     ASSESSMENT / PLAN:    1. Anemia in stage 4 chronic kidney disease    2. Iron deficiency anemia, unspecified iron deficiency anemia type    3. HTN (hypertension), benign           1.  Anemia in Chronic Kidney Disease Stage IV  2.  Iron Deficiency   -Pt received Venofer infusion on December 9, and 16 2022  -Received Venofer 200 mg IV on 11/9/23.    Last injection Retacrit 04/04/2024  Labs today:  BUN 33, Creatinine 1.87, GFR " 26.8, Hgb 10.7, Hct 36.4    -Iron 46, Ferritin 323, Sat 17%, TIBC 267  -We will continue Retacrit today  -Pt will get CBC every 2 weeks and will continue retacrit for Hb < 11 OR Hct < 33    3.  Hypertension / Edema  -BP today is 122/76  -On Lasix, Amlodipine-Valsartan, Coreg  -Managed  By PCP and nephrology   -Advised pt to monitor blood pressure with CHANTALE use      PLAN:  Continue Retacrit 40,000 units today and q 2 weeks for Hgb < 11 or Hct < 33  Continue current medications, treatment plans and follow up with PCP and any other providers  RTC in 3 months   Pre office labs for CBC, CMP, Iron Profile and Ferritin   Care discussed with patient.  Understanding expressed.  Patient agreeable with      Health Maintenance   Colonoscopy 2015 Diverticulosis.  Pt does not want to do another one.   Mammogram 01/25/22  Dexa Scan - 2016 Osteopenia        Marilu Lackey, APRN  04/18/2024

## 2024-05-01 DIAGNOSIS — D63.1 ANEMIA IN STAGE 4 CHRONIC KIDNEY DISEASE: Primary | ICD-10-CM

## 2024-05-01 DIAGNOSIS — N18.4 ANEMIA IN STAGE 4 CHRONIC KIDNEY DISEASE: Primary | ICD-10-CM

## 2024-05-02 ENCOUNTER — INFUSION (OUTPATIENT)
Dept: ONCOLOGY | Facility: HOSPITAL | Age: 79
End: 2024-05-02
Payer: MEDICARE

## 2024-05-02 ENCOUNTER — LAB (OUTPATIENT)
Dept: LAB | Facility: HOSPITAL | Age: 79
End: 2024-05-02
Payer: MEDICARE

## 2024-05-02 VITALS
SYSTOLIC BLOOD PRESSURE: 151 MMHG | HEART RATE: 91 BPM | OXYGEN SATURATION: 93 % | DIASTOLIC BLOOD PRESSURE: 60 MMHG | BODY MASS INDEX: 28.71 KG/M2 | WEIGHT: 142.4 LBS | RESPIRATION RATE: 18 BRPM | TEMPERATURE: 97.2 F | HEIGHT: 59 IN

## 2024-05-02 DIAGNOSIS — D63.1 ANEMIA IN STAGE 4 CHRONIC KIDNEY DISEASE: Primary | ICD-10-CM

## 2024-05-02 DIAGNOSIS — D64.9 ANEMIA, UNSPECIFIED TYPE: Primary | ICD-10-CM

## 2024-05-02 DIAGNOSIS — N18.4 ANEMIA IN STAGE 4 CHRONIC KIDNEY DISEASE: Primary | ICD-10-CM

## 2024-05-02 LAB
BASOPHILS # BLD AUTO: 0.02 10*3/MM3 (ref 0–0.2)
BASOPHILS NFR BLD AUTO: 0.5 % (ref 0–1.5)
DEPRECATED RDW RBC AUTO: 54.6 FL (ref 37–54)
EOSINOPHIL # BLD AUTO: 0.17 10*3/MM3 (ref 0–0.4)
EOSINOPHIL NFR BLD AUTO: 4 % (ref 0.3–6.2)
ERYTHROCYTE [DISTWIDTH] IN BLOOD BY AUTOMATED COUNT: 16.1 % (ref 12.3–15.4)
HCT VFR BLD AUTO: 39.6 % (ref 34–46.6)
HGB BLD-MCNC: 11.6 G/DL (ref 12–15.9)
HOLD SPECIMEN: NORMAL
IMM GRANULOCYTES # BLD AUTO: 0.01 10*3/MM3 (ref 0–0.05)
IMM GRANULOCYTES NFR BLD AUTO: 0.2 % (ref 0–0.5)
LYMPHOCYTES # BLD AUTO: 0.88 10*3/MM3 (ref 0.7–3.1)
LYMPHOCYTES NFR BLD AUTO: 21 % (ref 19.6–45.3)
MCH RBC QN AUTO: 26.9 PG (ref 26.6–33)
MCHC RBC AUTO-ENTMCNC: 29.3 G/DL (ref 31.5–35.7)
MCV RBC AUTO: 91.7 FL (ref 79–97)
MONOCYTES # BLD AUTO: 0.44 10*3/MM3 (ref 0.1–0.9)
MONOCYTES NFR BLD AUTO: 10.5 % (ref 5–12)
NEUTROPHILS NFR BLD AUTO: 2.68 10*3/MM3 (ref 1.7–7)
NEUTROPHILS NFR BLD AUTO: 63.8 % (ref 42.7–76)
NRBC BLD AUTO-RTO: 0 /100 WBC (ref 0–0.2)
PLATELET # BLD AUTO: 155 10*3/MM3 (ref 140–450)
PMV BLD AUTO: 8.5 FL (ref 6–12)
RBC # BLD AUTO: 4.32 10*6/MM3 (ref 3.77–5.28)
WBC NRBC COR # BLD AUTO: 4.2 10*3/MM3 (ref 3.4–10.8)

## 2024-05-02 PROCEDURE — 36415 COLL VENOUS BLD VENIPUNCTURE: CPT

## 2024-05-02 PROCEDURE — 85025 COMPLETE CBC W/AUTO DIFF WBC: CPT

## 2024-05-02 PROCEDURE — G0463 HOSPITAL OUTPT CLINIC VISIT: HCPCS

## 2024-05-16 ENCOUNTER — LAB (OUTPATIENT)
Dept: LAB | Facility: HOSPITAL | Age: 79
End: 2024-05-16
Payer: MEDICARE

## 2024-05-16 ENCOUNTER — INFUSION (OUTPATIENT)
Dept: ONCOLOGY | Facility: HOSPITAL | Age: 79
End: 2024-05-16
Payer: MEDICARE

## 2024-05-16 VITALS
WEIGHT: 141 LBS | OXYGEN SATURATION: 97 % | RESPIRATION RATE: 18 BRPM | SYSTOLIC BLOOD PRESSURE: 157 MMHG | BODY MASS INDEX: 28.43 KG/M2 | HEART RATE: 91 BPM | TEMPERATURE: 97.4 F | HEIGHT: 59 IN | DIASTOLIC BLOOD PRESSURE: 62 MMHG

## 2024-05-16 DIAGNOSIS — D64.9 ANEMIA, UNSPECIFIED TYPE: Primary | ICD-10-CM

## 2024-05-16 DIAGNOSIS — N18.4 ANEMIA IN STAGE 4 CHRONIC KIDNEY DISEASE: ICD-10-CM

## 2024-05-16 DIAGNOSIS — D63.1 ANEMIA IN STAGE 4 CHRONIC KIDNEY DISEASE: ICD-10-CM

## 2024-05-16 LAB
BASOPHILS # BLD AUTO: 0.03 10*3/MM3 (ref 0–0.2)
BASOPHILS NFR BLD AUTO: 0.5 % (ref 0–1.5)
DEPRECATED RDW RBC AUTO: 50.5 FL (ref 37–54)
EOSINOPHIL # BLD AUTO: 0.19 10*3/MM3 (ref 0–0.4)
EOSINOPHIL NFR BLD AUTO: 3.3 % (ref 0.3–6.2)
ERYTHROCYTE [DISTWIDTH] IN BLOOD BY AUTOMATED COUNT: 15.3 % (ref 12.3–15.4)
HCT VFR BLD AUTO: 38.6 % (ref 34–46.6)
HGB BLD-MCNC: 11.4 G/DL (ref 12–15.9)
IMM GRANULOCYTES # BLD AUTO: 0.01 10*3/MM3 (ref 0–0.05)
IMM GRANULOCYTES NFR BLD AUTO: 0.2 % (ref 0–0.5)
LYMPHOCYTES # BLD AUTO: 1.2 10*3/MM3 (ref 0.7–3.1)
LYMPHOCYTES NFR BLD AUTO: 20.5 % (ref 19.6–45.3)
MCH RBC QN AUTO: 26.8 PG (ref 26.6–33)
MCHC RBC AUTO-ENTMCNC: 29.5 G/DL (ref 31.5–35.7)
MCV RBC AUTO: 90.6 FL (ref 79–97)
MONOCYTES # BLD AUTO: 0.54 10*3/MM3 (ref 0.1–0.9)
MONOCYTES NFR BLD AUTO: 9.2 % (ref 5–12)
NEUTROPHILS NFR BLD AUTO: 3.87 10*3/MM3 (ref 1.7–7)
NEUTROPHILS NFR BLD AUTO: 66.3 % (ref 42.7–76)
NRBC BLD AUTO-RTO: 0 /100 WBC (ref 0–0.2)
PLATELET # BLD AUTO: 210 10*3/MM3 (ref 140–450)
PMV BLD AUTO: 8.6 FL (ref 6–12)
RBC # BLD AUTO: 4.26 10*6/MM3 (ref 3.77–5.28)
WBC NRBC COR # BLD AUTO: 5.84 10*3/MM3 (ref 3.4–10.8)

## 2024-05-16 PROCEDURE — G0463 HOSPITAL OUTPT CLINIC VISIT: HCPCS

## 2024-05-16 PROCEDURE — 85025 COMPLETE CBC W/AUTO DIFF WBC: CPT

## 2024-05-16 PROCEDURE — 36415 COLL VENOUS BLD VENIPUNCTURE: CPT

## 2024-05-30 ENCOUNTER — INFUSION (OUTPATIENT)
Dept: ONCOLOGY | Facility: HOSPITAL | Age: 79
End: 2024-05-30
Payer: MEDICARE

## 2024-05-30 ENCOUNTER — LAB (OUTPATIENT)
Dept: LAB | Facility: HOSPITAL | Age: 79
End: 2024-05-30
Payer: MEDICARE

## 2024-05-30 VITALS
BODY MASS INDEX: 28.63 KG/M2 | DIASTOLIC BLOOD PRESSURE: 56 MMHG | TEMPERATURE: 97.8 F | HEIGHT: 59 IN | HEART RATE: 89 BPM | RESPIRATION RATE: 18 BRPM | OXYGEN SATURATION: 96 % | SYSTOLIC BLOOD PRESSURE: 156 MMHG | WEIGHT: 142 LBS

## 2024-05-30 DIAGNOSIS — N18.4 ANEMIA IN STAGE 4 CHRONIC KIDNEY DISEASE: ICD-10-CM

## 2024-05-30 DIAGNOSIS — D63.1 ANEMIA IN STAGE 4 CHRONIC KIDNEY DISEASE: ICD-10-CM

## 2024-05-30 DIAGNOSIS — N18.4 ANEMIA IN STAGE 4 CHRONIC KIDNEY DISEASE: Primary | ICD-10-CM

## 2024-05-30 DIAGNOSIS — D63.1 ANEMIA IN STAGE 4 CHRONIC KIDNEY DISEASE: Primary | ICD-10-CM

## 2024-05-30 LAB
BASOPHILS # BLD AUTO: 0.02 10*3/MM3 (ref 0–0.2)
BASOPHILS NFR BLD AUTO: 0.3 % (ref 0–1.5)
DEPRECATED RDW RBC AUTO: 51.5 FL (ref 37–54)
EOSINOPHIL # BLD AUTO: 0.19 10*3/MM3 (ref 0–0.4)
EOSINOPHIL NFR BLD AUTO: 3.2 % (ref 0.3–6.2)
ERYTHROCYTE [DISTWIDTH] IN BLOOD BY AUTOMATED COUNT: 15.6 % (ref 12.3–15.4)
HCT VFR BLD AUTO: 34 % (ref 34–46.6)
HGB BLD-MCNC: 9.9 G/DL (ref 12–15.9)
IMM GRANULOCYTES # BLD AUTO: 0.01 10*3/MM3 (ref 0–0.05)
IMM GRANULOCYTES NFR BLD AUTO: 0.2 % (ref 0–0.5)
LYMPHOCYTES # BLD AUTO: 1.16 10*3/MM3 (ref 0.7–3.1)
LYMPHOCYTES NFR BLD AUTO: 19.5 % (ref 19.6–45.3)
MCH RBC QN AUTO: 26.4 PG (ref 26.6–33)
MCHC RBC AUTO-ENTMCNC: 29.1 G/DL (ref 31.5–35.7)
MCV RBC AUTO: 90.7 FL (ref 79–97)
MONOCYTES # BLD AUTO: 0.5 10*3/MM3 (ref 0.1–0.9)
MONOCYTES NFR BLD AUTO: 8.4 % (ref 5–12)
NEUTROPHILS NFR BLD AUTO: 4.08 10*3/MM3 (ref 1.7–7)
NEUTROPHILS NFR BLD AUTO: 68.4 % (ref 42.7–76)
NRBC BLD AUTO-RTO: 0 /100 WBC (ref 0–0.2)
PLATELET # BLD AUTO: 179 10*3/MM3 (ref 140–450)
PMV BLD AUTO: 9 FL (ref 6–12)
RBC # BLD AUTO: 3.75 10*6/MM3 (ref 3.77–5.28)
WBC NRBC COR # BLD AUTO: 5.96 10*3/MM3 (ref 3.4–10.8)

## 2024-05-30 PROCEDURE — 85025 COMPLETE CBC W/AUTO DIFF WBC: CPT

## 2024-05-30 PROCEDURE — 96372 THER/PROPH/DIAG INJ SC/IM: CPT

## 2024-05-30 PROCEDURE — 25010000002 EPOETIN ALFA PER 1000 UNITS: Performed by: NURSE PRACTITIONER

## 2024-05-30 PROCEDURE — 36415 COLL VENOUS BLD VENIPUNCTURE: CPT

## 2024-05-30 RX ADMIN — ERYTHROPOIETIN 40000 UNITS: 40000 INJECTION, SOLUTION INTRAVENOUS; SUBCUTANEOUS at 13:04

## 2024-06-13 ENCOUNTER — INFUSION (OUTPATIENT)
Dept: ONCOLOGY | Facility: HOSPITAL | Age: 79
End: 2024-06-13
Payer: MEDICARE

## 2024-06-13 ENCOUNTER — LAB (OUTPATIENT)
Dept: LAB | Facility: HOSPITAL | Age: 79
End: 2024-06-13
Payer: MEDICARE

## 2024-06-13 VITALS
OXYGEN SATURATION: 95 % | TEMPERATURE: 98 F | DIASTOLIC BLOOD PRESSURE: 67 MMHG | RESPIRATION RATE: 18 BRPM | WEIGHT: 141.8 LBS | SYSTOLIC BLOOD PRESSURE: 170 MMHG | BODY MASS INDEX: 28.59 KG/M2 | HEART RATE: 86 BPM | HEIGHT: 59 IN

## 2024-06-13 DIAGNOSIS — N18.4 ANEMIA IN STAGE 4 CHRONIC KIDNEY DISEASE: Primary | ICD-10-CM

## 2024-06-13 DIAGNOSIS — D63.1 ANEMIA IN STAGE 4 CHRONIC KIDNEY DISEASE: Primary | ICD-10-CM

## 2024-06-13 LAB
BASOPHILS # BLD AUTO: 0.02 10*3/MM3 (ref 0–0.2)
BASOPHILS NFR BLD AUTO: 0.4 % (ref 0–1.5)
DEPRECATED RDW RBC AUTO: 58.4 FL (ref 37–54)
EOSINOPHIL # BLD AUTO: 0.13 10*3/MM3 (ref 0–0.4)
EOSINOPHIL NFR BLD AUTO: 2.5 % (ref 0.3–6.2)
ERYTHROCYTE [DISTWIDTH] IN BLOOD BY AUTOMATED COUNT: 17.2 % (ref 12.3–15.4)
HCT VFR BLD AUTO: 34.8 % (ref 34–46.6)
HGB BLD-MCNC: 10.1 G/DL (ref 12–15.9)
HOLD SPECIMEN: NORMAL
IMM GRANULOCYTES # BLD AUTO: 0.03 10*3/MM3 (ref 0–0.05)
IMM GRANULOCYTES NFR BLD AUTO: 0.6 % (ref 0–0.5)
LYMPHOCYTES # BLD AUTO: 1.03 10*3/MM3 (ref 0.7–3.1)
LYMPHOCYTES NFR BLD AUTO: 20.1 % (ref 19.6–45.3)
MCH RBC QN AUTO: 26.7 PG (ref 26.6–33)
MCHC RBC AUTO-ENTMCNC: 29 G/DL (ref 31.5–35.7)
MCV RBC AUTO: 92.1 FL (ref 79–97)
MONOCYTES # BLD AUTO: 0.48 10*3/MM3 (ref 0.1–0.9)
MONOCYTES NFR BLD AUTO: 9.4 % (ref 5–12)
NEUTROPHILS NFR BLD AUTO: 3.43 10*3/MM3 (ref 1.7–7)
NEUTROPHILS NFR BLD AUTO: 67 % (ref 42.7–76)
NRBC BLD AUTO-RTO: 0 /100 WBC (ref 0–0.2)
PLATELET # BLD AUTO: 165 10*3/MM3 (ref 140–450)
PMV BLD AUTO: 8.3 FL (ref 6–12)
RBC # BLD AUTO: 3.78 10*6/MM3 (ref 3.77–5.28)
WBC NRBC COR # BLD AUTO: 5.12 10*3/MM3 (ref 3.4–10.8)

## 2024-06-13 PROCEDURE — 85025 COMPLETE CBC W/AUTO DIFF WBC: CPT

## 2024-06-13 PROCEDURE — 25010000002 EPOETIN ALFA PER 1000 UNITS: Performed by: NURSE PRACTITIONER

## 2024-06-13 PROCEDURE — 36415 COLL VENOUS BLD VENIPUNCTURE: CPT

## 2024-06-13 PROCEDURE — 96372 THER/PROPH/DIAG INJ SC/IM: CPT

## 2024-06-13 RX ADMIN — ERYTHROPOIETIN 40000 UNITS: 40000 INJECTION, SOLUTION INTRAVENOUS; SUBCUTANEOUS at 13:36

## 2024-06-27 ENCOUNTER — LAB (OUTPATIENT)
Dept: LAB | Facility: HOSPITAL | Age: 79
End: 2024-06-27
Payer: MEDICARE

## 2024-06-27 ENCOUNTER — INFUSION (OUTPATIENT)
Dept: ONCOLOGY | Facility: HOSPITAL | Age: 79
End: 2024-06-27
Payer: MEDICARE

## 2024-06-27 VITALS
RESPIRATION RATE: 18 BRPM | HEART RATE: 86 BPM | WEIGHT: 141 LBS | BODY MASS INDEX: 28.43 KG/M2 | HEIGHT: 59 IN | DIASTOLIC BLOOD PRESSURE: 70 MMHG | TEMPERATURE: 97 F | OXYGEN SATURATION: 95 % | SYSTOLIC BLOOD PRESSURE: 145 MMHG

## 2024-06-27 DIAGNOSIS — D63.1 ANEMIA IN STAGE 4 CHRONIC KIDNEY DISEASE: Primary | ICD-10-CM

## 2024-06-27 DIAGNOSIS — N18.4 ANEMIA IN STAGE 4 CHRONIC KIDNEY DISEASE: Primary | ICD-10-CM

## 2024-06-27 DIAGNOSIS — N18.4 ANEMIA IN STAGE 4 CHRONIC KIDNEY DISEASE: ICD-10-CM

## 2024-06-27 DIAGNOSIS — D63.1 ANEMIA IN STAGE 4 CHRONIC KIDNEY DISEASE: ICD-10-CM

## 2024-06-27 LAB
BASOPHILS # BLD AUTO: 0.03 10*3/MM3 (ref 0–0.2)
BASOPHILS NFR BLD AUTO: 0.7 % (ref 0–1.5)
DEPRECATED RDW RBC AUTO: 55.9 FL (ref 37–54)
EOSINOPHIL # BLD AUTO: 0.14 10*3/MM3 (ref 0–0.4)
EOSINOPHIL NFR BLD AUTO: 3.1 % (ref 0.3–6.2)
ERYTHROCYTE [DISTWIDTH] IN BLOOD BY AUTOMATED COUNT: 15.9 % (ref 12.3–15.4)
HCT VFR BLD AUTO: 36.4 % (ref 34–46.6)
HGB BLD-MCNC: 10.5 G/DL (ref 12–15.9)
IMM GRANULOCYTES # BLD AUTO: 0.01 10*3/MM3 (ref 0–0.05)
IMM GRANULOCYTES NFR BLD AUTO: 0.2 % (ref 0–0.5)
LYMPHOCYTES # BLD AUTO: 0.97 10*3/MM3 (ref 0.7–3.1)
LYMPHOCYTES NFR BLD AUTO: 21.2 % (ref 19.6–45.3)
MCH RBC QN AUTO: 27.4 PG (ref 26.6–33)
MCHC RBC AUTO-ENTMCNC: 28.8 G/DL (ref 31.5–35.7)
MCV RBC AUTO: 95 FL (ref 79–97)
MONOCYTES # BLD AUTO: 0.56 10*3/MM3 (ref 0.1–0.9)
MONOCYTES NFR BLD AUTO: 12.2 % (ref 5–12)
NEUTROPHILS NFR BLD AUTO: 2.87 10*3/MM3 (ref 1.7–7)
NEUTROPHILS NFR BLD AUTO: 62.6 % (ref 42.7–76)
NRBC BLD AUTO-RTO: 0 /100 WBC (ref 0–0.2)
PLATELET # BLD AUTO: 195 10*3/MM3 (ref 140–450)
PMV BLD AUTO: 8.6 FL (ref 6–12)
RBC # BLD AUTO: 3.83 10*6/MM3 (ref 3.77–5.28)
WBC NRBC COR # BLD AUTO: 4.58 10*3/MM3 (ref 3.4–10.8)

## 2024-06-27 PROCEDURE — 85025 COMPLETE CBC W/AUTO DIFF WBC: CPT

## 2024-06-27 PROCEDURE — 36415 COLL VENOUS BLD VENIPUNCTURE: CPT

## 2024-06-27 PROCEDURE — 96372 THER/PROPH/DIAG INJ SC/IM: CPT

## 2024-06-27 PROCEDURE — 25010000002 EPOETIN ALFA PER 1000 UNITS: Performed by: NURSE PRACTITIONER

## 2024-06-27 RX ADMIN — ERYTHROPOIETIN 40000 UNITS: 40000 INJECTION, SOLUTION INTRAVENOUS; SUBCUTANEOUS at 13:40

## 2024-07-11 ENCOUNTER — LAB (OUTPATIENT)
Dept: LAB | Facility: HOSPITAL | Age: 79
End: 2024-07-11
Payer: MEDICARE

## 2024-07-11 ENCOUNTER — APPOINTMENT (OUTPATIENT)
Dept: ONCOLOGY | Facility: HOSPITAL | Age: 79
End: 2024-07-11
Payer: MEDICARE

## 2024-07-11 ENCOUNTER — OFFICE VISIT (OUTPATIENT)
Dept: ONCOLOGY | Facility: CLINIC | Age: 79
End: 2024-07-11
Payer: MEDICARE

## 2024-07-11 VITALS
RESPIRATION RATE: 16 BRPM | HEART RATE: 103 BPM | SYSTOLIC BLOOD PRESSURE: 138 MMHG | TEMPERATURE: 97.4 F | DIASTOLIC BLOOD PRESSURE: 86 MMHG | WEIGHT: 140.2 LBS | OXYGEN SATURATION: 96 % | BODY MASS INDEX: 28.26 KG/M2 | HEIGHT: 59 IN

## 2024-07-11 DIAGNOSIS — D50.9 IRON DEFICIENCY ANEMIA, UNSPECIFIED IRON DEFICIENCY ANEMIA TYPE: ICD-10-CM

## 2024-07-11 DIAGNOSIS — D63.1 ANEMIA IN STAGE 4 CHRONIC KIDNEY DISEASE: Primary | ICD-10-CM

## 2024-07-11 DIAGNOSIS — N18.4 ANEMIA IN STAGE 4 CHRONIC KIDNEY DISEASE: Primary | ICD-10-CM

## 2024-07-11 DIAGNOSIS — D63.1 ANEMIA IN STAGE 4 CHRONIC KIDNEY DISEASE: ICD-10-CM

## 2024-07-11 DIAGNOSIS — N18.4 ANEMIA IN STAGE 4 CHRONIC KIDNEY DISEASE: ICD-10-CM

## 2024-07-11 LAB
ALBUMIN SERPL-MCNC: 3.7 G/DL (ref 3.5–5.2)
ALBUMIN/GLOB SERPL: 1.1 G/DL
ALP SERPL-CCNC: 130 U/L (ref 39–117)
ALT SERPL W P-5'-P-CCNC: 7 U/L (ref 1–33)
ANION GAP SERPL CALCULATED.3IONS-SCNC: 9 MMOL/L (ref 5–15)
AST SERPL-CCNC: 11 U/L (ref 1–32)
BASOPHILS # BLD AUTO: 0.03 10*3/MM3 (ref 0–0.2)
BASOPHILS NFR BLD AUTO: 0.6 % (ref 0–1.5)
BILIRUB SERPL-MCNC: 0.2 MG/DL (ref 0–1.2)
BUN SERPL-MCNC: 35 MG/DL (ref 8–23)
BUN/CREAT SERPL: 18 (ref 7–25)
CALCIUM SPEC-SCNC: 9.4 MG/DL (ref 8.6–10.5)
CHLORIDE SERPL-SCNC: 109 MMOL/L (ref 98–107)
CO2 SERPL-SCNC: 23 MMOL/L (ref 22–29)
CREAT SERPL-MCNC: 1.94 MG/DL (ref 0.57–1)
DEPRECATED RDW RBC AUTO: 55.4 FL (ref 37–54)
EGFRCR SERPLBLD CKD-EPI 2021: 25.6 ML/MIN/1.73
EOSINOPHIL # BLD AUTO: 0.16 10*3/MM3 (ref 0–0.4)
EOSINOPHIL NFR BLD AUTO: 3 % (ref 0.3–6.2)
ERYTHROCYTE [DISTWIDTH] IN BLOOD BY AUTOMATED COUNT: 16.3 % (ref 12.3–15.4)
FERRITIN SERPL-MCNC: 155.5 NG/ML (ref 13–150)
GLOBULIN UR ELPH-MCNC: 3.5 GM/DL
GLUCOSE SERPL-MCNC: 132 MG/DL (ref 65–99)
HCT VFR BLD AUTO: 38.4 % (ref 34–46.6)
HGB BLD-MCNC: 11.3 G/DL (ref 12–15.9)
IMM GRANULOCYTES # BLD AUTO: 0.01 10*3/MM3 (ref 0–0.05)
IMM GRANULOCYTES NFR BLD AUTO: 0.2 % (ref 0–0.5)
IRON 24H UR-MRATE: 40 MCG/DL (ref 37–145)
IRON SATN MFR SERPL: 15 % (ref 20–50)
LYMPHOCYTES # BLD AUTO: 1.04 10*3/MM3 (ref 0.7–3.1)
LYMPHOCYTES NFR BLD AUTO: 19.7 % (ref 19.6–45.3)
MCH RBC QN AUTO: 27 PG (ref 26.6–33)
MCHC RBC AUTO-ENTMCNC: 29.4 G/DL (ref 31.5–35.7)
MCV RBC AUTO: 91.9 FL (ref 79–97)
MONOCYTES # BLD AUTO: 0.46 10*3/MM3 (ref 0.1–0.9)
MONOCYTES NFR BLD AUTO: 8.7 % (ref 5–12)
NEUTROPHILS NFR BLD AUTO: 3.57 10*3/MM3 (ref 1.7–7)
NEUTROPHILS NFR BLD AUTO: 67.8 % (ref 42.7–76)
NRBC BLD AUTO-RTO: 0 /100 WBC (ref 0–0.2)
PLATELET # BLD AUTO: 170 10*3/MM3 (ref 140–450)
PMV BLD AUTO: 8.6 FL (ref 6–12)
POTASSIUM SERPL-SCNC: 5 MMOL/L (ref 3.5–5.2)
PROT SERPL-MCNC: 7.2 G/DL (ref 6–8.5)
RBC # BLD AUTO: 4.18 10*6/MM3 (ref 3.77–5.28)
SODIUM SERPL-SCNC: 141 MMOL/L (ref 136–145)
TIBC SERPL-MCNC: 268 MCG/DL (ref 298–536)
TRANSFERRIN SERPL-MCNC: 180 MG/DL (ref 200–360)
WBC NRBC COR # BLD AUTO: 5.27 10*3/MM3 (ref 3.4–10.8)

## 2024-07-11 PROCEDURE — 82728 ASSAY OF FERRITIN: CPT

## 2024-07-11 PROCEDURE — 83540 ASSAY OF IRON: CPT

## 2024-07-11 PROCEDURE — 36415 COLL VENOUS BLD VENIPUNCTURE: CPT

## 2024-07-11 PROCEDURE — 84466 ASSAY OF TRANSFERRIN: CPT

## 2024-07-11 PROCEDURE — 80053 COMPREHEN METABOLIC PANEL: CPT

## 2024-07-11 PROCEDURE — 85025 COMPLETE CBC W/AUTO DIFF WBC: CPT

## 2024-07-11 RX ORDER — FERROUS SULFATE 325(65) MG
325 TABLET ORAL DAILY
Qty: 90 TABLET | Refills: 1 | Status: SHIPPED | OUTPATIENT
Start: 2024-07-11

## 2024-07-11 NOTE — PROGRESS NOTES
[facility] HEMATOLOGY & ONCOLOGY  [address]  [facility] [address] [phone]    Patient Name: Nuha Cali  Encounter Date: 07/11/2024  YOB: 1945  Patient Number: [id number]    Progress Note    HISTORY OF PRESENT ILLNESS: Nuha Cali is a 79 y.o. female who was seen on 06/21/22 for consultation and management recommendations for anemia in chronic kidney disease and iron deficiency.  She has health history significant for  Stage IV CKD, Diabetes w/neuropathy, HTN,Hyperlipidemia, GERD, Rheumatoid Arthritis. S/P repair of rectovaginal fistula in 2018.     She had Venofer on December 9 and 16, 2022.      I have reviewed the HPI and verified with the patient the accuracy of it. No changes to history since the information was documented.  Marilu Lackey, APRN.  07/11/2024     Interval History  She presents to clinic today for continued follow-up.  She continues to complain of persistent fatigue and arthritis pain.    She has been receiving Retacrit injections for Hgb < 11 or Hct < 33.   Her last injection was 6/27/24.    She had labs drawn today and results were reviewed with patient.       LABS    Lab Results - Last 18 Months   Lab Units 08/01/24  1337 07/11/24  1322 06/27/24  1318 06/13/24  1311 05/30/24  1240 05/16/24  1258   HEMOGLOBIN g/dL 9.6* 11.3* 10.5* 10.1* 9.9* 11.4*   HEMATOCRIT % 32.6* 38.4 36.4 34.8 34.0 38.6   MCV fL 93.7 91.9 95.0 92.1 90.7 90.6   WBC 10*3/mm3 4.97 5.27 4.58 5.12 5.96 5.84   RDW % 14.9 16.3* 15.9* 17.2* 15.6* 15.3   MPV fL 8.5 8.6 8.6 8.3 9.0 8.6   PLATELETS 10*3/mm3 162 170 195 165 179 210   IMM GRAN % % 0.4 0.2 0.2 0.6* 0.2 0.2   NEUTROS ABS 10*3/mm3 3.23 3.57 2.87 3.43 4.08 3.87   LYMPHS ABS 10*3/mm3 1.05 1.04 0.97 1.03 1.16 1.20   MONOS ABS 10*3/mm3 0.44 0.46 0.56 0.48 0.50 0.54   EOS ABS 10*3/mm3 0.21 0.16 0.14 0.13 0.19 0.19   BASOS ABS 10*3/mm3 0.02 0.03 0.03 0.02 0.02 0.03  "  IMMATURE GRANS (ABS) 10*3/mm3 0.02 0.01 0.01 0.03 0.01 0.01   NRBC /100 WBC 0.0 0.0 0.0 0.0 0.0 0.0       Lab Results - Last 18 Months   Lab Units 07/11/24  1322 04/18/24  1301 01/25/24  1243 10/26/23  1342 09/14/23  1255 08/03/23  1359   GLUCOSE mg/dL 132* 208* 138* 130* 128* 256*   SODIUM mmol/L 141 141 139 139 141 139   POTASSIUM mmol/L 5.0 4.6 4.8 5.2 5.2 4.8   CO2 mmol/L 23.0 23.0 23.0 25.0 24.0 23.0   CHLORIDE mmol/L 109* 108* 105 106 107 107   ANION GAP mmol/L 9.0 10.0 11.0 8.0 10.0 9.0   CREATININE mg/dL 1.94* 1.87* 2.18* 2.20* 2.04* 1.46*   BUN mg/dL 35* 33* 36* 35* 31* 38*   BUN / CREAT RATIO  18.0 17.6 16.5 15.9 15.2 26.0*   CALCIUM mg/dL 9.4 9.4 9.3 9.6 9.8 9.6   ALK PHOS U/L 130* 105 104 102 100 92   TOTAL PROTEIN g/dL 7.2 6.7 6.8 7.1 7.1 6.6   ALT (SGPT) U/L 7 6 9 9 8 9   AST (SGOT) U/L 11 9 14 14 13 10   BILIRUBIN mg/dL 0.2 0.3 0.2 0.2 0.3 0.2   ALBUMIN g/dL 3.7 3.6 3.8 3.8 3.7 4.0   GLOBULIN gm/dL 3.5 3.1 3.0 3.3 3.4 2.6       No results for input(s): \"MSPIKE\", \"KAPPALAMB\", \"IGLFLC\", \"URICACID\", \"FREEKAPPAL\", \"CEA\", \"LDH\", \"REFLABREPO\" in the last 21290 hours.      Lab Results - Last 18 Months   Lab Units 07/11/24  1322 04/18/24  1301 01/25/24  1243 11/30/23  1345 10/26/23  1342 08/03/23  1359   IRON mcg/dL 40 46 62 40 49 102   TIBC mcg/dL 268* 267* 276* 244* 299 305   IRON SATURATION (TSAT) % 15* 17* 22 16* 16* 33   FERRITIN ng/mL 155.50* 323.60* 337.60* 647.80* 185.20* 457.50*         PAST MEDICAL HISTORY:  ALLERGIES:  Allergies   Allergen Reactions    Aspirin Anaphylaxis and Swelling     CURRENT MEDICATIONS:  Outpatient Encounter Medications as of 7/11/2024   Medication Sig Dispense Refill    allopurinol (ZYLOPRIM) 100 MG tablet Take 1 tablet by mouth Daily.      amLODIPine-valsartan (EXFORGE)  MG per tablet Take 1 tablet by mouth.      carvedilol (COREG) 12.5 MG tablet 2 (Two) Times a Day With Meals.      cholecalciferol (VITAMIN D3) 1000 units tablet Take 2 tablets by mouth Daily.      " cimetidine (TAGAMET) 200 MG tablet Take 1 tablet by mouth Daily.      folic acid (FOLVITE) 1 MG tablet Take 1 tablet by mouth Daily.      furosemide (LASIX) 20 MG tablet Daily.      leflunomide (ARAVA) 20 MG tablet Take 1 tablet by mouth Daily.      polyethyl glycol-propyl glycol (SYSTANE) 0.4-0.3 % solution ophthalmic solution Administer 1 drop to both eyes Every 1 (One) Hour As Needed.      potassium chloride (K-DUR) 10 MEQ CR tablet Daily.      pravastatin (PRAVACHOL) 20 MG tablet Take 2 tablets by mouth Daily.      ferrous sulfate 325 (65 FE) MG tablet Take 1 tablet by mouth Daily. 90 tablet 1     Facility-Administered Encounter Medications as of 7/11/2024   Medication Dose Route Frequency Provider Last Rate Last Admin    diphenhydrAMINE (BENADRYL) injection 50 mg  50 mg Intravenous PRN Marilu Lackey, APRELMER        famotidine (PEPCID) injection 20 mg  20 mg Intravenous PRN Marilu Lackey, APRN        Hydrocortisone Sod Suc (PF) (Solu-CORTEF) injection 100 mg  100 mg Intravenous PRN Marilu Lackey, APRN         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    PAD (peripheral artery disease) I73.9    Anemia in stage 4 chronic kidney disease N18.4, D63.1       HEALTH MAINTENANCE ITEMS:  Health Maintenance Due   Topic Date Due    URINE MICROALBUMIN  Never done    Pneumococcal Vaccine 65+ (1 of 2 - PCV) Never done    DIABETIC EYE EXAM  Never done    TDAP/TD VACCINES (1 - Tdap) Never done    ZOSTER VACCINE (1 of 2) Never done    RSV Vaccine - Adults (1 - 1-dose 60+ series) Never done    BMI FOLLOWUP  09/11/2019    DXA SCAN  01/02/2022    COVID-19 Vaccine (5 - 2023-24 season) 09/01/2023    HEMOGLOBIN A1C  11/15/2023    LIPID PANEL  05/15/2024    INFLUENZA  VACCINE  08/01/2024    COLORECTAL CANCER SCREENING  01/19/2025       <no information>  Last Completed Colonoscopy       This patient has no relevant Health Maintenance data.          Immunization History   Administered Date(s) Administered    COVID-19 (MODERNA) 1st,2nd,3rd Dose Monovalent 03/31/2021, 04/28/2021    COVID-19 (MODERNA) BIVALENT 12+YRS 11/28/2022    COVID-19 (MODERNA) Monovalent Original Booster 11/19/2021     Last Completed Mammogram       This patient has no relevant Health Maintenance data.              FAMILY HISTORY:  Family History   Problem Relation Age of Onset    Hypertension Other     Diabetes Other     Coronary artery disease Other     Cancer Other     No Known Problems Son     No Known Problems Son     No Known Problems Son     Breast cancer Other     Colon cancer Neg Hx     Colon polyps Neg Hx     Ovarian cancer Neg Hx      SOCIAL HISTORY:  Social History     Socioeconomic History    Marital status:    Tobacco Use    Smoking status: Never    Smokeless tobacco: Never   Vaping Use    Vaping status: Never Used   Substance and Sexual Activity    Alcohol use: No    Drug use: No    Sexual activity: Defer     Birth control/protection: Surgical       REVIEW OF SYSTEMS:  Review of Systems   Constitutional:  Positive for fatigue.   HENT:  Negative for swollen glands and trouble swallowing.    Eyes:         Macular degeneration. Gets monthly injections     Respiratory:  Negative for shortness of breath and wheezing.    Cardiovascular:  Negative for chest pain and palpitations.   Gastrointestinal:  Negative for abdominal pain, blood in stool, nausea and vomiting.   Genitourinary:  Negative for difficulty urinating, dysuria and hematuria.   Musculoskeletal:  Positive for back pain.        Currently on a tapering dose of prednisone for arthitis   Neurological:         Diabetic Neuropathy   Psychiatric/Behavioral:  Negative for suicidal ideas and depressed mood.      I have reviewed the ROS and  "verified with the patient the accuracy of it. No changes since the information was documented.  Marilu Lackey, APRN 07/11/2024       /86   Pulse 103   Temp 97.4 °F (36.3 °C)   Resp 16   Ht 149.9 cm (59\")   Wt 63.6 kg (140 lb 3.2 oz)   SpO2 96%   BMI 28.32 kg/m²  Body surface area is 1.59 meters squared.    Pain Score    07/11/24 1340   PainSc:   5   PainLoc: Arm         Physical Exam  Constitutional:       Appearance: Normal appearance.   HENT:      Head: Normocephalic and atraumatic.   Eyes:      Extraocular Movements: Extraocular movements intact.   Cardiovascular:      Rate and Rhythm: Normal rate and regular rhythm.   Pulmonary:      Effort: Pulmonary effort is normal.      Breath sounds: Normal breath sounds.   Abdominal:      General: Bowel sounds are normal.      Palpations: Abdomen is soft.   Musculoskeletal:      Right lower leg: Edema present.      Left lower leg: Edema present.   Skin:     General: Skin is warm and dry.   Neurological:      General: No focal deficit present.      Mental Status: She is alert and oriented to person, place, and time.   Psychiatric:         Mood and Affect: Mood normal.         Behavior: Behavior normal.       Nuha Cali reports a pain score of 5.  Given her pain assessment as noted, treatment options were discussed and the following options were decided upon as a follow-up plan to address the patient's pain: continuation of current treatment plan for pain.     ASSESSMENT / PLAN:    1. Anemia in stage 4 chronic kidney disease    2. Iron deficiency anemia, unspecified iron deficiency anemia type         1.  Anemia in Chronic Kidney Disease Stage IV  2.  Iron Deficiency   -Pt received Venofer infusion on December 9, and 16 2022  -Received Venofer 200 mg IV on 11/9/23.    Last Retacrit injection 6/27/24  Labs today:  BUN 35, Creatinine 1.94, GFR 25.6, Iron 40, Ferritin 155, Sat 15%, TIBC 268   Hgb 11.3, Hct 38.4  -No Retacrit indicated today as Hgb " >11  -Ferritin is adequate but Iron sat is low.  Therefore, pt will start oral iron.       3.  Hypertension / Edema  -BP today is 138/86  -On Lasix, Amlodipine-Valsartan, Coreg  -Managed  By PCP and nephrology   -Advised pt to monitor blood pressure with CHANTALE use      PLAN:  Labs and shot in two weeks then go to once per month.   Continue current medications, treatment plans and follow up with PCP and any other providers  RTC in 3 months   Pre office labs for CBC, CMP, Iron Profile and Ferritin   Care discussed with patient.  Understanding expressed.  Patient agreeable with      Health Maintenance   Colonoscopy 2015 Diverticulosis.  Pt does not want to do another one.   Mammogram 01/25/22  Dexa Scan - 2016 Osteopenia        Marilu Lackey, CINDY  07/11/2024

## 2024-08-01 ENCOUNTER — INFUSION (OUTPATIENT)
Dept: ONCOLOGY | Facility: HOSPITAL | Age: 79
End: 2024-08-01
Payer: MEDICARE

## 2024-08-01 ENCOUNTER — LAB (OUTPATIENT)
Dept: LAB | Facility: HOSPITAL | Age: 79
End: 2024-08-01
Payer: MEDICARE

## 2024-08-01 VITALS
OXYGEN SATURATION: 100 % | TEMPERATURE: 97.2 F | HEIGHT: 59 IN | SYSTOLIC BLOOD PRESSURE: 163 MMHG | WEIGHT: 139 LBS | BODY MASS INDEX: 28.02 KG/M2 | HEART RATE: 93 BPM | DIASTOLIC BLOOD PRESSURE: 65 MMHG | RESPIRATION RATE: 16 BRPM

## 2024-08-01 DIAGNOSIS — N18.4 ANEMIA IN STAGE 4 CHRONIC KIDNEY DISEASE: ICD-10-CM

## 2024-08-01 DIAGNOSIS — N18.4 ANEMIA IN STAGE 4 CHRONIC KIDNEY DISEASE: Primary | ICD-10-CM

## 2024-08-01 DIAGNOSIS — D63.1 ANEMIA IN STAGE 4 CHRONIC KIDNEY DISEASE: ICD-10-CM

## 2024-08-01 DIAGNOSIS — D63.1 ANEMIA IN STAGE 4 CHRONIC KIDNEY DISEASE: Primary | ICD-10-CM

## 2024-08-01 LAB
BASOPHILS # BLD AUTO: 0.02 10*3/MM3 (ref 0–0.2)
BASOPHILS NFR BLD AUTO: 0.4 % (ref 0–1.5)
DEPRECATED RDW RBC AUTO: 50.7 FL (ref 37–54)
EOSINOPHIL # BLD AUTO: 0.21 10*3/MM3 (ref 0–0.4)
EOSINOPHIL NFR BLD AUTO: 4.2 % (ref 0.3–6.2)
ERYTHROCYTE [DISTWIDTH] IN BLOOD BY AUTOMATED COUNT: 14.9 % (ref 12.3–15.4)
HCT VFR BLD AUTO: 32.6 % (ref 34–46.6)
HGB BLD-MCNC: 9.6 G/DL (ref 12–15.9)
IMM GRANULOCYTES # BLD AUTO: 0.02 10*3/MM3 (ref 0–0.05)
IMM GRANULOCYTES NFR BLD AUTO: 0.4 % (ref 0–0.5)
LYMPHOCYTES # BLD AUTO: 1.05 10*3/MM3 (ref 0.7–3.1)
LYMPHOCYTES NFR BLD AUTO: 21.1 % (ref 19.6–45.3)
MCH RBC QN AUTO: 27.6 PG (ref 26.6–33)
MCHC RBC AUTO-ENTMCNC: 29.4 G/DL (ref 31.5–35.7)
MCV RBC AUTO: 93.7 FL (ref 79–97)
MONOCYTES # BLD AUTO: 0.44 10*3/MM3 (ref 0.1–0.9)
MONOCYTES NFR BLD AUTO: 8.9 % (ref 5–12)
NEUTROPHILS NFR BLD AUTO: 3.23 10*3/MM3 (ref 1.7–7)
NEUTROPHILS NFR BLD AUTO: 65 % (ref 42.7–76)
NRBC BLD AUTO-RTO: 0 /100 WBC (ref 0–0.2)
PLATELET # BLD AUTO: 162 10*3/MM3 (ref 140–450)
PMV BLD AUTO: 8.5 FL (ref 6–12)
RBC # BLD AUTO: 3.48 10*6/MM3 (ref 3.77–5.28)
WBC NRBC COR # BLD AUTO: 4.97 10*3/MM3 (ref 3.4–10.8)

## 2024-08-01 PROCEDURE — 96372 THER/PROPH/DIAG INJ SC/IM: CPT

## 2024-08-01 PROCEDURE — 25010000002 EPOETIN ALFA PER 1000 UNITS: Performed by: NURSE PRACTITIONER

## 2024-08-01 PROCEDURE — 36415 COLL VENOUS BLD VENIPUNCTURE: CPT

## 2024-08-01 PROCEDURE — 85025 COMPLETE CBC W/AUTO DIFF WBC: CPT

## 2024-08-01 RX ADMIN — ERYTHROPOIETIN 40000 UNITS: 40000 INJECTION, SOLUTION INTRAVENOUS; SUBCUTANEOUS at 14:01

## 2024-08-22 ENCOUNTER — LAB (OUTPATIENT)
Dept: LAB | Facility: HOSPITAL | Age: 79
End: 2024-08-22
Payer: MEDICARE

## 2024-08-22 ENCOUNTER — INFUSION (OUTPATIENT)
Dept: ONCOLOGY | Facility: HOSPITAL | Age: 79
End: 2024-08-22
Payer: MEDICARE

## 2024-08-22 VITALS
SYSTOLIC BLOOD PRESSURE: 160 MMHG | DIASTOLIC BLOOD PRESSURE: 65 MMHG | RESPIRATION RATE: 16 BRPM | WEIGHT: 140 LBS | BODY MASS INDEX: 28.22 KG/M2 | HEART RATE: 86 BPM | OXYGEN SATURATION: 94 % | TEMPERATURE: 96.9 F | HEIGHT: 59 IN

## 2024-08-22 DIAGNOSIS — D50.9 IRON DEFICIENCY ANEMIA, UNSPECIFIED IRON DEFICIENCY ANEMIA TYPE: ICD-10-CM

## 2024-08-22 DIAGNOSIS — N18.4 ANEMIA IN STAGE 4 CHRONIC KIDNEY DISEASE: Primary | ICD-10-CM

## 2024-08-22 DIAGNOSIS — N18.4 ANEMIA IN STAGE 4 CHRONIC KIDNEY DISEASE: ICD-10-CM

## 2024-08-22 DIAGNOSIS — D63.1 ANEMIA IN STAGE 4 CHRONIC KIDNEY DISEASE: Primary | ICD-10-CM

## 2024-08-22 DIAGNOSIS — D63.1 ANEMIA IN STAGE 4 CHRONIC KIDNEY DISEASE: ICD-10-CM

## 2024-08-22 LAB
BASOPHILS # BLD AUTO: 0.02 10*3/MM3 (ref 0–0.2)
BASOPHILS NFR BLD AUTO: 0.4 % (ref 0–1.5)
DEPRECATED RDW RBC AUTO: 51.9 FL (ref 37–54)
EOSINOPHIL # BLD AUTO: 0.15 10*3/MM3 (ref 0–0.4)
EOSINOPHIL NFR BLD AUTO: 2.9 % (ref 0.3–6.2)
ERYTHROCYTE [DISTWIDTH] IN BLOOD BY AUTOMATED COUNT: 15 % (ref 12.3–15.4)
FERRITIN SERPL-MCNC: 322.7 NG/ML (ref 13–150)
HCT VFR BLD AUTO: 35.1 % (ref 34–46.6)
HGB BLD-MCNC: 10.2 G/DL (ref 12–15.9)
IMM GRANULOCYTES # BLD AUTO: 0.02 10*3/MM3 (ref 0–0.05)
IMM GRANULOCYTES NFR BLD AUTO: 0.4 % (ref 0–0.5)
IRON 24H UR-MRATE: 66 MCG/DL (ref 37–145)
IRON SATN MFR SERPL: 27 % (ref 20–50)
LYMPHOCYTES # BLD AUTO: 1.05 10*3/MM3 (ref 0.7–3.1)
LYMPHOCYTES NFR BLD AUTO: 20 % (ref 19.6–45.3)
MCH RBC QN AUTO: 27.4 PG (ref 26.6–33)
MCHC RBC AUTO-ENTMCNC: 29.1 G/DL (ref 31.5–35.7)
MCV RBC AUTO: 94.4 FL (ref 79–97)
MONOCYTES # BLD AUTO: 0.48 10*3/MM3 (ref 0.1–0.9)
MONOCYTES NFR BLD AUTO: 9.2 % (ref 5–12)
NEUTROPHILS NFR BLD AUTO: 3.52 10*3/MM3 (ref 1.7–7)
NEUTROPHILS NFR BLD AUTO: 67.1 % (ref 42.7–76)
NRBC BLD AUTO-RTO: 0 /100 WBC (ref 0–0.2)
PLATELET # BLD AUTO: 199 10*3/MM3 (ref 140–450)
PMV BLD AUTO: 9.7 FL (ref 6–12)
RBC # BLD AUTO: 3.72 10*6/MM3 (ref 3.77–5.28)
TIBC SERPL-MCNC: 243 MCG/DL (ref 298–536)
TRANSFERRIN SERPL-MCNC: 163 MG/DL (ref 200–360)
WBC NRBC COR # BLD AUTO: 5.24 10*3/MM3 (ref 3.4–10.8)

## 2024-08-22 PROCEDURE — 36415 COLL VENOUS BLD VENIPUNCTURE: CPT

## 2024-08-22 PROCEDURE — 82728 ASSAY OF FERRITIN: CPT

## 2024-08-22 PROCEDURE — 85025 COMPLETE CBC W/AUTO DIFF WBC: CPT

## 2024-08-22 PROCEDURE — 84466 ASSAY OF TRANSFERRIN: CPT

## 2024-08-22 PROCEDURE — 25010000002 EPOETIN ALFA PER 1000 UNITS: Performed by: NURSE PRACTITIONER

## 2024-08-22 PROCEDURE — 96372 THER/PROPH/DIAG INJ SC/IM: CPT

## 2024-08-22 PROCEDURE — 83540 ASSAY OF IRON: CPT

## 2024-08-22 RX ADMIN — ERYTHROPOIETIN 40000 UNITS: 40000 INJECTION, SOLUTION INTRAVENOUS; SUBCUTANEOUS at 14:30

## 2024-09-12 ENCOUNTER — LAB (OUTPATIENT)
Dept: LAB | Facility: HOSPITAL | Age: 79
End: 2024-09-12
Payer: MEDICARE

## 2024-09-12 ENCOUNTER — INFUSION (OUTPATIENT)
Dept: ONCOLOGY | Facility: HOSPITAL | Age: 79
End: 2024-09-12
Payer: MEDICARE

## 2024-09-12 VITALS
SYSTOLIC BLOOD PRESSURE: 187 MMHG | TEMPERATURE: 96.4 F | HEIGHT: 59 IN | WEIGHT: 141.2 LBS | OXYGEN SATURATION: 98 % | DIASTOLIC BLOOD PRESSURE: 66 MMHG | BODY MASS INDEX: 28.47 KG/M2 | RESPIRATION RATE: 16 BRPM | HEART RATE: 88 BPM

## 2024-09-12 DIAGNOSIS — D63.1 ANEMIA IN STAGE 4 CHRONIC KIDNEY DISEASE: ICD-10-CM

## 2024-09-12 DIAGNOSIS — N18.4 ANEMIA IN STAGE 4 CHRONIC KIDNEY DISEASE: Primary | ICD-10-CM

## 2024-09-12 DIAGNOSIS — N18.4 ANEMIA IN STAGE 4 CHRONIC KIDNEY DISEASE: ICD-10-CM

## 2024-09-12 DIAGNOSIS — D63.1 ANEMIA IN STAGE 4 CHRONIC KIDNEY DISEASE: Primary | ICD-10-CM

## 2024-09-12 LAB
BASOPHILS # BLD AUTO: 0.02 10*3/MM3 (ref 0–0.2)
BASOPHILS NFR BLD AUTO: 0.4 % (ref 0–1.5)
DEPRECATED RDW RBC AUTO: 50.8 FL (ref 37–54)
EOSINOPHIL # BLD AUTO: 0.15 10*3/MM3 (ref 0–0.4)
EOSINOPHIL NFR BLD AUTO: 2.8 % (ref 0.3–6.2)
ERYTHROCYTE [DISTWIDTH] IN BLOOD BY AUTOMATED COUNT: 15.2 % (ref 12.3–15.4)
HCT VFR BLD AUTO: 34.6 % (ref 34–46.6)
HGB BLD-MCNC: 10.3 G/DL (ref 12–15.9)
IMM GRANULOCYTES # BLD AUTO: 0.02 10*3/MM3 (ref 0–0.05)
IMM GRANULOCYTES NFR BLD AUTO: 0.4 % (ref 0–0.5)
LYMPHOCYTES # BLD AUTO: 1.19 10*3/MM3 (ref 0.7–3.1)
LYMPHOCYTES NFR BLD AUTO: 22.4 % (ref 19.6–45.3)
MCH RBC QN AUTO: 27.4 PG (ref 26.6–33)
MCHC RBC AUTO-ENTMCNC: 29.8 G/DL (ref 31.5–35.7)
MCV RBC AUTO: 92 FL (ref 79–97)
MONOCYTES # BLD AUTO: 0.56 10*3/MM3 (ref 0.1–0.9)
MONOCYTES NFR BLD AUTO: 10.5 % (ref 5–12)
NEUTROPHILS NFR BLD AUTO: 3.37 10*3/MM3 (ref 1.7–7)
NEUTROPHILS NFR BLD AUTO: 63.5 % (ref 42.7–76)
NRBC BLD AUTO-RTO: 0 /100 WBC (ref 0–0.2)
PLATELET # BLD AUTO: 195 10*3/MM3 (ref 140–450)
PMV BLD AUTO: 9.3 FL (ref 6–12)
RBC # BLD AUTO: 3.76 10*6/MM3 (ref 3.77–5.28)
WBC NRBC COR # BLD AUTO: 5.31 10*3/MM3 (ref 3.4–10.8)

## 2024-09-12 PROCEDURE — 25010000002 EPOETIN ALFA PER 1000 UNITS: Performed by: NURSE PRACTITIONER

## 2024-09-12 PROCEDURE — 96372 THER/PROPH/DIAG INJ SC/IM: CPT

## 2024-09-12 PROCEDURE — 85025 COMPLETE CBC W/AUTO DIFF WBC: CPT

## 2024-09-12 PROCEDURE — 36415 COLL VENOUS BLD VENIPUNCTURE: CPT

## 2024-09-12 RX ORDER — SODIUM BICARBONATE 650 MG/1
650 TABLET ORAL 2 TIMES DAILY
COMMUNITY
Start: 2024-08-26

## 2024-09-12 RX ADMIN — ERYTHROPOIETIN 40000 UNITS: 40000 INJECTION, SOLUTION INTRAVENOUS; SUBCUTANEOUS at 14:57

## 2024-09-27 DIAGNOSIS — D50.9 IRON DEFICIENCY ANEMIA, UNSPECIFIED IRON DEFICIENCY ANEMIA TYPE: ICD-10-CM

## 2024-09-27 DIAGNOSIS — D63.1 ANEMIA IN STAGE 4 CHRONIC KIDNEY DISEASE: Primary | ICD-10-CM

## 2024-09-27 DIAGNOSIS — N18.4 ANEMIA IN STAGE 4 CHRONIC KIDNEY DISEASE: Primary | ICD-10-CM

## 2024-10-03 ENCOUNTER — OFFICE VISIT (OUTPATIENT)
Dept: ONCOLOGY | Facility: CLINIC | Age: 79
End: 2024-10-03
Payer: MEDICARE

## 2024-10-03 ENCOUNTER — LAB (OUTPATIENT)
Dept: LAB | Facility: HOSPITAL | Age: 79
End: 2024-10-03
Payer: MEDICARE

## 2024-10-03 ENCOUNTER — INFUSION (OUTPATIENT)
Dept: ONCOLOGY | Facility: HOSPITAL | Age: 79
End: 2024-10-03
Payer: MEDICARE

## 2024-10-03 VITALS
WEIGHT: 140.8 LBS | SYSTOLIC BLOOD PRESSURE: 122 MMHG | HEART RATE: 86 BPM | BODY MASS INDEX: 28.39 KG/M2 | OXYGEN SATURATION: 98 % | TEMPERATURE: 97.3 F | HEIGHT: 59 IN | RESPIRATION RATE: 16 BRPM | DIASTOLIC BLOOD PRESSURE: 84 MMHG

## 2024-10-03 VITALS
SYSTOLIC BLOOD PRESSURE: 177 MMHG | OXYGEN SATURATION: 97 % | HEIGHT: 59 IN | RESPIRATION RATE: 18 BRPM | BODY MASS INDEX: 28.43 KG/M2 | DIASTOLIC BLOOD PRESSURE: 60 MMHG | HEART RATE: 87 BPM | WEIGHT: 141 LBS | TEMPERATURE: 96.7 F

## 2024-10-03 DIAGNOSIS — N18.4 ANEMIA IN STAGE 4 CHRONIC KIDNEY DISEASE: Primary | ICD-10-CM

## 2024-10-03 DIAGNOSIS — I10 HTN (HYPERTENSION), BENIGN: ICD-10-CM

## 2024-10-03 DIAGNOSIS — D50.9 IRON DEFICIENCY ANEMIA, UNSPECIFIED IRON DEFICIENCY ANEMIA TYPE: Primary | ICD-10-CM

## 2024-10-03 DIAGNOSIS — D63.1 ANEMIA IN STAGE 4 CHRONIC KIDNEY DISEASE: Primary | ICD-10-CM

## 2024-10-03 DIAGNOSIS — D50.9 IRON DEFICIENCY ANEMIA, UNSPECIFIED IRON DEFICIENCY ANEMIA TYPE: ICD-10-CM

## 2024-10-03 LAB
ALBUMIN SERPL-MCNC: 3.7 G/DL (ref 3.5–5.2)
ALBUMIN/GLOB SERPL: 1.2 G/DL
ALP SERPL-CCNC: 130 U/L (ref 39–117)
ALT SERPL W P-5'-P-CCNC: 8 U/L (ref 1–33)
ANION GAP SERPL CALCULATED.3IONS-SCNC: 10 MMOL/L (ref 5–15)
AST SERPL-CCNC: 12 U/L (ref 1–32)
BASOPHILS # BLD AUTO: 0.01 10*3/MM3 (ref 0–0.2)
BASOPHILS NFR BLD AUTO: 0.2 % (ref 0–1.5)
BILIRUB SERPL-MCNC: 0.2 MG/DL (ref 0–1.2)
BUN SERPL-MCNC: 30 MG/DL (ref 8–23)
BUN/CREAT SERPL: 15.9 (ref 7–25)
CALCIUM SPEC-SCNC: 9.1 MG/DL (ref 8.6–10.5)
CHLORIDE SERPL-SCNC: 108 MMOL/L (ref 98–107)
CO2 SERPL-SCNC: 25 MMOL/L (ref 22–29)
CREAT SERPL-MCNC: 1.89 MG/DL (ref 0.57–1)
DEPRECATED RDW RBC AUTO: 50.7 FL (ref 37–54)
EGFRCR SERPLBLD CKD-EPI 2021: 26.4 ML/MIN/1.73
EOSINOPHIL # BLD AUTO: 0.16 10*3/MM3 (ref 0–0.4)
EOSINOPHIL NFR BLD AUTO: 3.1 % (ref 0.3–6.2)
ERYTHROCYTE [DISTWIDTH] IN BLOOD BY AUTOMATED COUNT: 14.7 % (ref 12.3–15.4)
FERRITIN SERPL-MCNC: 330.1 NG/ML (ref 13–150)
GLOBULIN UR ELPH-MCNC: 3.1 GM/DL
GLUCOSE SERPL-MCNC: 148 MG/DL (ref 65–99)
HCT VFR BLD AUTO: 35.5 % (ref 34–46.6)
HGB BLD-MCNC: 10.6 G/DL (ref 12–15.9)
HOLD SPECIMEN: NORMAL
IMM GRANULOCYTES # BLD AUTO: 0.02 10*3/MM3 (ref 0–0.05)
IMM GRANULOCYTES NFR BLD AUTO: 0.4 % (ref 0–0.5)
IRON 24H UR-MRATE: 87 MCG/DL (ref 37–145)
IRON SATN MFR SERPL: 33 % (ref 20–50)
LYMPHOCYTES # BLD AUTO: 1.11 10*3/MM3 (ref 0.7–3.1)
LYMPHOCYTES NFR BLD AUTO: 21.3 % (ref 19.6–45.3)
MCH RBC QN AUTO: 27.7 PG (ref 26.6–33)
MCHC RBC AUTO-ENTMCNC: 29.9 G/DL (ref 31.5–35.7)
MCV RBC AUTO: 92.9 FL (ref 79–97)
MONOCYTES # BLD AUTO: 0.42 10*3/MM3 (ref 0.1–0.9)
MONOCYTES NFR BLD AUTO: 8 % (ref 5–12)
NEUTROPHILS NFR BLD AUTO: 3.5 10*3/MM3 (ref 1.7–7)
NEUTROPHILS NFR BLD AUTO: 67 % (ref 42.7–76)
NRBC BLD AUTO-RTO: 0 /100 WBC (ref 0–0.2)
PLATELET # BLD AUTO: 183 10*3/MM3 (ref 140–450)
PMV BLD AUTO: 8.9 FL (ref 6–12)
POTASSIUM SERPL-SCNC: 4.7 MMOL/L (ref 3.5–5.2)
PROT SERPL-MCNC: 6.8 G/DL (ref 6–8.5)
RBC # BLD AUTO: 3.82 10*6/MM3 (ref 3.77–5.28)
SODIUM SERPL-SCNC: 143 MMOL/L (ref 136–145)
TIBC SERPL-MCNC: 262 MCG/DL (ref 298–536)
TRANSFERRIN SERPL-MCNC: 176 MG/DL (ref 200–360)
WBC NRBC COR # BLD AUTO: 5.22 10*3/MM3 (ref 3.4–10.8)

## 2024-10-03 PROCEDURE — 83540 ASSAY OF IRON: CPT

## 2024-10-03 PROCEDURE — 80053 COMPREHEN METABOLIC PANEL: CPT

## 2024-10-03 PROCEDURE — 96372 THER/PROPH/DIAG INJ SC/IM: CPT

## 2024-10-03 PROCEDURE — 85025 COMPLETE CBC W/AUTO DIFF WBC: CPT

## 2024-10-03 PROCEDURE — 36415 COLL VENOUS BLD VENIPUNCTURE: CPT

## 2024-10-03 PROCEDURE — 82728 ASSAY OF FERRITIN: CPT

## 2024-10-03 PROCEDURE — 25010000002 EPOETIN ALFA PER 1000 UNITS: Performed by: NURSE PRACTITIONER

## 2024-10-03 PROCEDURE — 84466 ASSAY OF TRANSFERRIN: CPT

## 2024-10-03 RX ADMIN — ERYTHROPOIETIN 40000 UNITS: 40000 INJECTION, SOLUTION INTRAVENOUS; SUBCUTANEOUS at 14:00

## 2024-10-03 NOTE — PROGRESS NOTES
[facility] HEMATOLOGY & ONCOLOGY  [address]  [facility] [address] [phone]    Patient Name: Nuha Cali  Encounter Date: 10/03/2024  YOB: 1945  Patient Number: [id number]    Progress Note    HISTORY OF PRESENT ILLNESS: Nuha Cali is a 79 y.o. female who was seen on 06/21/22 for consultation and management recommendations for anemia in chronic kidney disease and iron deficiency.  She has health history significant for  Stage IV CKD, Diabetes w/neuropathy, HTN,Hyperlipidemia, GERD, Rheumatoid Arthritis. S/P repair of rectovaginal fistula in 2018.     She had Venofer on December 9 and 16, 2022.      I have reviewed the HPI and verified with the patient the accuracy of it. No changes to history since the information was documented.  Marilu Lackey, APRN.  10/03/2024     Interval History  She presents to clinic today for continued follow-up.  She continues to complain of persistent fatigue and arthritis pain.    She has been receiving Retacrit injections for Hgb < 11 or Hct < 33.   Her last injection was 09/12/2024    She had labs drawn today and results were reviewed with patient.       LABS    Lab Results - Last 18 Months   Lab Units 10/03/24  1310 09/12/24  1358 08/22/24  1327 08/01/24  1337 07/11/24  1322 06/27/24  1318   HEMOGLOBIN g/dL 10.6* 10.3* 10.2* 9.6* 11.3* 10.5*   HEMATOCRIT % 35.5 34.6 35.1 32.6* 38.4 36.4   MCV fL 92.9 92.0 94.4 93.7 91.9 95.0   WBC 10*3/mm3 5.22 5.31 5.24 4.97 5.27 4.58   RDW % 14.7 15.2 15.0 14.9 16.3* 15.9*   MPV fL 8.9 9.3 9.7 8.5 8.6 8.6   PLATELETS 10*3/mm3 183 195 199 162 170 195   IMM GRAN % % 0.4 0.4 0.4 0.4 0.2 0.2   NEUTROS ABS 10*3/mm3 3.50 3.37 3.52 3.23 3.57 2.87   LYMPHS ABS 10*3/mm3 1.11 1.19 1.05 1.05 1.04 0.97   MONOS ABS 10*3/mm3 0.42 0.56 0.48 0.44 0.46 0.56   EOS ABS 10*3/mm3 0.16 0.15 0.15 0.21 0.16 0.14   BASOS ABS 10*3/mm3 0.01 0.02 0.02 0.02 0.03 0.03  "  IMMATURE GRANS (ABS) 10*3/mm3 0.02 0.02 0.02 0.02 0.01 0.01   NRBC /100 WBC 0.0 0.0 0.0 0.0 0.0 0.0       Lab Results - Last 18 Months   Lab Units 10/03/24  1310 07/11/24  1322 04/18/24  1301 01/25/24  1243 10/26/23  1342 09/14/23  1255   GLUCOSE mg/dL 148* 132* 208* 138* 130* 128*   SODIUM mmol/L 143 141 141 139 139 141   POTASSIUM mmol/L 4.7 5.0 4.6 4.8 5.2 5.2   CO2 mmol/L 25.0 23.0 23.0 23.0 25.0 24.0   CHLORIDE mmol/L 108* 109* 108* 105 106 107   ANION GAP mmol/L 10.0 9.0 10.0 11.0 8.0 10.0   CREATININE mg/dL 1.89* 1.94* 1.87* 2.18* 2.20* 2.04*   BUN mg/dL 30* 35* 33* 36* 35* 31*   BUN / CREAT RATIO  15.9 18.0 17.6 16.5 15.9 15.2   CALCIUM mg/dL 9.1 9.4 9.4 9.3 9.6 9.8   ALK PHOS U/L 130* 130* 105 104 102 100   TOTAL PROTEIN g/dL 6.8 7.2 6.7 6.8 7.1 7.1   ALT (SGPT) U/L 8 7 6 9 9 8   AST (SGOT) U/L 12 11 9 14 14 13   BILIRUBIN mg/dL 0.2 0.2 0.3 0.2 0.2 0.3   ALBUMIN g/dL 3.7 3.7 3.6 3.8 3.8 3.7   GLOBULIN gm/dL 3.1 3.5 3.1 3.0 3.3 3.4       No results for input(s): \"MSPIKE\", \"KAPPALAMB\", \"IGLFLC\", \"URICACID\", \"FREEKAPPAL\", \"CEA\", \"LDH\", \"REFLABREPO\" in the last 13200 hours.      Lab Results - Last 18 Months   Lab Units 10/03/24  1310 08/22/24  1327 07/11/24  1322 04/18/24  1301 01/25/24  1243 11/30/23  1345   IRON mcg/dL 87 66 40 46 62 40   TIBC mcg/dL 262* 243* 268* 267* 276* 244*   IRON SATURATION (TSAT) % 33 27 15* 17* 22 16*   FERRITIN ng/mL 330.10* 322.70* 155.50* 323.60* 337.60* 647.80*         PAST MEDICAL HISTORY:  ALLERGIES:  Allergies   Allergen Reactions    Aspirin Anaphylaxis and Swelling     CURRENT MEDICATIONS:  Outpatient Encounter Medications as of 10/3/2024   Medication Sig Dispense Refill    allopurinol (ZYLOPRIM) 100 MG tablet Take 1 tablet by mouth Daily.      amLODIPine-valsartan (EXFORGE)  MG per tablet Take 1 tablet by mouth.      carvedilol (COREG) 12.5 MG tablet 2 (Two) Times a Day With Meals.      cholecalciferol (VITAMIN D3) 1000 units tablet Take 2 tablets by mouth Daily.   "    cimetidine (TAGAMET) 200 MG tablet Take 1 tablet by mouth Daily.      ferrous sulfate 325 (65 FE) MG tablet Take 1 tablet by mouth Daily. 90 tablet 1    folic acid (FOLVITE) 1 MG tablet Take 1 tablet by mouth Daily.      furosemide (LASIX) 20 MG tablet Daily.      leflunomide (ARAVA) 20 MG tablet Take 1 tablet by mouth Daily.      polyethyl glycol-propyl glycol (SYSTANE) 0.4-0.3 % solution ophthalmic solution Administer 1 drop to both eyes Every 1 (One) Hour As Needed.      potassium chloride (K-DUR) 10 MEQ CR tablet Daily.      pravastatin (PRAVACHOL) 20 MG tablet Take 2 tablets by mouth Daily.      sodium bicarbonate 650 MG tablet Take 1 tablet by mouth 2 (Two) Times a Day. as directed       Facility-Administered Encounter Medications as of 10/3/2024   Medication Dose Route Frequency Provider Last Rate Last Admin    diphenhydrAMINE (BENADRYL) injection 50 mg  50 mg Intravenous PRN Marilu Lackey, APRELMER        famotidine (PEPCID) injection 20 mg  20 mg Intravenous PRN Marilu Lackey, APRN        Hydrocortisone Sod Suc (PF) (Solu-CORTEF) injection 100 mg  100 mg Intravenous PRN Marilu Lackey, APRN         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    PAD (peripheral artery disease) I73.9    Anemia in stage 4 chronic kidney disease N18.4, D63.1       HEALTH MAINTENANCE ITEMS:  Health Maintenance Due   Topic Date Due    URINE MICROALBUMIN  Never done    Pneumococcal Vaccine 65+ (1 of 2 - PCV) Never done    DIABETIC EYE EXAM  Never done    TDAP/TD VACCINES (1 - Tdap) Never done    ZOSTER VACCINE (1 of 2) Never done    RSV Vaccine - Adults (1 - 1-dose 75+ series) Never done    BMI FOLLOWUP  09/11/2019    DXA SCAN  01/02/2022    HEMOGLOBIN A1C   11/15/2023    LIPID PANEL  05/15/2024    INFLUENZA VACCINE  08/01/2024    COVID-19 Vaccine (5 - 2023-24 season) 09/01/2024    ANNUAL WELLNESS VISIT  11/16/2024    COLORECTAL CANCER SCREENING  01/19/2025       <no information>  Last Completed Colonoscopy       This patient has no relevant Health Maintenance data.          Immunization History   Administered Date(s) Administered    COVID-19 (MODERNA) 1st,2nd,3rd Dose Monovalent 03/31/2021, 04/28/2021    COVID-19 (MODERNA) BIVALENT 12+YRS 11/28/2022    COVID-19 (MODERNA) Monovalent Original Booster 11/19/2021     Last Completed Mammogram            MAMMOGRAM (Every 2 Years) Next due on 3/14/2026      03/14/2024  Outside Claim: HC MAMMOGRAM SCREENING BILAT DIGITAL W CAD    03/14/2024  Outside Claim: CHG SCREENING DIGITAL BREAST TOMOSYNTHESIS BI    03/10/2023  Outside Claim: HC MAMMOGRAM SCREENING BILAT DIGITAL W CAD    03/10/2023  Outside Claim: CHG SCREENING DIGITAL BREAST TOMOSYNTHESIS BI    01/25/2022  Outside Claim: HC MAMMOGRAM SCREENING BILAT DIGITAL W CAD    Only the first 5 history entries have been loaded, but more history exists.                      FAMILY HISTORY:  Family History   Problem Relation Age of Onset    Hypertension Other     Diabetes Other     Coronary artery disease Other     Cancer Other     No Known Problems Son     No Known Problems Son     No Known Problems Son     Breast cancer Other     Colon cancer Neg Hx     Colon polyps Neg Hx     Ovarian cancer Neg Hx      SOCIAL HISTORY:  Social History     Socioeconomic History    Marital status:    Tobacco Use    Smoking status: Never    Smokeless tobacco: Never   Vaping Use    Vaping status: Never Used   Substance and Sexual Activity    Alcohol use: No    Drug use: No    Sexual activity: Defer     Birth control/protection: Surgical       REVIEW OF SYSTEMS:  Review of Systems   Constitutional:  Positive for fatigue.   HENT:  Negative for swollen glands and trouble swallowing.    Eyes:         " Macular degeneration. Gets monthly injections     Respiratory:  Negative for shortness of breath and wheezing.    Cardiovascular:  Negative for chest pain and palpitations.   Gastrointestinal:  Negative for abdominal pain, blood in stool, nausea and vomiting.   Genitourinary:  Negative for difficulty urinating, dysuria and hematuria.   Musculoskeletal:  Positive for back pain.        Currently on a tapering dose of prednisone for arthitis   Neurological:         Diabetic Neuropathy   Psychiatric/Behavioral:  Negative for suicidal ideas and depressed mood.      I have reviewed the ROS and verified with the patient the accuracy of it. No changes since the information was documented.  Marilu GAFFNEY Ziyad, APRN 10/03/2024       /84   Pulse 86   Temp 97.3 °F (36.3 °C)   Resp 16   Ht 149.9 cm (59\")   Wt 63.9 kg (140 lb 12.8 oz)   SpO2 98%   BMI 28.44 kg/m²  Body surface area is 1.59 meters squared.    Pain Score    10/03/24 1319   PainSc: 0-No pain         Physical Exam  Constitutional:       Appearance: Normal appearance.   HENT:      Head: Normocephalic and atraumatic.   Eyes:      Extraocular Movements: Extraocular movements intact.   Cardiovascular:      Rate and Rhythm: Normal rate and regular rhythm.   Pulmonary:      Effort: Pulmonary effort is normal.      Breath sounds: Normal breath sounds.   Abdominal:      General: Bowel sounds are normal.      Palpations: Abdomen is soft.   Musculoskeletal:      Right lower leg: Edema present.      Left lower leg: Edema present.   Skin:     General: Skin is warm and dry.   Neurological:      General: No focal deficit present.      Mental Status: She is alert and oriented to person, place, and time.   Psychiatric:         Mood and Affect: Mood normal.         Behavior: Behavior normal.     .I have reviewed the PHYSICAL EXAM and the accuracy of it. No changes since the information was documented.  Marilu Lackey, APRN 10/03/2024    Nuha Cali reports a pain " score of 0.  Given her pain assessment as noted, treatment options were discussed and the following options were decided upon as a follow-up plan to address the patient's pain: continuation of current treatment plan for pain.     ASSESSMENT / PLAN:    1. Anemia in stage 4 chronic kidney disease    2. HTN (hypertension), benign    3. Iron deficiency anemia, unspecified iron deficiency anemia type       1.  Anemia in Chronic Kidney Disease Stage IV  2.  Iron Deficiency   -Pt received Venofer infusion on December 9, and 16 2022  -Received Venofer 200 mg IV on 11/9/23.    Last Retacrit injection 9/12/2024  Labs today:  Hgb 10.6, Hct 35.5, Iron 87, Ferritin 330, Sat 33, TIBC 262   -We will continue Retacrit today  -Pt is taking oral iron once daily      3.  Hypertension / Edema  -BP today is 177/60  -On Lasix, Amlodipine-Valsartan, Coreg  -Managed  By PCP and nephrology   -Advised pt to monitor blood pressure with CHANTALE use      PLAN:  Labs and shot in two weeks then go to once per month.   Continue current medications, treatment plans and follow up with PCP and any other providers  RTC in 3 months   Pre office labs for CBC, CMP, Iron Profile and Ferritin   Care discussed with patient.  Understanding expressed.  Patient agreeable with      Health Maintenance   Colonoscopy 2015 Diverticulosis.  Pt does not want to do another one.   Mammogram 01/25/22  Dexa Scan - 2016 Osteopenia        CINDY Torres  10/03/2024

## 2024-10-31 ENCOUNTER — LAB (OUTPATIENT)
Dept: LAB | Facility: HOSPITAL | Age: 79
End: 2024-10-31
Payer: MEDICARE

## 2024-10-31 ENCOUNTER — INFUSION (OUTPATIENT)
Dept: ONCOLOGY | Facility: HOSPITAL | Age: 79
End: 2024-10-31
Payer: MEDICARE

## 2024-10-31 VITALS
DIASTOLIC BLOOD PRESSURE: 54 MMHG | OXYGEN SATURATION: 95 % | RESPIRATION RATE: 18 BRPM | HEART RATE: 87 BPM | SYSTOLIC BLOOD PRESSURE: 148 MMHG | TEMPERATURE: 97.4 F

## 2024-10-31 DIAGNOSIS — D63.1 ANEMIA IN STAGE 4 CHRONIC KIDNEY DISEASE: ICD-10-CM

## 2024-10-31 DIAGNOSIS — N18.4 ANEMIA IN STAGE 4 CHRONIC KIDNEY DISEASE: ICD-10-CM

## 2024-10-31 DIAGNOSIS — D63.1 ANEMIA IN STAGE 4 CHRONIC KIDNEY DISEASE: Primary | ICD-10-CM

## 2024-10-31 DIAGNOSIS — N18.4 ANEMIA IN STAGE 4 CHRONIC KIDNEY DISEASE: Primary | ICD-10-CM

## 2024-10-31 LAB
BASOPHILS # BLD AUTO: 0.02 10*3/MM3 (ref 0–0.2)
BASOPHILS NFR BLD AUTO: 0.4 % (ref 0–1.5)
DEPRECATED RDW RBC AUTO: 47.8 FL (ref 37–54)
EOSINOPHIL # BLD AUTO: 0.18 10*3/MM3 (ref 0–0.4)
EOSINOPHIL NFR BLD AUTO: 3.8 % (ref 0.3–6.2)
ERYTHROCYTE [DISTWIDTH] IN BLOOD BY AUTOMATED COUNT: 14.1 % (ref 12.3–15.4)
HCT VFR BLD AUTO: 36.1 % (ref 34–46.6)
HGB BLD-MCNC: 10.7 G/DL (ref 12–15.9)
IMM GRANULOCYTES # BLD AUTO: 0.01 10*3/MM3 (ref 0–0.05)
IMM GRANULOCYTES NFR BLD AUTO: 0.2 % (ref 0–0.5)
LYMPHOCYTES # BLD AUTO: 1.06 10*3/MM3 (ref 0.7–3.1)
LYMPHOCYTES NFR BLD AUTO: 22.1 % (ref 19.6–45.3)
MCH RBC QN AUTO: 27.5 PG (ref 26.6–33)
MCHC RBC AUTO-ENTMCNC: 29.6 G/DL (ref 31.5–35.7)
MCV RBC AUTO: 92.8 FL (ref 79–97)
MONOCYTES # BLD AUTO: 0.51 10*3/MM3 (ref 0.1–0.9)
MONOCYTES NFR BLD AUTO: 10.6 % (ref 5–12)
NEUTROPHILS NFR BLD AUTO: 3.02 10*3/MM3 (ref 1.7–7)
NEUTROPHILS NFR BLD AUTO: 62.9 % (ref 42.7–76)
NRBC BLD AUTO-RTO: 0 /100 WBC (ref 0–0.2)
PLATELET # BLD AUTO: 176 10*3/MM3 (ref 140–450)
PMV BLD AUTO: 9.4 FL (ref 6–12)
RBC # BLD AUTO: 3.89 10*6/MM3 (ref 3.77–5.28)
WBC NRBC COR # BLD AUTO: 4.8 10*3/MM3 (ref 3.4–10.8)

## 2024-10-31 PROCEDURE — 25010000002 EPOETIN ALFA PER 1000 UNITS: Performed by: NURSE PRACTITIONER

## 2024-10-31 PROCEDURE — 96372 THER/PROPH/DIAG INJ SC/IM: CPT

## 2024-10-31 PROCEDURE — 36415 COLL VENOUS BLD VENIPUNCTURE: CPT

## 2024-10-31 PROCEDURE — 85025 COMPLETE CBC W/AUTO DIFF WBC: CPT

## 2024-10-31 RX ADMIN — ERYTHROPOIETIN 40000 UNITS: 40000 INJECTION, SOLUTION INTRAVENOUS; SUBCUTANEOUS at 15:01

## 2024-12-05 ENCOUNTER — INFUSION (OUTPATIENT)
Dept: ONCOLOGY | Facility: HOSPITAL | Age: 79
End: 2024-12-05
Payer: MEDICARE

## 2024-12-05 ENCOUNTER — LAB (OUTPATIENT)
Dept: LAB | Facility: HOSPITAL | Age: 79
End: 2024-12-05
Payer: MEDICARE

## 2024-12-05 VITALS
SYSTOLIC BLOOD PRESSURE: 157 MMHG | HEART RATE: 92 BPM | RESPIRATION RATE: 18 BRPM | HEIGHT: 59 IN | BODY MASS INDEX: 28.63 KG/M2 | WEIGHT: 142 LBS | OXYGEN SATURATION: 97 % | TEMPERATURE: 97 F | DIASTOLIC BLOOD PRESSURE: 59 MMHG

## 2024-12-05 DIAGNOSIS — N18.4 ANEMIA IN STAGE 4 CHRONIC KIDNEY DISEASE: Primary | ICD-10-CM

## 2024-12-05 DIAGNOSIS — N18.4 ANEMIA IN STAGE 4 CHRONIC KIDNEY DISEASE: ICD-10-CM

## 2024-12-05 DIAGNOSIS — D63.1 ANEMIA IN STAGE 4 CHRONIC KIDNEY DISEASE: ICD-10-CM

## 2024-12-05 DIAGNOSIS — D63.1 ANEMIA IN STAGE 4 CHRONIC KIDNEY DISEASE: Primary | ICD-10-CM

## 2024-12-05 LAB
BASOPHILS # BLD AUTO: 0.03 10*3/MM3 (ref 0–0.2)
BASOPHILS NFR BLD AUTO: 0.5 % (ref 0–1.5)
DEPRECATED RDW RBC AUTO: 48.2 FL (ref 37–54)
EOSINOPHIL # BLD AUTO: 0.18 10*3/MM3 (ref 0–0.4)
EOSINOPHIL NFR BLD AUTO: 3 % (ref 0.3–6.2)
ERYTHROCYTE [DISTWIDTH] IN BLOOD BY AUTOMATED COUNT: 14.3 % (ref 12.3–15.4)
HCT VFR BLD AUTO: 31.7 % (ref 34–46.6)
HGB BLD-MCNC: 9.5 G/DL (ref 12–15.9)
IMM GRANULOCYTES # BLD AUTO: 0.01 10*3/MM3 (ref 0–0.05)
IMM GRANULOCYTES NFR BLD AUTO: 0.2 % (ref 0–0.5)
LYMPHOCYTES # BLD AUTO: 1.37 10*3/MM3 (ref 0.7–3.1)
LYMPHOCYTES NFR BLD AUTO: 22.5 % (ref 19.6–45.3)
MCH RBC QN AUTO: 27.6 PG (ref 26.6–33)
MCHC RBC AUTO-ENTMCNC: 30 G/DL (ref 31.5–35.7)
MCV RBC AUTO: 92.2 FL (ref 79–97)
MONOCYTES # BLD AUTO: 0.58 10*3/MM3 (ref 0.1–0.9)
MONOCYTES NFR BLD AUTO: 9.5 % (ref 5–12)
NEUTROPHILS NFR BLD AUTO: 3.92 10*3/MM3 (ref 1.7–7)
NEUTROPHILS NFR BLD AUTO: 64.3 % (ref 42.7–76)
NRBC BLD AUTO-RTO: 0 /100 WBC (ref 0–0.2)
PLATELET # BLD AUTO: 172 10*3/MM3 (ref 140–450)
PMV BLD AUTO: 9 FL (ref 6–12)
RBC # BLD AUTO: 3.44 10*6/MM3 (ref 3.77–5.28)
WBC NRBC COR # BLD AUTO: 6.09 10*3/MM3 (ref 3.4–10.8)

## 2024-12-05 PROCEDURE — 96372 THER/PROPH/DIAG INJ SC/IM: CPT

## 2024-12-05 PROCEDURE — 25010000002 EPOETIN ALFA PER 1000 UNITS: Performed by: NURSE PRACTITIONER

## 2024-12-05 PROCEDURE — 85025 COMPLETE CBC W/AUTO DIFF WBC: CPT

## 2024-12-05 PROCEDURE — 36415 COLL VENOUS BLD VENIPUNCTURE: CPT

## 2024-12-05 RX ADMIN — ERYTHROPOIETIN 40000 UNITS: 40000 INJECTION, SOLUTION INTRAVENOUS; SUBCUTANEOUS at 14:31

## 2025-01-02 ENCOUNTER — INFUSION (OUTPATIENT)
Dept: ONCOLOGY | Facility: HOSPITAL | Age: 80
End: 2025-01-02
Payer: MEDICARE

## 2025-01-02 ENCOUNTER — LAB (OUTPATIENT)
Dept: LAB | Facility: HOSPITAL | Age: 80
End: 2025-01-02
Payer: MEDICARE

## 2025-01-02 VITALS
WEIGHT: 141 LBS | DIASTOLIC BLOOD PRESSURE: 49 MMHG | HEART RATE: 95 BPM | HEIGHT: 59 IN | SYSTOLIC BLOOD PRESSURE: 149 MMHG | BODY MASS INDEX: 28.43 KG/M2 | RESPIRATION RATE: 18 BRPM | TEMPERATURE: 97.2 F | OXYGEN SATURATION: 97 %

## 2025-01-02 DIAGNOSIS — D63.1 ANEMIA IN STAGE 4 CHRONIC KIDNEY DISEASE: Primary | ICD-10-CM

## 2025-01-02 DIAGNOSIS — D63.1 ANEMIA IN STAGE 4 CHRONIC KIDNEY DISEASE: ICD-10-CM

## 2025-01-02 DIAGNOSIS — D50.9 IRON DEFICIENCY ANEMIA, UNSPECIFIED IRON DEFICIENCY ANEMIA TYPE: ICD-10-CM

## 2025-01-02 DIAGNOSIS — N18.4 ANEMIA IN STAGE 4 CHRONIC KIDNEY DISEASE: ICD-10-CM

## 2025-01-02 DIAGNOSIS — N18.4 ANEMIA IN STAGE 4 CHRONIC KIDNEY DISEASE: Primary | ICD-10-CM

## 2025-01-02 LAB
ALBUMIN SERPL-MCNC: 3.8 G/DL (ref 3.5–5.2)
ALBUMIN/GLOB SERPL: 1.2 G/DL
ALP SERPL-CCNC: 131 U/L (ref 39–117)
ALT SERPL W P-5'-P-CCNC: 8 U/L (ref 1–33)
ANION GAP SERPL CALCULATED.3IONS-SCNC: 8 MMOL/L (ref 5–15)
AST SERPL-CCNC: 11 U/L (ref 1–32)
BASOPHILS # BLD AUTO: 0.02 10*3/MM3 (ref 0–0.2)
BASOPHILS NFR BLD AUTO: 0.3 % (ref 0–1.5)
BILIRUB SERPL-MCNC: <0.2 MG/DL (ref 0–1.2)
BUN SERPL-MCNC: 32 MG/DL (ref 8–23)
BUN/CREAT SERPL: 14.7 (ref 7–25)
CALCIUM SPEC-SCNC: 9.2 MG/DL (ref 8.6–10.5)
CHLORIDE SERPL-SCNC: 108 MMOL/L (ref 98–107)
CO2 SERPL-SCNC: 24 MMOL/L (ref 22–29)
CREAT SERPL-MCNC: 2.17 MG/DL (ref 0.57–1)
DEPRECATED RDW RBC AUTO: 49.7 FL (ref 37–54)
EGFRCR SERPLBLD CKD-EPI 2021: 22.4 ML/MIN/1.73
EOSINOPHIL # BLD AUTO: 0.13 10*3/MM3 (ref 0–0.4)
EOSINOPHIL NFR BLD AUTO: 2.3 % (ref 0.3–6.2)
ERYTHROCYTE [DISTWIDTH] IN BLOOD BY AUTOMATED COUNT: 14.7 % (ref 12.3–15.4)
FERRITIN SERPL-MCNC: 397.4 NG/ML (ref 13–150)
GLOBULIN UR ELPH-MCNC: 3.2 GM/DL
GLUCOSE SERPL-MCNC: 131 MG/DL (ref 65–99)
HCT VFR BLD AUTO: 30.5 % (ref 34–46.6)
HGB BLD-MCNC: 9.1 G/DL (ref 12–15.9)
IMM GRANULOCYTES # BLD AUTO: 0.02 10*3/MM3 (ref 0–0.05)
IMM GRANULOCYTES NFR BLD AUTO: 0.3 % (ref 0–0.5)
IRON 24H UR-MRATE: 70 MCG/DL (ref 37–145)
IRON SATN MFR SERPL: 27 % (ref 20–50)
LYMPHOCYTES # BLD AUTO: 1.31 10*3/MM3 (ref 0.7–3.1)
LYMPHOCYTES NFR BLD AUTO: 22.7 % (ref 19.6–45.3)
MCH RBC QN AUTO: 27.6 PG (ref 26.6–33)
MCHC RBC AUTO-ENTMCNC: 29.8 G/DL (ref 31.5–35.7)
MCV RBC AUTO: 92.4 FL (ref 79–97)
MONOCYTES # BLD AUTO: 0.58 10*3/MM3 (ref 0.1–0.9)
MONOCYTES NFR BLD AUTO: 10.1 % (ref 5–12)
NEUTROPHILS NFR BLD AUTO: 3.71 10*3/MM3 (ref 1.7–7)
NEUTROPHILS NFR BLD AUTO: 64.3 % (ref 42.7–76)
NRBC BLD AUTO-RTO: 0 /100 WBC (ref 0–0.2)
PLATELET # BLD AUTO: 193 10*3/MM3 (ref 140–450)
PMV BLD AUTO: 9.4 FL (ref 6–12)
POTASSIUM SERPL-SCNC: 4.8 MMOL/L (ref 3.5–5.2)
PROT SERPL-MCNC: 7 G/DL (ref 6–8.5)
RBC # BLD AUTO: 3.3 10*6/MM3 (ref 3.77–5.28)
SODIUM SERPL-SCNC: 140 MMOL/L (ref 136–145)
TIBC SERPL-MCNC: 261 MCG/DL (ref 298–536)
TRANSFERRIN SERPL-MCNC: 175 MG/DL (ref 200–360)
WBC NRBC COR # BLD AUTO: 5.77 10*3/MM3 (ref 3.4–10.8)

## 2025-01-02 PROCEDURE — 83540 ASSAY OF IRON: CPT

## 2025-01-02 PROCEDURE — 25010000002 EPOETIN ALFA PER 1000 UNITS: Performed by: NURSE PRACTITIONER

## 2025-01-02 PROCEDURE — 96372 THER/PROPH/DIAG INJ SC/IM: CPT

## 2025-01-02 PROCEDURE — 80053 COMPREHEN METABOLIC PANEL: CPT

## 2025-01-02 PROCEDURE — 82728 ASSAY OF FERRITIN: CPT

## 2025-01-02 PROCEDURE — 36415 COLL VENOUS BLD VENIPUNCTURE: CPT

## 2025-01-02 PROCEDURE — 85025 COMPLETE CBC W/AUTO DIFF WBC: CPT

## 2025-01-02 PROCEDURE — 84466 ASSAY OF TRANSFERRIN: CPT

## 2025-01-02 RX ADMIN — ERYTHROPOIETIN 40000 UNITS: 40000 INJECTION, SOLUTION INTRAVENOUS; SUBCUTANEOUS at 13:27

## 2025-01-08 DIAGNOSIS — D50.9 IRON DEFICIENCY ANEMIA, UNSPECIFIED IRON DEFICIENCY ANEMIA TYPE: ICD-10-CM

## 2025-01-08 DIAGNOSIS — D63.1 ANEMIA IN STAGE 4 CHRONIC KIDNEY DISEASE: ICD-10-CM

## 2025-01-08 DIAGNOSIS — N18.4 ANEMIA IN STAGE 4 CHRONIC KIDNEY DISEASE: ICD-10-CM

## 2025-01-08 RX ORDER — FERROUS SULFATE 325(65) MG
325 TABLET ORAL DAILY
Qty: 90 TABLET | Refills: 1 | Status: SHIPPED | OUTPATIENT
Start: 2025-01-08

## 2025-01-29 DIAGNOSIS — D50.9 IRON DEFICIENCY ANEMIA, UNSPECIFIED IRON DEFICIENCY ANEMIA TYPE: Primary | ICD-10-CM

## 2025-02-11 ENCOUNTER — TELEPHONE (OUTPATIENT)
Dept: ONCOLOGY | Facility: CLINIC | Age: 80
End: 2025-02-11

## 2025-02-11 NOTE — TELEPHONE ENCOUNTER
Caller: Nuha Cali    Relationship to patient: Self    Best call back number: [phone]    Type of visit: LAB, FOLLOW UP, AND INJECTION    Requested date: ANY THURSDAY AFTERNOON     If rescheduling, when is the original appointment: 01/30     Additional notes: PLEASE CALL TO R/S.

## 2025-03-03 ENCOUNTER — LAB (OUTPATIENT)
Dept: LAB | Facility: HOSPITAL | Age: 80
End: 2025-03-03
Payer: MEDICARE

## 2025-03-03 ENCOUNTER — INFUSION (OUTPATIENT)
Dept: ONCOLOGY | Facility: HOSPITAL | Age: 80
End: 2025-03-03
Payer: MEDICARE

## 2025-03-03 ENCOUNTER — OFFICE VISIT (OUTPATIENT)
Dept: ONCOLOGY | Facility: CLINIC | Age: 80
End: 2025-03-03
Payer: MEDICARE

## 2025-03-03 VITALS
TEMPERATURE: 97.8 F | SYSTOLIC BLOOD PRESSURE: 177 MMHG | OXYGEN SATURATION: 97 % | HEART RATE: 81 BPM | RESPIRATION RATE: 18 BRPM | DIASTOLIC BLOOD PRESSURE: 55 MMHG

## 2025-03-03 VITALS
TEMPERATURE: 97.4 F | RESPIRATION RATE: 16 BRPM | SYSTOLIC BLOOD PRESSURE: 122 MMHG | WEIGHT: 139.2 LBS | BODY MASS INDEX: 28.06 KG/M2 | DIASTOLIC BLOOD PRESSURE: 88 MMHG | HEIGHT: 59 IN | HEART RATE: 81 BPM | OXYGEN SATURATION: 97 %

## 2025-03-03 DIAGNOSIS — D63.1 ANEMIA IN STAGE 4 CHRONIC KIDNEY DISEASE: Primary | ICD-10-CM

## 2025-03-03 DIAGNOSIS — N18.4 ANEMIA IN STAGE 4 CHRONIC KIDNEY DISEASE: Primary | ICD-10-CM

## 2025-03-03 DIAGNOSIS — D50.9 IRON DEFICIENCY ANEMIA, UNSPECIFIED IRON DEFICIENCY ANEMIA TYPE: ICD-10-CM

## 2025-03-03 DIAGNOSIS — D50.9 IRON DEFICIENCY ANEMIA, UNSPECIFIED IRON DEFICIENCY ANEMIA TYPE: Primary | ICD-10-CM

## 2025-03-03 LAB
ALBUMIN SERPL-MCNC: 3.8 G/DL (ref 3.5–5.2)
ALBUMIN/GLOB SERPL: 1.1 G/DL
ALP SERPL-CCNC: 113 U/L (ref 39–117)
ALT SERPL W P-5'-P-CCNC: 10 U/L (ref 1–33)
ANION GAP SERPL CALCULATED.3IONS-SCNC: 10 MMOL/L (ref 5–15)
AST SERPL-CCNC: 13 U/L (ref 1–32)
BASOPHILS # BLD AUTO: 0.03 10*3/MM3 (ref 0–0.2)
BASOPHILS NFR BLD AUTO: 0.5 % (ref 0–1.5)
BILIRUB SERPL-MCNC: 0.2 MG/DL (ref 0–1.2)
BUN SERPL-MCNC: 34 MG/DL (ref 8–23)
BUN/CREAT SERPL: 15.2 (ref 7–25)
CALCIUM SPEC-SCNC: 9.5 MG/DL (ref 8.6–10.5)
CHLORIDE SERPL-SCNC: 109 MMOL/L (ref 98–107)
CO2 SERPL-SCNC: 23 MMOL/L (ref 22–29)
CREAT SERPL-MCNC: 2.23 MG/DL (ref 0.57–1)
DEPRECATED RDW RBC AUTO: 50.8 FL (ref 37–54)
EGFRCR SERPLBLD CKD-EPI 2021: 21.5 ML/MIN/1.73
EOSINOPHIL # BLD AUTO: 0.2 10*3/MM3 (ref 0–0.4)
EOSINOPHIL NFR BLD AUTO: 3.3 % (ref 0.3–6.2)
ERYTHROCYTE [DISTWIDTH] IN BLOOD BY AUTOMATED COUNT: 14.8 % (ref 12.3–15.4)
FERRITIN SERPL-MCNC: 524.3 NG/ML (ref 13–150)
GLOBULIN UR ELPH-MCNC: 3.4 GM/DL
GLUCOSE SERPL-MCNC: 111 MG/DL (ref 65–99)
HCT VFR BLD AUTO: 27.4 % (ref 34–46.6)
HGB BLD-MCNC: 8 G/DL (ref 12–15.9)
HOLD SPECIMEN: NORMAL
IMM GRANULOCYTES # BLD AUTO: 0.02 10*3/MM3 (ref 0–0.05)
IMM GRANULOCYTES NFR BLD AUTO: 0.3 % (ref 0–0.5)
IRON 24H UR-MRATE: 74 MCG/DL (ref 37–145)
IRON SATN MFR SERPL: 29 % (ref 20–50)
LYMPHOCYTES # BLD AUTO: 1.2 10*3/MM3 (ref 0.7–3.1)
LYMPHOCYTES NFR BLD AUTO: 19.8 % (ref 19.6–45.3)
MCH RBC QN AUTO: 27.5 PG (ref 26.6–33)
MCHC RBC AUTO-ENTMCNC: 29.2 G/DL (ref 31.5–35.7)
MCV RBC AUTO: 94.2 FL (ref 79–97)
MONOCYTES # BLD AUTO: 0.48 10*3/MM3 (ref 0.1–0.9)
MONOCYTES NFR BLD AUTO: 7.9 % (ref 5–12)
NEUTROPHILS NFR BLD AUTO: 4.13 10*3/MM3 (ref 1.7–7)
NEUTROPHILS NFR BLD AUTO: 68.2 % (ref 42.7–76)
NRBC BLD AUTO-RTO: 0 /100 WBC (ref 0–0.2)
PLATELET # BLD AUTO: 178 10*3/MM3 (ref 140–450)
PMV BLD AUTO: 8.3 FL (ref 6–12)
POTASSIUM SERPL-SCNC: 4.9 MMOL/L (ref 3.5–5.2)
PROT SERPL-MCNC: 7.2 G/DL (ref 6–8.5)
RBC # BLD AUTO: 2.91 10*6/MM3 (ref 3.77–5.28)
SODIUM SERPL-SCNC: 142 MMOL/L (ref 136–145)
TIBC SERPL-MCNC: 256 MCG/DL (ref 298–536)
TRANSFERRIN SERPL-MCNC: 172 MG/DL (ref 200–360)
WBC NRBC COR # BLD AUTO: 6.06 10*3/MM3 (ref 3.4–10.8)

## 2025-03-03 PROCEDURE — 1126F AMNT PAIN NOTED NONE PRSNT: CPT | Performed by: NURSE PRACTITIONER

## 2025-03-03 PROCEDURE — 80053 COMPREHEN METABOLIC PANEL: CPT

## 2025-03-03 PROCEDURE — 3074F SYST BP LT 130 MM HG: CPT | Performed by: NURSE PRACTITIONER

## 2025-03-03 PROCEDURE — 36415 COLL VENOUS BLD VENIPUNCTURE: CPT

## 2025-03-03 PROCEDURE — 96372 THER/PROPH/DIAG INJ SC/IM: CPT

## 2025-03-03 PROCEDURE — 85025 COMPLETE CBC W/AUTO DIFF WBC: CPT

## 2025-03-03 PROCEDURE — 82728 ASSAY OF FERRITIN: CPT

## 2025-03-03 PROCEDURE — 3079F DIAST BP 80-89 MM HG: CPT | Performed by: NURSE PRACTITIONER

## 2025-03-03 PROCEDURE — 83540 ASSAY OF IRON: CPT

## 2025-03-03 PROCEDURE — 25010000002 EPOETIN ALFA PER 1000 UNITS: Performed by: NURSE PRACTITIONER

## 2025-03-03 PROCEDURE — 84466 ASSAY OF TRANSFERRIN: CPT

## 2025-03-03 PROCEDURE — 99214 OFFICE O/P EST MOD 30 MIN: CPT | Performed by: NURSE PRACTITIONER

## 2025-03-03 RX ADMIN — ERYTHROPOIETIN 40000 UNITS: 40000 INJECTION, SOLUTION INTRAVENOUS; SUBCUTANEOUS at 11:42

## 2025-03-03 NOTE — PROGRESS NOTES
[facility] HEMATOLOGY & ONCOLOGY  [address]  [facility] [address] [phone]    Patient Name: Nuha Cali  Encounter Date: 03/03/2025  YOB: 1945  Patient Number: [id number]    Progress Note    HISTORY OF PRESENT ILLNESS: Nuha Cali is a 80 y.o. female who was seen on 06/21/22 for consultation and management recommendations for anemia in chronic kidney disease and iron deficiency.  She has health history significant for  Stage IV CKD, Diabetes w/neuropathy, HTN,Hyperlipidemia, GERD, Rheumatoid Arthritis. S/P repair of rectovaginal fistula in 2018.     She had Venofer on December 9 and 16, 2022.      I have reviewed the HPI and verified with the patient the accuracy of it. No changes to history since the information was documented.  Marilu Lackey, APRN.  03/03/2025     Interval History  She presents to clinic today for continued follow-up.     She states she missed her last injection r/t flu.      She had labs drawn today and results were reviewed with patient.       LABS    Lab Results - Last 18 Months   Lab Units 03/03/25  1044 01/02/25  1244 12/05/24  1343 10/31/24  1354 10/03/24  1310 09/12/24  1358   HEMOGLOBIN g/dL 8.0* 9.1* 9.5* 10.7* 10.6* 10.3*   HEMATOCRIT % 27.4* 30.5* 31.7* 36.1 35.5 34.6   MCV fL 94.2 92.4 92.2 92.8 92.9 92.0   WBC 10*3/mm3 6.06 5.77 6.09 4.80 5.22 5.31   RDW % 14.8 14.7 14.3 14.1 14.7 15.2   MPV fL 8.3 9.4 9.0 9.4 8.9 9.3   PLATELETS 10*3/mm3 178 193 172 176 183 195   IMM GRAN % % 0.3 0.3 0.2 0.2 0.4 0.4   NEUTROS ABS 10*3/mm3 4.13 3.71 3.92 3.02 3.50 3.37   LYMPHS ABS 10*3/mm3 1.20 1.31 1.37 1.06 1.11 1.19   MONOS ABS 10*3/mm3 0.48 0.58 0.58 0.51 0.42 0.56   EOS ABS 10*3/mm3 0.20 0.13 0.18 0.18 0.16 0.15   BASOS ABS 10*3/mm3 0.03 0.02 0.03 0.02 0.01 0.02   IMMATURE GRANS (ABS) 10*3/mm3 0.02 0.02 0.01 0.01 0.02 0.02   NRBC /100 WBC 0.0 0.0 0.0 0.0 0.0 0.0       Lab Results - Last 18  "Months   Lab Units 01/02/25  1244 10/03/24  1310 07/11/24  1322 04/18/24  1301 01/25/24  1243 10/26/23  1342   GLUCOSE mg/dL 131* 148* 132* 208* 138* 130*   SODIUM mmol/L 140 143 141 141 139 139   POTASSIUM mmol/L 4.8 4.7 5.0 4.6 4.8 5.2   CO2 mmol/L 24.0 25.0 23.0 23.0 23.0 25.0   CHLORIDE mmol/L 108* 108* 109* 108* 105 106   ANION GAP mmol/L 8.0 10.0 9.0 10.0 11.0 8.0   CREATININE mg/dL 2.17* 1.89* 1.94* 1.87* 2.18* 2.20*   BUN mg/dL 32* 30* 35* 33* 36* 35*   BUN / CREAT RATIO  14.7 15.9 18.0 17.6 16.5 15.9   CALCIUM mg/dL 9.2 9.1 9.4 9.4 9.3 9.6   ALK PHOS U/L 131* 130* 130* 105 104 102   TOTAL PROTEIN g/dL 7.0 6.8 7.2 6.7 6.8 7.1   ALT (SGPT) U/L 8 8 7 6 9 9   AST (SGOT) U/L 11 12 11 9 14 14   BILIRUBIN mg/dL <0.2 0.2 0.2 0.3 0.2 0.2   ALBUMIN g/dL 3.8 3.7 3.7 3.6 3.8 3.8   GLOBULIN gm/dL 3.2 3.1 3.5 3.1 3.0 3.3       No results for input(s): \"MSPIKE\", \"KAPPALAMB\", \"IGLFLC\", \"URICACID\", \"FREEKAPPAL\", \"CEA\", \"LDH\", \"REFLABREPO\" in the last [id number] hours.      Lab Results - Last 18 Months   Lab Units 01/02/25  1244 10/03/24  1310 08/22/24  1327 07/11/24  1322 04/18/24  1301 01/25/24  1243   IRON mcg/dL 70 87 66 40 46 62   TIBC mcg/dL 261* 262* 243* 268* 267* 276*   IRON SATURATION (TSAT) % 27 33 27 15* 17* 22   FERRITIN ng/mL 397.40* 330.10* 322.70* 155.50* 323.60* 337.60*         PAST MEDICAL HISTORY:  ALLERGIES:  Allergies   Allergen Reactions    Aspirin Anaphylaxis and Swelling     CURRENT MEDICATIONS:  Outpatient Encounter Medications as of 3/3/2025   Medication Sig Dispense Refill    allopurinol (ZYLOPRIM) 100 MG tablet Take 1 tablet by mouth Daily.      amLODIPine-valsartan (EXFORGE)  MG per tablet Take 1 tablet by mouth.      carvedilol (COREG) 12.5 MG tablet 2 (Two) Times a Day With Meals.      cholecalciferol (VITAMIN D3) 1000 units tablet Take 2 tablets by mouth Daily.      cimetidine (TAGAMET) 200 MG tablet Take 1 tablet by mouth Daily.      ferrous sulfate 325 (65 FE) MG tablet Take 1 tablet by " mouth Daily. 90 tablet 1    folic acid (FOLVITE) 1 MG tablet Take 1 tablet by mouth Daily.      furosemide (LASIX) 20 MG tablet Daily.      leflunomide (ARAVA) 20 MG tablet Take 1 tablet by mouth Daily.      polyethyl glycol-propyl glycol (SYSTANE) 0.4-0.3 % solution ophthalmic solution Administer 1 drop to both eyes Every 1 (One) Hour As Needed.      potassium chloride (K-DUR) 10 MEQ CR tablet Daily.      pravastatin (PRAVACHOL) 20 MG tablet Take 2 tablets by mouth Daily.      sodium bicarbonate 650 MG tablet Take 1 tablet by mouth 2 (Two) Times a Day. as directed       Facility-Administered Encounter Medications as of 3/3/2025   Medication Dose Route Frequency Provider Last Rate Last Admin    diphenhydrAMINE (BENADRYL) injection 50 mg  50 mg Intravenous PRN Marilu Lackey, CINDY        famotidine (PEPCID) injection 20 mg  20 mg Intravenous PRN Marilu Lackey, CINDY        Hydrocortisone Sod Suc (PF) (Solu-CORTEF) injection 100 mg  100 mg Intravenous PRN Marilu Lackey, CINDY         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    PAD (peripheral artery disease) I73.9    Anemia in stage 4 chronic kidney disease N18.4, D63.1       HEALTH MAINTENANCE ITEMS:  Health Maintenance Due   Topic Date Due    DIABETIC FOOT EXAM  Never done    DIABETIC EYE EXAM  Never done    URINE MICROALBUMIN-CREATININE RATIO (uACR)  Never done    Pneumococcal Vaccine 50+ (1 of 2 - PCV) Never done    TDAP/TD VACCINES (1 - Tdap) Never done    ZOSTER VACCINE (1 of 2) Never done    RSV Vaccine - Adults (1 - 1-dose 75+ series) Never done    BMI FOLLOWUP  09/11/2019    DXA SCAN  01/02/2022    HEMOGLOBIN A1C  11/15/2023    LIPID PANEL  05/15/2024    COVID-19 Vaccine (5 - 2024-25 season)  09/01/2024    COLORECTAL CANCER SCREENING  01/19/2025       <no information>  Last Completed Colonoscopy       This patient has no relevant Health Maintenance data.          Immunization History   Administered Date(s) Administered    COVID-19 (MODERNA) 1st,2nd,3rd Dose Monovalent 03/31/2021, 04/28/2021    COVID-19 (MODERNA) BIVALENT 12+YRS 11/28/2022    COVID-19 (MODERNA) Monovalent Original Booster 11/19/2021     Last Completed Mammogram            Discontinued - MAMMOGRAM  Discontinued        Frequency changed to Never automatically (Topic No Longer Applies)    03/14/2024  Outside Claim: HC MAMMOGRAM SCREENING BILAT DIGITAL W CAD    03/14/2024  Outside Claim: CHG SCREENING DIGITAL BREAST TOMOSYNTHESIS BI    03/10/2023  Outside Claim: HC MAMMOGRAM SCREENING BILAT DIGITAL W CAD    03/10/2023  Outside Claim: CHG SCREENING DIGITAL BREAST TOMOSYNTHESIS BI    Only the first 5 history entries have been loaded, but more history exists.                      FAMILY HISTORY:  Family History   Problem Relation Age of Onset    Hypertension Other     Diabetes Other     Coronary artery disease Other     Cancer Other     No Known Problems Son     No Known Problems Son     No Known Problems Son     Breast cancer Other     Colon cancer Neg Hx     Colon polyps Neg Hx     Ovarian cancer Neg Hx      SOCIAL HISTORY:  Social History     Socioeconomic History    Marital status:    Tobacco Use    Smoking status: Never    Smokeless tobacco: Never   Vaping Use    Vaping status: Never Used   Substance and Sexual Activity    Alcohol use: No    Drug use: No    Sexual activity: Defer     Birth control/protection: Surgical       REVIEW OF SYSTEMS:  Review of Systems   Constitutional:  Positive for fatigue.   HENT:  Negative for swollen glands and trouble swallowing.    Eyes:         Macular degeneration. Gets monthly injections     Respiratory:  Negative for shortness of breath and wheezing.    Cardiovascular:  Negative for chest pain  "and palpitations.   Gastrointestinal:  Negative for abdominal pain, blood in stool, nausea and vomiting.   Genitourinary:  Negative for difficulty urinating, dysuria and hematuria.   Musculoskeletal:  Positive for back pain.        Currently on a tapering dose of prednisone for arthitis   Neurological:         Diabetic Neuropathy   Psychiatric/Behavioral:  Negative for suicidal ideas and depressed mood.      I have reviewed the ROS and verified with the patient the accuracy of it. No changes since the information was documented.  Marilu Lackey, APRN 03/03/2025       /88   Pulse 81   Temp 97.4 °F (36.3 °C)   Resp 16   Ht 149.9 cm (59\")   Wt 63.1 kg (139 lb 3.2 oz)   SpO2 97%   BMI 28.11 kg/m²  Body surface area is 1.58 meters squared.    Pain Score    03/03/25 1057   PainSc: 0-No pain         Physical Exam  Constitutional:       Appearance: Normal appearance.   HENT:      Head: Normocephalic and atraumatic.   Eyes:      Extraocular Movements: Extraocular movements intact.   Cardiovascular:      Rate and Rhythm: Normal rate and regular rhythm.   Pulmonary:      Effort: Pulmonary effort is normal.      Breath sounds: Normal breath sounds.   Abdominal:      General: Bowel sounds are normal.      Palpations: Abdomen is soft.   Musculoskeletal:      Right lower leg: Edema present.      Left lower leg: Edema present.   Skin:     General: Skin is warm and dry.   Neurological:      General: No focal deficit present.      Mental Status: She is alert and oriented to person, place, and time.   Psychiatric:         Mood and Affect: Mood normal.         Behavior: Behavior normal.     .I have reviewed the PHYSICAL EXAM and the accuracy of it. No changes since the information was documented.  Marilu Lackey, APRN 03/03/2025    Nuha Cali reports a pain score of 0.  Given her pain assessment as noted, treatment options were discussed and the following options were decided upon as a follow-up plan to address the " patient's pain: continuation of current treatment plan for pain.     ASSESSMENT / PLAN:    1. Anemia in stage 4 chronic kidney disease    2. Iron deficiency anemia, unspecified iron deficiency anemia type         1.  Anemia in Chronic Kidney Disease Stage IV  2.  Iron Deficiency   -Pt received Venofer infusion on December 9, and 16 2022  -Received Venofer 200 mg IV on 11/9/23.    -Pt is taking oral iron once daily  -Labs today:  GFR 21.5, Iron 74, Ferritin 524, Sat 29%, TIBC 256   Hgb 8.0, Hct 27.4  -We will continue Retacrit today  -Increase frequency of Retacrit to biweekly        Hypertension    -On Lasix, Amlodipine-Valsartan, Coreg  -Managed  By PCP and nephrology   -Advised pt to monitor blood pressure with CHANTALE use      PLAN:   Continue Retacrit today and increase frequency to biweekly  Continue current medications, treatment plans and follow up with PCP and any other providers  RTC in 3 months   Pre office labs for CBC, CMP, Iron Profile and Ferritin   Care discussed with patient.  Understanding expressed.  Patient agreeable with      Health Maintenance   Colonoscopy 2015 Diverticulosis.  Pt does not want to do another one.   Mammogram 01/25/22  Dexa Scan - 2016 Osteopenia        Marilu Lackey, CINDY  03/03/2025

## 2025-03-17 ENCOUNTER — LAB (OUTPATIENT)
Dept: LAB | Facility: HOSPITAL | Age: 80
End: 2025-03-17
Payer: MEDICARE

## 2025-03-17 ENCOUNTER — INFUSION (OUTPATIENT)
Dept: ONCOLOGY | Facility: HOSPITAL | Age: 80
End: 2025-03-17
Payer: MEDICARE

## 2025-03-17 VITALS
DIASTOLIC BLOOD PRESSURE: 62 MMHG | RESPIRATION RATE: 18 BRPM | SYSTOLIC BLOOD PRESSURE: 158 MMHG | HEART RATE: 85 BPM | WEIGHT: 139.6 LBS | TEMPERATURE: 98.2 F | OXYGEN SATURATION: 97 % | HEIGHT: 59 IN | BODY MASS INDEX: 28.14 KG/M2

## 2025-03-17 DIAGNOSIS — N18.4 ANEMIA IN STAGE 4 CHRONIC KIDNEY DISEASE: ICD-10-CM

## 2025-03-17 DIAGNOSIS — N18.4 ANEMIA IN STAGE 4 CHRONIC KIDNEY DISEASE: Primary | ICD-10-CM

## 2025-03-17 DIAGNOSIS — D63.1 ANEMIA IN STAGE 4 CHRONIC KIDNEY DISEASE: Primary | ICD-10-CM

## 2025-03-17 DIAGNOSIS — D63.1 ANEMIA IN STAGE 4 CHRONIC KIDNEY DISEASE: ICD-10-CM

## 2025-03-17 LAB
BASOPHILS # BLD AUTO: 0.02 10*3/MM3 (ref 0–0.2)
BASOPHILS NFR BLD AUTO: 0.4 % (ref 0–1.5)
DEPRECATED RDW RBC AUTO: 59.1 FL (ref 37–54)
EOSINOPHIL # BLD AUTO: 0.16 10*3/MM3 (ref 0–0.4)
EOSINOPHIL NFR BLD AUTO: 3.3 % (ref 0.3–6.2)
ERYTHROCYTE [DISTWIDTH] IN BLOOD BY AUTOMATED COUNT: 16.5 % (ref 12.3–15.4)
HCT VFR BLD AUTO: 31.1 % (ref 34–46.6)
HGB BLD-MCNC: 9 G/DL (ref 12–15.9)
IMM GRANULOCYTES # BLD AUTO: 0.01 10*3/MM3 (ref 0–0.05)
IMM GRANULOCYTES NFR BLD AUTO: 0.2 % (ref 0–0.5)
LYMPHOCYTES # BLD AUTO: 1.06 10*3/MM3 (ref 0.7–3.1)
LYMPHOCYTES NFR BLD AUTO: 21.8 % (ref 19.6–45.3)
MCH RBC QN AUTO: 28.3 PG (ref 26.6–33)
MCHC RBC AUTO-ENTMCNC: 28.9 G/DL (ref 31.5–35.7)
MCV RBC AUTO: 97.8 FL (ref 79–97)
MONOCYTES # BLD AUTO: 0.48 10*3/MM3 (ref 0.1–0.9)
MONOCYTES NFR BLD AUTO: 9.9 % (ref 5–12)
NEUTROPHILS NFR BLD AUTO: 3.14 10*3/MM3 (ref 1.7–7)
NEUTROPHILS NFR BLD AUTO: 64.4 % (ref 42.7–76)
NRBC BLD AUTO-RTO: 0 /100 WBC (ref 0–0.2)
PLATELET # BLD AUTO: 141 10*3/MM3 (ref 140–450)
PMV BLD AUTO: 9 FL (ref 6–12)
RBC # BLD AUTO: 3.18 10*6/MM3 (ref 3.77–5.28)
WBC NRBC COR # BLD AUTO: 4.87 10*3/MM3 (ref 3.4–10.8)

## 2025-03-17 PROCEDURE — 96372 THER/PROPH/DIAG INJ SC/IM: CPT

## 2025-03-17 PROCEDURE — 25010000002 EPOETIN ALFA PER 1000 UNITS: Performed by: NURSE PRACTITIONER

## 2025-03-17 PROCEDURE — 85025 COMPLETE CBC W/AUTO DIFF WBC: CPT

## 2025-03-17 PROCEDURE — 36415 COLL VENOUS BLD VENIPUNCTURE: CPT

## 2025-03-17 RX ADMIN — ERYTHROPOIETIN 40000 UNITS: 40000 INJECTION, SOLUTION INTRAVENOUS; SUBCUTANEOUS at 10:46

## 2025-03-19 ENCOUNTER — TELEPHONE (OUTPATIENT)
Dept: VASCULAR SURGERY | Facility: CLINIC | Age: 80
End: 2025-03-19
Payer: MEDICARE

## 2025-03-20 ENCOUNTER — HOSPITAL ENCOUNTER (OUTPATIENT)
Dept: ULTRASOUND IMAGING | Facility: HOSPITAL | Age: 80
Discharge: HOME | End: 2025-03-20
Payer: MEDICARE

## 2025-03-20 ENCOUNTER — OFFICE VISIT (OUTPATIENT)
Dept: VASCULAR SURGERY | Facility: CLINIC | Age: 80
End: 2025-03-20
Payer: MEDICARE

## 2025-03-20 VITALS
SYSTOLIC BLOOD PRESSURE: 140 MMHG | BODY MASS INDEX: 27.21 KG/M2 | HEIGHT: 59 IN | WEIGHT: 135 LBS | OXYGEN SATURATION: 98 % | HEART RATE: 86 BPM | DIASTOLIC BLOOD PRESSURE: 76 MMHG

## 2025-03-20 DIAGNOSIS — I73.9 PAD (PERIPHERAL ARTERY DISEASE): ICD-10-CM

## 2025-03-20 DIAGNOSIS — E11.9 CONTROLLED TYPE 2 DIABETES MELLITUS WITHOUT COMPLICATION, WITHOUT LONG-TERM CURRENT USE OF INSULIN: ICD-10-CM

## 2025-03-20 DIAGNOSIS — I10 HTN (HYPERTENSION), BENIGN: ICD-10-CM

## 2025-03-20 DIAGNOSIS — I65.23 BILATERAL CAROTID ARTERY STENOSIS: ICD-10-CM

## 2025-03-20 DIAGNOSIS — I73.9 PAD (PERIPHERAL ARTERY DISEASE): Primary | ICD-10-CM

## 2025-03-20 PROCEDURE — 93923 UPR/LXTR ART STDY 3+ LVLS: CPT

## 2025-03-20 PROCEDURE — 93880 EXTRACRANIAL BILAT STUDY: CPT

## 2025-03-20 NOTE — LETTER
March 20, 2025     Juan Echavarria MD  [address]  [address]  [place] [address]    Patient: Nuha Cali   YOB: 1945   Date of Visit: 3/20/2025     Dear Juan Echavarria MD:       Thank you for referring Nuha Cali to me for evaluation. Below are the relevant portions of my assessment and plan of care.    If you have questions, please do not hesitate to call me. I look forward to following Nuha along with you.         Sincerely,        CINDY Kelly        CC: No Recipients    Yvonne Brooks APRN  03/20/25 1657  Sign when Signing Visit  3/20/2025       Juan Echavarria MD  [address]  [address]  [place] [address]    Nuha Cali  1945    Chief Complaint   Patient presents with   • Follow-up     1 year follow up w/ testing. Last seen 3/28/24. Patient denies any stroke type symptoms.        Dear Juan Echavarria MD   I had the pleasure of seeing your patient Nuha Cali in the office today.   As you recall, Nuha Cali is a 80 y.o.  female who we are following for lower extremity PAD.  She previously underwent left lower extremity angiogram in 2021 and unfortunately was not able to cross through distal AT chronic total occlusion.  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  She has no further reports of wounds.  She is maintained on Pravachol.  She denies any strokelike symptoms.  She did have noninvasive testing performed which I did review in office.    Review of Systems   Constitutional: Negative.    HENT: Negative.     Eyes: Negative.    Respiratory: Negative.     Cardiovascular:  Positive for leg swelling.   Gastrointestinal: Negative.    Endocrine: Negative.    Genitourinary: Negative.    Musculoskeletal:  Positive for arthralgias.   Skin: Negative.    Allergic/Immunologic: Negative.    Neurological: Negative.    Hematological: Negative.    Psychiatric/Behavioral: Negative.         /76   Pulse 86   Ht 149.9 cm  "(59\")   Wt 61.2 kg (135 lb)   SpO2 98%   BMI 27.27 kg/m²   Physical Exam  Vitals and nursing note reviewed.   Constitutional:       General: She is not in acute distress.     Appearance: Normal appearance. She is well-developed and overweight. She is not diaphoretic.   HENT:      Head: Normocephalic and atraumatic.   Eyes:      General: No scleral icterus.     Pupils: Pupils are equal, round, and reactive to light.   Neck:      Thyroid: No thyromegaly.      Vascular: No carotid bruit or JVD.   Cardiovascular:      Rate and Rhythm: Normal rate and regular rhythm.      Pulses:           Dorsalis pedis pulses are detected w/ Doppler on the right side and detected w/ Doppler on the left side.        Posterior tibial pulses are detected w/ Doppler on the right side and detected w/ Doppler on the left side.      Heart sounds: Normal heart sounds and S2 normal. No murmur heard.     No friction rub. No gallop.   Pulmonary:      Effort: Pulmonary effort is normal.      Breath sounds: Normal breath sounds.   Abdominal:      General: Bowel sounds are normal.      Palpations: Abdomen is soft.   Musculoskeletal:         General: Swelling present. Normal range of motion.      Cervical back: Normal range of motion and neck supple.   Skin:     General: Skin is warm and dry.   Neurological:      Mental Status: She is alert and oriented to person, place, and time.      Cranial Nerves: No cranial nerve deficit.   Psychiatric:         Behavior: Behavior normal. Behavior is cooperative.         Thought Content: Thought content normal.         Judgment: Judgment normal.        Diagnostic data:  US Carotid Bilateral  Result Date: 3/20/2025  Narrative: History: Carotid occlusive disease      Impression: Impression: 1. There is less than 50% stenosis of the right internal carotid artery. 2. There is less than 50% stenosis of the left internal carotid artery. 3. Antegrade flow is demonstrated in bilateral vertebral arteries.  Comments: " Bilateral carotid vertebral arterial duplex scan was performed.  Grayscale imaging shows intimal thickening and calcified elements at the carotid bifurcation. The right internal carotid artery peak systolic velocity is 94.6 cm/sec. The end-diastolic velocity is 16.8 cm/sec. The right ICA/CCA ratio is approximately 0.8. These findings correlate with less than 50% stenosis of the right internal carotid artery.  Grayscale imaging shows intimal thickening and calcified elements at the carotid bifurcation. The left internal carotid artery peak systolic velocity is 89.3 cm/sec. The end-diastolic velocity is 11 cm/sec. The left ICA/CCA ratio is approximately 1.2. These findings correlate with less than 50% stenosis of the left internal carotid artery.  Antegrade flow is demonstrated in bilateral vertebral arteries.   This report was signed and finalized on 3/20/2025 2:00 PM by Dr. Peter Huddleston MD.      US Ankle / Brachial Indices Extremity Complete  Result Date: 3/20/2025  Narrative: History: PAD  Comments: Bilateral lower extremity arterial with multi-level pulse volume recordings and segmental pressures were performed at rest and stress.  The right ankle/brachial index is not obtainable due to noncompressibility of the vessels. The waveforms are biphasic without dampening.  The left ankle/brachial index is not obtainable due to noncompressibility of the vessels. The waveforms are biphasic without dampening.      Impression: Impression: 1. The right ankle/brachial index was not obtainable due to noncompressibility of the vessels. 2. The left ankle/brachial index was not obtainable due to noncompressibility of the vessels. 3. The waveforms are biphasic and well maintained at the ankle.   This report was signed and finalized on 3/20/2025 1:56 PM by Dr. Peter Huddleston MD.         Patient Active Problem List   Diagnosis   • Anemia   • Heme positive stool   • HTN (hypertension), benign   • Change in bowel habits   •  Controlled type 2 diabetes mellitus without complication, without long-term current use of insulin   • Colovaginal fistula   • PAD (peripheral artery disease)   • Anemia in stage 4 chronic kidney disease         ICD-10-CM ICD-9-CM   1. PAD (peripheral artery disease)  I73.9 443.9   2. Bilateral carotid artery stenosis  I65.23 433.10     433.30   3. Controlled type 2 diabetes mellitus without complication, without long-term current use of insulin  E11.9 250.00   4. HTN (hypertension), benign  I10 401.1         Plan: After thoroughly evaluating Nuha Cali, I believe the best course of action is to remain conservative from vascular surgery standpoint.  Currently she is doing well and denies any strokelike symptoms or problems to her lower extremities.  She does have leg swelling that is overall unchanged.  I did review her testing which shows less than 50% carotid stenosis bilaterally and ABIs show noncompressible however waveforms are biphasic and maintained at the ankle.   She should continue on her Pravachol 20 mg daily in addition to her other medications.  Body mass index is 27.27 kg/m².  This was all discussed in full with complete understanding.  Thank you for allowing me to participate in the care of your patient.  Please do not hesitate with any questions or concerns.  I will keep you aware of any further encounters with Nuha Cali.        Sincerely yours,         CINDY Kelly

## 2025-03-20 NOTE — PROGRESS NOTES
"3/20/2025       Juan Echavarria MD  [address]  [place] [address]    Nuha Cali  1945    Chief Complaint   Patient presents with    Follow-up     1 year follow up w/ testing. Last seen 3/28/24. Patient denies any stroke type symptoms.        Dear Juan Echavarria MD   I had the pleasure of seeing your patient Nuha Cali in the office today.   As you recall, Nuha Cali is a 80 y.o.  female who we are following for lower extremity PAD.  She previously underwent left lower extremity angiogram in 2021 and unfortunately was not able to cross through distal AT chronic total occlusion.  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  She has no further reports of wounds.  She is maintained on Pravachol.  She denies any strokelike symptoms.  She did have noninvasive testing performed which I did review in office.    Review of Systems   Constitutional: Negative.    HENT: Negative.     Eyes: Negative.    Respiratory: Negative.     Cardiovascular:  Positive for leg swelling.   Gastrointestinal: Negative.    Endocrine: Negative.    Genitourinary: Negative.    Musculoskeletal:  Positive for arthralgias.   Skin: Negative.    Allergic/Immunologic: Negative.    Neurological: Negative.    Hematological: Negative.    Psychiatric/Behavioral: Negative.         /76   Pulse 86   Ht 149.9 cm (59\")   Wt 61.2 kg (135 lb)   SpO2 98%   BMI 27.27 kg/m²   Physical Exam  Vitals and nursing note reviewed.   Constitutional:       General: She is not in acute distress.     Appearance: Normal appearance. She is well-developed and overweight. She is not diaphoretic.   HENT:      Head: Normocephalic and atraumatic.   Eyes:      General: No scleral icterus.     Pupils: Pupils are equal, round, and reactive to light.   Neck:      Thyroid: No thyromegaly.      Vascular: No carotid bruit or JVD.   Cardiovascular:      Rate and Rhythm: Normal rate and regular rhythm.      Pulses:  "          Dorsalis pedis pulses are detected w/ Doppler on the right side and detected w/ Doppler on the left side.        Posterior tibial pulses are detected w/ Doppler on the right side and detected w/ Doppler on the left side.      Heart sounds: Normal heart sounds and S2 normal. No murmur heard.     No friction rub. No gallop.   Pulmonary:      Effort: Pulmonary effort is normal.      Breath sounds: Normal breath sounds.   Abdominal:      General: Bowel sounds are normal.      Palpations: Abdomen is soft.   Musculoskeletal:         General: Swelling present. Normal range of motion.      Cervical back: Normal range of motion and neck supple.   Skin:     General: Skin is warm and dry.   Neurological:      Mental Status: She is alert and oriented to person, place, and time.      Cranial Nerves: No cranial nerve deficit.   Psychiatric:         Behavior: Behavior normal. Behavior is cooperative.         Thought Content: Thought content normal.         Judgment: Judgment normal.        Diagnostic data:  US Carotid Bilateral  Result Date: 3/20/2025  Narrative: History: Carotid occlusive disease      Impression: Impression: 1. There is less than 50% stenosis of the right internal carotid artery. 2. There is less than 50% stenosis of the left internal carotid artery. 3. Antegrade flow is demonstrated in bilateral vertebral arteries.  Comments: Bilateral carotid vertebral arterial duplex scan was performed.  Grayscale imaging shows intimal thickening and calcified elements at the carotid bifurcation. The right internal carotid artery peak systolic velocity is 94.6 cm/sec. The end-diastolic velocity is 16.8 cm/sec. The right ICA/CCA ratio is approximately 0.8. These findings correlate with less than 50% stenosis of the right internal carotid artery.  Grayscale imaging shows intimal thickening and calcified elements at the carotid bifurcation. The left internal carotid artery peak systolic velocity is 89.3 cm/sec. The  end-diastolic velocity is 11 cm/sec. The left ICA/CCA ratio is approximately 1.2. These findings correlate with less than 50% stenosis of the left internal carotid artery.  Antegrade flow is demonstrated in bilateral vertebral arteries.   This report was signed and finalized on 3/20/2025 2:00 PM by Dr. Peter Huddleston MD.      US Ankle / Brachial Indices Extremity Complete  Result Date: 3/20/2025  Narrative: History: PAD  Comments: Bilateral lower extremity arterial with multi-level pulse volume recordings and segmental pressures were performed at rest and stress.  The right ankle/brachial index is not obtainable due to noncompressibility of the vessels. The waveforms are biphasic without dampening.  The left ankle/brachial index is not obtainable due to noncompressibility of the vessels. The waveforms are biphasic without dampening.      Impression: Impression: 1. The right ankle/brachial index was not obtainable due to noncompressibility of the vessels. 2. The left ankle/brachial index was not obtainable due to noncompressibility of the vessels. 3. The waveforms are biphasic and well maintained at the ankle.   This report was signed and finalized on 3/20/2025 1:56 PM by Dr. Peter Huddleston MD.         Patient Active Problem List   Diagnosis    Anemia    Heme positive stool    HTN (hypertension), benign    Change in bowel habits    Controlled type 2 diabetes mellitus without complication, without long-term current use of insulin    Colovaginal fistula    PAD (peripheral artery disease)    Anemia in stage 4 chronic kidney disease         ICD-10-CM ICD-9-CM   1. PAD (peripheral artery disease)  I73.9 443.9   2. Bilateral carotid artery stenosis  I65.23 433.10     433.30   3. Controlled type 2 diabetes mellitus without complication, without long-term current use of insulin  E11.9 250.00   4. HTN (hypertension), benign  I10 401.1         Plan: After thoroughly evaluating Nuha Cali, I believe the best course  of action is to remain conservative from vascular surgery standpoint.  Currently she is doing well and denies any strokelike symptoms or problems to her lower extremities.  She does have leg swelling that is overall unchanged.  I did review her testing which shows less than 50% carotid stenosis bilaterally and ABIs show noncompressible however waveforms are biphasic and maintained at the ankle.   She should continue on her Pravachol 20 mg daily in addition to her other medications.  Body mass index is 27.27 kg/m².  This was all discussed in full with complete understanding.  Thank you for allowing me to participate in the care of your patient.  Please do not hesitate with any questions or concerns.  I will keep you aware of any further encounters with Nuha Cali.        Sincerely yours,         CINDY Kelly

## 2025-03-31 ENCOUNTER — LAB (OUTPATIENT)
Dept: LAB | Facility: HOSPITAL | Age: 80
End: 2025-03-31
Payer: MEDICARE

## 2025-03-31 ENCOUNTER — INFUSION (OUTPATIENT)
Dept: ONCOLOGY | Facility: HOSPITAL | Age: 80
End: 2025-03-31
Payer: MEDICARE

## 2025-03-31 VITALS
WEIGHT: 140.2 LBS | OXYGEN SATURATION: 96 % | TEMPERATURE: 98.2 F | HEIGHT: 59 IN | BODY MASS INDEX: 28.26 KG/M2 | HEART RATE: 89 BPM | DIASTOLIC BLOOD PRESSURE: 87 MMHG | RESPIRATION RATE: 18 BRPM | SYSTOLIC BLOOD PRESSURE: 174 MMHG

## 2025-03-31 DIAGNOSIS — D63.1 ANEMIA IN STAGE 4 CHRONIC KIDNEY DISEASE: Primary | ICD-10-CM

## 2025-03-31 DIAGNOSIS — N18.4 ANEMIA IN STAGE 4 CHRONIC KIDNEY DISEASE: Primary | ICD-10-CM

## 2025-03-31 LAB
BASOPHILS # BLD AUTO: 0.03 10*3/MM3 (ref 0–0.2)
BASOPHILS NFR BLD AUTO: 0.4 % (ref 0–1.5)
DEPRECATED RDW RBC AUTO: 53.2 FL (ref 37–54)
EOSINOPHIL # BLD AUTO: 0.16 10*3/MM3 (ref 0–0.4)
EOSINOPHIL NFR BLD AUTO: 2.4 % (ref 0.3–6.2)
ERYTHROCYTE [DISTWIDTH] IN BLOOD BY AUTOMATED COUNT: 15.2 % (ref 12.3–15.4)
HCT VFR BLD AUTO: 34.2 % (ref 34–46.6)
HGB BLD-MCNC: 10.3 G/DL (ref 12–15.9)
HOLD SPECIMEN: NORMAL
IMM GRANULOCYTES # BLD AUTO: 0.02 10*3/MM3 (ref 0–0.05)
IMM GRANULOCYTES NFR BLD AUTO: 0.3 % (ref 0–0.5)
LYMPHOCYTES # BLD AUTO: 1.24 10*3/MM3 (ref 0.7–3.1)
LYMPHOCYTES NFR BLD AUTO: 18.6 % (ref 19.6–45.3)
MCH RBC QN AUTO: 28.9 PG (ref 26.6–33)
MCHC RBC AUTO-ENTMCNC: 30.1 G/DL (ref 31.5–35.7)
MCV RBC AUTO: 95.8 FL (ref 79–97)
MONOCYTES # BLD AUTO: 0.51 10*3/MM3 (ref 0.1–0.9)
MONOCYTES NFR BLD AUTO: 7.6 % (ref 5–12)
NEUTROPHILS NFR BLD AUTO: 4.72 10*3/MM3 (ref 1.7–7)
NEUTROPHILS NFR BLD AUTO: 70.7 % (ref 42.7–76)
NRBC BLD AUTO-RTO: 0 /100 WBC (ref 0–0.2)
PLATELET # BLD AUTO: 151 10*3/MM3 (ref 140–450)
PMV BLD AUTO: 9.2 FL (ref 6–12)
RBC # BLD AUTO: 3.57 10*6/MM3 (ref 3.77–5.28)
WBC NRBC COR # BLD AUTO: 6.68 10*3/MM3 (ref 3.4–10.8)

## 2025-03-31 PROCEDURE — 85025 COMPLETE CBC W/AUTO DIFF WBC: CPT

## 2025-03-31 PROCEDURE — 36415 COLL VENOUS BLD VENIPUNCTURE: CPT

## 2025-03-31 PROCEDURE — 96372 THER/PROPH/DIAG INJ SC/IM: CPT

## 2025-03-31 PROCEDURE — 25010000002 EPOETIN ALFA PER 1000 UNITS: Performed by: NURSE PRACTITIONER

## 2025-03-31 RX ADMIN — ERYTHROPOIETIN 40000 UNITS: 40000 INJECTION, SOLUTION INTRAVENOUS; SUBCUTANEOUS at 10:31

## 2025-03-31 NOTE — PROGRESS NOTES
S/w ERNA Dale/Chaz regarding guidelines for Retacrit.  Per ERNA Dale:  To adminsiter 40,000 units biweekly when less than 11 or 33. RUFINA Neri

## 2025-04-17 ENCOUNTER — INFUSION (OUTPATIENT)
Dept: ONCOLOGY | Facility: HOSPITAL | Age: 80
End: 2025-04-17
Payer: MEDICARE

## 2025-04-17 ENCOUNTER — LAB (OUTPATIENT)
Dept: LAB | Facility: HOSPITAL | Age: 80
End: 2025-04-17
Payer: MEDICARE

## 2025-04-17 VITALS
HEIGHT: 59 IN | OXYGEN SATURATION: 95 % | HEART RATE: 80 BPM | DIASTOLIC BLOOD PRESSURE: 80 MMHG | RESPIRATION RATE: 18 BRPM | SYSTOLIC BLOOD PRESSURE: 120 MMHG | BODY MASS INDEX: 28.22 KG/M2 | WEIGHT: 140 LBS | TEMPERATURE: 96.3 F

## 2025-04-17 DIAGNOSIS — D63.1 ANEMIA IN STAGE 4 CHRONIC KIDNEY DISEASE: Primary | ICD-10-CM

## 2025-04-17 DIAGNOSIS — N18.4 ANEMIA IN STAGE 4 CHRONIC KIDNEY DISEASE: ICD-10-CM

## 2025-04-17 DIAGNOSIS — D63.1 ANEMIA IN STAGE 4 CHRONIC KIDNEY DISEASE: ICD-10-CM

## 2025-04-17 DIAGNOSIS — N18.4 ANEMIA IN STAGE 4 CHRONIC KIDNEY DISEASE: Primary | ICD-10-CM

## 2025-04-17 LAB
BASOPHILS # BLD AUTO: 0.04 10*3/MM3 (ref 0–0.2)
BASOPHILS NFR BLD AUTO: 0.8 % (ref 0–1.5)
DEPRECATED RDW RBC AUTO: 50.4 FL (ref 37–54)
EOSINOPHIL # BLD AUTO: 0.2 10*3/MM3 (ref 0–0.4)
EOSINOPHIL NFR BLD AUTO: 4.1 % (ref 0.3–6.2)
ERYTHROCYTE [DISTWIDTH] IN BLOOD BY AUTOMATED COUNT: 14.6 % (ref 12.3–15.4)
HCT VFR BLD AUTO: 38.4 % (ref 34–46.6)
HGB BLD-MCNC: 11.4 G/DL (ref 12–15.9)
IMM GRANULOCYTES # BLD AUTO: 0.01 10*3/MM3 (ref 0–0.05)
IMM GRANULOCYTES NFR BLD AUTO: 0.2 % (ref 0–0.5)
LYMPHOCYTES # BLD AUTO: 1.13 10*3/MM3 (ref 0.7–3.1)
LYMPHOCYTES NFR BLD AUTO: 23 % (ref 19.6–45.3)
MCH RBC QN AUTO: 28.2 PG (ref 26.6–33)
MCHC RBC AUTO-ENTMCNC: 29.7 G/DL (ref 31.5–35.7)
MCV RBC AUTO: 95 FL (ref 79–97)
MONOCYTES # BLD AUTO: 0.33 10*3/MM3 (ref 0.1–0.9)
MONOCYTES NFR BLD AUTO: 6.7 % (ref 5–12)
NEUTROPHILS NFR BLD AUTO: 3.2 10*3/MM3 (ref 1.7–7)
NEUTROPHILS NFR BLD AUTO: 65.2 % (ref 42.7–76)
NRBC BLD AUTO-RTO: 0 /100 WBC (ref 0–0.2)
PLATELET # BLD AUTO: 151 10*3/MM3 (ref 140–450)
PMV BLD AUTO: 8.4 FL (ref 6–12)
RBC # BLD AUTO: 4.04 10*6/MM3 (ref 3.77–5.28)
WBC NRBC COR # BLD AUTO: 4.91 10*3/MM3 (ref 3.4–10.8)

## 2025-04-17 PROCEDURE — 85025 COMPLETE CBC W/AUTO DIFF WBC: CPT

## 2025-04-17 PROCEDURE — 36415 COLL VENOUS BLD VENIPUNCTURE: CPT

## 2025-04-17 PROCEDURE — G0463 HOSPITAL OUTPT CLINIC VISIT: HCPCS

## 2025-05-01 ENCOUNTER — INFUSION (OUTPATIENT)
Dept: ONCOLOGY | Facility: HOSPITAL | Age: 80
End: 2025-05-01
Payer: MEDICARE

## 2025-05-01 ENCOUNTER — LAB (OUTPATIENT)
Dept: LAB | Facility: HOSPITAL | Age: 80
End: 2025-05-01
Payer: MEDICARE

## 2025-05-01 VITALS
OXYGEN SATURATION: 94 % | WEIGHT: 139 LBS | SYSTOLIC BLOOD PRESSURE: 179 MMHG | RESPIRATION RATE: 18 BRPM | HEIGHT: 59 IN | DIASTOLIC BLOOD PRESSURE: 67 MMHG | TEMPERATURE: 97.4 F | HEART RATE: 81 BPM | BODY MASS INDEX: 28.02 KG/M2

## 2025-05-01 DIAGNOSIS — N18.4 ANEMIA IN STAGE 4 CHRONIC KIDNEY DISEASE: Primary | ICD-10-CM

## 2025-05-01 DIAGNOSIS — N18.4 ANEMIA IN STAGE 4 CHRONIC KIDNEY DISEASE: ICD-10-CM

## 2025-05-01 DIAGNOSIS — D63.1 ANEMIA IN STAGE 4 CHRONIC KIDNEY DISEASE: ICD-10-CM

## 2025-05-01 DIAGNOSIS — D63.1 ANEMIA IN STAGE 4 CHRONIC KIDNEY DISEASE: Primary | ICD-10-CM

## 2025-05-01 LAB
BASOPHILS # BLD AUTO: 0.03 10*3/MM3 (ref 0–0.2)
BASOPHILS NFR BLD AUTO: 0.6 % (ref 0–1.5)
DEPRECATED RDW RBC AUTO: 46.6 FL (ref 37–54)
EOSINOPHIL # BLD AUTO: 0.19 10*3/MM3 (ref 0–0.4)
EOSINOPHIL NFR BLD AUTO: 3.8 % (ref 0.3–6.2)
ERYTHROCYTE [DISTWIDTH] IN BLOOD BY AUTOMATED COUNT: 13.8 % (ref 12.3–15.4)
HCT VFR BLD AUTO: 37.1 % (ref 34–46.6)
HGB BLD-MCNC: 11 G/DL (ref 12–15.9)
IMM GRANULOCYTES # BLD AUTO: 0.01 10*3/MM3 (ref 0–0.05)
IMM GRANULOCYTES NFR BLD AUTO: 0.2 % (ref 0–0.5)
LYMPHOCYTES # BLD AUTO: 1.26 10*3/MM3 (ref 0.7–3.1)
LYMPHOCYTES NFR BLD AUTO: 25.1 % (ref 19.6–45.3)
MCH RBC QN AUTO: 27.4 PG (ref 26.6–33)
MCHC RBC AUTO-ENTMCNC: 29.6 G/DL (ref 31.5–35.7)
MCV RBC AUTO: 92.5 FL (ref 79–97)
MONOCYTES # BLD AUTO: 0.45 10*3/MM3 (ref 0.1–0.9)
MONOCYTES NFR BLD AUTO: 9 % (ref 5–12)
NEUTROPHILS NFR BLD AUTO: 3.07 10*3/MM3 (ref 1.7–7)
NEUTROPHILS NFR BLD AUTO: 61.3 % (ref 42.7–76)
NRBC BLD AUTO-RTO: 0 /100 WBC (ref 0–0.2)
PLATELET # BLD AUTO: 170 10*3/MM3 (ref 140–450)
PMV BLD AUTO: 9.4 FL (ref 6–12)
RBC # BLD AUTO: 4.01 10*6/MM3 (ref 3.77–5.28)
WBC NRBC COR # BLD AUTO: 5.01 10*3/MM3 (ref 3.4–10.8)

## 2025-05-01 PROCEDURE — 36415 COLL VENOUS BLD VENIPUNCTURE: CPT

## 2025-05-01 PROCEDURE — G0463 HOSPITAL OUTPT CLINIC VISIT: HCPCS

## 2025-05-01 PROCEDURE — 85025 COMPLETE CBC W/AUTO DIFF WBC: CPT

## 2025-05-15 ENCOUNTER — LAB (OUTPATIENT)
Dept: LAB | Facility: HOSPITAL | Age: 80
End: 2025-05-15
Payer: MEDICARE

## 2025-05-15 ENCOUNTER — INFUSION (OUTPATIENT)
Dept: ONCOLOGY | Facility: HOSPITAL | Age: 80
End: 2025-05-15
Payer: MEDICARE

## 2025-05-15 VITALS
OXYGEN SATURATION: 96 % | RESPIRATION RATE: 16 BRPM | TEMPERATURE: 97.9 F | HEIGHT: 59 IN | SYSTOLIC BLOOD PRESSURE: 162 MMHG | DIASTOLIC BLOOD PRESSURE: 56 MMHG | BODY MASS INDEX: 28.02 KG/M2 | HEART RATE: 91 BPM | WEIGHT: 139 LBS

## 2025-05-15 DIAGNOSIS — D63.1 ANEMIA IN STAGE 4 CHRONIC KIDNEY DISEASE: ICD-10-CM

## 2025-05-15 DIAGNOSIS — N18.4 ANEMIA IN STAGE 4 CHRONIC KIDNEY DISEASE: Primary | ICD-10-CM

## 2025-05-15 DIAGNOSIS — N18.4 ANEMIA IN STAGE 4 CHRONIC KIDNEY DISEASE: ICD-10-CM

## 2025-05-15 DIAGNOSIS — D63.1 ANEMIA IN STAGE 4 CHRONIC KIDNEY DISEASE: Primary | ICD-10-CM

## 2025-05-15 LAB
BASOPHILS # BLD AUTO: 0.03 10*3/MM3 (ref 0–0.2)
BASOPHILS NFR BLD AUTO: 0.5 % (ref 0–1.5)
DEPRECATED RDW RBC AUTO: 48.4 FL (ref 37–54)
EOSINOPHIL # BLD AUTO: 0.17 10*3/MM3 (ref 0–0.4)
EOSINOPHIL NFR BLD AUTO: 2.9 % (ref 0.3–6.2)
ERYTHROCYTE [DISTWIDTH] IN BLOOD BY AUTOMATED COUNT: 14.4 % (ref 12.3–15.4)
HCT VFR BLD AUTO: 32.3 % (ref 34–46.6)
HGB BLD-MCNC: 9.4 G/DL (ref 12–15.9)
IMM GRANULOCYTES # BLD AUTO: 0.02 10*3/MM3 (ref 0–0.05)
IMM GRANULOCYTES NFR BLD AUTO: 0.3 % (ref 0–0.5)
LYMPHOCYTES # BLD AUTO: 1.32 10*3/MM3 (ref 0.7–3.1)
LYMPHOCYTES NFR BLD AUTO: 22.8 % (ref 19.6–45.3)
MCH RBC QN AUTO: 26.9 PG (ref 26.6–33)
MCHC RBC AUTO-ENTMCNC: 29.1 G/DL (ref 31.5–35.7)
MCV RBC AUTO: 92.6 FL (ref 79–97)
MONOCYTES # BLD AUTO: 0.53 10*3/MM3 (ref 0.1–0.9)
MONOCYTES NFR BLD AUTO: 9.1 % (ref 5–12)
NEUTROPHILS NFR BLD AUTO: 3.73 10*3/MM3 (ref 1.7–7)
NEUTROPHILS NFR BLD AUTO: 64.4 % (ref 42.7–76)
NRBC BLD AUTO-RTO: 0 /100 WBC (ref 0–0.2)
PLATELET # BLD AUTO: 144 10*3/MM3 (ref 140–450)
PMV BLD AUTO: 9.2 FL (ref 6–12)
RBC # BLD AUTO: 3.49 10*6/MM3 (ref 3.77–5.28)
WBC NRBC COR # BLD AUTO: 5.8 10*3/MM3 (ref 3.4–10.8)

## 2025-05-15 PROCEDURE — 96372 THER/PROPH/DIAG INJ SC/IM: CPT

## 2025-05-15 PROCEDURE — 25010000002 EPOETIN ALFA PER 1000 UNITS: Performed by: NURSE PRACTITIONER

## 2025-05-15 PROCEDURE — 85025 COMPLETE CBC W/AUTO DIFF WBC: CPT

## 2025-05-15 PROCEDURE — 36415 COLL VENOUS BLD VENIPUNCTURE: CPT

## 2025-05-15 RX ADMIN — ERYTHROPOIETIN 40000 UNITS: 40000 INJECTION, SOLUTION INTRAVENOUS; SUBCUTANEOUS at 12:53

## 2025-05-29 ENCOUNTER — INFUSION (OUTPATIENT)
Dept: ONCOLOGY | Facility: HOSPITAL | Age: 80
End: 2025-05-29
Payer: MEDICARE

## 2025-05-29 ENCOUNTER — LAB (OUTPATIENT)
Dept: LAB | Facility: HOSPITAL | Age: 80
End: 2025-05-29
Payer: MEDICARE

## 2025-05-29 ENCOUNTER — OFFICE VISIT (OUTPATIENT)
Dept: ONCOLOGY | Facility: CLINIC | Age: 80
End: 2025-05-29
Payer: MEDICARE

## 2025-05-29 VITALS
SYSTOLIC BLOOD PRESSURE: 159 MMHG | DIASTOLIC BLOOD PRESSURE: 62 MMHG | RESPIRATION RATE: 18 BRPM | OXYGEN SATURATION: 91 % | HEART RATE: 96 BPM | TEMPERATURE: 97 F

## 2025-05-29 VITALS
TEMPERATURE: 97.8 F | SYSTOLIC BLOOD PRESSURE: 122 MMHG | HEIGHT: 59 IN | OXYGEN SATURATION: 96 % | BODY MASS INDEX: 28.6 KG/M2 | DIASTOLIC BLOOD PRESSURE: 82 MMHG | RESPIRATION RATE: 16 BRPM | HEART RATE: 93 BPM | WEIGHT: 141.9 LBS

## 2025-05-29 DIAGNOSIS — D63.1 ANEMIA IN STAGE 4 CHRONIC KIDNEY DISEASE: Primary | ICD-10-CM

## 2025-05-29 DIAGNOSIS — D50.9 IRON DEFICIENCY ANEMIA, UNSPECIFIED IRON DEFICIENCY ANEMIA TYPE: ICD-10-CM

## 2025-05-29 DIAGNOSIS — N18.4 ANEMIA IN STAGE 4 CHRONIC KIDNEY DISEASE: Primary | ICD-10-CM

## 2025-05-29 LAB
ALBUMIN SERPL-MCNC: 3.6 G/DL (ref 3.5–5.2)
ALBUMIN/GLOB SERPL: 1.2 G/DL
ALP SERPL-CCNC: 122 U/L (ref 39–117)
ALT SERPL W P-5'-P-CCNC: 6 U/L (ref 1–33)
ANION GAP SERPL CALCULATED.3IONS-SCNC: 10 MMOL/L (ref 5–15)
AST SERPL-CCNC: 11 U/L (ref 1–32)
BASOPHILS # BLD AUTO: 0.03 10*3/MM3 (ref 0–0.2)
BASOPHILS NFR BLD AUTO: 0.6 % (ref 0–1.5)
BILIRUB SERPL-MCNC: 0.2 MG/DL (ref 0–1.2)
BUN SERPL-MCNC: 30.2 MG/DL (ref 8–23)
BUN/CREAT SERPL: 15.6 (ref 7–25)
CALCIUM SPEC-SCNC: 8.7 MG/DL (ref 8.6–10.5)
CHLORIDE SERPL-SCNC: 111 MMOL/L (ref 98–107)
CO2 SERPL-SCNC: 23 MMOL/L (ref 22–29)
CREAT SERPL-MCNC: 1.93 MG/DL (ref 0.57–1)
DEPRECATED RDW RBC AUTO: 52.4 FL (ref 37–54)
EGFRCR SERPLBLD CKD-EPI 2021: 25.6 ML/MIN/1.73
EOSINOPHIL # BLD AUTO: 0.12 10*3/MM3 (ref 0–0.4)
EOSINOPHIL NFR BLD AUTO: 2.3 % (ref 0.3–6.2)
ERYTHROCYTE [DISTWIDTH] IN BLOOD BY AUTOMATED COUNT: 15.6 % (ref 12.3–15.4)
FERRITIN SERPL-MCNC: 255.9 NG/ML (ref 13–150)
GLOBULIN UR ELPH-MCNC: 3 GM/DL
GLUCOSE SERPL-MCNC: 127 MG/DL (ref 65–99)
HCT VFR BLD AUTO: 33.8 % (ref 34–46.6)
HGB BLD-MCNC: 9.9 G/DL (ref 12–15.9)
HOLD SPECIMEN: NORMAL
IMM GRANULOCYTES # BLD AUTO: 0.01 10*3/MM3 (ref 0–0.05)
IMM GRANULOCYTES NFR BLD AUTO: 0.2 % (ref 0–0.5)
IRON 24H UR-MRATE: 103 MCG/DL (ref 37–145)
IRON SATN MFR SERPL: 35 % (ref 20–50)
LYMPHOCYTES # BLD AUTO: 0.9 10*3/MM3 (ref 0.7–3.1)
LYMPHOCYTES NFR BLD AUTO: 17.2 % (ref 19.6–45.3)
MCH RBC QN AUTO: 27 PG (ref 26.6–33)
MCHC RBC AUTO-ENTMCNC: 29.3 G/DL (ref 31.5–35.7)
MCV RBC AUTO: 92.1 FL (ref 79–97)
MONOCYTES # BLD AUTO: 0.48 10*3/MM3 (ref 0.1–0.9)
MONOCYTES NFR BLD AUTO: 9.2 % (ref 5–12)
NEUTROPHILS NFR BLD AUTO: 3.69 10*3/MM3 (ref 1.7–7)
NEUTROPHILS NFR BLD AUTO: 70.5 % (ref 42.7–76)
NRBC BLD AUTO-RTO: 0 /100 WBC (ref 0–0.2)
PLATELET # BLD AUTO: 152 10*3/MM3 (ref 140–450)
PMV BLD AUTO: 8.8 FL (ref 6–12)
POTASSIUM SERPL-SCNC: 4.6 MMOL/L (ref 3.5–5.2)
PROT SERPL-MCNC: 6.6 G/DL (ref 6–8.5)
RBC # BLD AUTO: 3.67 10*6/MM3 (ref 3.77–5.28)
SODIUM SERPL-SCNC: 144 MMOL/L (ref 136–145)
TIBC SERPL-MCNC: 297 MCG/DL (ref 298–536)
TRANSFERRIN SERPL-MCNC: 199 MG/DL (ref 200–360)
WBC NRBC COR # BLD AUTO: 5.23 10*3/MM3 (ref 3.4–10.8)

## 2025-05-29 PROCEDURE — 1126F AMNT PAIN NOTED NONE PRSNT: CPT | Performed by: NURSE PRACTITIONER

## 2025-05-29 PROCEDURE — 25010000002 EPOETIN ALFA PER 1000 UNITS: Performed by: NURSE PRACTITIONER

## 2025-05-29 PROCEDURE — 36415 COLL VENOUS BLD VENIPUNCTURE: CPT

## 2025-05-29 PROCEDURE — 85025 COMPLETE CBC W/AUTO DIFF WBC: CPT

## 2025-05-29 PROCEDURE — 99214 OFFICE O/P EST MOD 30 MIN: CPT | Performed by: NURSE PRACTITIONER

## 2025-05-29 PROCEDURE — 80053 COMPREHEN METABOLIC PANEL: CPT

## 2025-05-29 PROCEDURE — 96372 THER/PROPH/DIAG INJ SC/IM: CPT

## 2025-05-29 PROCEDURE — 3079F DIAST BP 80-89 MM HG: CPT | Performed by: NURSE PRACTITIONER

## 2025-05-29 PROCEDURE — 82728 ASSAY OF FERRITIN: CPT

## 2025-05-29 PROCEDURE — 3074F SYST BP LT 130 MM HG: CPT | Performed by: NURSE PRACTITIONER

## 2025-05-29 PROCEDURE — 83540 ASSAY OF IRON: CPT

## 2025-05-29 PROCEDURE — 84466 ASSAY OF TRANSFERRIN: CPT

## 2025-05-29 RX ADMIN — ERYTHROPOIETIN 40000 UNITS: 40000 INJECTION, SOLUTION INTRAVENOUS; SUBCUTANEOUS at 14:45

## 2025-05-29 NOTE — PROGRESS NOTES
[facility] HEMATOLOGY & ONCOLOGY  [address]  [facility] [address] [phone]    Patient Name: Nuha Cali  Encounter Date: 05/29/2025  YOB: 1945  Patient Number: [id number]    Progress Note    HISTORY OF PRESENT ILLNESS: Nuha Cali is a 80 y.o. female who was seen on 06/21/22 for consultation and management recommendations for anemia in chronic kidney disease and iron deficiency.  She has health history significant for  Stage IV CKD, Diabetes w/neuropathy, HTN,Hyperlipidemia, GERD, Rheumatoid Arthritis. S/P repair of rectovaginal fistula in 2018.     She had Venofer on December 9 and 16, 2022.      I have reviewed the HPI and verified with the patient the accuracy of it. No changes to history since the information was documented.  Marilu Lackey, APRN.  05/29/2025     Interval History  She presents to clinic today for continued follow-up.     She has no acute complaint other than arthritis pain.     She had labs drawn today and results were reviewed with patient.       LABS    Lab Results - Last 18 Months   Lab Units 05/29/25  1355 05/15/25  1234 05/01/25  0950 04/17/25  1003 03/31/25  0947 03/17/25  1012   HEMOGLOBIN g/dL 9.9* 9.4* 11.0* 11.4* 10.3* 9.0*   HEMATOCRIT % 33.8* 32.3* 37.1 38.4 34.2 31.1*   MCV fL 92.1 92.6 92.5 95.0 95.8 97.8*   WBC 10*3/mm3 5.23 5.80 5.01 4.91 6.68 4.87   RDW % 15.6* 14.4 13.8 14.6 15.2 16.5*   MPV fL 8.8 9.2 9.4 8.4 9.2 9.0   PLATELETS 10*3/mm3 152 144 170 151 151 141   IMM GRAN % % 0.2 0.3 0.2 0.2 0.3 0.2   NEUTROS ABS 10*3/mm3 3.69 3.73 3.07 3.20 4.72 3.14   LYMPHS ABS 10*3/mm3 0.90 1.32 1.26 1.13 1.24 1.06   MONOS ABS 10*3/mm3 0.48 0.53 0.45 0.33 0.51 0.48   EOS ABS 10*3/mm3 0.12 0.17 0.19 0.20 0.16 0.16   BASOS ABS 10*3/mm3 0.03 0.03 0.03 0.04 0.03 0.02   IMMATURE GRANS (ABS) 10*3/mm3 0.01 0.02 0.01 0.01 0.02 0.01   NRBC /100 WBC 0.0 0.0 0.0 0.0 0.0 0.0       Lab Results -  "Last 18 Months   Lab Units 05/29/25  1355 03/03/25  1044 01/02/25  1244 10/03/24  1310 07/11/24  1322 04/18/24  1301   GLUCOSE mg/dL 127* 111* 131* 148* 132* 208*   SODIUM mmol/L 144 142 140 143 141 141   POTASSIUM mmol/L 4.6 4.9 4.8 4.7 5.0 4.6   CO2 mmol/L 23.0 23.0 24.0 25.0 23.0 23.0   CHLORIDE mmol/L 111* 109* 108* 108* 109* 108*   ANION GAP mmol/L 10.0 10.0 8.0 10.0 9.0 10.0   CREATININE mg/dL 1.93* 2.23* 2.17* 1.89* 1.94* 1.87*   BUN mg/dL 30.2* 34* 32* 30* 35* 33*   BUN / CREAT RATIO  15.6 15.2 14.7 15.9 18.0 17.6   CALCIUM mg/dL 8.7 9.5 9.2 9.1 9.4 9.4   ALK PHOS U/L 122* 113 131* 130* 130* 105   TOTAL PROTEIN g/dL 6.6 7.2 7.0 6.8 7.2 6.7   ALT (SGPT) U/L 6 10 8 8 7 6   AST (SGOT) U/L 11 13 11 12 11 9   BILIRUBIN mg/dL 0.2 0.2 <0.2 0.2 0.2 0.3   ALBUMIN g/dL 3.6 3.8 3.8 3.7 3.7 3.6   GLOBULIN gm/dL 3.0 3.4 3.2 3.1 3.5 3.1       No results for input(s): \"MSPIKE\", \"KAPPALAMB\", \"IGLFLC\", \"URICACID\", \"FREEKAPPAL\", \"CEA\", \"LDH\", \"REFLABREPO\" in the last [id number] hours.      Lab Results - Last 18 Months   Lab Units 05/29/25  1355 03/03/25  1044 01/02/25  1244 10/03/24  1310 08/22/24  1327 07/11/24  1322 04/18/24  1301   IRON mcg/dL 103 74 70 87 66 40 46   TIBC mcg/dL 297* 256* 261* 262* 243* 268* 267*   IRON SATURATION (TSAT) % 35 29 27 33 27 15* 17*   FERRITIN ng/mL  --  524.30* 397.40* 330.10* 322.70* 155.50* 323.60*         PAST MEDICAL HISTORY:  ALLERGIES:  Allergies   Allergen Reactions    Aspirin Anaphylaxis and Swelling     CURRENT MEDICATIONS:  Outpatient Encounter Medications as of 5/29/2025   Medication Sig Dispense Refill    allopurinol (ZYLOPRIM) 100 MG tablet Take 1 tablet by mouth Daily.      amLODIPine-valsartan (EXFORGE)  MG per tablet Take 1 tablet by mouth.      carvedilol (COREG) 12.5 MG tablet 2 (Two) Times a Day With Meals.      cholecalciferol (VITAMIN D3) 1000 units tablet Take 2 tablets by mouth Daily.      cimetidine (TAGAMET) 200 MG tablet Take 1 tablet by mouth Daily.      ferrous " sulfate 325 (65 FE) MG tablet Take 1 tablet by mouth Daily. 90 tablet 1    folic acid (FOLVITE) 1 MG tablet Take 1 tablet by mouth Daily.      furosemide (LASIX) 20 MG tablet Daily.      leflunomide (ARAVA) 20 MG tablet Take 1 tablet by mouth Daily.      polyethyl glycol-propyl glycol (SYSTANE) 0.4-0.3 % solution ophthalmic solution Administer 1 drop to both eyes Every 1 (One) Hour As Needed.      potassium chloride (K-DUR) 10 MEQ CR tablet Daily.      pravastatin (PRAVACHOL) 20 MG tablet Take 2 tablets by mouth Daily.      sodium bicarbonate 650 MG tablet Take 1 tablet by mouth 2 (Two) Times a Day. as directed       Facility-Administered Encounter Medications as of 5/29/2025   Medication Dose Route Frequency Provider Last Rate Last Admin    diphenhydrAMINE (BENADRYL) injection 50 mg  50 mg Intravenous PRN Marilu Lackey, APRN        famotidine (PEPCID) injection 20 mg  20 mg Intravenous PRN Marilu Lackey, APRN        Hydrocortisone Sod Suc (PF) (Solu-CORTEF) injection 100 mg  100 mg Intravenous PRN Marilu Lackey, APRN         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    PAD (peripheral artery disease) I73.9    Anemia in stage 4 chronic kidney disease N18.4, D63.1       HEALTH MAINTENANCE ITEMS:  Health Maintenance Due   Topic Date Due    DIABETIC FOOT EXAM  Never done    DIABETIC EYE EXAM  Never done    URINE MICROALBUMIN-CREATININE RATIO (uACR)  Never done    Pneumococcal Vaccine 50+ (1 of 2 - PCV) Never done    TDAP/TD VACCINES (1 - Tdap) Never done    ZOSTER VACCINE (1 of 2) Never done    RSV Vaccine - Adults (1 - 1-dose 75+ series) Never done    DXA SCAN  01/02/2022    HEMOGLOBIN A1C  11/15/2023    COVID-19 Vaccine (5 - 2024-25 season)  09/01/2024    COLORECTAL CANCER SCREENING  01/19/2025       <no information>  Last Completed Colonoscopy    This patient has no relevant Health Maintenance data.       Immunization History   Administered Date(s) Administered    COVID-19 (MODERNA) 1st,2nd,3rd Dose Monovalent 03/31/2021, 04/28/2021    COVID-19 (MODERNA) BIVALENT 12+YRS 11/28/2022    COVID-19 (MODERNA) Monovalent Original Booster 11/19/2021     Last Completed Mammogram            Completed or No Longer Recommended       MAMMOGRAM  Discontinued        Frequency changed to Never automatically (Topic No Longer Applies)    03/14/2024  Outside Claim: CHG SCREENING DIGITAL BREAST TOMOSYNTHESIS BI    03/14/2024  Outside Claim: HC MAMMOGRAM SCREENING BILAT DIGITAL W CAD    03/10/2023  Outside Claim: CHG SCREENING DIGITAL BREAST TOMOSYNTHESIS BI    03/10/2023  Outside Claim: HC MAMMOGRAM SCREENING BILAT DIGITAL W CAD     Only the first 5 history entries have been loaded, but more history exists.                            FAMILY HISTORY:  Family History   Problem Relation Age of Onset    Hypertension Other     Diabetes Other     Coronary artery disease Other     Cancer Other     No Known Problems Son     No Known Problems Son     No Known Problems Son     Breast cancer Other     Colon cancer Neg Hx     Colon polyps Neg Hx     Ovarian cancer Neg Hx      SOCIAL HISTORY:  Social History     Socioeconomic History    Marital status:    Tobacco Use    Smoking status: Never    Smokeless tobacco: Never   Vaping Use    Vaping status: Never Used   Substance and Sexual Activity    Alcohol use: No    Drug use: No    Sexual activity: Defer     Birth control/protection: Surgical       REVIEW OF SYSTEMS:  Review of Systems   Constitutional:  Positive for fatigue.   HENT:  Negative for swollen glands and trouble swallowing.    Eyes:         Macular degeneration. Gets monthly injections     Respiratory:  Negative for shortness of breath and wheezing.    Cardiovascular:   "Negative for chest pain and palpitations.   Gastrointestinal:  Negative for abdominal pain, blood in stool, nausea and vomiting.   Genitourinary:  Negative for difficulty urinating, dysuria and hematuria.   Musculoskeletal:  Positive for back pain.        Currently on a tapering dose of prednisone for arthitis   Neurological:         Diabetic Neuropathy   Psychiatric/Behavioral:  Negative for suicidal ideas and depressed mood.      I have reviewed the ROS and verified with the patient the accuracy of it. No changes since the information was documented.  Marilu Lackey, APRN 05/29/2025       /82   Pulse 93   Temp 97.8 °F (36.6 °C)   Resp 16   Ht 149.9 cm (59\")   Wt 64.4 kg (141 lb 14.4 oz)   SpO2 96%   BMI 28.66 kg/m²  Body surface area is 1.59 meters squared.    Pain Score    05/29/25 1359   PainSc: 0-No pain         Physical Exam  Constitutional:       Appearance: Normal appearance.   HENT:      Head: Normocephalic and atraumatic.   Eyes:      Extraocular Movements: Extraocular movements intact.   Cardiovascular:      Rate and Rhythm: Normal rate and regular rhythm.   Pulmonary:      Effort: Pulmonary effort is normal.      Breath sounds: Normal breath sounds.   Abdominal:      General: Bowel sounds are normal.      Palpations: Abdomen is soft.   Musculoskeletal:      Right lower leg: Edema present.      Left lower leg: Edema present.   Skin:     General: Skin is warm and dry.   Neurological:      General: No focal deficit present.      Mental Status: She is alert and oriented to person, place, and time.   Psychiatric:         Mood and Affect: Mood normal.         Behavior: Behavior normal.     .I have reviewed the PHYSICAL EXAM and the accuracy of it. No changes since the information was documented.  Marilu Lackey, APRN 05/29/2025    Nuha Cali reports a pain score of 0.  Given her pain assessment as noted, treatment options were discussed and the following options were decided upon as a " follow-up plan to address the patient's pain: continuation of current treatment plan for pain.     ASSESSMENT / PLAN:    No diagnosis found.       1.  Anemia in Chronic Kidney Disease Stage IV  2.  Iron Deficiency   -Pt received Venofer infusion on December 9, and 16 2022  -Received Venofer 200 mg IV on 11/9/23.    -Pt is taking oral iron once daily  -Labs 5/22/25  , Iron 19, Ferritin 333, Sat8%, TIBC 256  -Labs today:  Hgb 9.9, Hct 33.8  -Rechecking iron to see if theres any correction.    -We will continue Retacrit today and biweekly        Hypertension   -122/82 today    -Managed  By PCP and nephrology   -Advised pt to monitor blood pressure with CHANTALE use      PLAN:   Continue Retacrit today and biweekly  Continue current medications, treatment plans and follow up with PCP and any other providers  RTC in 3 months   Pre office labs for CBC, CMP, Iron Profile and Ferritin   Care discussed with patient.  Understanding expressed.  Patient agreeable with         Marilu Lackey, CINDY  05/29/2025

## 2025-06-12 ENCOUNTER — INFUSION (OUTPATIENT)
Dept: ONCOLOGY | Facility: HOSPITAL | Age: 80
End: 2025-06-12
Payer: MEDICARE

## 2025-06-12 ENCOUNTER — LAB (OUTPATIENT)
Dept: LAB | Facility: HOSPITAL | Age: 80
End: 2025-06-12
Payer: MEDICARE

## 2025-06-12 VITALS
DIASTOLIC BLOOD PRESSURE: 63 MMHG | HEART RATE: 91 BPM | SYSTOLIC BLOOD PRESSURE: 158 MMHG | BODY MASS INDEX: 28.43 KG/M2 | TEMPERATURE: 97.1 F | HEIGHT: 59 IN | WEIGHT: 141 LBS | RESPIRATION RATE: 16 BRPM | OXYGEN SATURATION: 95 %

## 2025-06-12 DIAGNOSIS — N18.4 ANEMIA IN STAGE 4 CHRONIC KIDNEY DISEASE: Primary | ICD-10-CM

## 2025-06-12 DIAGNOSIS — D63.1 ANEMIA IN STAGE 4 CHRONIC KIDNEY DISEASE: Primary | ICD-10-CM

## 2025-06-12 DIAGNOSIS — N18.4 ANEMIA IN STAGE 4 CHRONIC KIDNEY DISEASE: ICD-10-CM

## 2025-06-12 DIAGNOSIS — D63.1 ANEMIA IN STAGE 4 CHRONIC KIDNEY DISEASE: ICD-10-CM

## 2025-06-12 LAB
BASOPHILS # BLD AUTO: 0.03 10*3/MM3 (ref 0–0.2)
BASOPHILS NFR BLD AUTO: 0.5 % (ref 0–1.5)
DEPRECATED RDW RBC AUTO: 55 FL (ref 37–54)
EOSINOPHIL # BLD AUTO: 0.16 10*3/MM3 (ref 0–0.4)
EOSINOPHIL NFR BLD AUTO: 2.5 % (ref 0.3–6.2)
ERYTHROCYTE [DISTWIDTH] IN BLOOD BY AUTOMATED COUNT: 16.1 % (ref 12.3–15.4)
HCT VFR BLD AUTO: 35.2 % (ref 34–46.6)
HGB BLD-MCNC: 10.2 G/DL (ref 12–15.9)
IMM GRANULOCYTES # BLD AUTO: 0.01 10*3/MM3 (ref 0–0.05)
IMM GRANULOCYTES NFR BLD AUTO: 0.2 % (ref 0–0.5)
LYMPHOCYTES # BLD AUTO: 1.18 10*3/MM3 (ref 0.7–3.1)
LYMPHOCYTES NFR BLD AUTO: 18.5 % (ref 19.6–45.3)
MCH RBC QN AUTO: 27.2 PG (ref 26.6–33)
MCHC RBC AUTO-ENTMCNC: 29 G/DL (ref 31.5–35.7)
MCV RBC AUTO: 93.9 FL (ref 79–97)
MONOCYTES # BLD AUTO: 0.65 10*3/MM3 (ref 0.1–0.9)
MONOCYTES NFR BLD AUTO: 10.2 % (ref 5–12)
NEUTROPHILS NFR BLD AUTO: 4.36 10*3/MM3 (ref 1.7–7)
NEUTROPHILS NFR BLD AUTO: 68.1 % (ref 42.7–76)
NRBC BLD AUTO-RTO: 0 /100 WBC (ref 0–0.2)
PLATELET # BLD AUTO: 158 10*3/MM3 (ref 140–450)
PMV BLD AUTO: 9.3 FL (ref 6–12)
RBC # BLD AUTO: 3.75 10*6/MM3 (ref 3.77–5.28)
WBC NRBC COR # BLD AUTO: 6.39 10*3/MM3 (ref 3.4–10.8)

## 2025-06-12 PROCEDURE — 96372 THER/PROPH/DIAG INJ SC/IM: CPT

## 2025-06-12 PROCEDURE — 85025 COMPLETE CBC W/AUTO DIFF WBC: CPT

## 2025-06-12 PROCEDURE — 36415 COLL VENOUS BLD VENIPUNCTURE: CPT

## 2025-06-12 PROCEDURE — 25010000002 EPOETIN ALFA PER 1000 UNITS: Performed by: NURSE PRACTITIONER

## 2025-06-12 RX ADMIN — ERYTHROPOIETIN 40000 UNITS: 40000 INJECTION, SOLUTION INTRAVENOUS; SUBCUTANEOUS at 12:58

## 2025-06-26 ENCOUNTER — INFUSION (OUTPATIENT)
Dept: ONCOLOGY | Facility: HOSPITAL | Age: 80
End: 2025-06-26
Payer: MEDICARE

## 2025-06-26 ENCOUNTER — LAB (OUTPATIENT)
Dept: LAB | Facility: HOSPITAL | Age: 80
End: 2025-06-26
Payer: MEDICARE

## 2025-06-26 VITALS
SYSTOLIC BLOOD PRESSURE: 166 MMHG | WEIGHT: 140.2 LBS | BODY MASS INDEX: 28.26 KG/M2 | TEMPERATURE: 97.4 F | HEIGHT: 59 IN | HEART RATE: 88 BPM | OXYGEN SATURATION: 96 % | DIASTOLIC BLOOD PRESSURE: 61 MMHG | RESPIRATION RATE: 20 BRPM

## 2025-06-26 DIAGNOSIS — D63.1 ANEMIA IN STAGE 4 CHRONIC KIDNEY DISEASE: Primary | ICD-10-CM

## 2025-06-26 DIAGNOSIS — D63.1 ANEMIA IN STAGE 4 CHRONIC KIDNEY DISEASE: ICD-10-CM

## 2025-06-26 DIAGNOSIS — N18.4 ANEMIA IN STAGE 4 CHRONIC KIDNEY DISEASE: Primary | ICD-10-CM

## 2025-06-26 DIAGNOSIS — N18.4 ANEMIA IN STAGE 4 CHRONIC KIDNEY DISEASE: ICD-10-CM

## 2025-06-26 LAB
BASOPHILS # BLD AUTO: 0.04 10*3/MM3 (ref 0–0.2)
BASOPHILS NFR BLD AUTO: 0.8 % (ref 0–1.5)
DEPRECATED RDW RBC AUTO: 57.3 FL (ref 37–54)
EOSINOPHIL # BLD AUTO: 0.19 10*3/MM3 (ref 0–0.4)
EOSINOPHIL NFR BLD AUTO: 3.7 % (ref 0.3–6.2)
ERYTHROCYTE [DISTWIDTH] IN BLOOD BY AUTOMATED COUNT: 16.4 % (ref 12.3–15.4)
HCT VFR BLD AUTO: 37.2 % (ref 34–46.6)
HGB BLD-MCNC: 10.8 G/DL (ref 12–15.9)
IMM GRANULOCYTES # BLD AUTO: 0.02 10*3/MM3 (ref 0–0.05)
IMM GRANULOCYTES NFR BLD AUTO: 0.4 % (ref 0–0.5)
LYMPHOCYTES # BLD AUTO: 1.11 10*3/MM3 (ref 0.7–3.1)
LYMPHOCYTES NFR BLD AUTO: 21.5 % (ref 19.6–45.3)
MCH RBC QN AUTO: 28 PG (ref 26.6–33)
MCHC RBC AUTO-ENTMCNC: 29 G/DL (ref 31.5–35.7)
MCV RBC AUTO: 96.4 FL (ref 79–97)
MONOCYTES # BLD AUTO: 0.58 10*3/MM3 (ref 0.1–0.9)
MONOCYTES NFR BLD AUTO: 11.2 % (ref 5–12)
NEUTROPHILS NFR BLD AUTO: 3.23 10*3/MM3 (ref 1.7–7)
NEUTROPHILS NFR BLD AUTO: 62.4 % (ref 42.7–76)
NRBC BLD AUTO-RTO: 0 /100 WBC (ref 0–0.2)
PLATELET # BLD AUTO: 148 10*3/MM3 (ref 140–450)
PMV BLD AUTO: 9.6 FL (ref 6–12)
RBC # BLD AUTO: 3.86 10*6/MM3 (ref 3.77–5.28)
WBC NRBC COR # BLD AUTO: 5.17 10*3/MM3 (ref 3.4–10.8)

## 2025-06-26 PROCEDURE — 96372 THER/PROPH/DIAG INJ SC/IM: CPT

## 2025-06-26 PROCEDURE — 85025 COMPLETE CBC W/AUTO DIFF WBC: CPT

## 2025-06-26 PROCEDURE — 36415 COLL VENOUS BLD VENIPUNCTURE: CPT

## 2025-06-26 PROCEDURE — 25010000002 EPOETIN ALFA PER 1000 UNITS: Performed by: NURSE PRACTITIONER

## 2025-06-26 RX ADMIN — ERYTHROPOIETIN 40000 UNITS: 40000 INJECTION, SOLUTION INTRAVENOUS; SUBCUTANEOUS at 13:38

## 2025-07-10 ENCOUNTER — LAB (OUTPATIENT)
Dept: LAB | Facility: HOSPITAL | Age: 80
End: 2025-07-10
Payer: MEDICARE

## 2025-07-10 ENCOUNTER — INFUSION (OUTPATIENT)
Dept: ONCOLOGY | Facility: HOSPITAL | Age: 80
End: 2025-07-10
Payer: MEDICARE

## 2025-07-10 VITALS
BODY MASS INDEX: 28.22 KG/M2 | TEMPERATURE: 97.2 F | WEIGHT: 140 LBS | HEIGHT: 59 IN | HEART RATE: 88 BPM | OXYGEN SATURATION: 96 % | DIASTOLIC BLOOD PRESSURE: 88 MMHG | SYSTOLIC BLOOD PRESSURE: 170 MMHG | RESPIRATION RATE: 18 BRPM

## 2025-07-10 DIAGNOSIS — D63.1 ANEMIA IN STAGE 4 CHRONIC KIDNEY DISEASE: Primary | ICD-10-CM

## 2025-07-10 DIAGNOSIS — N18.4 ANEMIA IN STAGE 4 CHRONIC KIDNEY DISEASE: ICD-10-CM

## 2025-07-10 DIAGNOSIS — D63.1 ANEMIA IN STAGE 4 CHRONIC KIDNEY DISEASE: ICD-10-CM

## 2025-07-10 DIAGNOSIS — N18.4 ANEMIA IN STAGE 4 CHRONIC KIDNEY DISEASE: Primary | ICD-10-CM

## 2025-07-10 LAB
BASOPHILS # BLD AUTO: 0.03 10*3/MM3 (ref 0–0.2)
BASOPHILS NFR BLD AUTO: 0.6 % (ref 0–1.5)
DEPRECATED RDW RBC AUTO: 55.3 FL (ref 37–54)
EOSINOPHIL # BLD AUTO: 0.26 10*3/MM3 (ref 0–0.4)
EOSINOPHIL NFR BLD AUTO: 4.8 % (ref 0.3–6.2)
ERYTHROCYTE [DISTWIDTH] IN BLOOD BY AUTOMATED COUNT: 15.8 % (ref 12.3–15.4)
HCT VFR BLD AUTO: 38.6 % (ref 34–46.6)
HGB BLD-MCNC: 11.2 G/DL (ref 12–15.9)
IMM GRANULOCYTES # BLD AUTO: 0.01 10*3/MM3 (ref 0–0.05)
IMM GRANULOCYTES NFR BLD AUTO: 0.2 % (ref 0–0.5)
LYMPHOCYTES # BLD AUTO: 1.19 10*3/MM3 (ref 0.7–3.1)
LYMPHOCYTES NFR BLD AUTO: 21.9 % (ref 19.6–45.3)
MCH RBC QN AUTO: 27.5 PG (ref 26.6–33)
MCHC RBC AUTO-ENTMCNC: 29 G/DL (ref 31.5–35.7)
MCV RBC AUTO: 94.6 FL (ref 79–97)
MONOCYTES # BLD AUTO: 0.44 10*3/MM3 (ref 0.1–0.9)
MONOCYTES NFR BLD AUTO: 8.1 % (ref 5–12)
NEUTROPHILS NFR BLD AUTO: 3.5 10*3/MM3 (ref 1.7–7)
NEUTROPHILS NFR BLD AUTO: 64.4 % (ref 42.7–76)
NRBC BLD AUTO-RTO: 0 /100 WBC (ref 0–0.2)
PLATELET # BLD AUTO: 146 10*3/MM3 (ref 140–450)
PMV BLD AUTO: 8.8 FL (ref 6–12)
RBC # BLD AUTO: 4.08 10*6/MM3 (ref 3.77–5.28)
WBC NRBC COR # BLD AUTO: 5.43 10*3/MM3 (ref 3.4–10.8)

## 2025-07-10 PROCEDURE — 36415 COLL VENOUS BLD VENIPUNCTURE: CPT

## 2025-07-10 PROCEDURE — 85025 COMPLETE CBC W/AUTO DIFF WBC: CPT

## 2025-07-10 PROCEDURE — G0463 HOSPITAL OUTPT CLINIC VISIT: HCPCS

## 2025-07-11 DIAGNOSIS — D63.1 ANEMIA IN STAGE 4 CHRONIC KIDNEY DISEASE: ICD-10-CM

## 2025-07-11 DIAGNOSIS — D50.9 IRON DEFICIENCY ANEMIA, UNSPECIFIED IRON DEFICIENCY ANEMIA TYPE: ICD-10-CM

## 2025-07-11 DIAGNOSIS — N18.4 ANEMIA IN STAGE 4 CHRONIC KIDNEY DISEASE: ICD-10-CM

## 2025-07-11 RX ORDER — FERROUS SULFATE 325(65) MG
325 TABLET ORAL DAILY
Qty: 90 TABLET | Refills: 1 | Status: SHIPPED | OUTPATIENT
Start: 2025-07-11

## 2025-07-11 NOTE — TELEPHONE ENCOUNTER
Caller: Nuha Cali    Relationship: Self    Best call back number: [phone]    Requested Prescriptions:   Requested Prescriptions     Pending Prescriptions Disp Refills    ferrous sulfate 325 (65 FE) MG tablet 90 tablet 1     Sig: Take 1 tablet by mouth Daily.        Pharmacy where request should be sent:  [facility]    Last office visit with prescribing clinician: 5/29/2025   Last telemedicine visit with prescribing clinician: Visit date not found   Next office visit with prescribing clinician: 8/21/2025     Additional details provided by patient: NA    Does the patient have less than a 3 day supply:  [x] Yes  [] No    Would you like a call back once the refill request has been completed: [] Yes [] No    If the office needs to give you a call back, can they leave a voicemail: [] Yes [] No    Jeromy Tang Rep   07/11/25 09:22 CDT

## 2025-07-24 ENCOUNTER — INFUSION (OUTPATIENT)
Dept: ONCOLOGY | Facility: HOSPITAL | Age: 80
End: 2025-07-24
Payer: MEDICARE

## 2025-07-24 ENCOUNTER — LAB (OUTPATIENT)
Dept: LAB | Facility: HOSPITAL | Age: 80
End: 2025-07-24
Payer: MEDICARE

## 2025-07-24 VITALS
DIASTOLIC BLOOD PRESSURE: 69 MMHG | TEMPERATURE: 97.1 F | HEART RATE: 87 BPM | SYSTOLIC BLOOD PRESSURE: 162 MMHG | WEIGHT: 141 LBS | OXYGEN SATURATION: 98 % | RESPIRATION RATE: 18 BRPM | BODY MASS INDEX: 28.43 KG/M2 | HEIGHT: 59 IN

## 2025-07-24 DIAGNOSIS — N18.4 ANEMIA IN STAGE 4 CHRONIC KIDNEY DISEASE: Primary | ICD-10-CM

## 2025-07-24 DIAGNOSIS — N18.4 ANEMIA IN STAGE 4 CHRONIC KIDNEY DISEASE: ICD-10-CM

## 2025-07-24 DIAGNOSIS — D63.1 ANEMIA IN STAGE 4 CHRONIC KIDNEY DISEASE: Primary | ICD-10-CM

## 2025-07-24 DIAGNOSIS — D63.1 ANEMIA IN STAGE 4 CHRONIC KIDNEY DISEASE: ICD-10-CM

## 2025-07-24 LAB
BASOPHILS # BLD AUTO: 0.04 10*3/MM3 (ref 0–0.2)
BASOPHILS NFR BLD AUTO: 0.6 % (ref 0–1.5)
DEPRECATED RDW RBC AUTO: 50.4 FL (ref 37–54)
EOSINOPHIL # BLD AUTO: 0.17 10*3/MM3 (ref 0–0.4)
EOSINOPHIL NFR BLD AUTO: 2.4 % (ref 0.3–6.2)
ERYTHROCYTE [DISTWIDTH] IN BLOOD BY AUTOMATED COUNT: 14.5 % (ref 12.3–15.4)
HCT VFR BLD AUTO: 35.5 % (ref 34–46.6)
HGB BLD-MCNC: 10.4 G/DL (ref 12–15.9)
IMM GRANULOCYTES # BLD AUTO: 0.02 10*3/MM3 (ref 0–0.05)
IMM GRANULOCYTES NFR BLD AUTO: 0.3 % (ref 0–0.5)
LYMPHOCYTES # BLD AUTO: 1.03 10*3/MM3 (ref 0.7–3.1)
LYMPHOCYTES NFR BLD AUTO: 14.5 % (ref 19.6–45.3)
MCH RBC QN AUTO: 27.6 PG (ref 26.6–33)
MCHC RBC AUTO-ENTMCNC: 29.3 G/DL (ref 31.5–35.7)
MCV RBC AUTO: 94.2 FL (ref 79–97)
MONOCYTES # BLD AUTO: 0.51 10*3/MM3 (ref 0.1–0.9)
MONOCYTES NFR BLD AUTO: 7.2 % (ref 5–12)
NEUTROPHILS NFR BLD AUTO: 5.32 10*3/MM3 (ref 1.7–7)
NEUTROPHILS NFR BLD AUTO: 75 % (ref 42.7–76)
NRBC BLD AUTO-RTO: 0 /100 WBC (ref 0–0.2)
PLATELET # BLD AUTO: 203 10*3/MM3 (ref 140–450)
PMV BLD AUTO: 9.2 FL (ref 6–12)
RBC # BLD AUTO: 3.77 10*6/MM3 (ref 3.77–5.28)
WBC NRBC COR # BLD AUTO: 7.09 10*3/MM3 (ref 3.4–10.8)

## 2025-07-24 PROCEDURE — 36415 COLL VENOUS BLD VENIPUNCTURE: CPT

## 2025-07-24 PROCEDURE — 25010000002 EPOETIN ALFA PER 1000 UNITS: Performed by: NURSE PRACTITIONER

## 2025-07-24 PROCEDURE — 85025 COMPLETE CBC W/AUTO DIFF WBC: CPT

## 2025-07-24 PROCEDURE — 96372 THER/PROPH/DIAG INJ SC/IM: CPT

## 2025-07-24 RX ADMIN — ERYTHROPOIETIN 40000 UNITS: 40000 INJECTION, SOLUTION INTRAVENOUS; SUBCUTANEOUS at 14:32

## 2025-08-07 ENCOUNTER — INFUSION (OUTPATIENT)
Dept: ONCOLOGY | Facility: HOSPITAL | Age: 80
End: 2025-08-07
Payer: MEDICARE

## 2025-08-07 ENCOUNTER — LAB (OUTPATIENT)
Dept: LAB | Facility: HOSPITAL | Age: 80
End: 2025-08-07
Payer: MEDICARE

## 2025-08-07 VITALS
HEIGHT: 59 IN | BODY MASS INDEX: 28.43 KG/M2 | RESPIRATION RATE: 18 BRPM | HEART RATE: 89 BPM | DIASTOLIC BLOOD PRESSURE: 65 MMHG | WEIGHT: 141 LBS | OXYGEN SATURATION: 97 % | SYSTOLIC BLOOD PRESSURE: 180 MMHG | TEMPERATURE: 97 F

## 2025-08-07 DIAGNOSIS — N18.4 ANEMIA IN STAGE 4 CHRONIC KIDNEY DISEASE: Primary | ICD-10-CM

## 2025-08-07 DIAGNOSIS — N18.4 ANEMIA IN STAGE 4 CHRONIC KIDNEY DISEASE: ICD-10-CM

## 2025-08-07 DIAGNOSIS — D63.1 ANEMIA IN STAGE 4 CHRONIC KIDNEY DISEASE: Primary | ICD-10-CM

## 2025-08-07 DIAGNOSIS — D63.1 ANEMIA IN STAGE 4 CHRONIC KIDNEY DISEASE: ICD-10-CM

## 2025-08-07 LAB
BASOPHILS # BLD AUTO: 0.02 10*3/MM3 (ref 0–0.2)
BASOPHILS NFR BLD AUTO: 0.4 % (ref 0–1.5)
DEPRECATED RDW RBC AUTO: 52 FL (ref 37–54)
EOSINOPHIL # BLD AUTO: 0.16 10*3/MM3 (ref 0–0.4)
EOSINOPHIL NFR BLD AUTO: 3.3 % (ref 0.3–6.2)
ERYTHROCYTE [DISTWIDTH] IN BLOOD BY AUTOMATED COUNT: 15.2 % (ref 12.3–15.4)
HCT VFR BLD AUTO: 37.8 % (ref 34–46.6)
HGB BLD-MCNC: 11.1 G/DL (ref 12–15.9)
IMM GRANULOCYTES # BLD AUTO: 0.02 10*3/MM3 (ref 0–0.05)
IMM GRANULOCYTES NFR BLD AUTO: 0.4 % (ref 0–0.5)
LYMPHOCYTES # BLD AUTO: 1.13 10*3/MM3 (ref 0.7–3.1)
LYMPHOCYTES NFR BLD AUTO: 23.5 % (ref 19.6–45.3)
MCH RBC QN AUTO: 27.4 PG (ref 26.6–33)
MCHC RBC AUTO-ENTMCNC: 29.4 G/DL (ref 31.5–35.7)
MCV RBC AUTO: 93.3 FL (ref 79–97)
MONOCYTES # BLD AUTO: 0.42 10*3/MM3 (ref 0.1–0.9)
MONOCYTES NFR BLD AUTO: 8.7 % (ref 5–12)
NEUTROPHILS NFR BLD AUTO: 3.06 10*3/MM3 (ref 1.7–7)
NEUTROPHILS NFR BLD AUTO: 63.7 % (ref 42.7–76)
NRBC BLD AUTO-RTO: 0 /100 WBC (ref 0–0.2)
PLATELET # BLD AUTO: 138 10*3/MM3 (ref 140–450)
PMV BLD AUTO: 9.7 FL (ref 6–12)
RBC # BLD AUTO: 4.05 10*6/MM3 (ref 3.77–5.28)
WBC NRBC COR # BLD AUTO: 4.81 10*3/MM3 (ref 3.4–10.8)

## 2025-08-07 PROCEDURE — 36415 COLL VENOUS BLD VENIPUNCTURE: CPT

## 2025-08-07 PROCEDURE — 85025 COMPLETE CBC W/AUTO DIFF WBC: CPT

## 2025-08-07 PROCEDURE — G0463 HOSPITAL OUTPT CLINIC VISIT: HCPCS

## 2025-08-21 ENCOUNTER — INFUSION (OUTPATIENT)
Dept: ONCOLOGY | Facility: HOSPITAL | Age: 80
End: 2025-08-21
Payer: MEDICARE

## 2025-08-21 ENCOUNTER — LAB (OUTPATIENT)
Dept: LAB | Facility: HOSPITAL | Age: 80
End: 2025-08-21
Payer: MEDICARE

## 2025-08-21 VITALS
SYSTOLIC BLOOD PRESSURE: 162 MMHG | HEIGHT: 59 IN | HEART RATE: 89 BPM | WEIGHT: 142 LBS | DIASTOLIC BLOOD PRESSURE: 67 MMHG | TEMPERATURE: 97.4 F | BODY MASS INDEX: 28.63 KG/M2 | RESPIRATION RATE: 18 BRPM | OXYGEN SATURATION: 96 %

## 2025-08-21 DIAGNOSIS — N18.4 ANEMIA IN STAGE 4 CHRONIC KIDNEY DISEASE: Primary | ICD-10-CM

## 2025-08-21 DIAGNOSIS — D63.1 ANEMIA IN STAGE 4 CHRONIC KIDNEY DISEASE: Primary | ICD-10-CM

## 2025-08-21 PROCEDURE — 96372 THER/PROPH/DIAG INJ SC/IM: CPT

## 2025-08-21 PROCEDURE — 25010000002 EPOETIN ALFA PER 1000 UNITS: Performed by: NURSE PRACTITIONER

## 2025-08-21 RX ADMIN — ERYTHROPOIETIN 40000 UNITS: 40000 INJECTION, SOLUTION INTRAVENOUS; SUBCUTANEOUS at 14:09

## (undated) DEVICE — 3M™ IOBAN™ 2 ANTIMICROBIAL INCISE DRAPE 6650EZ: Brand: IOBAN™ 2

## (undated) DEVICE — PINNACLE R/O II INTRODUCER SHEATH WITH RADIOPAQUE MARKER: Brand: PINNACLE

## (undated) DEVICE — BANDAGE,GAUZE,BULKEE II,4.5"X4.1YD,STRL: Brand: MEDLINE

## (undated) DEVICE — GLV SURG BIOGEL LTX PF 7 1/2

## (undated) DEVICE — PAD GRND REM POLYHESIVE A/ DISP

## (undated) DEVICE — SUT SILK 2/0 SH 30IN K833H

## (undated) DEVICE — GLV SURG BIOGEL LTX PF 6 1/2

## (undated) DEVICE — GLV SURG SENSICARE PI ORTHO SZ7.5 LF STRL

## (undated) DEVICE — CATHETER,FOLEY,SILI-ELAST,LTX,18FR,10ML: Brand: MEDLINE

## (undated) DEVICE — SYR LUERLOK 20CC BX/50

## (undated) DEVICE — PREP SOL POVIDONE/IODINE BT 4OZ

## (undated) DEVICE — 4-PORT MANIFOLD: Brand: NEPTUNE 2

## (undated) DEVICE — SPNG GZ WOVN 4X4IN 12PLY 10/BX STRL

## (undated) DEVICE — ANTIBACTERIAL UNDYED BRAIDED (POLYGLACTIN 910), SYNTHETIC ABSORBABLE SUTURE: Brand: COATED VICRYL

## (undated) DEVICE — ENDOPATH PNEUMONEEDLE INSUFFLATION NEEDLES WITH LUER LOCK CONNECTORS 120MM: Brand: ENDOPATH

## (undated) DEVICE — DRSNG WND GZ CURAD OIL EMULSION 3X3IN STRL

## (undated) DEVICE — DRSNG SURESITE WNDW 4X4.5

## (undated) DEVICE — A2000 MULTI-USE SYRINGE KIT, P/N 701277-003KIT CONTENTS: 100ML CONTRAST RESERVOIR AND TUBING WITH CONTRAST SPIKE AND CLAMP: Brand: A2000 MULTI-USE SYRINGE KIT

## (undated) DEVICE — BARRIER, ABSORBABLE, ADHESION: Brand: SEPRAFILM®

## (undated) DEVICE — CATH URETH FLEXIMA CONE TP 5F 70CM

## (undated) DEVICE — GW SENSR DUALFLEX NITNL STR .038IN 3X150CM

## (undated) DEVICE — ENDOPATH XCEL WITH OPTIVIEW TECHNOLOGY DILATING TIP TROCARS WITH STABILITY SLEEVES: Brand: ENDOPATH XCEL OPTIVIEW

## (undated) DEVICE — GLV SURG SENSICARE PI ORTHO PF SZ7 LF STRL

## (undated) DEVICE — PK TURNOVER RM ADV

## (undated) DEVICE — CATH FLSH OMNI SFT 5F 90CM

## (undated) DEVICE — MONOPOLAR METZENBAUM SCISSOR, MINI BLADE TIP, DISPOSABLE: Brand: MONOPOLAR METZENBAUM SCISSOR, MINI BLADE TIP, DISPOSABLE

## (undated) DEVICE — SUT VIC 0 UR6 27IN VCP603H

## (undated) DEVICE — DEV CLS VASC MYNXCONTROL 6FTO7F

## (undated) DEVICE — ELECTRD L HK EZ CLN 33CM

## (undated) DEVICE — DRAINBAG,ANTI-REFLUX TOWER,L/F,2000ML,LL: Brand: MEDLINE

## (undated) DEVICE — TRY PREP SCRB VAG PVP

## (undated) DEVICE — NDL HYPO PRECISIONGLIDE REG 25G 1 1/2

## (undated) DEVICE — PK TURNOVER CYSTO RM

## (undated) DEVICE — CLTH CLENS READYCLEANSE PERI CARE PK/5

## (undated) DEVICE — DISPOSABLE TOURNIQUET CUFF SINGLE BLADDER, SINGLE PORT AND QUICK CONNECT CONNECTOR: Brand: COLOR CUFF

## (undated) DEVICE — PROXIMATE RH ROTATING HEAD SKIN STAPLERS (35 WIDE) CONTAINS 35 STAINLESS STEEL STAPLES: Brand: PROXIMATE

## (undated) DEVICE — CVR HNDL LIGHT RIGID

## (undated) DEVICE — BG BANDED WRUBBER BAND AND TP 36X54IN

## (undated) DEVICE — QUICK-CROSS™ SUPPORT CATHETER: Brand: QUICK-CROSS™

## (undated) DEVICE — BNDG ELAS CO-FLEX SLF ADHR 4IN5YD LF STRL

## (undated) DEVICE — SUT SILK 2/0 SH CR8 18IN CR8 C012D

## (undated) DEVICE — PK EXTRM 30

## (undated) DEVICE — INTENDED FOR TISSUE SEPARATION, AND OTHER PROCEDURES THAT REQUIRE A SHARP SURGICAL BLADE TO PUNCTURE OR CUT.: Brand: BARD-PARKER ® STAINLESS STEEL BLADES

## (undated) DEVICE — ENSEAL 20 CM SHAFT, LARGE JAW: Brand: ENSEAL X1

## (undated) DEVICE — MODEL AT P65, P/N 701554-001KIT CONTENTS: HAND CONTROLLER, 3-WAY HIGH-PRESSURE STOPCOCK WITH ROTATING END AND PREMIUM HIGH-PRESSURE TUBING: Brand: ANGIOTOUCH® KIT

## (undated) DEVICE — MODEL BT2000 P/N 700287-012KIT CONTENTS: MANIFOLD WITH SALINE AND CONTRAST PORTS, SALINE TUBING WITH SPIKE AND HAND SYRINGE, TRANSDUCER: Brand: BT2000 AUTOMATED MANIFOLD KIT

## (undated) DEVICE — ENDOPATH XCEL WITH OPTIVIEW TECHNOLOGY UNIVERSAL TROCAR STABILITY SLEEVES: Brand: ENDOPATH XCEL OPTIVIEW

## (undated) DEVICE — ELECTRD BLD EDGE/INSUL1P 2.4X5.1MM STRL

## (undated) DEVICE — ENDOPATH XCEL DILATING TIP TROCARS WITH STABILITY SLEEVES: Brand: ENDOPATH XCEL

## (undated) DEVICE — RADIFOCUS GLIDEWIRE ADVANTAGE GUIDEWIRE: Brand: GLIDEWIRE ADVANTAGE

## (undated) DEVICE — ST MIC/INTRO ACC SHRP/NDL TUNG/TP NITNL 5F 45CM 7CM

## (undated) DEVICE — DESTINATION PERIPHERAL GUIDING SHEATH: Brand: DESTINATION

## (undated) DEVICE — ELECTRD BLD EDGE/INSUL1P SFTY SLV 2.75IN

## (undated) DEVICE — POOLE SUCTION INSTRUMENT WITH REMOVABLE SHEATH: Brand: POOLE

## (undated) DEVICE — YANKAUER,BULB TIP WITH VENT: Brand: ARGYLE

## (undated) DEVICE — PAD LAP CHOLE: Brand: MEDLINE INDUSTRIES, INC.

## (undated) DEVICE — PK CYSTO 30

## (undated) DEVICE — NAVICROSS SUPPORT CATHETER: Brand: NAVICROSS

## (undated) DEVICE — PAD ENDOVASCULAR: Brand: MEDLINE INDUSTRIES, INC.

## (undated) DEVICE — SUT ETHLN 4/0 FS2 18IN 662H

## (undated) DEVICE — LEGGINGS, PAIR, 33X51 XL, STERILE: Brand: MEDLINE

## (undated) DEVICE — 2, DISPOSABLE SUCTION/IRRIGATOR WITHOUT DISPOSABLE TIP: Brand: STRYKEFLOW

## (undated) DEVICE — PENCL ES MEGADINE EZ/CLEAN BUTN W/HOLSTR 10FT

## (undated) DEVICE — SUT VIC 4/0 RB1 27IN VCP214H

## (undated) DEVICE — GOWN,NON-REINFORCED,SIRUS,SET IN SLV,XXL: Brand: MEDLINE

## (undated) DEVICE — GLV SURG BIOGEL M LTX PF 8

## (undated) DEVICE — SUT PDS LP 1 TP1 96IN VIO PDP880GA

## (undated) DEVICE — TUBING, SUCTION, 1/4" X 12', STRAIGHT: Brand: MEDLINE

## (undated) DEVICE — SPNG LAP PREWSH SFTPK 18X18IN STRL PK/5

## (undated) DEVICE — IRRIGATOR BULB ASEPTO 60CC STRL